# Patient Record
Sex: MALE | Race: OTHER | HISPANIC OR LATINO
[De-identification: names, ages, dates, MRNs, and addresses within clinical notes are randomized per-mention and may not be internally consistent; named-entity substitution may affect disease eponyms.]

---

## 2024-11-05 ENCOUNTER — LABORATORY RESULT (OUTPATIENT)
Age: 64
End: 2024-11-05

## 2024-11-05 ENCOUNTER — APPOINTMENT (OUTPATIENT)
Dept: FAMILY MEDICINE | Facility: HOSPITAL | Age: 64
End: 2024-11-05

## 2024-11-05 ENCOUNTER — OUTPATIENT (OUTPATIENT)
Dept: OUTPATIENT SERVICES | Facility: HOSPITAL | Age: 64
LOS: 1 days | End: 2024-11-05
Payer: MEDICARE

## 2024-11-05 VITALS
HEIGHT: 62 IN | OXYGEN SATURATION: 96 % | WEIGHT: 158 LBS | RESPIRATION RATE: 16 BRPM | DIASTOLIC BLOOD PRESSURE: 78 MMHG | SYSTOLIC BLOOD PRESSURE: 128 MMHG | HEART RATE: 95 BPM | TEMPERATURE: 97.5 F | BODY MASS INDEX: 29.08 KG/M2

## 2024-11-05 DIAGNOSIS — Z00.00 ENCOUNTER FOR GENERAL ADULT MEDICAL EXAMINATION W/OUT ABNORMAL FINDINGS: ICD-10-CM

## 2024-11-05 DIAGNOSIS — H93.19 TINNITUS, UNSPECIFIED EAR: ICD-10-CM

## 2024-11-05 DIAGNOSIS — F10.90 ALCOHOL USE, UNSPECIFIED, UNCOMPLICATED: ICD-10-CM

## 2024-11-05 DIAGNOSIS — H91.90 UNSPECIFIED HEARING LOSS, UNSPECIFIED EAR: ICD-10-CM

## 2024-11-05 DIAGNOSIS — Z00.00 ENCOUNTER FOR GENERAL ADULT MEDICAL EXAMINATION WITHOUT ABNORMAL FINDINGS: ICD-10-CM

## 2024-11-05 DIAGNOSIS — Z12.11 ENCOUNTER FOR SCREENING FOR MALIGNANT NEOPLASM OF COLON: ICD-10-CM

## 2024-11-10 PROBLEM — E78.5 HYPERLIPIDEMIA: Status: ACTIVE | Noted: 2024-11-10

## 2024-11-10 PROBLEM — R73.03 PREDIABETES: Status: ACTIVE | Noted: 2024-11-10

## 2024-11-10 LAB
ALBUMIN SERPL ELPH-MCNC: 3.3 G/DL
ALP BLD-CCNC: 200 U/L
ALT SERPL-CCNC: 14 U/L
ANION GAP SERPL CALC-SCNC: 13 MMOL/L
AST SERPL-CCNC: 39 U/L
BASOPHILS # BLD AUTO: 0.09 K/UL
BASOPHILS NFR BLD AUTO: 1.1 %
BILIRUB SERPL-MCNC: 1 MG/DL
BUN SERPL-MCNC: 6 MG/DL
CALCIUM SERPL-MCNC: 9.3 MG/DL
CHLORIDE SERPL-SCNC: 101 MMOL/L
CHOLEST SERPL-MCNC: 180 MG/DL
CO2 SERPL-SCNC: 24 MMOL/L
CREAT SERPL-MCNC: 0.84 MG/DL
EGFR: 97 ML/MIN/1.73M2
EOSINOPHIL # BLD AUTO: 0.16 K/UL
EOSINOPHIL NFR BLD AUTO: 1.9 %
ESTIMATED AVERAGE GLUCOSE: 117 MG/DL
GLUCOSE SERPL-MCNC: 90 MG/DL
HBA1C MFR BLD HPLC: 5.7 %
HCT VFR BLD CALC: 43.4 %
HCV AB SER QL: NONREACTIVE
HCV S/CO RATIO: 0.38 S/CO
HDLC SERPL-MCNC: 25 MG/DL
HGB BLD-MCNC: 14.5 G/DL
HIV1+2 AB SPEC QL IA.RAPID: NONREACTIVE
IMM GRANULOCYTES NFR BLD AUTO: 0.2 %
LDLC SERPL CALC-MCNC: 131 MG/DL
LYMPHOCYTES # BLD AUTO: 2.74 K/UL
LYMPHOCYTES NFR BLD AUTO: 32 %
MAN DIFF?: NORMAL
MCHC RBC-ENTMCNC: 31.5 PG
MCHC RBC-ENTMCNC: 33.4 G/DL
MCV RBC AUTO: 94.3 FL
MONOCYTES # BLD AUTO: 0.88 K/UL
MONOCYTES NFR BLD AUTO: 10.3 %
NEUTROPHILS # BLD AUTO: 4.68 K/UL
NEUTROPHILS NFR BLD AUTO: 54.5 %
NONHDLC SERPL-MCNC: 154 MG/DL
PLATELET # BLD AUTO: 163 K/UL
POTASSIUM SERPL-SCNC: 4.1 MMOL/L
PROT SERPL-MCNC: 8.6 G/DL
RBC # BLD: 4.6 M/UL
RBC # FLD: 13.3 %
SODIUM SERPL-SCNC: 138 MMOL/L
TRIGL SERPL-MCNC: 127 MG/DL
TSH SERPL-ACNC: 6.28 UIU/ML
WBC # FLD AUTO: 8.57 K/UL

## 2024-11-16 ENCOUNTER — APPOINTMENT (OUTPATIENT)
Dept: FAMILY MEDICINE | Facility: HOSPITAL | Age: 64
End: 2024-11-16

## 2024-11-16 ENCOUNTER — OUTPATIENT (OUTPATIENT)
Dept: OUTPATIENT SERVICES | Facility: HOSPITAL | Age: 64
LOS: 1 days | End: 2024-11-16
Payer: MEDICARE

## 2024-11-16 VITALS
DIASTOLIC BLOOD PRESSURE: 84 MMHG | HEART RATE: 86 BPM | WEIGHT: 158 LBS | TEMPERATURE: 98 F | OXYGEN SATURATION: 99 % | HEIGHT: 62 IN | SYSTOLIC BLOOD PRESSURE: 128 MMHG | RESPIRATION RATE: 16 BRPM | BODY MASS INDEX: 29.08 KG/M2

## 2024-11-16 DIAGNOSIS — F32.A ANXIETY DISORDER, UNSPECIFIED: ICD-10-CM

## 2024-11-16 DIAGNOSIS — R11.2 NAUSEA WITH VOMITING, UNSPECIFIED: ICD-10-CM

## 2024-11-16 DIAGNOSIS — Z00.00 ENCOUNTER FOR GENERAL ADULT MEDICAL EXAMINATION WITHOUT ABNORMAL FINDINGS: ICD-10-CM

## 2024-11-16 DIAGNOSIS — R10.9 UNSPECIFIED ABDOMINAL PAIN: ICD-10-CM

## 2024-11-16 DIAGNOSIS — R19.5 OTHER FECAL ABNORMALITIES: ICD-10-CM

## 2024-11-16 DIAGNOSIS — F41.9 ANXIETY DISORDER, UNSPECIFIED: ICD-10-CM

## 2024-11-16 DIAGNOSIS — E03.9 HYPOTHYROIDISM, UNSPECIFIED: ICD-10-CM

## 2024-11-16 DIAGNOSIS — K21.9 GASTRO-ESOPHAGEAL REFLUX DISEASE W/OUT ESOPHAGITIS: ICD-10-CM

## 2024-11-16 LAB — HEMOCCULT STL QL IA: POSITIVE

## 2024-11-16 PROCEDURE — G0463: CPT

## 2024-11-16 PROCEDURE — 87338 HPYLORI STOOL AG IA: CPT

## 2024-11-16 RX ORDER — FAMOTIDINE 20 MG/1
20 TABLET, FILM COATED ORAL
Qty: 30 | Refills: 2 | Status: ACTIVE | COMMUNITY
Start: 2024-11-16 | End: 1900-01-01

## 2024-11-16 RX ORDER — LEVOTHYROXINE SODIUM 0.07 MG/1
75 TABLET ORAL DAILY
Qty: 90 | Refills: 0 | Status: ACTIVE | COMMUNITY
Start: 2024-11-16 | End: 1900-01-01

## 2024-11-16 RX ORDER — ONDANSETRON 4 MG/1
4 TABLET, ORALLY DISINTEGRATING ORAL TWICE DAILY
Qty: 10 | Refills: 0 | Status: ACTIVE | COMMUNITY
Start: 2024-11-16 | End: 1900-01-01

## 2024-11-19 ENCOUNTER — APPOINTMENT (OUTPATIENT)
Dept: OTOLARYNGOLOGY | Facility: CLINIC | Age: 64
End: 2024-11-19
Payer: MEDICARE

## 2024-11-19 VITALS
RESPIRATION RATE: 16 BRPM | SYSTOLIC BLOOD PRESSURE: 124 MMHG | HEART RATE: 85 BPM | OXYGEN SATURATION: 96 % | HEIGHT: 62 IN | WEIGHT: 158 LBS | BODY MASS INDEX: 29.08 KG/M2 | TEMPERATURE: 97.3 F | DIASTOLIC BLOOD PRESSURE: 78 MMHG

## 2024-11-19 DIAGNOSIS — H91.90 UNSPECIFIED HEARING LOSS, UNSPECIFIED EAR: ICD-10-CM

## 2024-11-19 DIAGNOSIS — H93.19 TINNITUS, UNSPECIFIED EAR: ICD-10-CM

## 2024-11-19 PROCEDURE — 99202 OFFICE O/P NEW SF 15 MIN: CPT

## 2024-11-20 PROCEDURE — 82274 ASSAY TEST FOR BLOOD FECAL: CPT

## 2024-11-20 PROCEDURE — 80061 LIPID PANEL: CPT

## 2024-11-20 PROCEDURE — 36415 COLL VENOUS BLD VENIPUNCTURE: CPT

## 2024-11-20 PROCEDURE — 84439 ASSAY OF FREE THYROXINE: CPT

## 2024-11-20 PROCEDURE — 80053 COMPREHEN METABOLIC PANEL: CPT

## 2024-11-20 PROCEDURE — 86803 HEPATITIS C AB TEST: CPT

## 2024-11-20 PROCEDURE — G0439: CPT

## 2024-11-20 PROCEDURE — 90686 IIV4 VACC NO PRSV 0.5 ML IM: CPT

## 2024-11-20 PROCEDURE — 90471 IMMUNIZATION ADMIN: CPT

## 2024-11-20 PROCEDURE — 85025 COMPLETE CBC W/AUTO DIFF WBC: CPT

## 2024-11-20 PROCEDURE — 84443 ASSAY THYROID STIM HORMONE: CPT

## 2024-11-20 PROCEDURE — 87389 HIV-1 AG W/HIV-1&-2 AB AG IA: CPT

## 2024-11-20 PROCEDURE — 83036 HEMOGLOBIN GLYCOSYLATED A1C: CPT

## 2024-11-21 ENCOUNTER — NON-APPOINTMENT (OUTPATIENT)
Age: 64
End: 2024-11-21

## 2024-11-21 DIAGNOSIS — A04.8 OTHER SPECIFIED BACTERIAL INTESTINAL INFECTIONS: ICD-10-CM

## 2024-11-21 LAB — H PYLORI AG STL QL: POSITIVE

## 2024-11-21 RX ORDER — METRONIDAZOLE 500 MG/1
500 TABLET ORAL 3 TIMES DAILY
Qty: 42 | Refills: 0 | Status: ACTIVE | COMMUNITY
Start: 2024-11-21 | End: 1900-01-01

## 2024-11-21 RX ORDER — TETRACYCLINE HYDROCHLORIDE 500 MG/1
500 TABLET, FILM COATED ORAL EVERY 6 HOURS
Qty: 56 | Refills: 0 | Status: ACTIVE | COMMUNITY
Start: 2024-11-21 | End: 1900-01-01

## 2024-11-21 RX ORDER — BISMUTH SUBSALICYLATE 262 MG/1
262 TABLET, CHEWABLE ORAL 4 TIMES DAILY
Qty: 112 | Refills: 0 | Status: ACTIVE | COMMUNITY
Start: 2024-11-21 | End: 1900-01-01

## 2024-11-21 RX ORDER — PANTOPRAZOLE 40 MG/1
40 TABLET, DELAYED RELEASE ORAL
Qty: 28 | Refills: 0 | Status: ACTIVE | COMMUNITY
Start: 2024-11-21 | End: 1900-01-01

## 2024-11-22 ENCOUNTER — NON-APPOINTMENT (OUTPATIENT)
Age: 64
End: 2024-11-22

## 2024-11-25 ENCOUNTER — NON-APPOINTMENT (OUTPATIENT)
Age: 64
End: 2024-11-25

## 2024-12-03 ENCOUNTER — DOCTOR'S OFFICE (OUTPATIENT)
Facility: LOCATION | Age: 64
Setting detail: OPHTHALMOLOGY
End: 2024-12-03
Payer: MEDICARE

## 2024-12-03 ENCOUNTER — OUTPATIENT (OUTPATIENT)
Dept: OUTPATIENT SERVICES | Facility: HOSPITAL | Age: 64
LOS: 1 days | End: 2024-12-03
Payer: MEDICARE

## 2024-12-03 DIAGNOSIS — H11.153: ICD-10-CM

## 2024-12-03 DIAGNOSIS — H17.9: ICD-10-CM

## 2024-12-03 DIAGNOSIS — H25.12: ICD-10-CM

## 2024-12-03 DIAGNOSIS — H00.022: ICD-10-CM

## 2024-12-03 DIAGNOSIS — Z00.00 ENCOUNTER FOR GENERAL ADULT MEDICAL EXAMINATION WITHOUT ABNORMAL FINDINGS: ICD-10-CM

## 2024-12-03 DIAGNOSIS — H00.025: ICD-10-CM

## 2024-12-03 LAB
ALBUMIN SERPL ELPH-MCNC: 3.3 G/DL
ALP BLD-CCNC: 195 U/L
ALT SERPL-CCNC: 16 U/L
ANION GAP SERPL CALC-SCNC: 11 MMOL/L
AST SERPL-CCNC: 35 U/L
BILIRUB SERPL-MCNC: 1 MG/DL
BUN SERPL-MCNC: 10 MG/DL
CALCIUM SERPL-MCNC: 9.3 MG/DL
CHLORIDE SERPL-SCNC: 104 MMOL/L
CO2 SERPL-SCNC: 26 MMOL/L
CREAT SERPL-MCNC: 0.97 MG/DL
EGFR: 87 ML/MIN/1.73M2
GLUCOSE SERPL-MCNC: 91 MG/DL
POTASSIUM SERPL-SCNC: 4.1 MMOL/L
PROT SERPL-MCNC: 8.6 G/DL
SODIUM SERPL-SCNC: 141 MMOL/L

## 2024-12-03 PROCEDURE — 74177 CT ABD & PELVIS W/CONTRAST: CPT | Mod: 26,MH

## 2024-12-03 PROCEDURE — 99204 OFFICE O/P NEW MOD 45 MIN: CPT | Performed by: OPHTHALMOLOGY

## 2024-12-03 PROCEDURE — 74177 CT ABD & PELVIS W/CONTRAST: CPT

## 2024-12-03 ASSESSMENT — TONOMETRY
OS_IOP_MMHG: 12
OD_IOP_MMHG: 16

## 2024-12-03 ASSESSMENT — KERATOMETRY
OS_AXISANGLE_DEGREES: 023
OS_K2POWER_DIOPTERS: 44.75
OD_AXISANGLE_DEGREES: 006
OD_K1POWER_DIOPTERS: 44.00
OS_K1POWER_DIOPTERS: 43.25
OD_K2POWER_DIOPTERS: 44.75

## 2024-12-03 ASSESSMENT — REFRACTION_AUTOREFRACTION
OD_CYLINDER: +0.25
OD_AXIS: 007
OS_SPHERE: -19.25
OS_CYLINDER: +0.25
OS_AXIS: 007

## 2024-12-03 ASSESSMENT — CONFRONTATIONAL VISUAL FIELD TEST (CVF)
OS_FINDINGS: FULL
OD_FINDINGS: FULL

## 2024-12-03 ASSESSMENT — VISUAL ACUITY
OS_BCVA: 20/LP
OD_BCVA: CF@2FT

## 2024-12-03 ASSESSMENT — LID EXAM ASSESSMENTS
OD_MEIBOMITIS: RLL
OS_MEIBOMITIS: LLL

## 2024-12-10 ENCOUNTER — RESULT REVIEW (OUTPATIENT)
Age: 64
End: 2024-12-10

## 2024-12-10 ENCOUNTER — NON-APPOINTMENT (OUTPATIENT)
Age: 64
End: 2024-12-10

## 2024-12-10 DIAGNOSIS — R59.9 ENLARGED LYMPH NODES, UNSPECIFIED: ICD-10-CM

## 2024-12-10 DIAGNOSIS — K76.89 OTHER SPECIFIED DISEASES OF LIVER: ICD-10-CM

## 2024-12-10 DIAGNOSIS — K70.30 ALCOHOLIC CIRRHOSIS OF LIVER W/OUT ASCITES: ICD-10-CM

## 2024-12-11 ENCOUNTER — APPOINTMENT (OUTPATIENT)
Dept: GASTROENTEROLOGY | Facility: CLINIC | Age: 64
End: 2024-12-11
Payer: MEDICARE

## 2024-12-11 ENCOUNTER — TRANSCRIPTION ENCOUNTER (OUTPATIENT)
Age: 64
End: 2024-12-11

## 2024-12-11 VITALS
HEIGHT: 62 IN | BODY MASS INDEX: 28.71 KG/M2 | OXYGEN SATURATION: 97 % | SYSTOLIC BLOOD PRESSURE: 136 MMHG | HEART RATE: 112 BPM | DIASTOLIC BLOOD PRESSURE: 87 MMHG | TEMPERATURE: 97.5 F | RESPIRATION RATE: 16 BRPM | WEIGHT: 156 LBS

## 2024-12-11 DIAGNOSIS — R74.8 ABNORMAL LEVELS OF OTHER SERUM ENZYMES: ICD-10-CM

## 2024-12-11 DIAGNOSIS — K74.60 UNSPECIFIED CIRRHOSIS OF LIVER: ICD-10-CM

## 2024-12-11 PROBLEM — I71.40 ABDOMINAL AORTIC ANEURYSM: Status: ACTIVE | Noted: 2024-12-11

## 2024-12-11 PROCEDURE — 99204 OFFICE O/P NEW MOD 45 MIN: CPT

## 2024-12-11 RX ORDER — SODIUM SULFATE, POTASSIUM SULFATE AND MAGNESIUM SULFATE 1.6; 3.13; 17.5 G/177ML; G/177ML; G/177ML
17.5-3.13-1.6 SOLUTION ORAL
Qty: 1 | Refills: 0 | Status: ACTIVE | COMMUNITY
Start: 2024-12-11 | End: 1900-01-01

## 2024-12-18 ENCOUNTER — DOCTOR'S OFFICE (OUTPATIENT)
Facility: LOCATION | Age: 64
Setting detail: OPHTHALMOLOGY
End: 2024-12-18
Payer: MEDICARE

## 2024-12-18 DIAGNOSIS — H00.022: ICD-10-CM

## 2024-12-18 DIAGNOSIS — H17.9: ICD-10-CM

## 2024-12-18 DIAGNOSIS — H11.153: ICD-10-CM

## 2024-12-18 DIAGNOSIS — H25.13: ICD-10-CM

## 2024-12-18 DIAGNOSIS — H00.025: ICD-10-CM

## 2024-12-18 DIAGNOSIS — H25.11: ICD-10-CM

## 2024-12-18 PROBLEM — H25.12 CATARACT SENILE NUCLEAR SCLEROSIS; RIGHT EYE, LEFT EYE, BOTH EYES: Status: ACTIVE | Noted: 2024-12-18

## 2024-12-18 PROCEDURE — 99213 OFFICE O/P EST LOW 20 MIN: CPT | Performed by: OPHTHALMOLOGY

## 2024-12-18 PROCEDURE — 76519 ECHO EXAM OF EYE: CPT | Mod: 26,RT | Performed by: OPHTHALMOLOGY

## 2024-12-18 PROCEDURE — 76519 ECHO EXAM OF EYE: CPT | Mod: TC | Performed by: OPHTHALMOLOGY

## 2024-12-18 ASSESSMENT — KERATOMETRY
OS_K1POWER_DIOPTERS: 43.00
OD_K1POWER_DIOPTERS: 44.50
OS_K2POWER_DIOPTERS: 44.75
OD_K2POWER_DIOPTERS: 45.25
OD_AXISANGLE_DEGREES: 156
OS_AXISANGLE_DEGREES: 016

## 2024-12-18 ASSESSMENT — VISUAL ACUITY
OD_BCVA: CF@2FT
OS_BCVA: 20/LP

## 2024-12-18 ASSESSMENT — REFRACTION_AUTOREFRACTION
OS_SPHERE: -19.25
OD_AXIS: 005
OD_CYLINDER: +0.25
OS_AXIS: 006
OS_CYLINDER: +0.25
OD_SPHERE: -19.25

## 2024-12-18 ASSESSMENT — CONFRONTATIONAL VISUAL FIELD TEST (CVF)
OD_FINDINGS: FULL
OS_FINDINGS: FULL

## 2024-12-19 ENCOUNTER — OUTPATIENT (OUTPATIENT)
Dept: OUTPATIENT SERVICES | Facility: HOSPITAL | Age: 64
LOS: 1 days | End: 2024-12-19
Payer: MEDICARE

## 2024-12-19 ENCOUNTER — APPOINTMENT (OUTPATIENT)
Dept: NUCLEAR MEDICINE | Facility: IMAGING CENTER | Age: 64
End: 2024-12-19

## 2024-12-19 DIAGNOSIS — K76.89 OTHER SPECIFIED DISEASES OF LIVER: ICD-10-CM

## 2024-12-19 DIAGNOSIS — R59.9 ENLARGED LYMPH NODES, UNSPECIFIED: ICD-10-CM

## 2024-12-19 DIAGNOSIS — K74.60 UNSPECIFIED CIRRHOSIS OF LIVER: ICD-10-CM

## 2024-12-19 PROCEDURE — 78815 PET IMAGE W/CT SKULL-THIGH: CPT | Mod: 26,PI

## 2024-12-19 PROCEDURE — A9552: CPT

## 2024-12-19 PROCEDURE — 78815 PET IMAGE W/CT SKULL-THIGH: CPT

## 2025-01-04 ENCOUNTER — NON-APPOINTMENT (OUTPATIENT)
Age: 65
End: 2025-01-04

## 2025-01-04 DIAGNOSIS — R94.8 ABNORMAL RESULTS OF FUNCTION STUDIES OF OTHER ORGANS AND SYSTEMS: ICD-10-CM

## 2025-01-25 ENCOUNTER — NON-APPOINTMENT (OUTPATIENT)
Age: 65
End: 2025-01-25

## 2025-02-12 ENCOUNTER — NON-APPOINTMENT (OUTPATIENT)
Age: 65
End: 2025-02-12

## 2025-02-20 ENCOUNTER — APPOINTMENT (OUTPATIENT)
Dept: GASTROENTEROLOGY | Facility: HOSPITAL | Age: 65
End: 2025-02-20

## 2025-06-02 ENCOUNTER — APPOINTMENT (OUTPATIENT)
Age: 65
End: 2025-06-02

## 2025-06-02 ENCOUNTER — INPATIENT (INPATIENT)
Facility: HOSPITAL | Age: 65
LOS: 9 days | Discharge: ANOTHER HOSPITAL | DRG: 641 | End: 2025-06-12
Attending: FAMILY MEDICINE | Admitting: INTERNAL MEDICINE
Payer: MEDICARE

## 2025-06-02 ENCOUNTER — OUTPATIENT (OUTPATIENT)
Dept: OUTPATIENT SERVICES | Facility: HOSPITAL | Age: 65
LOS: 1 days | End: 2025-06-02

## 2025-06-02 VITALS
HEART RATE: 110 BPM | DIASTOLIC BLOOD PRESSURE: 69 MMHG | WEIGHT: 137 LBS | TEMPERATURE: 98 F | RESPIRATION RATE: 14 BRPM | SYSTOLIC BLOOD PRESSURE: 112 MMHG | OXYGEN SATURATION: 97 % | BODY MASS INDEX: 25.06 KG/M2

## 2025-06-02 VITALS
SYSTOLIC BLOOD PRESSURE: 117 MMHG | RESPIRATION RATE: 18 BRPM | OXYGEN SATURATION: 91 % | TEMPERATURE: 98 F | HEART RATE: 103 BPM | DIASTOLIC BLOOD PRESSURE: 73 MMHG | HEIGHT: 66 IN | WEIGHT: 136.91 LBS

## 2025-06-02 DIAGNOSIS — E87.70 FLUID OVERLOAD, UNSPECIFIED: ICD-10-CM

## 2025-06-02 DIAGNOSIS — Z00.00 ENCOUNTER FOR GENERAL ADULT MEDICAL EXAMINATION WITHOUT ABNORMAL FINDINGS: ICD-10-CM

## 2025-06-02 LAB
ALBUMIN SERPL ELPH-MCNC: 1.3 G/DL — LOW (ref 3.3–5)
ALP SERPL-CCNC: 218 U/L — HIGH (ref 40–120)
ALT FLD-CCNC: <6 U/L — LOW (ref 10–45)
AMMONIA BLD-MCNC: <10 UMOL/L — LOW (ref 11–55)
ANION GAP SERPL CALC-SCNC: 8 MMOL/L — SIGNIFICANT CHANGE UP (ref 5–17)
APTT BLD: 38.1 SEC — HIGH (ref 26.1–36.8)
AST SERPL-CCNC: 33 U/L — SIGNIFICANT CHANGE UP (ref 10–40)
BASOPHILS # BLD AUTO: 0.07 K/UL — SIGNIFICANT CHANGE UP (ref 0–0.2)
BASOPHILS NFR BLD AUTO: 0.8 % — SIGNIFICANT CHANGE UP (ref 0–2)
BILIRUB SERPL-MCNC: 1 MG/DL — SIGNIFICANT CHANGE UP (ref 0.2–1.2)
BUN SERPL-MCNC: 8 MG/DL — SIGNIFICANT CHANGE UP (ref 7–23)
CALCIUM SERPL-MCNC: 8.1 MG/DL — LOW (ref 8.4–10.5)
CHLORIDE SERPL-SCNC: 101 MMOL/L — SIGNIFICANT CHANGE UP (ref 96–108)
CO2 SERPL-SCNC: 25 MMOL/L — SIGNIFICANT CHANGE UP (ref 22–31)
CREAT SERPL-MCNC: 0.8 MG/DL — SIGNIFICANT CHANGE UP (ref 0.5–1.3)
EGFR: 98 ML/MIN/1.73M2 — SIGNIFICANT CHANGE UP
EGFR: 98 ML/MIN/1.73M2 — SIGNIFICANT CHANGE UP
EOSINOPHIL # BLD AUTO: 0.06 K/UL — SIGNIFICANT CHANGE UP (ref 0–0.5)
EOSINOPHIL NFR BLD AUTO: 0.7 % — SIGNIFICANT CHANGE UP (ref 0–6)
GLUCOSE SERPL-MCNC: 92 MG/DL — SIGNIFICANT CHANGE UP (ref 70–99)
HCT VFR BLD CALC: 31.2 % — LOW (ref 39–50)
HGB BLD-MCNC: 10.8 G/DL — LOW (ref 13–17)
IMM GRANULOCYTES NFR BLD AUTO: 0.3 % — SIGNIFICANT CHANGE UP (ref 0–0.9)
INR BLD: 1.56 RATIO — HIGH (ref 0.85–1.16)
LIDOCAIN IGE QN: 57 U/L — SIGNIFICANT CHANGE UP (ref 16–77)
LYMPHOCYTES # BLD AUTO: 1.42 K/UL — SIGNIFICANT CHANGE UP (ref 1–3.3)
LYMPHOCYTES # BLD AUTO: 15.6 % — SIGNIFICANT CHANGE UP (ref 13–44)
MAGNESIUM SERPL-MCNC: 1.7 MG/DL — SIGNIFICANT CHANGE UP (ref 1.6–2.6)
MCHC RBC-ENTMCNC: 31.6 PG — SIGNIFICANT CHANGE UP (ref 27–34)
MCHC RBC-ENTMCNC: 34.6 G/DL — SIGNIFICANT CHANGE UP (ref 32–36)
MCV RBC AUTO: 91.2 FL — SIGNIFICANT CHANGE UP (ref 80–100)
MONOCYTES # BLD AUTO: 1.03 K/UL — HIGH (ref 0–0.9)
MONOCYTES NFR BLD AUTO: 11.3 % — SIGNIFICANT CHANGE UP (ref 2–14)
NEUTROPHILS # BLD AUTO: 6.51 K/UL — SIGNIFICANT CHANGE UP (ref 1.8–7.4)
NEUTROPHILS NFR BLD AUTO: 71.3 % — SIGNIFICANT CHANGE UP (ref 43–77)
NRBC BLD AUTO-RTO: 0 /100 WBCS — SIGNIFICANT CHANGE UP (ref 0–0)
NT-PROBNP SERPL-SCNC: 372 PG/ML — HIGH (ref 0–300)
PLATELET # BLD AUTO: 380 K/UL — SIGNIFICANT CHANGE UP (ref 150–400)
POTASSIUM SERPL-MCNC: 3.7 MMOL/L — SIGNIFICANT CHANGE UP (ref 3.5–5.3)
POTASSIUM SERPL-SCNC: 3.7 MMOL/L — SIGNIFICANT CHANGE UP (ref 3.5–5.3)
PROT SERPL-MCNC: 9.1 G/DL — HIGH (ref 6–8.3)
PROTHROM AB SERPL-ACNC: 18.3 SEC — HIGH (ref 9.9–13.4)
RBC # BLD: 3.42 M/UL — LOW (ref 4.2–5.8)
RBC # FLD: 14.7 % — HIGH (ref 10.3–14.5)
SODIUM SERPL-SCNC: 134 MMOL/L — LOW (ref 135–145)
TROPONIN I, HIGH SENSITIVITY RESULT: 6.7 NG/L — SIGNIFICANT CHANGE UP
WBC # BLD: 9.12 K/UL — SIGNIFICANT CHANGE UP (ref 3.8–10.5)
WBC # FLD AUTO: 9.12 K/UL — SIGNIFICANT CHANGE UP (ref 3.8–10.5)

## 2025-06-02 PROCEDURE — 71045 X-RAY EXAM CHEST 1 VIEW: CPT | Mod: 26

## 2025-06-02 PROCEDURE — 99285 EMERGENCY DEPT VISIT HI MDM: CPT

## 2025-06-02 PROCEDURE — 93010 ELECTROCARDIOGRAM REPORT: CPT

## 2025-06-02 PROCEDURE — 99223 1ST HOSP IP/OBS HIGH 75: CPT | Mod: GC

## 2025-06-02 PROCEDURE — 93970 EXTREMITY STUDY: CPT | Mod: 26

## 2025-06-02 RX ORDER — MELATONIN 5 MG
3 TABLET ORAL AT BEDTIME
Refills: 0 | Status: DISCONTINUED | OUTPATIENT
Start: 2025-06-02 | End: 2025-06-12

## 2025-06-02 RX ORDER — FUROSEMIDE 10 MG/ML
40 INJECTION INTRAMUSCULAR; INTRAVENOUS ONCE
Refills: 0 | Status: COMPLETED | OUTPATIENT
Start: 2025-06-02 | End: 2025-06-02

## 2025-06-02 RX ORDER — MAGNESIUM, ALUMINUM HYDROXIDE 200-200 MG
30 TABLET,CHEWABLE ORAL EVERY 4 HOURS
Refills: 0 | Status: DISCONTINUED | OUTPATIENT
Start: 2025-06-02 | End: 2025-06-12

## 2025-06-02 RX ORDER — B1/B2/B3/B5/B6/B12/VIT C/FOLIC 500-0.5 MG
1 TABLET ORAL DAILY
Refills: 0 | Status: DISCONTINUED | OUTPATIENT
Start: 2025-06-02 | End: 2025-06-12

## 2025-06-02 RX ORDER — ONDANSETRON HCL/PF 4 MG/2 ML
4 VIAL (ML) INJECTION EVERY 8 HOURS
Refills: 0 | Status: DISCONTINUED | OUTPATIENT
Start: 2025-06-02 | End: 2025-06-12

## 2025-06-02 RX ADMIN — Medication 500 MILLILITER(S): at 18:58

## 2025-06-02 RX ADMIN — Medication 500 MILLILITER(S): at 20:00

## 2025-06-02 RX ADMIN — FUROSEMIDE 40 MILLIGRAM(S): 10 INJECTION INTRAMUSCULAR; INTRAVENOUS at 21:20

## 2025-06-02 NOTE — ED ADULT NURSE NOTE - NSFALLHARMRISKINTERV_ED_ALL_ED

## 2025-06-02 NOTE — ED PROVIDER NOTE - PHYSICAL EXAMINATION
VITAL SIGNS: I have reviewed nursing notes and confirm.   GEN: Well-developed; well-nourished; in no acute distress. Speaking full sentences.  SKIN: Warm, pink, no rash, no diaphoresis, no cyanosis, well perfused.   HEAD: Normocephalic; atraumatic.    NECK: Supple; non tender. Full range of motion. (+) nonmeningitic.  EYES: Pupils 3mm equal, round, reactive to light and accomodation, conjunctiva and sclera clear. Extra-ocular movements intact bilaterally.  ENT: No nasal discharge; airway clear. Trachea is midline.    CV: (+) tachycardia, S1, S2 normal; no murmurs, gallops, or rubs. Capillary refill < 2 seconds throughout. Distal pulses intact 2+ throughout. (+) BL LE pitting edema up to knees.  RESP: CTA bilaterally. No wheezes, rales, or rhonchi.   ABD: Normal bowel sounds, soft, (+) tense, (+) moderately distended, no rebound, no guarding, no rigidity   MSK: Normal range of motion and movement of all 4 extremities. No apparent joint or muscular pain throughout.    BACK: No thoracolumbar midline or paravertebral tenderness.    NEURO: Alert & oriented x 3, Grossly unremarkable. Sensory and motor intact throughout. No focal deficits.  Normal speech and coordination.

## 2025-06-02 NOTE — ED ADULT NURSE REASSESSMENT NOTE - NS ED NURSE REASSESS COMMENT FT1
Patient received from previous RN. Offering no complaints at this time. US in progress. Pending CT scans. Vitals as per flow sheet. Safety maintained. Comfort provided.

## 2025-06-02 NOTE — ED PROVIDER NOTE - PROGRESS NOTE DETAILS
pt wont lie flat for ct. gets very anxious and says he cant breath. doubt PE - h and p and CXR consistent with pleural effusions likely from etoh cirrhosis. admitted to med for thearaputic tap and reassessment for need ct ct chest is hypoxia doesn't improve after that. pt well appearing. no resp distress. currently on NC at 2LPM spo2 94%. pt looks cmfortable at this time.

## 2025-06-02 NOTE — ED PROVIDER NOTE - EKG/XRAY ADDITIONAL INFORMATION
sinus tachycardia at 101 bpm, WI interval at 132 ms, QRS at 78 ms, QTc at 466 ms, there are no ST elevations or ST depressions.

## 2025-06-02 NOTE — H&P ADULT - HISTORY OF PRESENT ILLNESS
Medication Name: LYRICA   Last office visit: 11-2-21  Next office visit: 1-10-22  Last refill: 11-23-21  Is patient due for refill: Yes   63 y/o male with PMHx of HLD, liver cirrhosis 2/2 ETOH use disorder, positive FIT test (cancelled colonoscopy 1/2025), Hpylori + 11/16/24 (treatment???***), GERD, hyperlipidemia, hypothyroidism sent from family medicine practice for shortness of breath today.  Pt had lost to follow up at clinic [place]. Per daughter at the bedside been reporting progressively worsening shortness of breath associated with abdominal distention, bilateral lower extremity worsening pitting edema  Denies  chest pain, syncope, lightheadedness, headaches, falls or nausea/vomiting/diarrhea, confusion, visual complaints, neck pain/stiffness, subjective neurological deficits, numbness/tingling.    ED course: VS T 97.5*F, , /73, RR 18, SpO2 91%  s/p NaCl 500mL  Labs: hgb 10.8, Hct 31.2, Na 134, Ca 8.1, albumin 1.3, Alk phosph 218, proBNP 372  EKG: accelerated junctional rhythm, rate 101 bpm, QTc 464ms    b/l duplex: No evidence of deep venous thrombosis in either lower extremity.  CXR showing right moderate pleural effusion and small left pleural effusion. 65 y/o male with PMHx of HLD, liver cirrhosis 2/2 ETOH use disorder (stopped drinking 1-2 yeas ago), positive FIT test (cancelled colonoscopy 1/2025), Hpylori + 11/16/24 (did not complete treatment), GERD, hyperlipidemia, hypothyroidism sent from family medicine practice for shortness of breath today.  Pt had lost to follow up at clinic as he left to [facility] in 12/2024. CT abd/pelvis ordered 12/10/2024 for abdominal pain showed cirrhotic changes of liver, abdominal aortic aneurysm, portacaval and peripancreatic lymph nodes, and  small nodule adjacent to R liver for which PET CT was recommended.  Per daughter Ada, pt returned from [facility] 6/1/2025 where he has seeing a natural medicine doctor and was taking various meds {epadex (B12, folate,oemga3), sadur, colitisan, vivitar, rodim (domperidone/rabeprazole)} along with torsemide 10mg BID. Daughter says he was receiving some injections but does not know what they were. Patient is poor historian but notes worsening shortness of breath, abdominal distention, bilateral lower extremity pitting edema for the past 1 wk. Denies chest pain, syncope, lightheadedness, headaches, falls or nausea/vomiting/diarrhea, confusion, visual complaints, neck pain/stiffness, subjective neurological deficits, numbness/tingling.    ED course: VS T 97.5*F, , /73, RR 18, SpO2 91% >97% on 4 L NC  s/p NaCl 500mL, IV lasix 40mg    Labs: hgb 10.8, Hct 31.2, Na 134, Ca 8.1, albumin 1.3, Alk phosph 218, proBNP 372  EKG: accelerated junctional rhythm, rate 101 bpm, QTc 464ms    b/l duplex: No evidence of deep venous thrombosis in either lower extremity.    CXR showing right moderate pleural effusion and small left pleural effusion.    Preliminary CT chest/abd/pelvisresult: No obvious PE to the level of the segmental pulmonary arteries. Difficult to assess subsegmental pulmonary arteries at the left lung base. Large right and moderate to large left pleural effusion with atelectasis. Nonspecific interlobar septal thickening. Cirrhosis and portal hypertension. Moderate to large ascites. Cholelithiasis. Nonspecific small and large bowel wall thickening in the setting of ascites/cirrhosis. Official report to follow.

## 2025-06-02 NOTE — ED PROVIDER NOTE - CLINICAL SUMMARY MEDICAL DECISION MAKING FREE TEXT BOX
64-year-old male with a past medical history of alcohol dependence complicated with liver cirrhosis, GERD, H. pylori infection, hyperlipidemia, hypothyroidism, sent from family medicine practice for shortness of breath today.  Per daughter at the bedside been reporting progressively worsening shortness of breath associated with abdominal distention.   The patient also has been noticing bilateral lower extremity worsening pitting edema bilaterally. Patient denies any chest pain or syncope or lightheadedness or headaches or falls or nausea/vomiting or diarrhea or confusion or visual complaints or neck pain/stiffness or subjective neurological deficits or numbness/tingling    The differential diagnosis includes but is not limited to: ascites from cirrhosis requiring therapeutic tap, rule out PE, pna, viral illness, atypical acs.    AP: 64-year-old male with a past medical history of alcohol dependence complicated with liver cirrhosis, GERD, H. pylori infection, hyperlipidemia, hypothyroidism, sent from family medicine practice for shortness of breath, worsening abdominal distention, worsening bl le pitting edema today. Vitals (+)tachycardia, 99% on RA, BP normotensive. No airway compromise and speaking full sentences. Mentating well, no confusion.  - cbc/cmp, coags, trop, bnp, IVF gentle hydration  - doubt sbp as he is afebrile and no abdominal ttp.  - CTA chest and CT AP for evaluation rule out pe and ascites.   - re-evaluation and disposition accoridngly, likely admit for therapeutic peritoneal tap. 64-year-old male with a past medical history of alcohol dependence complicated with liver cirrhosis, GERD, H. pylori infection, hyperlipidemia, hypothyroidism, sent from family medicine practice for shortness of breath today.  Per daughter at the bedside been reporting progressively worsening shortness of breath associated with abdominal distention.   The patient also has been noticing bilateral lower extremity worsening pitting edema bilaterally. Patient denies any chest pain or syncope or lightheadedness or headaches or falls or nausea/vomiting or diarrhea or confusion or visual complaints or neck pain/stiffness or subjective neurological deficits or numbness/tingling    The differential diagnosis includes but is not limited to: ascites from cirrhosis requiring therapeutic tap, rule out PE, pna, viral illness, atypical acs.    AP: 64-year-old male with a past medical history of alcohol dependence complicated with liver cirrhosis, GERD, H. pylori infection, hyperlipidemia, hypothyroidism, sent from family medicine practice for shortness of breath, worsening abdominal distention, worsening bl le pitting edema today. Vitals (+)tachycardia, 99% on RA, BP normotensive. No airway compromise and speaking full sentences. Mentating well, no confusion.  - cbc/cmp, coags, trop, bnp, IVF gentle hydration  - doubt sbp as he is afebrile and no abdominal ttp.  - CTA chest and CT AP for evaluation rule out pe and ascites.   - re-evaluation and disposition accoridngly, likely admit for therapeutic peritoneal tap.    715pm CXR showing right moderate pleural effusion and small left pleural effusion.

## 2025-06-02 NOTE — ED ADULT TRIAGE NOTE - CHIEF COMPLAINT QUOTE
pt BIB EMS from PCP office for c/o SOB x 3 days, pt also with c.o abd distention, pain and B/L LE swelling. Per PCP pt with suspected colon CA with mets to lung and suspecting volume overload at this time. O2 sat 91% on arrival to ED.

## 2025-06-02 NOTE — H&P ADULT - ASSESSMENT
65 y/o male with PMHx of HLD, liver cirrhosis 2/2 ETOH use disorder, positive FIT test (cancelled colonoscopy 1/2025), Hpylori + 11/16/24 (treatment???***), GERD, hyperlipidemia, hypothyroidism is admitted for      #decompensated alcoholic cirrhosis  #SOB  #b/l LE edema  - Consider RUQ US  - Ammonia level <10  - Monitor Is and Os, daily weight  - Supplementation with thiamine, MVI, and folate****  - CIWA protocol***  - follow up B12, folate levels  - MELD-Na __.   - spironolactone ***  - Monitor LFTs and coags daily   - GI consult ***    #hypothyroidism  - ordered TSH  - c/w levothyroxine 75mcg    #HLD    #DVT ppx  - SCDs for possible thoracentesis tomorrow    #GOC  - Full code ?    Case seen and discussed with Dr. Pascual 63 y/o male with PMHx of HLD, liver cirrhosis 2/2 ETOH use disorder, positive FIT test (cancelled colonoscopy 1/2025), Hpylori + 11/16/24 did not complete treatment, GERD, hyperlipidemia, hypothyroidism is admitted for      #decompensated alcoholic cirrhosis  #SOB  #b/l LE edema  - Ammonia level <10  - Na 134  - pro   - Monitor Is and Os, daily weight  - follow up B12, folate levels  - MELD-Na __  - consider spironolactone ****  - Monitor LFTs and coags daily   - b/l duplex: No evidence of deep venous thrombosis in either lower extremity.  - ordered abdominal US  - GI consult     #hypothyroidism  - ordered TSH  - c/w levothyroxine 75mcg    #HLD    #DVT ppx  - SCDs for possible thoracentesis tomorrow    #GOC  - Full code     Case seen and discussed with Dr. Pascual 65 y/o male with PMHx of HLD, liver cirrhosis 2/2 ETOH use disorder (stopped drinking 1-2 yeas ago), positive FIT test (cancelled colonoscopy 1/2025), Hpylori + 11/16/24 (did not complete treatment), GERD, hyperlipidemia, hypothyroidism is admitted for:      #Decompensated alcoholic cirrhosis versus malignant ascites (positive FIT,   #Acute Hypoxic respiratory failure secondary to b/l pleural effusions  #b/l LE edema  - s/p IV lasix 40mg in the ED with 1800cc output  - INR 1.56, albumin 1.3, Na 134,  pro , Ammonia level <10  - MELD-Na 11  - c/w oral daily lasix 40mg   - follow up AM labs, cbc, cmp, Mg, phosph  - consider spironolactone  - Monitor Is and Os, daily weight  - follow up B12, folate levels  - Monitor LFTs and coags daily   - b/l duplex: No evidence of deep venous thrombosis in either lower extremity.  - CXR showing right moderate pleural effusion and small left pleural effusion.  - CT abd/pelvis 12/10/2024 showed cirrhotic changes of liver, abdominal aortic aneurysm, portacaval and peripancreatic lymph nodes, and  small nodule adjacent to R liver for which PET CT was recommended. PET 12/19/2024 showed: Multiple hypermetabolic mediastinal and bilateral hilar lymph nodes, with the largest lesion adjacent to the descending thoracic aorta. Right level 2 neck lymph node with increased FDG uptake. Two periportal lymph nodes with increased FDG uptake, suspicious for malignant involvement. 3.8 cm infrarenal abdominal aortic aneurysm.  - ***Preliminary CT chest/abd/pelvis 6/2/25: No obvious PE to the level of the segmental pulmonary arteries. Difficult to assess subsegmental pulmonary arteries at the left lung base. Large right and moderate to large left pleural effusion with atelectasis. Nonspecific interlobar septal thickening. Cirrhosis and portal hypertension. Moderate to large ascites. Cholelithiasis. Nonspecific small and large bowel wall thickening in the setting of ascites/cirrhosis. Official report to follow.  - ordered abdominal US   - GI consult   - Pulm consult     #hypothyroidism  - TSH 6.28 11/2024  - pt was prescribed levothyroxine 75mcg but has not been taking med  - ordered TSH, follow up result and start dose accordingly    #HLD  - lipid panel 11/2024: 11/2024: total 180, HDL 25, [id number]  - was prescribed atorvastatin 20mg but has not been taking it  - ordered lipid panel     #DVT ppx  - SCDs for possible thoracentesis/paracentesis    #GOC  - Full code     Case seen and discussed with Dr. Pascual

## 2025-06-02 NOTE — H&P ADULT - ATTENDING COMMENTS
I have personally seen and examined patient on the above date.  I discussed the case with Dr Jay and I agree with findings and plan as detailed per note above, which I have amended where appropriate.    Superseding the above.  64 Vietnamese speaking M with hx of HLD, hypothyroid, AAA, hx of ETOH use d/o with liver cirrhosis (but no known prior ascites, hepatic encephalopathy, variceal bleed), hx of recent +H pylori which he did not take tmt for, hx of +FIT and hilar adenopathy but had not followed up as he went back to [facility]. He returned to [place] about 2 days ago reporting SOB, abd distention and LE edema for the past 2 weeks. Pt is extremely poor historian. Denied any fevers, chills, cough, CP, dysuria.   In ED afebrile P: 103 BP: 117/73 sat 91% on RA sat 100% on 2L/NC  NAD  CV;S1S2, tachy, no mgr  CL; decreased BS at R Base  Abd; tense ascites, +BS  Ext; bl LE edema to thighs  Labs;   WBC: 9.12 hgb: 10.8 plt; 380 71%N INR: 1.56 ammonia: <10 lip; 57 BUN/cr: 8/0,8 TB: 1 alb: 1.3 AP; 218 AST/ALT: 33/<6 M.7   trop: 6.7 BNP: 373     EKG: peronsally rev. sinus tachy at 101 bpm.   CXR: personally rev. Bl pleural effusions R>L  CT chest abd pelvis: PENDING     AP:   64 Vietnamese speaking M with hx of HLD, hypothyroid, AAA, hx of ETOH use d/o with liver cirrhosis with new onset ascites and bl pleural effusions with AHRF    #Ascites likely sec to liver cirrhosis sec to ETOH (elev INR, low alb) vs less likely malignant ascites with recent +FIT and hilar adenopathy  Check abd US for localization for paracentesis.  GI consult for diagnostic/therapeutic paracentesis.   #AHRF sec to bl pleural effusions.   s/p IV Lasix 40mg in ED with 1800 cc urine outpt  monitor lytes and replete.   pulm consult for possible thoracocentesis   #Hypothyroid  has not been taking levothyroxine  check TFTs.   #DVT/GI proph  #GOC  Pt is full code.     Kaden KAPLAN rev  D/W Dr Thomas, ED.

## 2025-06-02 NOTE — ED PROVIDER NOTE - OBJECTIVE STATEMENT
64-year-old male with a past medical history of alcohol dependence complicated with liver cirrhosis, GERD, H. pylori infection, hyperlipidemia, hypothyroidism, sent from family medicine practice for shortness of breath today.  Per daughter at the bedside been reporting progressively worsening shortness of breath associated with abdominal distention.   The patient also has been noticing bilateral lower extremity worsening pitting edema bilaterally. Patient denies any chest pain or syncope or lightheadedness or headaches or falls or nausea/vomiting or diarrhea or confusion or visual complaints or neck pain/stiffness or subjective neurological deficits or numbness/tingling.

## 2025-06-02 NOTE — H&P ADULT - NSHPPHYSICALEXAM_GEN_ALL_CORE
Vitals  T(F): 98.4 (06-03-25 @ 01:00), Max: 98.4 (06-03-25 @ 01:00)  HR: 114 (06-03-25 @ 03:00) (100 - 114)  BP: 109/80 (06-03-25 @ 03:00) (100/72 - 125/83)  RR: 20 (06-03-25 @ 03:00) (18 - 23)  SpO2: 94% (06-03-25 @ 03:00) (91% - 100%)    PHYSICAL EXAM   Gen: NAD, mouth breathing, on 5LNC  HEENT: head atraumatic and normocephalic, PEERLA, EOMI,  mucous membranes moist, no oral lesions, no JVD  CVS: +s1, s2, tachycardic, no murmurs  Pulmonary: decreased breath sounds on RLL  Abdomen: tense ascites, tender distended abdomen, +bowel sounds in all 4 quadrants  Back: no point tenderness, no CVA tenderness   Extremities: b/l pitting edema up to above knee, pedal pulses palpable, <2 sec. cap refill   Skin: warm and dry, no wounds   Neuro: AAOx3

## 2025-06-02 NOTE — H&P ADULT - NSICDXPASTMEDICALHX_GEN_ALL_CORE_FT
PAST MEDICAL HISTORY:  ALC (alcoholic liver cirrhosis)     GERD (gastroesophageal reflux disease)     Hyperlipidemia     Hypothyroidism     Positive FIT (fecal immunochemical test)

## 2025-06-03 DIAGNOSIS — I71.40 ABDOMINAL AORTIC ANEURYSM, WITHOUT RUPTURE, UNSPECIFIED: ICD-10-CM

## 2025-06-03 DIAGNOSIS — R18.8 OTHER ASCITES: ICD-10-CM

## 2025-06-03 DIAGNOSIS — K74.60 UNSPECIFIED CIRRHOSIS OF LIVER: ICD-10-CM

## 2025-06-03 DIAGNOSIS — R94.8 ABNORMAL RESULTS OF FUNCTION STUDIES OF OTHER ORGANS AND SYSTEMS: ICD-10-CM

## 2025-06-03 LAB
A1C WITH ESTIMATED AVERAGE GLUCOSE RESULT: 5.1 % — SIGNIFICANT CHANGE UP (ref 4–5.6)
ALBUMIN SERPL ELPH-MCNC: 1.1 G/DL — LOW (ref 3.3–5)
ALP SERPL-CCNC: 195 U/L — HIGH (ref 40–120)
ALT FLD-CCNC: 12 U/L — SIGNIFICANT CHANGE UP (ref 10–45)
ANION GAP SERPL CALC-SCNC: 10 MMOL/L — SIGNIFICANT CHANGE UP (ref 5–17)
APTT BLD: 39.5 SEC — HIGH (ref 26.1–36.8)
AST SERPL-CCNC: 34 U/L — SIGNIFICANT CHANGE UP (ref 10–40)
BASOPHILS # BLD AUTO: 0.05 K/UL — SIGNIFICANT CHANGE UP (ref 0–0.2)
BASOPHILS NFR BLD AUTO: 0.6 % — SIGNIFICANT CHANGE UP (ref 0–2)
BILIRUB SERPL-MCNC: 0.9 MG/DL — SIGNIFICANT CHANGE UP (ref 0.2–1.2)
BUN SERPL-MCNC: 10 MG/DL — SIGNIFICANT CHANGE UP (ref 7–23)
CALCIUM SERPL-MCNC: 8.1 MG/DL — LOW (ref 8.4–10.5)
CHLORIDE SERPL-SCNC: 101 MMOL/L — SIGNIFICANT CHANGE UP (ref 96–108)
CHOLEST SERPL-MCNC: 91 MG/DL — SIGNIFICANT CHANGE UP
CO2 SERPL-SCNC: 23 MMOL/L — SIGNIFICANT CHANGE UP (ref 22–31)
CREAT SERPL-MCNC: 0.89 MG/DL — SIGNIFICANT CHANGE UP (ref 0.5–1.3)
EGFR: 95 ML/MIN/1.73M2 — SIGNIFICANT CHANGE UP
EGFR: 95 ML/MIN/1.73M2 — SIGNIFICANT CHANGE UP
EOSINOPHIL # BLD AUTO: 0.04 K/UL — SIGNIFICANT CHANGE UP (ref 0–0.5)
EOSINOPHIL NFR BLD AUTO: 0.5 % — SIGNIFICANT CHANGE UP (ref 0–6)
ESTIMATED AVERAGE GLUCOSE: 100 MG/DL — SIGNIFICANT CHANGE UP (ref 68–114)
FOLATE SERPL-MCNC: 7.8 NG/ML — SIGNIFICANT CHANGE UP
GLUCOSE SERPL-MCNC: 69 MG/DL — LOW (ref 70–99)
HCT VFR BLD CALC: 31.8 % — LOW (ref 39–50)
HDLC SERPL-MCNC: 12 MG/DL — LOW
HGB BLD-MCNC: 10.6 G/DL — LOW (ref 13–17)
IMM GRANULOCYTES NFR BLD AUTO: 0.5 % — SIGNIFICANT CHANGE UP (ref 0–0.9)
INR BLD: 1.58 RATIO — HIGH (ref 0.85–1.16)
LDLC SERPL-MCNC: 64 MG/DL — SIGNIFICANT CHANGE UP
LIPID PNL WITH DIRECT LDL SERPL: 64 MG/DL — SIGNIFICANT CHANGE UP
LYMPHOCYTES # BLD AUTO: 1.25 K/UL — SIGNIFICANT CHANGE UP (ref 1–3.3)
LYMPHOCYTES # BLD AUTO: 14.9 % — SIGNIFICANT CHANGE UP (ref 13–44)
MAGNESIUM SERPL-MCNC: 1.6 MG/DL — SIGNIFICANT CHANGE UP (ref 1.6–2.6)
MCHC RBC-ENTMCNC: 30.6 PG — SIGNIFICANT CHANGE UP (ref 27–34)
MCHC RBC-ENTMCNC: 33.3 G/DL — SIGNIFICANT CHANGE UP (ref 32–36)
MCV RBC AUTO: 91.9 FL — SIGNIFICANT CHANGE UP (ref 80–100)
MELD SCORE WITH DIALYSIS: 26 POINTS — SIGNIFICANT CHANGE UP
MELD SCORE WITHOUT DIALYSIS: 15 POINTS — SIGNIFICANT CHANGE UP
MONOCYTES # BLD AUTO: 0.97 K/UL — HIGH (ref 0–0.9)
MONOCYTES NFR BLD AUTO: 11.6 % — SIGNIFICANT CHANGE UP (ref 2–14)
NEUTROPHILS # BLD AUTO: 6.04 K/UL — SIGNIFICANT CHANGE UP (ref 1.8–7.4)
NEUTROPHILS NFR BLD AUTO: 71.9 % — SIGNIFICANT CHANGE UP (ref 43–77)
NONHDLC SERPL-MCNC: 79 MG/DL — SIGNIFICANT CHANGE UP
NRBC BLD AUTO-RTO: 0 /100 WBCS — SIGNIFICANT CHANGE UP (ref 0–0)
PHOSPHATE SERPL-MCNC: 4.6 MG/DL — HIGH (ref 2.5–4.5)
PLATELET # BLD AUTO: 365 K/UL — SIGNIFICANT CHANGE UP (ref 150–400)
POTASSIUM SERPL-MCNC: 3.8 MMOL/L — SIGNIFICANT CHANGE UP (ref 3.5–5.3)
POTASSIUM SERPL-SCNC: 3.8 MMOL/L — SIGNIFICANT CHANGE UP (ref 3.5–5.3)
PROT SERPL-MCNC: 8.7 G/DL — HIGH (ref 6–8.3)
PROTHROM AB SERPL-ACNC: 18.6 SEC — HIGH (ref 9.9–13.4)
RBC # BLD: 3.46 M/UL — LOW (ref 4.2–5.8)
RBC # FLD: 14.9 % — HIGH (ref 10.3–14.5)
SODIUM SERPL-SCNC: 134 MMOL/L — LOW (ref 135–145)
TRIGL SERPL-MCNC: 68 MG/DL — SIGNIFICANT CHANGE UP
TSH SERPL-MCNC: 5.31 UIU/ML — HIGH (ref 0.36–3.74)
VIT B12 SERPL-MCNC: >2000 PG/ML — HIGH (ref 232–1245)
WBC # BLD: 8.39 K/UL — SIGNIFICANT CHANGE UP (ref 3.8–10.5)
WBC # FLD AUTO: 8.39 K/UL — SIGNIFICANT CHANGE UP (ref 3.8–10.5)

## 2025-06-03 PROCEDURE — 99223 1ST HOSP IP/OBS HIGH 75: CPT | Mod: FS

## 2025-06-03 PROCEDURE — 99233 SBSQ HOSP IP/OBS HIGH 50: CPT | Mod: GC

## 2025-06-03 PROCEDURE — 74177 CT ABD & PELVIS W/CONTRAST: CPT | Mod: 26

## 2025-06-03 PROCEDURE — 70450 CT HEAD/BRAIN W/O DYE: CPT | Mod: 26

## 2025-06-03 PROCEDURE — 71275 CT ANGIOGRAPHY CHEST: CPT | Mod: 26

## 2025-06-03 PROCEDURE — 76700 US EXAM ABDOM COMPLETE: CPT | Mod: 26

## 2025-06-03 RX ORDER — PIPERACILLIN-TAZO-DEXTROSE,ISO 3.375G/5
3.38 IV SOLUTION, PIGGYBACK PREMIX FROZEN(ML) INTRAVENOUS ONCE
Refills: 0 | Status: COMPLETED | OUTPATIENT
Start: 2025-06-03 | End: 2025-06-03

## 2025-06-03 RX ORDER — LEVOTHYROXINE SODIUM 300 MCG
75 TABLET ORAL DAILY
Refills: 0 | Status: DISCONTINUED | OUTPATIENT
Start: 2025-06-03 | End: 2025-06-12

## 2025-06-03 RX ORDER — CEFTRIAXONE 500 MG/1
2000 INJECTION, POWDER, FOR SOLUTION INTRAMUSCULAR; INTRAVENOUS EVERY 24 HOURS
Refills: 0 | Status: DISCONTINUED | OUTPATIENT
Start: 2025-06-03 | End: 2025-06-09

## 2025-06-03 RX ORDER — FUROSEMIDE 10 MG/ML
40 INJECTION INTRAMUSCULAR; INTRAVENOUS DAILY
Refills: 0 | Status: DISCONTINUED | OUTPATIENT
Start: 2025-06-03 | End: 2025-06-07

## 2025-06-03 RX ORDER — SPIRONOLACTONE 25 MG
100 TABLET ORAL DAILY
Refills: 0 | Status: DISCONTINUED | OUTPATIENT
Start: 2025-06-03 | End: 2025-06-04

## 2025-06-03 RX ORDER — PIPERACILLIN-TAZO-DEXTROSE,ISO 3.375G/5
3.38 IV SOLUTION, PIGGYBACK PREMIX FROZEN(ML) INTRAVENOUS EVERY 8 HOURS
Refills: 0 | Status: DISCONTINUED | OUTPATIENT
Start: 2025-06-03 | End: 2025-06-03

## 2025-06-03 RX ADMIN — Medication 200 GRAM(S): at 02:28

## 2025-06-03 RX ADMIN — FUROSEMIDE 40 MILLIGRAM(S): 10 INJECTION INTRAMUSCULAR; INTRAVENOUS at 05:27

## 2025-06-03 RX ADMIN — Medication 25 GRAM(S): at 05:26

## 2025-06-03 RX ADMIN — Medication 1 TABLET(S): at 13:36

## 2025-06-03 NOTE — CONSULT NOTE ADULT - SUBJECTIVE AND OBJECTIVE BOX
Time of visit:    CHIEF COMPLAINT: Patient is a 64y old  Male who presents with a chief complaint of ascites secondary to cirrhosis, sob, b/l LE edema (03 Jun 2025 08:23)      HPI:  63 y/o male with PMHx of HLD, liver cirrhosis 2/2 ETOH use disorder (stopped drinking 1-2 yeas ago), positive FIT test (cancelled colonoscopy 1/2025), Hpylori + 11/16/24 (did not complete treatment), GERD, hyperlipidemia, hypothyroidism sent from family medicine practice for shortness of breath today.  Pt had lost to follow up at clinic as he left to [facility] in 12/2024. CT abd/pelvis ordered 12/10/2024 for abdominal pain showed cirrhotic changes of liver, abdominal aortic aneurysm, portacaval and peripancreatic lymph nodes, and  small nodule adjacent to R liver for which PET CT was recommended.  Per daughter Ada, pt returned from [facility] 6/1/2025 where he has seeing a natural medicine doctor and was taking various meds epadex (B12, folate,oemga3), sadur, colitisan, vivitar, rodim (domperidone/rabeprazole) along with torsemide 10mg BID. Daughter says he was receiving some injections but does not know what they were. Patient is poor historian but notes worsening shortness of breath, abdominal distention, bilateral lower extremity pitting edema for the past 1 wk. Denies chest pain, syncope, lightheadedness, headaches, falls or nausea/vomiting/diarrhea, confusion, visual complaints, neck pain/stiffness, subjective neurological deficits, numbness/tingling.    ED course: VS T 97.5*F, , /73, RR 18, SpO2 91% >97% on 4 L NC  s/p NaCl 500mL, IV lasix 40mg    Labs: hgb 10.8, Hct 31.2, Na 134, Ca 8.1, albumin 1.3, Alk phosph 218, proBNP 372  EKG: accelerated junctional rhythm, rate 101 bpm, QTc 464ms    b/l duplex: No evidence of deep venous thrombosis in either lower extremity.    CXR showing right moderate pleural effusion and small left pleural effusion.    Preliminary CT chest/abd/pelvisresult: No obvious PE to the level of the segmental pulmonary arteries. Difficult to assess subsegmental pulmonary arteries at the left lung base. Large right and moderate to large left pleural effusion with atelectasis. Nonspecific interlobar septal thickening. Cirrhosis and portal hypertension. Moderate to large ascites. Cholelithiasis. Nonspecific small and large bowel wall thickening in the setting of ascites/cirrhosis. Official report to follow. (02 Jun 2025 21:35)   Patient seen and examined.     PAST MEDICAL & SURGICAL HISTORY:  ALC (alcoholic liver cirrhosis)      Hyperlipidemia      Positive FIT (fecal immunochemical test)      GERD (gastroesophageal reflux disease)      Hypothyroidism          Allergies    No Known Allergies    Intolerances        MEDICATIONS  (STANDING):  furosemide    Tablet 40 milliGRAM(s) Oral daily  multivitamin 1 Tablet(s) Oral daily  piperacillin/tazobactam IVPB.. 3.375 Gram(s) IV Intermittent every 8 hours      MEDICATIONS  (PRN):  aluminum hydroxide/magnesium hydroxide/simethicone Suspension 30 milliLiter(s) Oral every 4 hours PRN Dyspepsia  melatonin 3 milliGRAM(s) Oral at bedtime PRN Insomnia  ondansetron Injectable 4 milliGRAM(s) IV Push every 8 hours PRN Nausea and/or Vomiting   Medications up to date at time of exam.    Medications up to date at time of exam.    FAMILY HISTORY:      SOCIAL HISTORY  Smoking History: [   ] smoking/smoke exposure, [   ] former smoker  Living Condition: [   ] apartment, [   ] private house  Work History:   Travel History: denies recent travel  Illicit Substance Use: denies  Alcohol Use: denies    REVIEW OF SYSTEMS:    CONSTITUTIONAL:  denies fevers, chills, sweats, weight loss    HEENT:  denies diplopia or blurred vision, sore throat or runny nose.    CARDIOVASCULAR:  denies pressure, squeezing, tightness, or heaviness about the chest; no palpitations.    RESPIRATORY:  denies SOB, cough, SORIA, wheezing.    GASTROINTESTINAL:  denies abdominal pain, nausea, vomiting or diarrhea.    GENITOURINARY: denies dysuria, frequency or urgency.    NEUROLOGIC:  denies numbness, tingling, seizures or weakness.    PSYCHIATRIC:  denies disorder of thought or mood.    MSK: denies swelling, redness      PHYSICAL EXAMINATION:    GENERAL: The patient  in no apparent distress.     Vital Signs Last 24 Hrs  T(C): 36.9 (03 Jun 2025 01:00), Max: 36.9 (03 Jun 2025 01:00)  T(F): 98.4 (03 Jun 2025 01:00), Max: 98.4 (03 Jun 2025 01:00)  HR: 115 (03 Jun 2025 05:00) (100 - 115)  BP: 104/77 (03 Jun 2025 05:00) (100/72 - 125/83)  BP(mean): --  RR: 24 (03 Jun 2025 05:00) (18 - 24)  SpO2: 95% (03 Jun 2025 05:00) (91% - 100%)    Parameters below as of 03 Jun 2025 05:00  Patient On (Oxygen Delivery Method): nasal cannula  O2 Flow (L/min): 4     (if applicable)    Chest Tube (if applicable)    HEENT: Head is normocephalic and atraumatic. Extraocular muscles are intact. Mucous membranes are moist.     NECK: Supple, no palpable adenopathy.    LUNGS: Fair b/l breath sounds, no wheezing, rales, or rhonchi.    HEART: Regular rate and rhythm without murmur.    ABDOMEN: Soft, nontender, and nondistended.  No hepatosplenomegaly is noted.    RENAL: No difficulty voiding, no pelvic pain    EXTREMITIES: Without any cyanosis, clubbing, rash, lesions or edema.    NEUROLOGIC: Awake, alert, oriented, grossly intact    SKIN: Warm, dry, good turgor.      LABS:                        10.6   8.39  )-----------( 365      ( 03 Jun 2025 06:48 )             31.8     06-02    134[L]  |  101  |  8   ----------------------------<  92  3.7   |  25  |  0.80    Ca    8.1[L]      02 Jun 2025 18:07  Mg     1.7     06-02    TPro  9.1[H]  /  Alb  1.3[L]  /  TBili  1.0  /  DBili  x   /  AST  33  /  ALT  <6[L]  /  AlkPhos  218[H]  06-02    PT/INR - ( 03 Jun 2025 06:48 )   PT: 18.6 sec;   INR: 1.58 ratio         PTT - ( 03 Jun 2025 06:48 )  PTT:39.5 sec  Urinalysis Basic - ( 02 Jun 2025 18:07 )    Color: x / Appearance: x / SG: x / pH: x  Gluc: 92 mg/dL / Ketone: x  / Bili: x / Urobili: x   Blood: x / Protein: x / Nitrite: x   Leuk Esterase: x / RBC: x / WBC x   Sq Epi: x / Non Sq Epi: x / Bacteria: x        MICROBIOLOGY: (if applicable)    RADIOLOGY & ADDITIONAL STUDIES:  EKG:   CXR:< from: CT Angio Chest PE Protocol w/ IV Cont (06.03.25 @ 01:04) >  CT Angiography of the Chest was performed followed by portal venous phase   imaging of the Abdomen and Pelvis.  Sagittal and coronal reformats were performed as well as 3D (MIP)   reconstructions.    OVERNIGHT RADIOLOGIST PRELIMINARY INTERPRETATION: No obvious PE to the   level of the segmental pulmonary arteries. Difficult to assess   subsegmental pulmonary arteries at the left lung base. Large right and   moderate to large left pleural effusion with atelectasis. Nonspecific   interlobar septal thickening. Cirrhosisand portal hypertension. Moderate   to large ascites. Cholelithiasis. Nonspecific small and large bowel wall   thickening in the setting of ascites/cirrhosis. Official report to follow.    FINDINGS: Motion artifacts degrade some of the imaging.    CHEST:  LUNGS AND LARGE AIRWAYS: Small volume of retained secretions versus   aspirated material within the upper trachea. Respiratory motion artifact.   Calcified granulomata. Mild biapical scarring.  Complete right middle and lower lobe atelectasis.  Compressive atelectasis of remaining lungs adjacent to the pleural   effusions  PLEURA: Large right and moderate left pleural effusions  VESSELS: Aortic calcifications. Coronary artery calcifications.  HEART: Heart size is normal. No pericardial effusion. No acute pulmonary   embolism  MEDIASTINUM AND ADOLFO: Calcified subcarinal and left infrahilar lymph nodes  CHEST WALL AND LOWER NECK: Small approximately 4 x 1.5 cm intramuscular   lipoma superficial to the right scapular body    ABDOMEN AND PELVIS:  LIVER: Cirrhosis. Patent vasculature  BILE DUCTS: Normal caliber.  GALLBLADDER: Nondistended. Nonspecific mild diffuse thickening.   Cholelithiasis  SPLEEN: Diminutive and lobular with calcification, likely sequela of   prior infarct  PANCREAS: Within normal limits.  ADRENALS: Within normal limits.  KIDNEYS/URETERS: Diminutive left kidney with extensive cortical scarring.   No hydronephrosis.  Small right lower pole simple cortical cyst. Partial duplication of the   right collecting system    BLADDER: Minimally distended.  REPRODUCTIVE ORGANS: Prostate within normal size limits.    BOWEL: No bowel obstruction. Colonic diverticulosis  Edematous wall thickening of distal and terminal ileum, cecum, ascending   through mid transverse colon  PERITONEUM/RETROPERITONEUM: Moderate volume abdominal ascites. No free air  Extensive omental infiltration probably due to ascites and small varices.  Areas of smooth peritoneal thickening and enhancement suggests sequela of   recent instrumentation versus peritonitis  VESSELS: Atherosclerotic changes. Infrarenal bilobed configuration   fusiform tendon aneurysms approximately measuring 4 cm and 3 cm in   maximal AP dimensions on sagittal series  LYMPH NODES: No lymphadenopathy.  ABDOMINAL WALL: No significant acute abnormality.  BONES: Multilevel degenerative changes throughout the spine.    IMPRESSION: Motion degraded exam    No acute pulmonary embolism.    Large right and moderate left pleural effusions. Complete right middle   and lower lobeatelectasis with compressive atelectasis of remaining lungs    Cirrhosis with sequela of portal hypertension as described notably   moderate volume abdominal and pelvic ascites    Edematous wall thickening of distal and terminal ileum, cecum, ascending   through mid transverse colon may represent sequela of portal hypertensive   change versus ileitis and colitis in the correct clinical scenario    Areas of smooth peritoneal thickening and enhancement suggests sequela of   recent instrumentation versus peritonitis          < end of copied text >    ECHO:    IMPRESSION: 64y Male PAST MEDICAL & SURGICAL HISTORY:  ALC (alcoholic liver cirrhosis)      Hyperlipidemia      Positive FIT (fecal immunochemical test)      GERD (gastroesophageal reflux disease)      Hypothyroidism       p/w

## 2025-06-03 NOTE — CONSULT NOTE ADULT - SUBJECTIVE AND OBJECTIVE BOX
Time of visit:    CHIEF COMPLAINT: Patient is a 64y old  Male who presents with a chief complaint of ascites secondary to cirrhosis, sob, b/l LE edema (03 Jun 2025 15:35)      HPI: This is a 63 y/o man  with  HLD, liver cirrhosis 2/2 ETOH use disorder (stopped drinking 1-2 yeas ago), positive FIT test (cancelled colonoscopy 1/2025), Hpylori + 11/16/24 (did not complete treatment), GERD, hyperlipidemia, hypothyroidism sent from family medicine practice for shortness of breath today.  Pt had lost to follow up at clinic as he left to [facility] in 12/2024. CT abd/pelvis ordered 12/10/2024 for abdominal pain showed cirrhotic changes of liver, abdominal aortic aneurysm, portacaval and peripancreatic lymph nodes, and  small nodule adjacent to R liver for which PET CT was recommended.  Per daughter Ada, pt returned from [facility] 6/1/2025 where he has seeing a natural medicine doctor and was taking various meds epadex (B12, folate,oemga3), sadur, colitisan, vivitar, rodim (domperidone/rabeprazole) along with torsemide 10mg BID. Daughter says he was receiving some injections but does not know what they were. Patient is poor historian but notes worsening shortness of breath, abdominal distention, bilateral lower extremity pitting edema for the past 1 wk. Denies chest pain, syncope, lightheadedness, headaches, falls or nausea/vomiting/diarrhea, confusion, visual complaints, neck pain/stiffness, subjective neurological deficits, numbness/tingling.    ED course: VS T 97.5*F, , /73, RR 18, SpO2 91% >97% on 4 L NC  s/p NaCl 500mL, IV lasix 40mg    Labs: hgb 10.8, Hct 31.2, Na 134, Ca 8.1, albumin 1.3, Alk phosph 218, proBNP 372  EKG: accelerated junctional rhythm, rate 101 bpm, QTc 464ms    b/l duplex: No evidence of deep venous thrombosis in either lower extremity.    CXR showing right moderate pleural effusion and small left pleural effusion.    Preliminary CT chest/abd/pelvisresult: No obvious PE to the level of the segmental pulmonary arteries. Difficult to assess subsegmental pulmonary arteries at the left lung base. Large right and moderate to large left pleural effusion with atelectasis. Nonspecific interlobar septal thickening. Cirrhosis and portal hypertension. Moderate to large ascites. Cholelithiasis. Nonspecific small and large bowel wall thickening in the setting of ascites/cirrhosis. Official report to follow. (02 Jun 2025 21:35)   Patient seen and examined.     PAST MEDICAL & SURGICAL HISTORY:  ALC (alcoholic liver cirrhosis)      Hyperlipidemia      Positive FIT (fecal immunochemical test)      GERD (gastroesophageal reflux disease)      Hypothyroidism          Allergies    No Known Allergies    Intolerances        MEDICATIONS  (STANDING):  cefTRIAXone   IVPB 2000 milliGRAM(s) IV Intermittent every 24 hours  furosemide    Tablet 40 milliGRAM(s) Oral daily  levothyroxine 75 MICROGram(s) Oral daily  multivitamin 1 Tablet(s) Oral daily  propranolol 10 milliGRAM(s) Oral two times a day  spironolactone 100 milliGRAM(s) Oral daily      MEDICATIONS  (PRN):  aluminum hydroxide/magnesium hydroxide/simethicone Suspension 30 milliLiter(s) Oral every 4 hours PRN Dyspepsia  melatonin 3 milliGRAM(s) Oral at bedtime PRN Insomnia  ondansetron Injectable 4 milliGRAM(s) IV Push every 8 hours PRN Nausea and/or Vomiting   Medications up to date at time of exam.    Medications up to date at time of exam.    FAMILY HISTORY:      SOCIAL HISTORY  Smoking History: [ x  ] none  smoking/smoke exposure, [   ] former smoker  Living Condition: [   ] apartment, [   ] private house  Work History: lawn sprinkler tech  Travel History: denies recent travel  Illicit Substance Use: denies  Alcohol Use: denies    REVIEW OF SYSTEMS:    CONSTITUTIONAL:  denies fevers, chills, sweats, weight loss    HEENT:  denies diplopia or blurred vision, sore throat or runny nose.    CARDIOVASCULAR:  denies pressure, squeezing, tightness, or heaviness about the chest; no palpitations.    RESPIRATORY:  denies SOB, cough, SORIA, wheezing.    GASTROINTESTINAL:  denies abdominal pain, nausea, vomiting or diarrhea.    GENITOURINARY: denies dysuria, frequency or urgency.    NEUROLOGIC:  denies numbness, tingling, seizures or weakness.    PSYCHIATRIC:  denies disorder of thought or mood.    MSK: denies swelling, redness      PHYSICAL EXAMINATION:    GENERAL: The patient  in no apparent distress.     Vital Signs Last 24 Hrs  T(C): 36.5 (03 Jun 2025 17:17), Max: 36.9 (03 Jun 2025 01:00)  T(F): 97.7 (03 Jun 2025 17:17), Max: 98.4 (03 Jun 2025 01:00)  HR: 108 (03 Jun 2025 17:17) (100 - 115)  BP: 117/82 (03 Jun 2025 17:17) (100/72 - 125/83)  BP(mean): 87 (03 Jun 2025 13:40) (87 - 87)  RR: 24 (03 Jun 2025 17:17) (20 - 24)  SpO2: 96% (03 Jun 2025 17:17) (94% - 100%)    Parameters below as of 03 Jun 2025 17:17  Patient On (Oxygen Delivery Method): nasal cannula  O2 Flow (L/min): 4     (if applicable)    Chest Tube (if applicable)    HEENT: Head is normocephalic and atraumatic. Extraocular muscles are intact. Mucous membranes are moist.     NECK: Supple, no palpable adenopathy.    LUNGS: Fair b/l breath sounds, no wheezing, rales, or rhonchi.    HEART: Regular rate and rhythm without murmur.    ABDOMEN: Distended and tense     RENAL: No difficulty voiding, no pelvic pain    EXTREMITIES: Without any cyanosis, clubbing, rash, lesions or edema.    NEUROLOGIC: Awake, alert, oriented, grossly intact    SKIN: Warm, dry, good turgor.      LABS:                        10.6   8.39  )-----------( 365      ( 03 Jun 2025 06:48 )             31.8     06-03    134[L]  |  101  |  10  ----------------------------<  69[L]  3.8   |  23  |  0.89    Ca    8.1[L]      03 Jun 2025 06:48  Phos  4.6     06-03  Mg     1.6     06-03    TPro  8.7[H]  /  Alb  1.1[L]  /  TBili  0.9  /  DBili  x   /  AST  34  /  ALT  12  /  AlkPhos  195[H]  06-03    PT/INR - ( 03 Jun 2025 06:48 )   PT: 18.6 sec;   INR: 1.58 ratio         PTT - ( 03 Jun 2025 06:48 )  PTT:39.5 sec  Urinalysis Basic - ( 03 Jun 2025 06:48 )    Color: x / Appearance: x / SG: x / pH: x  Gluc: 69 mg/dL / Ketone: x  / Bili: x / Urobili: x   Blood: x / Protein: x / Nitrite: x   Leuk Esterase: x / RBC: x / WBC x   Sq Epi: x / Non Sq Epi: x / Bacteria: x      MICROBIOLOGY: (if applicable)    RADIOLOGY & ADDITIONAL STUDIES:  EKG:   CXR: < from: CT Abdomen and Pelvis w/ IV Cont (06.03.25 @ 01:04) >  IMPRESSION: Motion degraded exam    No acute pulmonary embolism.    Large right and moderate left pleural effusions. Complete right middle   and lower lobeatelectasis with compressive atelectasis of remaining lungs    Cirrhosis with sequela of portal hypertension as described notably   moderate volume abdominal and pelvic ascites    Edematous wall thickening of distal and terminal ileum, cecum, ascending   through mid transverse colon may represent sequela of portal hypertensive   change versus ileitis and colitis in the correct clinical scenario    Areas of smooth peritoneal thickening and enhancement suggests sequela of   recent instrumentation versus peritonitis    < end of copied text >    ECHO:    IMPRESSION: 64y Male PAST MEDICAL & SURGICAL HISTORY:  ALC (alcoholic liver cirrhosis)      Hyperlipidemia      Positive FIT (fecal immunochemical test)      GERD (gastroesophageal reflux disease)      Hypothyroidism       p/w           IMP:  This is a 63 y/o man  with  HLD, liver cirrhosis 2/2 ETOH use disorder (stopped drinking 1-2 yeas ago), positive FIT test (cancelled colonoscopy 1/2025), Hpylori + 11/16/24 (did not complete treatment), GERD, hyperlipidemia, hypothyroidism sent from family medicine practice for shortness of breath today due to acute hypoxic resp failure secondary to hepatohydrothorax       ASSESSMENT     - Acute hypoxic resp failure   - Hepatohydrothorax   - Liver cirrhosis   - Hx of Alcohol use   - HLD       Sugg    - Continue o2 supp as needed to maintain sat >90%  - Rocephin 1 gm prophy   - F/U cultures   - Paracentesis   - Diresis : lasix and aldatone   - DVT GI prophy

## 2025-06-03 NOTE — CONSULT NOTE ADULT - SUBJECTIVE AND OBJECTIVE BOX
Gi Consult:  HPI:  63 y/o male with PMHx of HLD, liver cirrhosis 2/2 ETOH use disorder (stopped drinking 1-2 yeas ago), positive FIT test (cancelled colonoscopy 1/2025), Hpylori + 11/16/24 (did not complete treatment), GERD, hyperlipidemia, hypothyroidism sent from family medicine practice for shortness of breath today.  Pt had lost to follow up at clinic as he left to [facility] in 12/2024. CT abd/pelvis ordered 12/10/2024 for abdominal pain showed cirrhotic changes of liver, abdominal aortic aneurysm, portacaval and peripancreatic lymph nodes, and  small nodule adjacent to R liver for which PET CT was recommended.  Per daughter Ada, pt returned from [facility] 6/1/2025 where he has seeing a natural medicine doctor and was taking various meds epadex (B12, folate,oemga3), sadur, colitisan, vivitar, rodim (domperidone/rabeprazole) along with torsemide 10mg BID. Daughter says he was receiving some injections but does not know what they were. Patient is poor historian but notes worsening shortness of breath, abdominal distention, bilateral lower extremity pitting edema for the past 1 wk. Denies chest pain, syncope, lightheadedness, headaches, falls or nausea/vomiting/diarrhea, confusion, visual complaints, neck pain/stiffness, subjective neurological deficits, numbness/tingling.        Preliminary CT chest/abd/pelvisresult: No obvious PE to the level of the segmental pulmonary arteries. Difficult to assess subsegmental pulmonary arteries at the left lung base. Large right and moderate to large left pleural effusion with atelectasis. Nonspecific interlobar septal thickening. Cirrhosis and portal hypertension. Moderate to large ascites. Cholelithiasis. Nonspecific small and large bowel wall thickening in the setting of ascites/cirrhosis. Official report to follow. (02 Jun 2025 21:35)    MEDICATIONS  (STANDING):  cefTRIAXone   IVPB 2000 milliGRAM(s) IV Intermittent every 24 hours  furosemide    Tablet 40 milliGRAM(s) Oral daily  levothyroxine 75 MICROGram(s) Oral daily  multivitamin 1 Tablet(s) Oral daily  propranolol 10 milliGRAM(s) Oral two times a day  spironolactone 100 milliGRAM(s) Oral daily    MEDICATIONS  (PRN):  aluminum hydroxide/magnesium hydroxide/simethicone Suspension 30 milliLiter(s) Oral every 4 hours PRN Dyspepsia  melatonin 3 milliGRAM(s) Oral at bedtime PRN Insomnia  ondansetron Injectable 4 milliGRAM(s) IV Push every 8 hours PRN Nausea and/or Vomiting      Allergies    No Known Allergies    Intolerances        PAST MEDICAL & SURGICAL HISTORY:  ALC (alcoholic liver cirrhosis)      Hyperlipidemia      Positive FIT (fecal immunochemical test)      GERD (gastroesophageal reflux disease)      Hypothyroidism          Social History:  lives with daughter  doesn't work  stopped drinking alcohol about 1-2 years ago  doesn't smoke (02 Jun 2025 21:35)      FAMILY HISTORY:      REVIEW OF SYSTEMS      General:	    Skin/Breast:  	  Ophthalmologic:  	  ENMT:	    Respiratory and Thorax:  	  Cardiovascular:	    Gastrointestinal:	    Genitourinary:	    Musculoskeletal:	    Neurological:	    Psychiatric:	    Hematology/Lymphatics:	    Endocrine:	    Allergic/Immunologic:	       PHYSICAL EXAM:   Vital Signs:  Vital Signs Last 24 Hrs  T(C): 36.8 (03 Jun 2025 07:30), Max: 36.9 (03 Jun 2025 01:00)  T(F): 98.2 (03 Jun 2025 07:30), Max: 98.4 (03 Jun 2025 01:00)  HR: 113 (03 Jun 2025 13:40) (100 - 115)  BP: 105/80 (03 Jun 2025 13:40) (100/72 - 125/83)  BP(mean): 87 (03 Jun 2025 13:40) (87 - 87)  RR: 23 (03 Jun 2025 13:40) (18 - 24)  SpO2: 96% (03 Jun 2025 13:40) (91% - 100%)    Parameters below as of 03 Jun 2025 13:40  Patient On (Oxygen Delivery Method): nasal cannula  O2 Flow (L/min): 4    Daily Height in cm: 167.64 (02 Jun 2025 17:41)    Daily I&O's Summary      GENERAL:  Appears stated age, well-groomed, well-nourished, no distress  HEENT:  Moist and clear sclera and conjuctivae  CHEST:  Full & symmetric excursion, SOB, breath sounds clear  HEART:  Regular rhythm, S1, S2  ABDOMEN:  Soft, -tender, -distended, normoactive bowel sounds,    EXTREMITIES:  no edema  SKIN:  No rash  NEURO:  Alert, oriented, no tremor, no encephalopathy, Afghan speaking      LABS:                          10.6   8.39  )-----------( 365      ( 03 Jun 2025 06:48 )             31.8       06-03    134[L]  |  101  |  10  ----------------------------<  69[L]  3.8   |  23  |  0.89    Ca    8.1[L]      03 Jun 2025 06:48  Phos  4.6     06-03  Mg     1.6     06-03    TPro  8.7[H]  /  Alb  1.1[L]  /  TBili  0.9  /  DBili  x   /  AST  34  /  ALT  12  /  AlkPhos  195[H]  06-03      PT/INR - ( 03 Jun 2025 06:48 )   PT: 18.6 sec;   INR: 1.58 ratio         PTT - ( 03 Jun 2025 06:48 )  PTT:39.5 sec    amylase     lipaseLipase: 57 U/L (06-02 @ 19:31)      RADIOLOGY & ADDITIONAL TESTS:  US 6/03/2024  IMPRESSION:  1. Cirrhotic morphology of the liver. No focal intrahepatic abnormality   is identified on this examination.  2. There is moderate volume ascites fluid in the right upper quadrant and   right lower quadrant which is complex and partially loculated with   numerous septations and low-level echoes. Advise clinical correlation and   correlation with needle sampling as clinically warranted.  3. There are multiple gallstones present within the gallbladder. There is   mild nonspecific thickening of the gallbladder wall which may be reactive   in the setting of liver disease and ascites. A sonographic Hernandez sign   was not elicited.  4. The common bile duct is mildly dilated measuring 9 mm. No significant   intrahepatic biliary dilatation is seen. Suggest clinical and laboratory   correlation and further evaluation as clinically warranted.  5. Atrophic left kidney, stable finding. GI Consult:  HPI:  65 y/o male with PMHx of HLD, liver cirrhosis 2/2 ETOH use disorder (stopped drinking 1-2 yeas ago), positive FIT test (cancelled colonoscopy 1/2025), Hpylori + 11/16/24 (did not complete treatment), GERD, hyperlipidemia, hypothyroidism sent from family medicine practice for shortness of breath today.  Pt had lost to follow up at clinic as he left to [facility] in 12/2024. CT abd/pelvis ordered 12/10/2024 for abdominal pain showed cirrhotic changes of liver, abdominal aortic aneurysm, portacaval and peripancreatic lymph nodes, and  small nodule adjacent to R liver for which PET CT was recommended.  Per daughter Ada, pt returned from [facility] 6/1/2025 where he has seeing a natural medicine doctor and was taking various meds epadex (B12, folate,oemga3), sadur, colitisan, vivitar, rodim (domperidone/rabeprazole) along with torsemide 10mg BID. Daughter says he was receiving some injections but does not know what they were. Patient is poor historian but notes worsening shortness of breath, abdominal distention, bilateral lower extremity pitting edema for the past 1 wk. Denies chest pain, syncope, lightheadedness, headaches, falls or nausea/vomiting/diarrhea, confusion, visual complaints, neck pain/stiffness, subjective neurological deficits, numbness/tingling.        Preliminary CT chest / abd / pelvis result: No obvious PE to the level of the segmental pulmonary arteries. Difficult to assess subsegmental pulmonary arteries at the left lung base. Large right and moderate to large left pleural effusion with atelectasis. Nonspecific interlobar septal thickening. Cirrhosis and portal hypertension. Moderate to large ascites. Cholelithiasis. Nonspecific small and large bowel wall thickening in the setting of ascites/cirrhosis. Official report to follow. (02 Jun 2025 21:35)    MEDICATIONS  (STANDING):  cefTRIAXone   IVPB 2000 milliGRAM(s) IV Intermittent every 24 hours  furosemide    Tablet 40 milliGRAM(s) Oral daily  levothyroxine 75 MICROGram(s) Oral daily  multivitamin 1 Tablet(s) Oral daily  propranolol 10 milliGRAM(s) Oral two times a day  spironolactone 100 milliGRAM(s) Oral daily    MEDICATIONS  (PRN):  aluminum hydroxide/magnesium hydroxide/simethicone Suspension 30 milliLiter(s) Oral every 4 hours PRN Dyspepsia  melatonin 3 milliGRAM(s) Oral at bedtime PRN Insomnia  ondansetron Injectable 4 milliGRAM(s) IV Push every 8 hours PRN Nausea and/or Vomiting      Allergies    No Known Allergies    Intolerances        PAST MEDICAL & SURGICAL HISTORY:  ALC (alcoholic liver cirrhosis)      Hyperlipidemia      Positive FIT (fecal immunochemical test)      GERD (gastroesophageal reflux disease)      Hypothyroidism          Social History:  lives with daughter  doesn't work  stopped drinking alcohol about 1-2 years ago  doesn't smoke (02 Jun 2025 21:35)      FAMILY HISTORY:      REVIEW OF SYSTEMS-  Respiratory: Positive for shortness of breath. GI: Positive for abdominal distention.       PHYSICAL EXAM:   Vital Signs:  Vital Signs Last 24 Hrs  T(C): 36.8 (03 Jun 2025 07:30), Max: 36.9 (03 Jun 2025 01:00)  T(F): 98.2 (03 Jun 2025 07:30), Max: 98.4 (03 Jun 2025 01:00)  HR: 113 (03 Jun 2025 13:40) (100 - 115)  BP: 105/80 (03 Jun 2025 13:40) (100/72 - 125/83)  BP(mean): 87 (03 Jun 2025 13:40) (87 - 87)  RR: 23 (03 Jun 2025 13:40) (18 - 24)  SpO2: 96% (03 Jun 2025 13:40) (91% - 100%)    Parameters below as of 03 Jun 2025 13:40  Patient On (Oxygen Delivery Method): nasal cannula  O2 Flow (L/min): 4    Daily Height in cm: 167.64 (02 Jun 2025 17:41)    Daily I&O's Summary      GENERAL:  Appears stated age, well-groomed, well-nourished, no distress  HEENT:  Moist and clear sclera and conjuctivae  CHEST:  Full & symmetric excursion, SOB, breath sounds clear  HEART:  Regular rhythm, S1, S2  ABDOMEN:  Soft, -tender, -distended, normoactive bowel sounds,    EXTREMITIES:  no edema  SKIN:  No rash  NEURO:  Alert, oriented, no tremor, no encephalopathy, Icelandic speaking      LABS:                          10.6   8.39  )-----------( 365      ( 03 Jun 2025 06:48 )             31.8       06-03    134[L]  |  101  |  10  ----------------------------<  69[L]  3.8   |  23  |  0.89    Ca    8.1[L]      03 Jun 2025 06:48  Phos  4.6     06-03  Mg     1.6     06-03    TPro  8.7[H]  /  Alb  1.1[L]  /  TBili  0.9  /  DBili  x   /  AST  34  /  ALT  12  /  AlkPhos  195[H]  06-03      PT/INR - ( 03 Jun 2025 06:48 )   PT: 18.6 sec;   INR: 1.58 ratio         PTT - ( 03 Jun 2025 06:48 )  PTT:39.5 sec    amylase     lipaseLipase: 57 U/L (06-02 @ 19:31)      RADIOLOGY & ADDITIONAL TESTS:  US 6/03/2024  IMPRESSION:  1. Cirrhotic morphology of the liver. No focal intrahepatic abnormality   is identified on this examination.  2. There is moderate volume ascites fluid in the right upper quadrant and   right lower quadrant which is complex and partially loculated with   numerous septations and low-level echoes. Advise clinical correlation and   correlation with needle sampling as clinically warranted.  3. There are multiple gallstones present within the gallbladder. There is   mild nonspecific thickening of the gallbladder wall which may be reactive   in the setting of liver disease and ascites. A sonographic Hernandez sign   was not elicited.  4. The common bile duct is mildly dilated measuring 9 mm. No significant   intrahepatic biliary dilatation is seen. Suggest clinical and laboratory   correlation and further evaluation as clinically warranted.  5. Atrophic left kidney, stable finding. GI Consult:    63 y/o male patient with PMHx of HLD, liver cirrhosis presumed secondary to EtOH use disorder (reports he stopped drinking 1-2 years ago), positive FIT test (cancelled colonoscopy 1/2025), H pylori+ 11/16/24 (did not complete treatment), GERD, hyperlipidemia, hypothyroidism sent from family medicine practice for shortness of breath today.  Pt had lost to follow up at clinic as he left to [facility] in 12/2024. CT abd/pelvis ordered 12/10/2024 for abdominal pain showed cirrhotic changes of liver, abdominal aortic aneurysm, portacaval and peripancreatic lymph nodes, and  small nodule adjacent to R liver for which PET CT was recommended.    Per daughter Ada, pt returned from [facility] 6/1/2025 where he has seeing a natural medicine doctor and was taking various meds epadex (B12, folate,oemga3), sadur, colitisan, vivitar, rodim (domperidone/rabeprazole) along with torsemide 10mg BID. Daughter says he was receiving some injections but does not know what they were. Patient is poor historian but notes worsening shortness of breath, abdominal distention, bilateral lower extremity pitting edema for the past 1 wk. Denies chest pain, syncope, lightheadedness, headaches, falls or nausea/vomiting/diarrhea, confusion, visual complaints, neck pain/stiffness, subjective neurological deficits, numbness/tingling.      Preliminary CT chest / abd / pelvis result: No obvious PE to the level of the segmental pulmonary arteries. Difficult to assess subsegmental pulmonary arteries at the left lung base. Large right and moderate to large left pleural effusion with atelectasis. Nonspecific interlobar septal thickening. Cirrhosis and portal hypertension. Moderate to large ascites. Cholelithiasis. Nonspecific small and large bowel wall thickening in the setting of ascites/cirrhosis. Official report to follow. (02 Jun 2025 21:35)    MEDICATIONS  (STANDING):  cefTRIAXone   IVPB 2000 milliGRAM(s) IV Intermittent every 24 hours  furosemide    Tablet 40 milliGRAM(s) Oral daily  levothyroxine 75 MICROGram(s) Oral daily  multivitamin 1 Tablet(s) Oral daily  propranolol 10 milliGRAM(s) Oral two times a day  spironolactone 100 milliGRAM(s) Oral daily    MEDICATIONS  (PRN):  aluminum hydroxide/magnesium hydroxide/simethicone Suspension 30 milliLiter(s) Oral every 4 hours PRN Dyspepsia  melatonin 3 milliGRAM(s) Oral at bedtime PRN Insomnia  ondansetron Injectable 4 milliGRAM(s) IV Push every 8 hours PRN Nausea and/or Vomiting      Allergies    No Known Allergies    Intolerances        PAST MEDICAL & SURGICAL HISTORY:  ALC (alcoholic liver cirrhosis)      Hyperlipidemia      Positive FIT (fecal immunochemical test)      GERD (gastroesophageal reflux disease)      Hypothyroidism          Social History:  lives with daughter  doesn't work  stopped drinking alcohol about 1-2 years ago  doesn't smoke (02 Jun 2025 21:35)      FAMILY HISTORY:  NC    REVIEW OF SYSTEMS-  Respiratory: Positive for shortness of breath. GI: Positive for abdominal distention.       PHYSICAL EXAM:   Vital Signs:  Vital Signs Last 24 Hrs  T(C): 36.8 (03 Jun 2025 07:30), Max: 36.9 (03 Jun 2025 01:00)  T(F): 98.2 (03 Jun 2025 07:30), Max: 98.4 (03 Jun 2025 01:00)  HR: 113 (03 Jun 2025 13:40) (100 - 115)  BP: 105/80 (03 Jun 2025 13:40) (100/72 - 125/83)  BP(mean): 87 (03 Jun 2025 13:40) (87 - 87)  RR: 23 (03 Jun 2025 13:40) (18 - 24)  SpO2: 96% (03 Jun 2025 13:40) (91% - 100%)    Parameters below as of 03 Jun 2025 13:40  Patient On (Oxygen Delivery Method): nasal cannula  O2 Flow (L/min): 4    Daily Height in cm: 167.64 (02 Jun 2025 17:41)    Daily I&O's Summary      GENERAL:  Appears stated age, well-groomed, well-nourished, no distress  HEENT:  Moist and clear sclera and conjuctivae  CHEST:  Full & symmetric excursion, SOB, breath sounds clear  HEART:  Regular rhythm, S1, S2  ABDOMEN:  Soft, -tender, -distended, normoactive bowel sounds,    EXTREMITIES:  no edema  SKIN:  No rash  NEURO:  Alert, oriented, no tremor, no encephalopathy, Belarusian speaking      LABS:                          10.6   8.39  )-----------( 365      ( 03 Jun 2025 06:48 )             31.8       06-03    134[L]  |  101  |  10  ----------------------------<  69[L]  3.8   |  23  |  0.89    Ca    8.1[L]      03 Jun 2025 06:48  Phos  4.6     06-03  Mg     1.6     06-03    TPro  8.7[H]  /  Alb  1.1[L]  /  TBili  0.9  /  DBili  x   /  AST  34  /  ALT  12  /  AlkPhos  195[H]  06-03      PT/INR - ( 03 Jun 2025 06:48 )   PT: 18.6 sec;   INR: 1.58 ratio    PTT - ( 03 Jun 2025 06:48 )  PTT:39.5 sec      Lipase: 57 U/L (06-02 @ 19:31)      RADIOLOGY & ADDITIONAL TESTS:  US 6/03/2024  IMPRESSION:  1. Cirrhotic morphology of the liver. No focal intrahepatic abnormality   is identified on this examination.  2. There is moderate volume ascites fluid in the right upper quadrant and   right lower quadrant which is complex and partially loculated with   numerous septations and low-level echoes. Advise clinical correlation and   correlation with needle sampling as clinically warranted.  3. There are multiple gallstones present within the gallbladder. There is   mild nonspecific thickening of the gallbladder wall which may be reactive   in the setting of liver disease and ascites. A sonographic Hernandez sign   was not elicited.  4. The common bile duct is mildly dilated measuring 9 mm. No significant   intrahepatic biliary dilatation is seen. Suggest clinical and laboratory   correlation and further evaluation as clinically warranted.  5. Atrophic left kidney, stable finding.          XXXXXXXXXXXX  XXXXXXXXXXXX        ACC: [id number] EXAM:  CT ABDOMEN AND PELVIS IC   ORDERED BY: EMILI CAMERON     ACC: [id number] EXAM:  CT ANGIO CHEST PULM ART [facility]   ORDERED BY: EMILI CAMERON     PROCEDURE DATE:  06/03/2025          INTERPRETATION:  CLINICAL INFORMATION: Shortness of breath. Cirrhosis   with increased abdominal distention    COMPARISON: CT abdomen pelvis 12/3/2024    CONTRAST/COMPLICATIONS:  IV Contrast: Omnipaque 350  90 cc administered   10 cc discarded  Oral Contrast: NONE  .    PROCEDURE:  CT Angiography of the Chest was performed followed by portal venous phase   imaging of the Abdomen and Pelvis.  Sagittal and coronal reformats were performed as well as 3D (MIP)   reconstructions.    OVERNIGHT RADIOLOGIST PRELIMINARY INTERPRETATION: No obvious PE to the   level of the segmental pulmonary arteries. Difficult to assess   subsegmental pulmonary arteries at the left lung base. Large right and   moderate to large left pleural effusion with atelectasis. Nonspecific   interlobar septal thickening. Cirrhosisand portal hypertension. Moderate   to large ascites. Cholelithiasis. Nonspecific small and large bowel wall   thickening in the setting of ascites/cirrhosis. Official report to follow.    FINDINGS: Motion artifacts degrade some of the imaging.    CHEST:  LUNGS AND LARGE AIRWAYS: Small volume of retained secretions versus   aspirated material within the upper trachea. Respiratory motion artifact.   Calcified granulomata. Mild biapical scarring.  Complete right middle and lower lobe atelectasis.  Compressive atelectasis of remaining lungs adjacent to the pleural   effusions  PLEURA: Large right and moderate left pleural effusions  VESSELS: Aortic calcifications. Coronary artery calcifications.  HEART: Heart size is normal. No pericardial effusion. No acute pulmonary   embolism  MEDIASTINUM AND ADOLFO: Calcified subcarinal and left infrahilar lymph nodes  CHEST WALL AND LOWER NECK: Small approximately 4 x 1.5 cm intramuscular   lipoma superficial to the right scapular body    ABDOMEN AND PELVIS:  LIVER: Cirrhosis. Patent vasculature  BILE DUCTS: Normal caliber.  GALLBLADDER: Nondistended. Nonspecific mild diffuse thickening.   Cholelithiasis  SPLEEN: Diminutive and lobular with calcification, likely sequela of   prior infarct  PANCREAS: Within normal limits.  ADRENALS: Within normal limits.  KIDNEYS/URETERS: Diminutive left kidney with extensive cortical scarring.   No hydronephrosis.  Small right lower pole simple cortical cyst. Partial duplication of the   right collecting system    BLADDER: Minimally distended.  REPRODUCTIVE ORGANS: Prostate within normal size limits.    BOWEL: No bowel obstruction. Colonic diverticulosis  Edematous wall thickening of distal and terminal ileum, cecum, ascending   through mid transverse colon  PERITONEUM/RETROPERITONEUM: Moderate volume abdominal ascites. No free air  Extensive omental infiltration probably due to ascites and small varices.  Areas of smooth peritoneal thickening and enhancement suggests sequela of   recent instrumentation versus peritonitis  VESSELS: Atherosclerotic changes. Infrarenal bilobed configuration   fusiform tendon aneurysms approximately measuring 4 cm and 3 cm in   maximal AP dimensions on sagittal series  LYMPH NODES: No lymphadenopathy.  ABDOMINAL WALL: No significant acute abnormality.  BONES: Multilevel degenerative changes throughout the spine.    IMPRESSION: Motion degraded exam    No acute pulmonary embolism.    Large right and moderate left pleural effusions. Complete right middle   and lower lobeatelectasis with compressive atelectasis of remaining lungs    Cirrhosis with sequela of portal hypertension as described notably   moderate volume abdominal and pelvic ascites    Edematous wall thickening of distal and terminal ileum, cecum, ascending   through mid transverse colon may represent sequela of portal hypertensive   change versus ileitis and colitis in the correct clinical scenario    Areas of smooth peritoneal thickening and enhancement suggests sequela of   recent instrumentation versus peritonitis            --- End of Report ---            ANTONIA AUGUSTE MD; Attending Radiologist  This document has been electronically signed. Sheldon  3 2025  8:53AM            [id number]  [id number]          EXAM: [id number] - PETCT SKUL-THI ONC FDG INIT  - ORDERED BY: MASON GLEZ      PROCEDURE DATE:  12/19/2024           INTERPRETATION:  CLINICAL INFORMATION: Nodule adjacent to the RIGHT lobe   of the liver.    TREATMENT STRATEGY EVALUATION: Initial  AREA IMAGED: Skull base-to-thigh  RADIOPHARMACEUTICAL: 10.89 mCi F-18 FDG, I.V.  I.V. SITE: antecubital left  UPTAKE PERIOD: 53 min  SCANNER: Crus[employer] Vapor Gen2  ORAL CONTRAST: Omnipaque 300    TECHNIQUE: Following intravenous injection of radiopharmaceutical and   above uptake period, PET/CT was obtained. CT protocol was optimized for   PET attenuation correction and anatomic localization and was not designed   to produce and cannot replace state-of-the-art diagnostic CT images with   specific imaging protocols for different body parts and indications.   Images were reconstructed and reviewed in axial, coronal and sagittal   views and three-dimensional MIP.    Standardized uptake values ("SUV") are normalized to patient body weight   and indicate the highest activity concentration (SUVmax) in a given   disease site. All image numbers refer to axial image numbers.    COMPARISON: No prior FDG PET/CT available for review.    OTHER STUDIES USED FOR CORRELATION:    FINDINGS:    HEAD/NECK: Increased uptake at RIGHT level 2 node (1.3 x 1.0 cm, SUV   14.3, image 44). Otherwise physiologic FDG activity in remaining   visualized brain, head, and neck.    CHEST: Multiple hypermetabolic nodes seen at the mediastinum and   BILATERAL perihilar regions. The most FDG-avid and largest lesion is a   curvilinear area of likely no abnormal soft tissue adjacent to the   DEscending thoracic aorta (3.0 x 0.9 cm, SUV 19.3, image 122). Additional   nodes are seen in the RIGHT internal mammary (0.7 x 0.6cm, SUV 3.1,   image 97), BILATERAL axillary, high RIGHT paratracheal, precarinal (1.4 x   1.2 cm, SUV 8.0, image 96), subcarinal and BILATERAL hilar stations, with   similar degrees of activity. There is also a hypermetabolic cluster of   small RIGHT costochondral nodes.    LUNGS: No abnormal activity. Findings of emphysema. No pulmonary nodules.    PLEURA/PERICARDIUM: No abnormal FDG activity. No effusions.    ESOPHAGUS/STOMACH: No abnormal FDG activity.    HEPATOBILIARY/PANCREAS: Physiologic hepatobiliary FDG activity. For   reference, liver SUV mean is 2.3.    SPLEEN: Physiologic FDG activity. Normal size.    ADRENAL GLANDS: No abnormal FDG activity. No nodule(s).    KIDNEYS/URINARY BLADDER: Physiologic excreted FDG activity.    REPRODUCTIVE ORGANS: No abnormal FDG activity.    ABDOMINOPELVIC NODES: Periportal node with increased uptake (1.1 cm, SUV   5.8, image 161). Small focus anterior to the LEFT lobe of the liver at a   thin-appearing node (1.2 x 0.8 cm, SUV 3.4, image 146) is a small focus   seen adjacent to the celiac axis (SUV 4.5, image 168), without clear   low-dose CT correlate. These findings were not enlarged on prior CT.    BOWEL/PERITONEUM/MESENTERY: Previously seen nodule lateral to the   inferior RIGHT lobe of theliver is no longer appreciated. Alimentary   tract, peritoneum, and mesentery otherwise without abnormal uptake.    ABDOMINAL WALL: No abnormal FDG activity.    BONES/SOFT TISSUES: No abnormal FDG activity.    VESSELS: Aortoiliac atherosclerosis. 3.8cm infrarenal abdominal aortic   aneurysm.    IMPRESSION:  1.  Multiple hypermetabolic mediastinal and bilateral hilar lymph nodes,   with the largest lesion adjacent to the descending thoracic aorta.   Consider tissue characterization of the largestmediastinal lesion.  2.  Right level 2 neck lymph node with increased FDG uptake. Consider   sonography if this will change patient management.  3.  Two periportal lymph nodes with increased FDG uptake, suspicious for   malignant involvement.  4.  Previously noted nodule lateral to the inferior right hepatic lobe no   longer appreciated.  5.  3.8 cm infrarenal abdominal aortic aneurysm.    --- End of Report ---                FELECIA TEJADA MD; Attending Radiologist   This document has been electronically signed. Dec 20 2024  9:19AM   GI Consult:    63 y/o male patient with PMHx of HLD, liver cirrhosis presumed secondary to EtOH use disorder (reports he stopped drinking 1-2 years ago), positive FIT test (cancelled colonoscopy 1/2025), H pylori+ 11/16/24 (did not complete treatment), GERD, hyperlipidemia, hypothyroidism sent from family medicine practice for shortness of breath today.  Pt had lost to follow up at clinic as he left to [facility] in 12/2024. CT abd/pelvis ordered 12/10/2024 for abdominal pain showed cirrhotic changes of liver, abdominal aortic aneurysm, portacaval and peripancreatic lymph nodes, and  small nodule adjacent to R liver for which PET CT was recommended.    Per daughter Ada, pt returned from [facility] 6/1/2025 where he has seeing a natural medicine doctor and was taking various meds epadex (B12, folate,oemga3), sadur, colitisan, vivitar, rodim (domperidone/rabeprazole) along with torsemide 10mg BID. Daughter says he was receiving some injections but does not know what they were. Patient is poor historian but notes worsening shortness of breath, abdominal distention, bilateral lower extremity pitting edema for the past 1 wk. Denies chest pain, syncope, lightheadedness, headaches, falls or nausea/vomiting/diarrhea, confusion, visual complaints, neck pain/stiffness, subjective neurological deficits, numbness/tingling.      Preliminary CT chest / abd / pelvis result: No obvious PE to the level of the segmental pulmonary arteries. Difficult to assess subsegmental pulmonary arteries at the left lung base. Large right and moderate to large left pleural effusion with atelectasis. Nonspecific interlobar septal thickening. Cirrhosis and portal hypertension. Moderate to large ascites. Cholelithiasis. Nonspecific small and large bowel wall thickening in the setting of ascites/cirrhosis. Official report to follow. (02 Jun 2025 21:35)    MEDICATIONS  (STANDING):  cefTRIAXone   IVPB 2000 milliGRAM(s) IV Intermittent every 24 hours  furosemide    Tablet 40 milliGRAM(s) Oral daily  levothyroxine 75 MICROGram(s) Oral daily  multivitamin 1 Tablet(s) Oral daily  propranolol 10 milliGRAM(s) Oral two times a day  spironolactone 100 milliGRAM(s) Oral daily    MEDICATIONS  (PRN):  aluminum hydroxide/magnesium hydroxide/simethicone Suspension 30 milliLiter(s) Oral every 4 hours PRN Dyspepsia  melatonin 3 milliGRAM(s) Oral at bedtime PRN Insomnia  ondansetron Injectable 4 milliGRAM(s) IV Push every 8 hours PRN Nausea and/or Vomiting      Allergies    No Known Allergies    Intolerances        PAST MEDICAL & SURGICAL HISTORY:  ALC (alcoholic liver cirrhosis)      Hyperlipidemia      Positive FIT (fecal immunochemical test)      GERD (gastroesophageal reflux disease)      Hypothyroidism          Social History:  lives with daughter  doesn't work  stopped drinking alcohol about 1-2 years ago  doesn't smoke (02 Jun 2025 21:35)      FAMILY HISTORY:  [place]    REVIEW OF SYSTEMS-  Respiratory: Positive for shortness of breath. GI: Positive for abdominal distention.       PHYSICAL EXAM:   Vital Signs:  Vital Signs Last 24 Hrs  T(C): 36.8 (03 Jun 2025 07:30), Max: 36.9 (03 Jun 2025 01:00)  T(F): 98.2 (03 Jun 2025 07:30), Max: 98.4 (03 Jun 2025 01:00)  HR: 113 (03 Jun 2025 13:40) (100 - 115)  BP: 105/80 (03 Jun 2025 13:40) (100/72 - 125/83)  BP(mean): 87 (03 Jun 2025 13:40) (87 - 87)  RR: 23 (03 Jun 2025 13:40) (18 - 24)  SpO2: 96% (03 Jun 2025 13:40) (91% - 100%)    Parameters below as of 03 Jun 2025 13:40  Patient On (Oxygen Delivery Method): nasal cannula  O2 Flow (L/min): 4    Daily Height in cm: 167.64 (02 Jun 2025 17:41)    Daily I&O's Summary      GENERAL:  appears older than reported age. NAD  HEENT:  MMM. EOMI, anicteric sclerae.  CHEST:  clear  HEART:  Regular rhythm, S1, S2  ABDOMEN:  Soft, -tender, _+softly distended, +normoactive bowel sounds    EXTREMITIES:  no edema  SKIN:  No rash or jaundice  NEURO:  Alert, oriented, no tremor, no overt encephalopathy, Irish speaking      LABS:                          10.6   8.39  )-----------( 365      ( 03 Jun 2025 06:48 )             31.8       06-03    134[L]  |  101  |  10  ----------------------------<  69[L]  3.8   |  23  |  0.89    Ca    8.1[L]      03 Jun 2025 06:48  Phos  4.6     06-03  Mg     1.6     06-03    TPro  8.7[H]  /  Alb  1.1[L]  /  TBili  0.9  /  DBili  x   /  AST  34  /  ALT  12  /  AlkPhos  195[H]  06-03      PT/INR - ( 03 Jun 2025 06:48 )   PT: 18.6 sec;   INR: 1.58 ratio    PTT - ( 03 Jun 2025 06:48 )  PTT:39.5 sec      Lipase: 57 U/L (06-02 @ 19:31)      RADIOLOGY & ADDITIONAL TESTS:  US 6/03/2024  IMPRESSION:  1. Cirrhotic morphology of the liver. No focal intrahepatic abnormality   is identified on this examination.  2. There is moderate volume ascites fluid in the right upper quadrant and   right lower quadrant which is complex and partially loculated with   numerous septations and low-level echoes. Advise clinical correlation and   correlation with needle sampling as clinically warranted.  3. There are multiple gallstones present within the gallbladder. There is   mild nonspecific thickening of the gallbladder wall which may be reactive   in the setting of liver disease and ascites. A sonographic Hernandez sign   was not elicited.  4. The common bile duct is mildly dilated measuring 9 mm. No significant   intrahepatic biliary dilatation is seen. Suggest clinical and laboratory   correlation and further evaluation as clinically warranted.  5. Atrophic left kidney, stable finding.          XXXXXXXXXXXX  XXXXXXXXXXXX        ACC: [id number] EXAM:  CT ABDOMEN AND PELVIS IC   ORDERED BY: EMILI CAMERON     ACC: [id number] EXAM:  [facility]ST [facility]   ORDERED BY: EMILI CAMERON     PROCEDURE DATE:  06/03/2025          INTERPRETATION:  CLINICAL INFORMATION: Shortness of breath. Cirrhosis   with increased abdominal distention    COMPARISON: CT abdomen pelvis 12/3/2024    CONTRAST/COMPLICATIONS:  IV Contrast: Omnipaque 350  90 cc administered   10 cc discarded  Oral Contrast: NONE  .    PROCEDURE:  CT Angiography of the Chest was performed followed by portal venous phase   imaging of the Abdomen and Pelvis.  Sagittal and coronal reformats were performed as well as 3D (MIP)   reconstructions.    OVERNIGHT RADIOLOGIST PRELIMINARY INTERPRETATION: No obvious PE to the   level of the segmental pulmonary arteries. Difficult to assess   subsegmental pulmonary arteries at the left lung base. Large right and   moderate to large left pleural effusion with atelectasis. Nonspecific   interlobar septal thickening. Cirrhosisand portal hypertension. Moderate   to large ascites. Cholelithiasis. Nonspecific small and large bowel wall   thickening in the setting of ascites/cirrhosis. Official report to follow.    FINDINGS: Motion artifacts degrade some of the imaging.    CHEST:  LUNGS AND LARGE AIRWAYS: Small volume of retained secretions versus   aspirated material within the upper trachea. Respiratory motion artifact.   Calcified granulomata. Mild biapical scarring.  Complete right middle and lower lobe atelectasis.  Compressive atelectasis of remaining lungs adjacent to the pleural   effusions  PLEURA: Large right and moderate left pleural effusions  VESSELS: Aortic calcifications. Coronary artery calcifications.  HEART: Heart size is normal. No pericardial effusion. No acute pulmonary   embolism  MEDIASTINUM AND ADOLFO: Calcified subcarinal and left infrahilar lymph nodes  CHEST WALL AND LOWER NECK: Small approximately 4 x 1.5 cm intramuscular   lipoma superficial to the right scapular body    ABDOMEN AND PELVIS:  LIVER: Cirrhosis. Patent vasculature  BILE DUCTS: Normal caliber.  GALLBLADDER: Nondistended. Nonspecific mild diffuse thickening.   Cholelithiasis  SPLEEN: Diminutive and lobular with calcification, likely sequela of   prior infarct  PANCREAS: Within normal limits.  ADRENALS: Within normal limits.  KIDNEYS/URETERS: Diminutive left kidney with extensive cortical scarring.   No hydronephrosis.  Small right lower pole simple cortical cyst. Partial duplication of the   right collecting system    BLADDER: Minimally distended.  REPRODUCTIVE ORGANS: Prostate within normal size limits.    BOWEL: No bowel obstruction. Colonic diverticulosis  Edematous wall thickening of distal and terminal ileum, cecum, ascending   through mid transverse colon  PERITONEUM/RETROPERITONEUM: Moderate volume abdominal ascites. No free air  Extensive omental infiltration probably due to ascites and small varices.  Areas of smooth peritoneal thickening and enhancement suggests sequela of   recent instrumentation versus peritonitis  VESSELS: Atherosclerotic changes. Infrarenal bilobed configuration   fusiform tendon aneurysms approximately measuring 4 cm and 3 cm in   maximal AP dimensions on sagittal series  LYMPH NODES: No lymphadenopathy.  ABDOMINAL WALL: No significant acute abnormality.  BONES: Multilevel degenerative changes throughout the spine.    IMPRESSION: Motion degraded exam    No acute pulmonary embolism.    Large right and moderate left pleural effusions. Complete right middle   and lower lobeatelectasis with compressive atelectasis of remaining lungs    Cirrhosis with sequela of portal hypertension as described notably   moderate volume abdominal and pelvic ascites    Edematous wall thickening of distal and terminal ileum, cecum, ascending   through mid transverse colon may represent sequela of portal hypertensive   change versus ileitis and colitis in the correct clinical scenario    Areas of smooth peritoneal thickening and enhancement suggests sequela of   recent instrumentation versus peritonitis            --- End of Report ---            ANTONIA AUGUSTE MD; Attending Radiologist  This document has been electronically signed. Sheldon  3 2025  8:53AM            [id number]  [id number]          EXAM: [id number] - PETCT SKUL-THI ONC FDG INIT  - ORDERED BY: MASON GLEZ      PROCEDURE DATE:  12/19/2024           INTERPRETATION:  CLINICAL INFORMATION: Nodule adjacent to the RIGHT lobe   of the liver.    TREATMENT STRATEGY EVALUATION: Initial  AREA IMAGED: Skull base-to-thigh  RADIOPHARMACEUTICAL: 10.89 mCi F-18 FDG, I.V.  I.V. SITE: antecubital left  UPTAKE PERIOD: 53 min  SCANNER: Metaresolver Gen2  ORAL CONTRAST: Omnipaque 300    TECHNIQUE: Following intravenous injection of radiopharmaceutical and   above uptake period, PET/CT was obtained. CT protocol was optimized for   PET attenuation correction and anatomic localization and was not designed   to produce and cannot replace state-of-the-art diagnostic CT images with   specific imaging protocols for different body parts and indications.   Images were reconstructed and reviewed in axial, coronal and sagittal   views and three-dimensional MIP.    Standardized uptake values ("SUV") are normalized to patient body weight   and indicate the highest activity concentration (SUVmax) in a given   disease site. All image numbers refer to axial image numbers.    COMPARISON: No prior FDG PET/CT available for review.    OTHER STUDIES USED FOR CORRELATION:    FINDINGS:    HEAD/NECK: Increased uptake at RIGHT level 2 node (1.3 x 1.0 cm, SUV   14.3, image 44). Otherwise physiologic FDG activity in remaining   visualized brain, head, and neck.    CHEST: Multiple hypermetabolic nodes seen at the mediastinum and   BILATERAL perihilar regions. The most FDG-avid and largest lesion is a   curvilinear area of likely no abnormal soft tissue adjacent to the   DEscending thoracic aorta (3.0 x 0.9 cm, SUV 19.3, image 122). Additional   nodes are seen in the RIGHT internal mammary (0.7 x 0.6cm, SUV 3.1,   image 97), BILATERAL axillary, high RIGHT paratracheal, precarinal (1.4 x   1.2 cm, SUV 8.0, image 96), subcarinal and BILATERAL hilar stations, with   similar degrees of activity. There is also a hypermetabolic cluster of   small RIGHT costochondral nodes.    LUNGS: No abnormal activity. Findings of emphysema. No pulmonary nodules.    PLEURA/PERICARDIUM: No abnormal FDG activity. No effusions.    ESOPHAGUS/STOMACH: No abnormal FDG activity.    HEPATOBILIARY/PANCREAS: Physiologic hepatobiliary FDG activity. For   reference, liver SUV mean is 2.3.    SPLEEN: Physiologic FDG activity. Normal size.    ADRENAL GLANDS: No abnormal FDG activity. No nodule(s).    KIDNEYS/URINARY BLADDER: Physiologic excreted FDG activity.    REPRODUCTIVE ORGANS: No abnormal FDG activity.    ABDOMINOPELVIC NODES: Periportal node with increased uptake (1.1 cm, SUV   5.8, image 161). Small focus anterior to the LEFT lobe of the liver at a   thin-appearing node (1.2 x 0.8 cm, SUV 3.4, image 146) is a small focus   seen adjacent to the celiac axis (SUV 4.5, image 168), without clear   low-dose CT correlate. These findings were not enlarged on prior CT.    BOWEL/PERITONEUM/MESENTERY: Previously seen nodule lateral to the   inferior RIGHT lobe of theliver is no longer appreciated. Alimentary   tract, peritoneum, and mesentery otherwise without abnormal uptake.    ABDOMINAL WALL: No abnormal FDG activity.    BONES/SOFT TISSUES: No abnormal FDG activity.    VESSELS: Aortoiliac atherosclerosis. 3.8cm infrarenal abdominal aortic   aneurysm.    IMPRESSION:  1.  Multiple hypermetabolic mediastinal and bilateral hilar lymph nodes,   with the largest lesion adjacent to the descending thoracic aorta.   Consider tissue characterization of the largestmediastinal lesion.  2.  Right level 2 neck lymph node with increased FDG uptake. Consider   sonography if this will change patient management.  3.  Two periportal lymph nodes with increased FDG uptake, suspicious for   malignant involvement.  4.  Previously noted nodule lateral to the inferior right hepatic lobe no   longer appreciated.  5.  3.8 cm infrarenal abdominal aortic aneurysm.    --- End of Report ---                FELECIA TEJADA MD; Attending Radiologist   This document has been electronically signed. Dec 20 2024  9:19AM

## 2025-06-03 NOTE — CONSULT NOTE ADULT - NS ATTEND AMEND GEN_ALL_CORE FT
Patient seen and examined at the bedside. Agree with the assessment and plan of NP Giovanni.  Ascites and imaging findings suggesting cirrhosis with portal HTN.  Multiple medical problems exacerbated by poor compliance to treatment.  Agree with plan to initiate diuretic treatment. Paracentesis (diagnostic and therapeutic) pending.  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 No obvious colon or gastric malignant changes on CT imaging. Patient seen and examined at the bedside. Agree with the assessment and plan of NP Giovanni.  Ascites and imaging findings suggesting cirrhosis with portal HTN.  Multiple medical problems exacerbated by poor compliance to treatment.  Agree with plan to initiate diuretic treatment. Paracentesis (diagnostic and therapeutic) pending.  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 No obvious colon or gastric malignant changes on CT imaging.      I certify that the total time spent on this consultation encounter includes reviewing medical records, obtaining history, performing examination, ordering tests, counseling, and documentation. Time spent = 75 minutes.

## 2025-06-03 NOTE — PROGRESS NOTE ADULT - SUBJECTIVE AND OBJECTIVE BOX
Subjective and Objective:     Overnight Events: None   Interval History: Patient was seen and examined this morning at bedside.     Vital Signs Last 24 Hrs  T(C): 36.5 (03 Jun 2025 17:17), Max: 36.9 (03 Jun 2025 01:00)  T(F): 97.7 (03 Jun 2025 17:17), Max: 98.4 (03 Jun 2025 01:00)  HR: 108 (03 Jun 2025 17:17) (100 - 115)  BP: 117/82 (03 Jun 2025 17:17) (100/72 - 125/83)  BP(mean): 87 (03 Jun 2025 13:40) (87 - 87)  RR: 24 (03 Jun 2025 17:17) (18 - 24)  SpO2: 96% (03 Jun 2025 17:17) (91% - 100%)    Parameters below as of 03 Jun 2025 17:17  Patient On (Oxygen Delivery Method): nasal cannula  O2 Flow (L/min): 4    I&O's Summary      PHYSICAL EXAM:  Constitutional: Pt lying in bed, awake and alert, NAD  Respiratory: decreased breath sounds at bilateral bases   Cardiovascular: S1S2+, RRR, no M/G/R  Gastrointestinal: abdomen distended, positive fluid wave   Extremities: no lower extremity swelling   Vascular: Peripheral pulses present  Neurological: AAOx2 to person and place , no focal deficits    ALLERGIES:  No Known Allergies      MEDICATIONS:  MEDICATIONS  (STANDING):  cefTRIAXone   IVPB 2000 milliGRAM(s) IV Intermittent every 24 hours  furosemide    Tablet 40 milliGRAM(s) Oral daily  levothyroxine 75 MICROGram(s) Oral daily  multivitamin 1 Tablet(s) Oral daily  propranolol 10 milliGRAM(s) Oral two times a day  spironolactone 100 milliGRAM(s) Oral daily    MEDICATIONS  (PRN):  aluminum hydroxide/magnesium hydroxide/simethicone Suspension 30 milliLiter(s) Oral every 4 hours PRN Dyspepsia  melatonin 3 milliGRAM(s) Oral at bedtime PRN Insomnia  ondansetron Injectable 4 milliGRAM(s) IV Push every 8 hours PRN Nausea and/or Vomiting      LABS: (Personally Reviewed):                        10.6   8.39  )-----------( 365      ( 03 Jun 2025 06:48 )             31.8     06-03    134[L]  |  101  |  10  ----------------------------<  69[L]  3.8   |  23  |  0.89    Ca    8.1[L]      03 Jun 2025 06:48  Phos  4.6     06-03  Mg     1.6     06-03    TPro  8.7[H]  /  Alb  1.1[L]  /  TBili  0.9  /  DBili  x   /  AST  34  /  ALT  12  /  AlkPhos  195[H]  06-03    PT/INR - ( 03 Jun 2025 06:48 )   PT: 18.6 sec;   INR: 1.58 ratio         PTT - ( 03 Jun 2025 06:48 )  PTT:39.5 sec

## 2025-06-03 NOTE — CONSULT NOTE ADULT - REASON FOR ADMISSION
ascites secondary to cirrhosis, sob, b/l LE edema

## 2025-06-03 NOTE — CONSULT NOTE ADULT - ASSESSMENT
64-year-old male with a history of hyperlipidemia, liver cirrhosis secondary to alcohol use disorder (abstinent for 1–2 years), hypothyroidism, gastroesophageal reflux disease, and prior Helicobacter pylori infection (untreated), presents for evaluation of shortness of breath due to abdominal distention related to large amount  64-year-old male with a history of hyperlipidemia, liver cirrhosis secondary to alcohol use disorder (abstinent for 1–2 years), hypothyroidism, gastroesophageal reflux disease, and prior Helicobacter pylori infection (untreated), presents for evaluation of shortness of breath due to abdominal distention related to large amount of ascites    GI: Ascites secondary to cirrhosis    Abdomen US: There is moderate volume ascites fluid in the right upper quadrant and right lower quadrant which is complex and partially loculated with numerous septations and low-level echoes. Advise clinical correlation and correlation with needle sampling as clinically warranted. 64 year-old man with a history of hyperlipidemia, liver cirrhosis presumed secondary to alcohol use disorder (abstinent for 1–2 years), hypothyroidism, gastroesophageal reflux disease, and prior Helicobacter pylori infection (untreated), presents for evaluation of shortness of breath due to abdominal distention and large ascites

## 2025-06-03 NOTE — CONSULT NOTE ADULT - PROBLEM SELECTOR RECOMMENDATION 9
Liver Cirrhosis most likely due to ETOH  - 50 mg Spirolactone (if kidney function permits)  - 20mg Lasix (If kidney function permits)  - Paracentesis tomorrow 6/4 by ICU attending   - NPO after midnight Liver Cirrhosis most likely due to ETOH  - 50 mg Spirolactone  - 20mg Lasix  - Paracentesis tomorrow 6/4 by ICU attending   - NPO after midnight

## 2025-06-03 NOTE — PROGRESS NOTE ADULT - ASSESSMENT
65 y/o male with PMHx of HLD, liver cirrhosis 2/2 ETOH use disorder (stopped drinking 1-2 yeas ago), positive FIT test (cancelled colonoscopy 1/2025), Hpylori + 11/16/24 (did not complete treatment), GERD, hyperlipidemia, hypothyroidism is admitted for:      #Decompensated alcoholic cirrhosis versus malignant ascites (positive FIT,   #Acute Hypoxic respiratory failure secondary to b/l pleural effusions  #b/l LE edema  - s/p IV lasix 40mg in the ED with 1800cc output  - INR 1.56, albumin 1.3, Na 134,  pro , Ammonia level <10  - MELD-Na 11  - c/w oral daily lasix 40mg   -Start Spironolactone for cirrhosis  -Start Propanolol for prophylaxis   -Monitor Is and Os, daily weight  -B12 >2000  -Folate 7.8   - Monitor LFTs and coags daily   - b/l duplex: No evidence of deep venous thrombosis in either lower extremity.  - CXR showing right moderate pleural effusion and small left pleural effusion.  - CT abd/pelvis 12/10/2024 showed cirrhotic changes of liver, abdominal aortic aneurysm, portacaval and peripancreatic lymph nodes, and  small nodule adjacent to R liver for which PET CT was recommended. PET 12/19/2024 showed: Multiple hypermetabolic mediastinal and bilateral hilar lymph nodes, with the largest lesion adjacent to the descending thoracic aorta. Right level 2 neck lymph node with increased FDG uptake. Two periportal lymph nodes with increased FDG uptake, suspicious for malignant involvement. 3.8 cm infrarenal abdominal aortic aneurysm.  - ***Preliminary CT chest/abd/pelvis 6/2/25: No obvious PE to the level of the segmental pulmonary arteries. Difficult to assess subsegmental pulmonary arteries at the left lung base. Large right and moderate to large left pleural effusion with atelectasis. Nonspecific interlobar septal thickening. Cirrhosis and portal hypertension. Moderate to large ascites. Cholelithiasis. Nonspecific small and large bowel wall thickening in the setting of ascites/cirrhosis. Official report to follow.  - ordered abdominal US   - GI consult   - Pulm consult     #hypothyroidism  - TSH 6.28 11/2024  - pt was prescribed levothyroxine 75mcg but has not been taking med  - TSH 5.3  - Restart Levothyroxine 75     #HLD  - lipid panel 11/2024: 11/2024: total 180, HDL 25, [id number]  - lipid panel 6/3/2025: total 91, HDL 12, LDL 64   - was prescribed atorvastatin 20mg but has not been taking it    #DVT ppx  - SCDs for possible thoracentesis/paracentesis    #GOC  - Full code     Discussed with Dr. Canales  65 y/o male with PMHx of HLD, liver cirrhosis 2/2 ETOH use disorder (stopped drinking 1-2 yeas ago), positive FIT test (cancelled colonoscopy 1/2025), Hpylori + 11/16/24 (did not complete treatment), GERD, hyperlipidemia, hypothyroidism is admitted for:    #Decompensated alcoholic cirrhosis versus malignant ascites (positive FIT outpatient)   #Acute Hypoxic respiratory failure secondary to b/l pleural effusions  #b/l LE edema  - s/p IV lasix 40mg in the ED with 1800cc output  - INR 1.56, albumin 1.3, Na 134,  pro , Ammonia level <10  - MELD-Na 11  - c/w oral daily lasix 40mg   -Start Spironolactone for cirrhosis  -Start Propanolol for prophylaxis   -Monitor Is and Os, daily weight  -B12 >2000  -Folate 7.8   - Monitor LFTs and coags daily   - b/l duplex: No evidence of deep venous thrombosis in either lower extremity.  - CXR showing right moderate pleural effusion and small left pleural effusion.  - CT abd/pelvis 12/10/2024 showed cirrhotic changes of liver, abdominal aortic aneurysm, portacaval and peripancreatic lymph nodes, and  small nodule adjacent to R liver for which PET CT was recommended. PET 12/19/2024 showed: Multiple hypermetabolic mediastinal and bilateral hilar lymph nodes, with the largest lesion adjacent to the descending thoracic aorta. Right level 2 neck lymph node with increased FDG uptake. Two periportal lymph nodes with increased FDG uptake, suspicious for malignant involvement. 3.8 cm infrarenal abdominal aortic aneurysm.  - ***Preliminary CT chest/abd/pelvis 6/2/25: No obvious PE to the level of the segmental pulmonary arteries. Difficult to assess subsegmental pulmonary arteries at the left lung base. Large right and moderate to large left pleural effusion with atelectasis. Nonspecific interlobar septal thickening. Cirrhosis and portal hypertension. Moderate to large ascites. Cholelithiasis. Nonspecific small and large bowel wall thickening in the setting of ascites/cirrhosis. Official report to follow.  - ordered abdominal US   - GI consult   - Pulm consult     #hypothyroidism  - TSH 6.28 11/2024  - pt was prescribed levothyroxine 75mcg but has not been taking med  - TSH 5.3  - Restart Levothyroxine 75     #HLD  - lipid panel 11/2024: 11/2024: total 180, HDL 25, [id number]  - lipid panel 6/3/2025: total 91, HDL 12, LDL 64   - was prescribed atorvastatin 20mg but has not been taking it    #DVT ppx  - SCDs for possible thoracentesis/paracentesis    #GOC  - Full code     Discussed with Dr. Canales

## 2025-06-03 NOTE — PATIENT PROFILE ADULT - FALL HARM RISK - HARM RISK INTERVENTIONS

## 2025-06-04 LAB
ALBUMIN FLD-MCNC: 1.5 G/DL — SIGNIFICANT CHANGE UP
ALBUMIN SERPL ELPH-MCNC: 1.2 G/DL — LOW (ref 3.3–5)
ALP SERPL-CCNC: 189 U/L — HIGH (ref 40–120)
ALT FLD-CCNC: 9 U/L — LOW (ref 10–45)
ANION GAP SERPL CALC-SCNC: 5 MMOL/L — SIGNIFICANT CHANGE UP (ref 5–17)
APTT BLD: 38.3 SEC — HIGH (ref 26.1–36.8)
AST SERPL-CCNC: 24 U/L — SIGNIFICANT CHANGE UP (ref 10–40)
BASOPHILS # BLD AUTO: 0.04 K/UL — SIGNIFICANT CHANGE UP (ref 0–0.2)
BASOPHILS NFR BLD AUTO: 0.6 % — SIGNIFICANT CHANGE UP (ref 0–2)
BILIRUB SERPL-MCNC: 0.7 MG/DL — SIGNIFICANT CHANGE UP (ref 0.2–1.2)
BUN SERPL-MCNC: 9 MG/DL — SIGNIFICANT CHANGE UP (ref 7–23)
CALCIUM SERPL-MCNC: 8.2 MG/DL — LOW (ref 8.4–10.5)
CHLORIDE SERPL-SCNC: 99 MMOL/L — SIGNIFICANT CHANGE UP (ref 96–108)
CO2 SERPL-SCNC: 30 MMOL/L — SIGNIFICANT CHANGE UP (ref 22–31)
CREAT SERPL-MCNC: 0.77 MG/DL — SIGNIFICANT CHANGE UP (ref 0.5–1.3)
EGFR: 99 ML/MIN/1.73M2 — SIGNIFICANT CHANGE UP
EGFR: 99 ML/MIN/1.73M2 — SIGNIFICANT CHANGE UP
EOSINOPHIL # BLD AUTO: 0.08 K/UL — SIGNIFICANT CHANGE UP (ref 0–0.5)
EOSINOPHIL NFR BLD AUTO: 1.2 % — SIGNIFICANT CHANGE UP (ref 0–6)
GLUCOSE FLD-MCNC: 100 MG/DL — SIGNIFICANT CHANGE UP
GLUCOSE SERPL-MCNC: 88 MG/DL — SIGNIFICANT CHANGE UP (ref 70–99)
GRAM STN FLD: SIGNIFICANT CHANGE UP
HAV IGM SER-ACNC: SIGNIFICANT CHANGE UP
HBV CORE IGM SER-ACNC: SIGNIFICANT CHANGE UP
HBV SURFACE AG SER-ACNC: SIGNIFICANT CHANGE UP
HCT VFR BLD CALC: 33.4 % — LOW (ref 39–50)
HCV AB S/CO SERPL IA: 0.32 S/CO — SIGNIFICANT CHANGE UP (ref 0–0.79)
HCV AB SERPL-IMP: SIGNIFICANT CHANGE UP
HGB BLD-MCNC: 11.3 G/DL — LOW (ref 13–17)
IMM GRANULOCYTES NFR BLD AUTO: 0.5 % — SIGNIFICANT CHANGE UP (ref 0–0.9)
INR BLD: 1.44 RATIO — HIGH (ref 0.85–1.16)
LDH SERPL L TO P-CCNC: 136 U/L — SIGNIFICANT CHANGE UP
LYMPHOCYTES # BLD AUTO: 1.08 K/UL — SIGNIFICANT CHANGE UP (ref 1–3.3)
LYMPHOCYTES # BLD AUTO: 16.3 % — SIGNIFICANT CHANGE UP (ref 13–44)
MAGNESIUM SERPL-MCNC: 1.5 MG/DL — LOW (ref 1.6–2.6)
MCHC RBC-ENTMCNC: 30.4 PG — SIGNIFICANT CHANGE UP (ref 27–34)
MCHC RBC-ENTMCNC: 33.8 G/DL — SIGNIFICANT CHANGE UP (ref 32–36)
MCV RBC AUTO: 89.8 FL — SIGNIFICANT CHANGE UP (ref 80–100)
MONOCYTES # BLD AUTO: 0.94 K/UL — HIGH (ref 0–0.9)
MONOCYTES NFR BLD AUTO: 14.2 % — HIGH (ref 2–14)
NEUTROPHILS # BLD AUTO: 4.47 K/UL — SIGNIFICANT CHANGE UP (ref 1.8–7.4)
NEUTROPHILS NFR BLD AUTO: 67.2 % — SIGNIFICANT CHANGE UP (ref 43–77)
NRBC BLD AUTO-RTO: 0 /100 WBCS — SIGNIFICANT CHANGE UP (ref 0–0)
PH FLD: 7.8 — SIGNIFICANT CHANGE UP
PHOSPHATE SERPL-MCNC: 4.3 MG/DL — SIGNIFICANT CHANGE UP (ref 2.5–4.5)
PLATELET # BLD AUTO: 379 K/UL — SIGNIFICANT CHANGE UP (ref 150–400)
POTASSIUM SERPL-MCNC: 3.2 MMOL/L — LOW (ref 3.5–5.3)
POTASSIUM SERPL-SCNC: 3.2 MMOL/L — LOW (ref 3.5–5.3)
PROT FLD-MCNC: 6.3 G/DL — SIGNIFICANT CHANGE UP
PROT SERPL-MCNC: 8.8 G/DL — HIGH (ref 6–8.3)
PROTHROM AB SERPL-ACNC: 16.9 SEC — HIGH (ref 9.9–13.4)
RBC # BLD: 3.72 M/UL — LOW (ref 4.2–5.8)
RBC # FLD: 14.7 % — HIGH (ref 10.3–14.5)
SODIUM SERPL-SCNC: 134 MMOL/L — LOW (ref 135–145)
SPECIMEN SOURCE: SIGNIFICANT CHANGE UP
WBC # BLD: 6.64 K/UL — SIGNIFICANT CHANGE UP (ref 3.8–10.5)
WBC # FLD AUTO: 6.64 K/UL — SIGNIFICANT CHANGE UP (ref 3.8–10.5)

## 2025-06-04 PROCEDURE — 71045 X-RAY EXAM CHEST 1 VIEW: CPT | Mod: 26

## 2025-06-04 PROCEDURE — 99233 SBSQ HOSP IP/OBS HIGH 50: CPT | Mod: FS

## 2025-06-04 PROCEDURE — 88112 CYTOPATH CELL ENHANCE TECH: CPT | Mod: 26

## 2025-06-04 PROCEDURE — 88305 TISSUE EXAM BY PATHOLOGIST: CPT | Mod: 26

## 2025-06-04 PROCEDURE — 99233 SBSQ HOSP IP/OBS HIGH 50: CPT | Mod: GC

## 2025-06-04 RX ORDER — ALBUMIN (HUMAN) 12.5 G/50ML
100 INJECTION, SOLUTION INTRAVENOUS
Refills: 0 | Status: COMPLETED | OUTPATIENT
Start: 2025-06-04 | End: 2025-06-04

## 2025-06-04 RX ORDER — SPIRONOLACTONE 25 MG
25 TABLET ORAL DAILY
Refills: 0 | Status: DISCONTINUED | OUTPATIENT
Start: 2025-06-04 | End: 2025-06-05

## 2025-06-04 RX ORDER — MAGNESIUM SULFATE 500 MG/ML
1 SYRINGE (ML) INJECTION ONCE
Refills: 0 | Status: COMPLETED | OUTPATIENT
Start: 2025-06-04 | End: 2025-06-04

## 2025-06-04 RX ADMIN — Medication 1 TABLET(S): at 12:19

## 2025-06-04 RX ADMIN — CEFTRIAXONE 100 MILLIGRAM(S): 500 INJECTION, POWDER, FOR SOLUTION INTRAMUSCULAR; INTRAVENOUS at 01:29

## 2025-06-04 RX ADMIN — ALBUMIN (HUMAN) 100 MILLILITER(S): 12.5 INJECTION, SOLUTION INTRAVENOUS at 19:10

## 2025-06-04 RX ADMIN — ALBUMIN (HUMAN) 100 MILLILITER(S): 12.5 INJECTION, SOLUTION INTRAVENOUS at 18:08

## 2025-06-04 RX ADMIN — ALBUMIN (HUMAN) 100 MILLILITER(S): 12.5 INJECTION, SOLUTION INTRAVENOUS at 17:05

## 2025-06-04 RX ADMIN — Medication 100 MILLIEQUIVALENT(S): at 09:28

## 2025-06-04 RX ADMIN — Medication 100 GRAM(S): at 12:19

## 2025-06-04 RX ADMIN — Medication 40 MILLIEQUIVALENT(S): at 12:19

## 2025-06-04 RX ADMIN — Medication 75 MICROGRAM(S): at 06:06

## 2025-06-04 RX ADMIN — ALBUMIN (HUMAN) 100 MILLILITER(S): 12.5 INJECTION, SOLUTION INTRAVENOUS at 16:00

## 2025-06-04 RX ADMIN — FUROSEMIDE 40 MILLIGRAM(S): 10 INJECTION INTRAMUSCULAR; INTRAVENOUS at 06:05

## 2025-06-04 NOTE — PROGRESS NOTE ADULT - ASSESSMENT
63 y/o male with PMHx of HLD, liver cirrhosis 2/2 ETOH use disorder (stopped drinking 1-2 yeas ago), positive FIT test (cancelled colonoscopy 1/2025), Hpylori + 11/16/24 (did not complete treatment), GERD, hyperlipidemia, hypothyroidism is admitted for:    #Decompensated alcoholic cirrhosis versus malignant ascites (positive FIT outpatient)   #Acute Hypoxic respiratory failure secondary to b/l pleural effusions  #b/l LE edema  - s/p IV lasix 40mg in the ED with 1800cc output  - INR 1.56, albumin 1.3, Na 134,  pro , Ammonia level <10  - MELD-Na 11  - c/w oral daily lasix 40mg   -Start Spironolactone for cirrhosis   -Start Propanolol for prophylaxis  -Rocephin ppx    -Monitor Is and Os, daily weight  -B12 >2000  -Folate 7.8   - Monitor LFTs and coags daily   - b/l duplex: No evidence of deep venous thrombosis in either lower extremity.  - CXR showing right moderate pleural effusion and small left pleural effusion.  - CT abd/pelvis 12/10/2024 showed cirrhotic changes of liver, abdominal aortic aneurysm, portacaval and peripancreatic lymph nodes, and  small nodule adjacent to R liver for which PET CT was recommended. PET 12/19/2024 showed: Multiple hypermetabolic mediastinal and bilateral hilar lymph nodes, with the largest lesion adjacent to the descending thoracic aorta. Right level 2 neck lymph node with increased FDG uptake. Two periportal lymph nodes with increased FDG uptake, suspicious for malignant involvement. 3.8 cm infrarenal abdominal aortic aneurysm.  - CT chest/abd/pelvis 6/2/25: No acute pulmonary embolism. Large right and moderate left pleural effusions. Complete right middle and lower lobeatelectasis with compressive atelectasis of remaining lungs. Cirrhosis with sequela of portal hypertension as described notably moderate volume abdominal and pelvic ascites. Edematous wall thickening of distal and terminal ileum, cecum, ascending   through mid transverse colon may represent sequela of portal hypertensive   change versus ileitis and colitis in the correct clinical scenario. Areas of smooth peritoneal thickening and enhancement suggests sequela of   recent instrumentation versus peritonitis  - GI consult   - Pulm consult     #hypothyroidism  - TSH 6.28 11/2024  - pt was prescribed levothyroxine 75mcg but has not been taking med  - TSH 5.3  - Continue Levothyroxine 75     #HLD  - lipid panel 11/2024: 11/2024: total 180, HDL 25, [id number]  - lipid panel 6/3/2025: total 91, HDL 12, LDL 64   - was prescribed atorvastatin 20mg but has not been taking it    #DVT ppx  - SCDs for possible thoracentesis/paracentesis    #GOC  - Full code     Discussed with Dr. Canales

## 2025-06-04 NOTE — PROVIDER CONTACT NOTE (MEDICATION) - ACTION/TREATMENT ORDERED:
Provider made aware with order to move medications to 0800.
Provider made aware with order to hold propranolol PO.

## 2025-06-04 NOTE — PROGRESS NOTE ADULT - PROBLEM SELECTOR PLAN 1
Liver Cirrhosis most likely due to ETOH  - 50 mg Spirolactone  - 20mg Lasix  - Tentatively Paracentesis on 6/4 or 6/5 after re evaluation by Dr Espana.  - Clear diet for paracentesis  - Monitor INR, Platelets Liver Cirrhosis most likely due to ETOH  - 50 mg Spirolactone  - 20mg Lasix  - Tentatively Paracentesis on 6/4 or 6/5 after re evaluation by Dr Espana.  - Clear diet for possible paracentesis  - Monitor INR, Platelets Liver Cirrhosis most likely due to ETOH  - Daily weight  - 50 mg Spirolactone, double if tolerated  - 20mg Lasix, double if tolerated  - Tentatively Paracentesis 6/5 after re evaluation by Dr Espana.  - Clear diet post midnight for possible paracentesis  - Monitor INR, Platelets

## 2025-06-04 NOTE — PROGRESS NOTE ADULT - ATTENDING COMMENTS
Please see resident note for full details regarding the service.    PE: A+Ox2 (person, place), no murmurs, decreased breath sounds bilateral bases, abd distended, positive fluid wave, no lower extremity swelling    Assessment:  - Dyspnea secondary to moderate to large right pleural effusion (possibly malignant effusion vs. cirrhosis?) s/p thoracentesis 6/4/25   - Decompensated cirrhosis and portal hypertension   - Moderate to large ascites   - Cholelithiasis with common bile duct is mildly dilated measuring 9 mm  - Tachycardia   - Normocytic anemia   - Elevated alkaline phosphatase   - Elevated BNP   - Hypoalbuminemia   - 3.8 cm infrarenal abdominal aortic aneurysm   - History of HLD, liver cirrhosis 2/2 ETOH use disorder (stopped drinking 1-2 yeas ago), positive FIT test (cancelled colonoscopy 1/2025), Hpylori + 11/16/24 (did not complete treatment), GERD, hyperlipidemia, hypothyroidism     Plan:  - Of note - outpatient imaging showed: multiple mediastinal and bilateral hilar lymph nodes, largest lesion adjacent to descending thoracic aorta, rght level 2 neck lymph node, 2 periportal lymph nodes, 3.8 cm infrarenal abdominal aortic aneurysm   - CT head: Mild chronic microvascular changes without evidence of an acute transcortical infarction or hemorrhage.  - CT chest/abd/pelvis: No acute pulmonary embolism. Large right and moderate left pleural effusions. Complete right middle and lower lobe atelectasis with compressive atelectasis of remaining lungs. Cirrhosis with sequela of portal hypertension as described notably moderate volume abdominal and pelvic ascites. Edematous wall thickening of distal and terminal ileum, cecum, ascending through mid transverse colon may represent sequela of portal hypertensive change versus ileitis and colitis in the correct clinical scenario. Areas of smooth peritoneal thickening and enhancement suggests sequela of recent instrumentation versus peritonitis  - Abdominal ultrasound:  1. Cirrhotic morphology of the liver. No focal intrahepatic abnormality is identified on this examination. 2. There is moderate volume ascites fluid in the right upper quadrant and right lower quadrant which is complex and partially loculated with numerous septations and low-level echoes. Advise clinical correlation and correlation with needle sampling as clinically warranted. 3. There are multiple gallstones present within the gallbladder. There is mild nonspecific thickening of the gallbladder wall which may be reactive in the setting of liver disease and ascites. A sonographic Hernandez sign was not elicited. 4. The common bile duct is mildly dilated measuring 9 mm. No significant intrahepatic biliary dilatation is seen. Suggest clinical and laboratory correlation and further evaluation as clinically warranted. 5. Atrophic left kidney, stable finding.  - B12, folate, lipid panel within normal limits   - TSH elevated -> pt was not taking thyroid medications outpatient -> restarted here follow up repeat TFTs in 6-8 weeks   - -116 -> monitor after thoracentesis and starting Propanolol   - SpO2 93-96% on 4L nasal cannula -> wean as tolerated   - Thoracentesis completed today, 1200 ml removed   - Switched to 2g of Ceftriaxone yesterday   - Continue Lasix 40 mg PO daily   - Pt did not receive Spironolactone given holding parameters -> start at 25 mg daily with lower holding parameters -> titrate up as tolerated   - Continue Propanolol   - Strict I+Os, daily weights  - Order hepatitis panel   - Spoke to GI (PA) - possible paracentesis tomorrow with IR   - Spoke to Pulmonology (Dr. Nguyen) - thoracentesis  - DVT ppx: SCDs   - Code status: Full code   - Dispo: Active     I spent a total of 50 minutes on the date of this encounter coordinating the patient's care, excluding resident teaching time. This includes reviewing results/imaging and discussions with specialists, nursing, case management/social work. Further tests, medications, and procedures have been ordered as indicated. Results and the plan of care were communicated to the patient and/or their family member. Supporting documentation was completed and added to the patient's chart.

## 2025-06-04 NOTE — PROCEDURE NOTE - NSINFORMCONSENT_GEN_A_CORE
yesterday
Benefits, risks, and possible complications of procedure explained to patient/caregiver who verbalized understanding and gave verbal consent.

## 2025-06-04 NOTE — PROGRESS NOTE ADULT - ASSESSMENT
64 year-old man with a history of hyperlipidemia, liver cirrhosis presumed secondary to alcohol use disorder (abstinent for 1–2 years), hypothyroidism, gastroesophageal reflux disease, and prior Helicobacter pylori infection (untreated), presents for evaluation of shortness of breath due to abdominal distention and large ascites    GI consult: Ascites  Thoracentesis by ICU - 1200 ml removed  Paracentesis tentatively 6/4 or 6/05 pending revaluation by Dr Espana

## 2025-06-04 NOTE — PROGRESS NOTE ADULT - SUBJECTIVE AND OBJECTIVE BOX
Follow-up Pulmonary Progress Note  Chief Complaint : Hypervolemia    pt seen and examined  cofmortable, in upright position  difficulty laying flat as will have sob  denies cough, fever chills  bedside US of chest and abd done   noted large right, mod left pl effusion and small ascities  no good area for paracentesis  discussed patient role in drainage as may help sob  agreed  consent obtained.        Allergies :No Known Allergies      PAST MEDICAL & SURGICAL HISTORY:  ALC (alcoholic liver cirrhosis)    Hyperlipidemia    Positive FIT (fecal immunochemical test)    GERD (gastroesophageal reflux disease)    Hypothyroidism        Medications:  MEDICATIONS  (STANDING):  albumin human 25% IVPB 100 milliLiter(s) IV Intermittent every 1 hour  cefTRIAXone   IVPB 2000 milliGRAM(s) IV Intermittent every 24 hours  furosemide    Tablet 40 milliGRAM(s) Oral daily  levothyroxine 75 MICROGram(s) Oral daily  magnesium sulfate  IVPB 1 Gram(s) IV Intermittent once  multivitamin 1 Tablet(s) Oral daily  potassium chloride    Tablet ER 40 milliEquivalent(s) Oral once  propranolol 10 milliGRAM(s) Oral two times a day  spironolactone 25 milliGRAM(s) Oral daily    MEDICATIONS  (PRN):  aluminum hydroxide/magnesium hydroxide/simethicone Suspension 30 milliLiter(s) Oral every 4 hours PRN Dyspepsia  melatonin 3 milliGRAM(s) Oral at bedtime PRN Insomnia  ondansetron Injectable 4 milliGRAM(s) IV Push every 8 hours PRN Nausea and/or Vomiting      Antibiotics History  cefTRIAXone   IVPB 2000 milliGRAM(s) IV Intermittent every 24 hours, 06-03-25 @ 13:32, Stop order after: 7 Days  piperacillin/tazobactam IVPB. 3.375 Gram(s) IV Intermittent once, 06-03-25 @ 01:49, Stop order after: 1 Doses  piperacillin/tazobactam IVPB.- 3.375 Gram(s) IV Intermittent once, 06-03-25 @ 06:30  piperacillin/tazobactam IVPB.. 3.375 Gram(s) IV Intermittent every 8 hours, 06-03-25 @ 14:00, Stop order after: 7 Days      Heme Medications       GI Medications  aluminum hydroxide/magnesium hydroxide/simethicone Suspension 30 milliLiter(s) Oral every 4 hours, 06-02-25 @ 21:47, Routine PRN        LABS:                        11.3   6.64  )-----------( 379      ( 04 Jun 2025 06:50 )             33.4     06-04    134[L]  |  99  |  9   ----------------------------<  88  3.2[L]   |  30  |  0.77    Ca    8.2[L]      04 Jun 2025 06:50  Phos  4.3     06-04  Mg     1.5     06-04    TPro  8.8[H]  /  Alb  1.2[L]  /  TBili  0.7  /  DBili  x   /  AST  24  /  ALT  9[L]  /  AlkPhos  189[H]  06-04            PT/INR - ( 04 Jun 2025 06:50 )   PT: 16.9 sec;   INR: 1.44 ratio         PTT - ( 04 Jun 2025 06:50 )  PTT:38.3 sec  Urinalysis Basic - ( 04 Jun 2025 06:50 )    Color: x / Appearance: x / SG: x / pH: x  Gluc: 88 mg/dL / Ketone: x  / Bili: x / Urobili: x   Blood: x / Protein: x / Nitrite: x   Leuk Esterase: x / RBC: x / WBC x   Sq Epi: x / Non Sq Epi: x / Bacteria: x               VITALS:  T(C): 36.8 (06-04-25 @ 05:00), Max: 36.9 (06-03-25 @ 20:19)  T(F): 98.3 (06-04-25 @ 05:00), Max: 98.4 (06-03-25 @ 20:19)  HR: 111 (06-04-25 @ 05:00) (107 - 113)  BP: 106/67 (06-04-25 @ 05:00) (104/69 - 117/82)  BP(mean): 87 (06-03-25 @ 13:40) (87 - 87)  ABP: --  ABP(mean): --  RR: 17 (06-04-25 @ 05:00) (17 - 24)  SpO2: 93% (06-04-25 @ 05:00) (93% - 96%)  CVP(mm Hg): --  CVP(cm H2O): --    Ins and Outs     06-03-25 @ 07:01  -  06-04-25 @ 07:00  --------------------------------------------------------  IN: 0 mL / OUT: 1400 mL / NET: -1400 mL        Height (cm): 167.6 (06-02-25 @ 17:41)  Weight (kg): 62.1 (06-02-25 @ 17:41)  BMI (kg/m2): 22.1 (06-02-25 @ 17:41)        I&O's Detail    03 Jun 2025 07:01  -  04 Jun 2025 07:00  --------------------------------------------------------  IN:  Total IN: 0 mL    OUT:    Voided (mL): 1400 mL  Total OUT: 1400 mL    Total NET: -1400 mL          Physical Examination:  GENERAL:               Alert, Oriented, No acute distress.    HEENT:                    Pupils equal, reactive to light.  Symmetric. No JVD, Moist MM  PULM:                     Bilateral air entry, Clear to auscultation bilaterally, no significant sputum production, No Rales, No Rhonchi, No Wheezing  CVS:                         S1, S2,  No Murmur  ABD:                        Soft, nondistended, nontender, normoactive bowel sounds,   EXT:                         No edema, nontender, No Cyanosis or Clubbing   Vascular:                Warm Extremities, Normal Capillary refill, Normal Distal Pulses  SKIN:                       Warm and well perfused, no rashes noted.   NEURO:                  Alert, oriented, interactive, nonfocal, follows commands  PSYC:                      Calm, + Insight.

## 2025-06-04 NOTE — PROGRESS NOTE ADULT - SUBJECTIVE AND OBJECTIVE BOX
Subjective and Objective:     Overnight Events: None   Interval History: Patient was seen and examined this morning at bedside.     Vital Signs Last 24 Hrs  T(C): 36.8 (04 Jun 2025 05:00), Max: 36.9 (03 Jun 2025 20:19)  T(F): 98.3 (04 Jun 2025 05:00), Max: 98.4 (03 Jun 2025 20:19)  HR: 111 (04 Jun 2025 05:00) (107 - 113)  BP: 106/67 (04 Jun 2025 05:00) (104/69 - 117/82)  BP(mean): 87 (03 Jun 2025 13:40) (87 - 87)  RR: 17 (04 Jun 2025 05:00) (17 - 24)  SpO2: 93% (04 Jun 2025 05:00) (93% - 96%)    Parameters below as of 04 Jun 2025 05:00  Patient On (Oxygen Delivery Method): nasal cannula  O2 Flow (L/min): 4    I&O's Summary    03 Jun 2025 07:01  -  04 Jun 2025 07:00  --------------------------------------------------------  IN: 0 mL / OUT: 1400 mL / NET: -1400 mL      PHYSICAL EXAM:  Constitutional: Pt lying in bed, awake and alert, NAD  Respiratory: decreased breath sounds at bilateral bases   Cardiovascular: S1S2+, RRR, no M/G/R  Gastrointestinal: abdomen distended, positive fluid wave   Extremities: no lower extremity swelling   Vascular: Peripheral pulses present  Neurological: AAOx2 to person and place , no focal deficits    ALLERGIES:  No Known Allergies      MEDICATIONS:  MEDICATIONS  (STANDING):  cefTRIAXone   IVPB 2000 milliGRAM(s) IV Intermittent every 24 hours  furosemide    Tablet 40 milliGRAM(s) Oral daily  levothyroxine 75 MICROGram(s) Oral daily  magnesium sulfate  IVPB 1 Gram(s) IV Intermittent once  multivitamin 1 Tablet(s) Oral daily  potassium chloride  10 mEq/100 mL IVPB 10 milliEquivalent(s) IV Intermittent every 1 hour  propranolol 10 milliGRAM(s) Oral two times a day  spironolactone 25 milliGRAM(s) Oral daily    MEDICATIONS  (PRN):  aluminum hydroxide/magnesium hydroxide/simethicone Suspension 30 milliLiter(s) Oral every 4 hours PRN Dyspepsia  melatonin 3 milliGRAM(s) Oral at bedtime PRN Insomnia  ondansetron Injectable 4 milliGRAM(s) IV Push every 8 hours PRN Nausea and/or Vomiting      LABS: (Personally Reviewed):                        11.3   6.64  )-----------( 379      ( 04 Jun 2025 06:50 )             33.4     06-04    134[L]  |  99  |  9   ----------------------------<  88  3.2[L]   |  30  |  0.77    Ca    8.2[L]      04 Jun 2025 06:50  Phos  4.3     06-04  Mg     1.5     06-04    TPro  8.8[H]  /  Alb  1.2[L]  /  TBili  0.7  /  DBili  x   /  AST  24  /  ALT  x   /  AlkPhos  189[H]  06-04    PT/INR - ( 04 Jun 2025 06:50 )   PT: 16.9 sec;   INR: 1.44 ratio         PTT - ( 04 Jun 2025 06:50 )  PTT:38.3 sec

## 2025-06-04 NOTE — PROGRESS NOTE ADULT - SUBJECTIVE AND OBJECTIVE BOX
GI Follow up  Patient seen and examined at bedside. Denies abdominal pain, denies N/V/D, no hemetemesis and hemetochezia.   Last bm:  , tolerating diet            MEDICATIONS  (STANDING):  albumin human 25% IVPB 100 milliLiter(s) IV Intermittent every 1 hour  cefTRIAXone   IVPB 2000 milliGRAM(s) IV Intermittent every 24 hours  furosemide    Tablet 40 milliGRAM(s) Oral daily  levothyroxine 75 MICROGram(s) Oral daily  multivitamin 1 Tablet(s) Oral daily  propranolol 10 milliGRAM(s) Oral two times a day  spironolactone 25 milliGRAM(s) Oral daily    MEDICATIONS  (PRN):  aluminum hydroxide/magnesium hydroxide/simethicone Suspension 30 milliLiter(s) Oral every 4 hours PRN Dyspepsia  melatonin 3 milliGRAM(s) Oral at bedtime PRN Insomnia  ondansetron Injectable 4 milliGRAM(s) IV Push every 8 hours PRN Nausea and/or Vomiting      Allergies    No Known Allergies    Intolerances          Vital Signs Last 24 Hrs  T(C): 36.8 (04 Jun 2025 11:58), Max: 36.9 (03 Jun 2025 20:19)  T(F): 98.2 (04 Jun 2025 11:58), Max: 98.4 (03 Jun 2025 20:19)  HR: 116 (04 Jun 2025 11:58) (107 - 116)  BP: 103/64 (04 Jun 2025 11:58) (103/64 - 117/82)  BP(mean): 87 (03 Jun 2025 13:40) (87 - 87)  RR: 18 (04 Jun 2025 11:58) (17 - 24)  SpO2: 93% (04 Jun 2025 11:58) (93% - 96%)    Parameters below as of 04 Jun 2025 11:58  Patient On (Oxygen Delivery Method): nasal cannula  O2 Flow (L/min): 4      PHYSICAL EXAM:    Constitutional: Well-developed  ENTT: clear conjunctivae and sclera  Respiratory: clear to auscultation  Cardiovascular: S1 and S2  Gastrointestinal: +Bowel Sounds all quadrants, soft, Non tender, non distended  Extremities: No peripheral edema, neg clubbing, cyanosis  Neurological: A/O x 3  Skin: warm and dry      LABS:                        11.3   6.64  )-----------( 379      ( 04 Jun 2025 06:50 )             33.4     06-04    134[L]  |  99  |  9   ----------------------------<  88  3.2[L]   |  30  |  0.77    Ca    8.2[L]      04 Jun 2025 06:50  Phos  4.3     06-04  Mg     1.5     06-04    TPro  8.8[H]  /  Alb  1.2[L]  /  TBili  0.7  /  DBili  x   /  AST  24  /  ALT  9[L]  /  AlkPhos  189[H]  06-04    PT/INR - ( 04 Jun 2025 06:50 )   PT: 16.9 sec;   INR: 1.44 ratio         PTT - ( 04 Jun 2025 06:50 )  PTT:38.3 sec  Urinalysis Basic - ( 04 Jun 2025 06:50 )    Color: x / Appearance: x / SG: x / pH: x  Gluc: 88 mg/dL / Ketone: x  / Bili: x / Urobili: x   Blood: x / Protein: x / Nitrite: x   Leuk Esterase: x / RBC: x / WBC x   Sq Epi: x / Non Sq Epi: x / Bacteria: x      LIVER FUNCTIONS - ( 04 Jun 2025 06:50 )  Alb: 1.2 g/dL / Pro: 8.8 g/dL / ALK PHOS: 189 U/L / ALT: 9 U/L / AST: 24 U/L / GGT: x             RADIOLOGY & ADDITIONAL TESTS:   GI Follow up  Patient seen and examined at bedside post Thoracentesis. Denies abdominal pain, denies N/V/D, denies SOB          MEDICATIONS  (STANDING):  albumin human 25% IVPB 100 milliLiter(s) IV Intermittent every 1 hour  cefTRIAXone   IVPB 2000 milliGRAM(s) IV Intermittent every 24 hours  furosemide    Tablet 40 milliGRAM(s) Oral daily  levothyroxine 75 MICROGram(s) Oral daily  multivitamin 1 Tablet(s) Oral daily  propranolol 10 milliGRAM(s) Oral two times a day  spironolactone 25 milliGRAM(s) Oral daily    MEDICATIONS  (PRN):  aluminum hydroxide/magnesium hydroxide/simethicone Suspension 30 milliLiter(s) Oral every 4 hours PRN Dyspepsia  melatonin 3 milliGRAM(s) Oral at bedtime PRN Insomnia  ondansetron Injectable 4 milliGRAM(s) IV Push every 8 hours PRN Nausea and/or Vomiting      Allergies    No Known Allergies    Intolerances          Vital Signs Last 24 Hrs  T(C): 36.8 (04 Jun 2025 11:58), Max: 36.9 (03 Jun 2025 20:19)  T(F): 98.2 (04 Jun 2025 11:58), Max: 98.4 (03 Jun 2025 20:19)  HR: 116 (04 Jun 2025 11:58) (107 - 116)  BP: 103/64 (04 Jun 2025 11:58) (103/64 - 117/82)  BP(mean): 87 (03 Jun 2025 13:40) (87 - 87)  RR: 18 (04 Jun 2025 11:58) (17 - 24)  SpO2: 93% (04 Jun 2025 11:58) (93% - 96%)    Parameters below as of 04 Jun 2025 11:58  Patient On (Oxygen Delivery Method): nasal cannula  O2 Flow (L/min): 4      PHYSICAL EXAM:    Constitutional: Well-developed  ENTT: clear conjunctivae and sclera  Respiratory: no distres  Cardiovascular: S1 and S2  Gastrointestinal: +Bowel Sounds all quadrants, soft, Non tender, slightly distended  Extremities: No peripheral edema, neg clubbing, cyanosis  Neurological: A/O x 3 with forgetfulness, Italian speaking  Skin: warm and dry      LABS:                        11.3   6.64  )-----------( 379      ( 04 Jun 2025 06:50 )             33.4     06-04    134[L]  |  99  |  9   ----------------------------<  88  3.2[L]   |  30  |  0.77    Ca    8.2[L]      04 Jun 2025 06:50  Phos  4.3     06-04  Mg     1.5     06-04    TPro  8.8[H]  /  Alb  1.2[L]  /  TBili  0.7  /  DBili  x   /  AST  24  /  ALT  9[L]  /  AlkPhos  189[H]  06-04    PT/INR - ( 04 Jun 2025 06:50 )   PT: 16.9 sec;   INR: 1.44 ratio         PTT - ( 04 Jun 2025 06:50 )  PTT:38.3 sec  Urinalysis Basic - ( 04 Jun 2025 06:50 )    Color: x / Appearance: x / SG: x / pH: x  Gluc: 88 mg/dL / Ketone: x  / Bili: x / Urobili: x   Blood: x / Protein: x / Nitrite: x   Leuk Esterase: x / RBC: x / WBC x   Sq Epi: x / Non Sq Epi: x / Bacteria: x      LIVER FUNCTIONS - ( 04 Jun 2025 06:50 )  Alb: 1.2 g/dL / Pro: 8.8 g/dL / ALK PHOS: 189 U/L / ALT: 9 U/L / AST: 24 U/L / GGT: x             RADIOLOGY & ADDITIONAL TESTS:   GI Follow up  Patient seen and examined at bedside post Thoracentesis. Denies abdominal pain, denies N/V/D, denies SOB          MEDICATIONS  (STANDING):  albumin human 25% IVPB 100 milliLiter(s) IV Intermittent every 1 hour  cefTRIAXone   IVPB 2000 milliGRAM(s) IV Intermittent every 24 hours  furosemide    Tablet 40 milliGRAM(s) Oral daily  levothyroxine 75 MICROGram(s) Oral daily  multivitamin 1 Tablet(s) Oral daily  propranolol 10 milliGRAM(s) Oral two times a day  spironolactone 25 milliGRAM(s) Oral daily    MEDICATIONS  (PRN):  aluminum hydroxide/magnesium hydroxide/simethicone Suspension 30 milliLiter(s) Oral every 4 hours PRN Dyspepsia  melatonin 3 milliGRAM(s) Oral at bedtime PRN Insomnia  ondansetron Injectable 4 milliGRAM(s) IV Push every 8 hours PRN Nausea and/or Vomiting      Allergies    No Known Allergies    Intolerances          Vital Signs Last 24 Hrs  T(C): 36.8 (04 Jun 2025 11:58), Max: 36.9 (03 Jun 2025 20:19)  T(F): 98.2 (04 Jun 2025 11:58), Max: 98.4 (03 Jun 2025 20:19)  HR: 116 (04 Jun 2025 11:58) (107 - 116)  BP: 103/64 (04 Jun 2025 11:58) (103/64 - 117/82)  BP(mean): 87 (03 Jun 2025 13:40) (87 - 87)  RR: 18 (04 Jun 2025 11:58) (17 - 24)  SpO2: 93% (04 Jun 2025 11:58) (93% - 96%)    Parameters below as of 04 Jun 2025 11:58  Patient On (Oxygen Delivery Method): nasal cannula  O2 Flow (L/min): 4      PHYSICAL EXAM:    Constitutional: Well-developed  ENTT: clear conjunctivae and sclera  Respiratory: no distres  Cardiovascular: S1 and S2  Gastrointestinal: +Bowel Sounds all quadrants, soft, Non tender, slightly distended  Extremities: No peripheral edema, neg clubbing, cyanosis  Neurological: A/O x 3 with forgetfulness, [place]an speaking  Skin: warm and dry      LABS:                        11.3   6.64  )-----------( 379      ( 04 Jun 2025 06:50 )             33.4     06-04    134[L]  |  99  |  9   ----------------------------<  88  3.2[L]   |  30  |  0.77    Ca    8.2[L]      04 Jun 2025 06:50  Phos  4.3     06-04  Mg     1.5     06-04    TPro  8.8[H]  /  Alb  1.2[L]  /  TBili  0.7  /  DBili  x   /  AST  24  /  ALT  9[L]  /  AlkPhos  189[H]  06-04    PT/INR - ( 04 Jun 2025 06:50 )   PT: 16.9 sec;   INR: 1.44 ratio         PTT - ( 04 Jun 2025 06:50 )  PTT:38.3 sec  Urinalysis Basic - ( 04 Jun 2025 06:50 )    Color: x / Appearance: x / SG: x / pH: x  Gluc: 88 mg/dL / Ketone: x  / Bili: x / Urobili: x   Blood: x / Protein: x / Nitrite: x   Leuk Esterase: x / RBC: x / WBC x   Sq Epi: x / Non Sq Epi: x / Bacteria: x      LIVER FUNCTIONS - ( 04 Jun 2025 06:50 )  Alb: 1.2 g/dL / Pro: 8.8 g/dL / ALK PHOS: 189 U/L / ALT: 9 U/L / AST: 24 U/L / GGT: x             RADIOLOGY & ADDITIONAL TESTS:      ACC: [id number] EXAM:  US ABDOMEN COMPLETE   ORDERED BY:  CRISTOPHER ANDUJAR     PROCEDURE DATE:  06/03/2025          INTERPRETATION:  CLINICAL INFORMATION: 64-year-old male with   decompensated alcoholic cirrhosis versus malignancy    COMPARISON:Abdominal pelvic CT 6/3/2025, 12/3/2024, PET/CT 12/19/2024    TECHNIQUE: Portable sonography of the abdomen.    FINDINGS:  Liver: Diffusely coarsened, heterogeneous echotexture and  irregular   liver surface contour, compatible with cirrhosis. No focal intrahepatic   abnormality is identified. The main portal vein is patent with   hepatopedal flow.  Bile ducts: Common bile duct measures 9 mm, mildly dilated. No   significant intrahepatic biliary dilatation is seen.  Gallbladder: Distended. There are multiple small gallstones. There is   mild diffuse gallbladder wall thickening measuring up to 5 mm. As per   sonographer report, a sonographic Hernandez sign was not elicited.  Pancreas: Poorly visualized due to overlying bowel.  Spleen: Not visualized on this examination. The spleen was demonstrated   to be small in size and lobular in contour on the prior CT scan.  Right kidney: 12.9 cm. No hydronephrosis. There is a cyst with thin   septation in the lower pole measuring 1.7 x 1.4 x 1.4 cm.  Left kidney: 5.7 cm. Atrophic, stable finding compared to prior CT scans.   No hydronephrosis.  Ascites: There is moderate volume ascites fluid in the right upper   quadrant and right lower quadrant which appears complex and partially   loculated with numerous internal septations and low-level echoes. There   is a small amount of ascites in the left upper quadrant and left lower   quadrant.  Aorta and IVC: Poorly visualized due to overlying bowel.    There are large bilateral pleural effusions.    IMPRESSION:    1. Cirrhotic morphology of the liver. No focal intrahepatic abnormality   is identified on this examination.  2. There is moderate volume ascites fluid in the right upper quadrant and   right lower quadrant which is complex and partially loculated with   numerous septations and low-level echoes. Advise clinical correlation and   correlation with needle sampling as clinically warranted.  3. There are multiple gallstones present within the gallbladder. There is   mild nonspecific thickening of the gallbladder wall which may be reactive   in the setting of liver disease and ascites. A sonographic Hernandez sign   was not elicited.  4. The common bile duct is mildly dilated measuring 9 mm. No significant   intrahepatic biliary dilatation is seen. Suggest clinical and laboratory   correlation and further evaluation as clinically warranted.  5. Atrophic left kidney, stable finding.

## 2025-06-04 NOTE — PROGRESS NOTE ADULT - ASSESSMENT
Assessment  63 y/o male with PMHx of HLD, liver cirrhosis 2/2 ETOH use disorder (stopped drinking 1-2 yeas ago), positive FIT test (cancelled colonoscopy 1/2025), Hpylori + 11/16/24 (did not complete treatment), GERD, hyperlipidemia, hypothyroidism sent from family medicine practice for shortness of breath today.     problem list  ascites with b/l pl effusions ? from cirrhosis    Plan  diagnostic / therapeutic thoracentesis  consent obtained  n/c o2 prn  gi f/u will need to optimize cirrhosis meds.

## 2025-06-05 LAB
ALBUMIN SERPL ELPH-MCNC: 2.1 G/DL — LOW (ref 3.3–5)
ALP SERPL-CCNC: 155 U/L — HIGH (ref 40–120)
ALT FLD-CCNC: 9 U/L — LOW (ref 10–45)
ANION GAP SERPL CALC-SCNC: 7 MMOL/L — SIGNIFICANT CHANGE UP (ref 5–17)
APTT BLD: 42.5 SEC — HIGH (ref 26.1–36.8)
AST SERPL-CCNC: 24 U/L — SIGNIFICANT CHANGE UP (ref 10–40)
B PERT IGG+IGM PNL SER: ABNORMAL
BASOPHILS # BLD AUTO: 0 K/UL — SIGNIFICANT CHANGE UP (ref 0–0.2)
BASOPHILS NFR BLD AUTO: 0 % — SIGNIFICANT CHANGE UP (ref 0–2)
BILIRUB SERPL-MCNC: 0.8 MG/DL — SIGNIFICANT CHANGE UP (ref 0.2–1.2)
BUN SERPL-MCNC: 6 MG/DL — LOW (ref 7–23)
CALCIUM SERPL-MCNC: 8 MG/DL — LOW (ref 8.4–10.5)
CHLORIDE SERPL-SCNC: 101 MMOL/L — SIGNIFICANT CHANGE UP (ref 96–108)
CO2 SERPL-SCNC: 28 MMOL/L — SIGNIFICANT CHANGE UP (ref 22–31)
COLOR FLD: SIGNIFICANT CHANGE UP
CREAT SERPL-MCNC: 0.73 MG/DL — SIGNIFICANT CHANGE UP (ref 0.5–1.3)
EGFR: 101 ML/MIN/1.73M2 — SIGNIFICANT CHANGE UP
EGFR: 101 ML/MIN/1.73M2 — SIGNIFICANT CHANGE UP
EOSINOPHIL # BLD AUTO: 0.14 K/UL — SIGNIFICANT CHANGE UP (ref 0–0.5)
EOSINOPHIL # FLD: 0 % — SIGNIFICANT CHANGE UP
EOSINOPHIL NFR BLD AUTO: 2 % — SIGNIFICANT CHANGE UP (ref 0–6)
FLUID INTAKE SUBSTANCE CLASS: SIGNIFICANT CHANGE UP
FOLATE+VIT B12 SERBLD-IMP: 0 % — SIGNIFICANT CHANGE UP
GLUCOSE SERPL-MCNC: 86 MG/DL — SIGNIFICANT CHANGE UP (ref 70–99)
HCT VFR BLD CALC: 29.7 % — LOW (ref 39–50)
HGB BLD-MCNC: 10.2 G/DL — LOW (ref 13–17)
HYPOCHROMIA BLD QL: SLIGHT — SIGNIFICANT CHANGE UP
INR BLD: 1.61 RATIO — HIGH (ref 0.85–1.16)
LDH SERPL L TO P-CCNC: 137 U/L — SIGNIFICANT CHANGE UP (ref 50–242)
LYMPHOCYTES # BLD AUTO: 1.01 K/UL — SIGNIFICANT CHANGE UP (ref 1–3.3)
LYMPHOCYTES # BLD AUTO: 14 % — SIGNIFICANT CHANGE UP (ref 13–44)
LYMPHOCYTES # FLD: 11 % — SIGNIFICANT CHANGE UP
MAGNESIUM SERPL-MCNC: 1.7 MG/DL — SIGNIFICANT CHANGE UP (ref 1.6–2.6)
MANUAL SMEAR VERIFICATION: SIGNIFICANT CHANGE UP
MCHC RBC-ENTMCNC: 31.3 PG — SIGNIFICANT CHANGE UP (ref 27–34)
MCHC RBC-ENTMCNC: 34.3 G/DL — SIGNIFICANT CHANGE UP (ref 32–36)
MCV RBC AUTO: 91.1 FL — SIGNIFICANT CHANGE UP (ref 80–100)
MESOTHL CELL # FLD: 0 % — SIGNIFICANT CHANGE UP
MONOCYTES # BLD AUTO: 0.93 K/UL — HIGH (ref 0–0.9)
MONOCYTES NFR BLD AUTO: 13 % — SIGNIFICANT CHANGE UP (ref 2–14)
MONOS+MACROS # FLD: 75 % — SIGNIFICANT CHANGE UP
NEUTROPHILS # BLD AUTO: 5.1 K/UL — SIGNIFICANT CHANGE UP (ref 1.8–7.4)
NEUTROPHILS NFR BLD AUTO: 69 % — SIGNIFICANT CHANGE UP (ref 43–77)
NEUTROPHILS-BODY FLUID: 14 % — SIGNIFICANT CHANGE UP
NEUTS BAND # BLD: 2 % — SIGNIFICANT CHANGE UP (ref 0–8)
NEUTS BAND NFR BLD: 2 % — SIGNIFICANT CHANGE UP (ref 0–8)
NRBC # BLD: 0 /100 WBCS — SIGNIFICANT CHANGE UP (ref 0–0)
NRBC # FLD: 0 % — SIGNIFICANT CHANGE UP
NRBC BLD-RTO: 0 /100 WBCS — SIGNIFICANT CHANGE UP (ref 0–0)
OTHER CELLS FLD MANUAL: 0 % — SIGNIFICANT CHANGE UP
PHOSPHATE SERPL-MCNC: 2.3 MG/DL — LOW (ref 2.5–4.5)
PLAT MORPH BLD: NORMAL — SIGNIFICANT CHANGE UP
PLATELET # BLD AUTO: 334 K/UL — SIGNIFICANT CHANGE UP (ref 150–400)
POTASSIUM SERPL-MCNC: 3.4 MMOL/L — LOW (ref 3.5–5.3)
POTASSIUM SERPL-SCNC: 3.4 MMOL/L — LOW (ref 3.5–5.3)
PROT SERPL-MCNC: 8.1 G/DL — SIGNIFICANT CHANGE UP (ref 6–8.3)
PROTHROM AB SERPL-ACNC: 18.9 SEC — HIGH (ref 9.9–13.4)
RBC # BLD: 3.26 M/UL — LOW (ref 4.2–5.8)
RBC # FLD: 14.6 % — HIGH (ref 10.3–14.5)
RBC BLD AUTO: ABNORMAL
RCV VOL RI: SIGNIFICANT CHANGE UP CELLS/UL
ROULEAUX BLD QL SMEAR: PRESENT
SCHISTOCYTES BLD QL AUTO: SLIGHT — SIGNIFICANT CHANGE UP
SODIUM SERPL-SCNC: 136 MMOL/L — SIGNIFICANT CHANGE UP (ref 135–145)
TOTAL NUCLEATED CELL COUNT, BODY FLUID: 1388 CELLS/UL — SIGNIFICANT CHANGE UP
TUBE TYPE: SIGNIFICANT CHANGE UP
WBC # BLD: 7.19 K/UL — SIGNIFICANT CHANGE UP (ref 3.8–10.5)
WBC # FLD AUTO: 7.19 K/UL — SIGNIFICANT CHANGE UP (ref 3.8–10.5)
WBC COUNT.: 1353 CELLS/UL — SIGNIFICANT CHANGE UP

## 2025-06-05 PROCEDURE — 71045 X-RAY EXAM CHEST 1 VIEW: CPT | Mod: 26

## 2025-06-05 PROCEDURE — 99233 SBSQ HOSP IP/OBS HIGH 50: CPT | Mod: FS

## 2025-06-05 PROCEDURE — 99233 SBSQ HOSP IP/OBS HIGH 50: CPT | Mod: GC

## 2025-06-05 RX ORDER — SPIRONOLACTONE 25 MG
25 TABLET ORAL
Refills: 0 | Status: DISCONTINUED | OUTPATIENT
Start: 2025-06-05 | End: 2025-06-12

## 2025-06-05 RX ORDER — SOD PHOS DI, MONO/K PHOS MONO 250 MG
1 TABLET ORAL ONCE
Refills: 0 | Status: COMPLETED | OUTPATIENT
Start: 2025-06-05 | End: 2025-06-05

## 2025-06-05 RX ADMIN — Medication 1 PACKET(S): at 11:56

## 2025-06-05 RX ADMIN — Medication 10 MILLIEQUIVALENT(S): at 11:58

## 2025-06-05 RX ADMIN — Medication 25 MILLIGRAM(S): at 11:56

## 2025-06-05 RX ADMIN — CEFTRIAXONE 100 MILLIGRAM(S): 500 INJECTION, POWDER, FOR SOLUTION INTRAMUSCULAR; INTRAVENOUS at 01:12

## 2025-06-05 RX ADMIN — Medication 75 MICROGRAM(S): at 05:46

## 2025-06-05 RX ADMIN — FUROSEMIDE 40 MILLIGRAM(S): 10 INJECTION INTRAMUSCULAR; INTRAVENOUS at 05:46

## 2025-06-05 RX ADMIN — Medication 1 TABLET(S): at 11:56

## 2025-06-05 NOTE — PROGRESS NOTE ADULT - SUBJECTIVE AND OBJECTIVE BOX
GI Follow up  Patient seen and examined at bedside. Denies abdominal pain, denies N/V/D, no hemetemesis and hemetochezia.   Last bm:  , tolerating diet            MEDICATIONS  (STANDING):  cefTRIAXone   IVPB 2000 milliGRAM(s) IV Intermittent every 24 hours  furosemide    Tablet 40 milliGRAM(s) Oral daily  levothyroxine 75 MICROGram(s) Oral daily  multivitamin 1 Tablet(s) Oral daily  propranolol 10 milliGRAM(s) Oral two times a day  spironolactone 25 milliGRAM(s) Oral <User Schedule>    MEDICATIONS  (PRN):  aluminum hydroxide/magnesium hydroxide/simethicone Suspension 30 milliLiter(s) Oral every 4 hours PRN Dyspepsia  melatonin 3 milliGRAM(s) Oral at bedtime PRN Insomnia  ondansetron Injectable 4 milliGRAM(s) IV Push every 8 hours PRN Nausea and/or Vomiting      Allergies    No Known Allergies    Intolerances          Vital Signs Last 24 Hrs  T(C): 36.7 (05 Jun 2025 11:54), Max: 36.7 (04 Jun 2025 20:33)  T(F): 98 (05 Jun 2025 11:54), Max: 98.1 (05 Jun 2025 05:03)  HR: 106 (05 Jun 2025 11:54) (106 - 110)  BP: 104/67 (05 Jun 2025 11:54) (100/61 - 112/55)  BP(mean): --  RR: 17 (05 Jun 2025 11:54) (16 - 18)  SpO2: 94% (05 Jun 2025 11:54) (93% - 94%)    Parameters below as of 05 Jun 2025 11:54  Patient On (Oxygen Delivery Method): nasal cannula  O2 Flow (L/min): 4      PHYSICAL EXAM:    Constitutional: Well-developed  ENTT: clear conjunctivae and sclera  Respiratory: clear to auscultation  Cardiovascular: S1 and S2  Gastrointestinal: +Bowel Sounds all quadrants, soft, Non tender, non distended  Extremities: No peripheral edema, neg clubbing, cyanosis  Neurological: A/O x 3  Skin: warm and dry      LABS:                        10.2   7.19  )-----------( 334      ( 05 Jun 2025 07:00 )             29.7     06-05    136  |  101  |  6[L]  ----------------------------<  86  3.4[L]   |  28  |  0.73    Ca    8.0[L]      05 Jun 2025 07:00  Phos  2.3     06-05  Mg     1.7     06-05    TPro  8.1  /  Alb  2.1[L]  /  TBili  0.8  /  DBili  x   /  AST  24  /  ALT  9[L]  /  AlkPhos  155[H]  06-05    PT/INR - ( 05 Jun 2025 07:00 )   PT: 18.9 sec;   INR: 1.61 ratio         PTT - ( 05 Jun 2025 07:00 )  PTT:42.5 sec  Urinalysis Basic - ( 05 Jun 2025 07:00 )    Color: x / Appearance: x / SG: x / pH: x  Gluc: 86 mg/dL / Ketone: x  / Bili: x / Urobili: x   Blood: x / Protein: x / Nitrite: x   Leuk Esterase: x / RBC: x / WBC x   Sq Epi: x / Non Sq Epi: x / Bacteria: x      LIVER FUNCTIONS - ( 05 Jun 2025 07:00 )  Alb: 2.1 g/dL / Pro: 8.1 g/dL / ALK PHOS: 155 U/L / ALT: 9 U/L / AST: 24 U/L / GGT: x             RADIOLOGY & ADDITIONAL TESTS:   GI Follow up  Patient seen and examined at bedside. Denies abdominal pain, denies N/V. No hematemesis or hematochezia.       MEDICATIONS  (STANDING):  cefTRIAXone   IVPB 2000 milliGRAM(s) IV Intermittent every 24 hours  furosemide    Tablet 40 milliGRAM(s) Oral daily  levothyroxine 75 MICROGram(s) Oral daily  multivitamin 1 Tablet(s) Oral daily  propranolol 10 milliGRAM(s) Oral two times a day  spironolactone 25 milliGRAM(s) Oral <User Schedule>    MEDICATIONS  (PRN):  aluminum hydroxide/magnesium hydroxide/simethicone Suspension 30 milliLiter(s) Oral every 4 hours PRN Dyspepsia  melatonin 3 milliGRAM(s) Oral at bedtime PRN Insomnia  ondansetron Injectable 4 milliGRAM(s) IV Push every 8 hours PRN Nausea and/or Vomiting      Allergies    No Known Allergies    Intolerances          Vital Signs Last 24 Hrs  T(C): 36.7 (05 Jun 2025 11:54), Max: 36.7 (04 Jun 2025 20:33)  T(F): 98 (05 Jun 2025 11:54), Max: 98.1 (05 Jun 2025 05:03)  HR: 106 (05 Jun 2025 11:54) (106 - 110)  BP: 104/67 (05 Jun 2025 11:54) (100/61 - 112/55)  BP(mean): --  RR: 17 (05 Jun 2025 11:54) (16 - 18)  SpO2: 94% (05 Jun 2025 11:54) (93% - 94%)    Parameters below as of 05 Jun 2025 11:54  Patient On (Oxygen Delivery Method): nasal cannula  O2 Flow (L/min): 4      PHYSICAL EXAM:    Gen: appears older than stated age, NAD  HEENT: EOMI, MMM, anicteric sclera  Respiratory: clear  Cardiovascular: S1 and S2  Gastrointestinal: +Bowel Sounds, soft  Extremities: No edema  Neurological: Awake alert, no confusion or agitation  Skin: warm and dry      LABS:                        10.2   7.19  )-----------( 334      ( 05 Jun 2025 07:00 )             29.7     06-05    136  |  101  |  6[L]  ----------------------------<  86  3.4[L]   |  28  |  0.73    Ca    8.0[L]      05 Jun 2025 07:00  Phos  2.3     06-05  Mg     1.7     06-05    TPro  8.1  /  Alb  2.1[L]  /  TBili  0.8  /  DBili  x   /  AST  24  /  ALT  9[L]  /  AlkPhos  155[H]  06-05    PT/INR - ( 05 Jun 2025 07:00 )   PT: 18.9 sec;   INR: 1.61 ratio    PTT - ( 05 Jun 2025 07:00 )  PTT:42.5 sec      LIVER FUNCTIONS - ( 05 Jun 2025 07:00 )  Alb: 2.1 g/dL / Pro: 8.1 g/dL / ALK PHOS: 155 U/L / ALT: 9 U/L / AST: 24 U/L / GGT: x

## 2025-06-05 NOTE — PROGRESS NOTE ADULT - ATTENDING COMMENTS
Please see resident note for full details regarding the service.    PE: A+Ox2 (person, place), no murmurs, decreased breath sounds bilateral bases, abd distended, positive fluid wave, no lower extremity swelling    Assessment:  - Dyspnea secondary to moderate to large right pleural effusion (possibly malignant effusion vs. cirrhosis?) s/p thoracentesis 6/4/25   - Decompensated cirrhosis and portal hypertension   - Moderate to large ascites   - Cholelithiasis with common bile duct is mildly dilated measuring 9 mm  - Tachycardia   - Normocytic anemia   - Elevated alkaline phosphatase   - Elevated BNP   - Hypoalbuminemia   - 3.8 cm infrarenal abdominal aortic aneurysm   - History of HLD, liver cirrhosis 2/2 ETOH use disorder (stopped drinking 1-2 yeas ago), positive FIT test (cancelled colonoscopy 1/2025), Hpylori + 11/16/24 (did not complete treatment), GERD, hyperlipidemia, hypothyroidism     Plan:  - Of note - outpatient imaging showed: multiple mediastinal and bilateral hilar lymph nodes, largest lesion adjacent to descending thoracic aorta, rght level 2 neck lymph node, 2 periportal lymph nodes, 3.8 cm infrarenal abdominal aortic aneurysm   - TSH elevated -> pt was not taking thyroid medications outpatient -> restarted here follow up repeat TFTs in 6-8 weeks   - Hepatitis panel negative   - -116 -> monitor after thoracentesis and starting Propanolol   - SpO2 dropped to 88% on 3L -> increased back to 4L, will wean as tolerated   - s/p thoracentesis completed today, 1200 ml removed. CXR: decreased effusions   - Switched to 2g of Ceftriaxone (Day 2)   - Continue Lasix 40 mg PO daily   - Pt did not receive Spironolactone given holding parameters -> start at 25 mg daily with lower holding parameters -> titrate up as tolerated   - Continue Propanolol   - Strict I+Os, daily weights  - Will need to follow up pleural fluid studies, cytology, culture   - Will follow up with GI regarding hepatology evaluation and further management and if paracentesis indicated   - DVT ppx: SCDs   - Code status: Full code   - Dispo: Active     I spent a total of 50 minutes on the date of this encounter coordinating the patient's care, excluding resident teaching time. This includes reviewing results/imaging and discussions with specialists, nursing, case management/social work. Further tests, medications, and procedures have been ordered as indicated. Results and the plan of care were communicated to the patient and/or their family member. Supporting documentation was completed and added to the patient's chart.

## 2025-06-05 NOTE — PROGRESS NOTE ADULT - ASSESSMENT
64 year-old man with a history of hyperlipidemia, liver cirrhosis presumed secondary to alcohol use disorder (abstinent for 1–2 years), hypothyroidism, gastroesophageal reflux disease, and prior Helicobacter pylori infection (untreated), presents for evaluation of shortness of breath due to abdominal distention and large ascites    GI consult: Ascites  Thoracentesis by ICU - 1200 ml removed  Paracentesis 6/06 by Dr Jovi Dyer consult from the hepatology team - Outpatient f/u for liver transplant / tips

## 2025-06-05 NOTE — PROGRESS NOTE ADULT - SUBJECTIVE AND OBJECTIVE BOX
Subjective and Objective:     Overnight Events: None   Interval History: Patient was seen and examined this morning at bedside.     Vital Signs Last 24 Hrs  T(C): 36.7 (05 Jun 2025 05:03), Max: 36.8 (04 Jun 2025 11:58)  T(F): 98.1 (05 Jun 2025 05:03), Max: 98.2 (04 Jun 2025 11:58)  HR: 109 (05 Jun 2025 05:03) (109 - 116)  BP: 100/61 (05 Jun 2025 05:03) (100/61 - 112/55)  BP(mean): --  RR: 18 (05 Jun 2025 05:03) (16 - 18)  SpO2: 93% (05 Jun 2025 05:03) (93% - 93%)    Parameters below as of 05 Jun 2025 05:03  Patient On (Oxygen Delivery Method): nasal cannula  O2 Flow (L/min): 4    I&O's Summary    04 Jun 2025 07:01  -  05 Jun 2025 07:00  --------------------------------------------------------  IN: 0 mL / OUT: 900 mL / NET: -900 mL        PHYSICAL EXAM:  Constitutional: Pt lying in bed, awake and alert, NAD  Respiratory: decreased breath sounds at bilateral bases   Cardiovascular: S1S2+, RRR, no M/G/R  Gastrointestinal: abdomen distended, positive fluid wave   Extremities: no lower extremity swelling   Vascular: Peripheral pulses present  Neurological: AAOx2 to person and place , no focal deficits    ALLERGIES:  No Known Allergies      MEDICATIONS:  MEDICATIONS  (STANDING):  cefTRIAXone   IVPB 2000 milliGRAM(s) IV Intermittent every 24 hours  furosemide    Tablet 40 milliGRAM(s) Oral daily  levothyroxine 75 MICROGram(s) Oral daily  multivitamin 1 Tablet(s) Oral daily  potassium chloride    Tablet ER 10 milliEquivalent(s) Oral once  potassium phosphate / sodium phosphate Powder (PHOS-NaK) 1 Packet(s) Oral once  propranolol 10 milliGRAM(s) Oral two times a day  spironolactone 25 milliGRAM(s) Oral <User Schedule>    MEDICATIONS  (PRN):  aluminum hydroxide/magnesium hydroxide/simethicone Suspension 30 milliLiter(s) Oral every 4 hours PRN Dyspepsia  melatonin 3 milliGRAM(s) Oral at bedtime PRN Insomnia  ondansetron Injectable 4 milliGRAM(s) IV Push every 8 hours PRN Nausea and/or Vomiting      LABS: (Personally Reviewed):                        10.2   7.19  )-----------( 334      ( 05 Jun 2025 07:00 )             29.7     06-05    136  |  101  |  6[L]  ----------------------------<  86  3.4[L]   |  28  |  0.73    Ca    8.0[L]      05 Jun 2025 07:00  Phos  2.3     06-05  Mg     1.7     06-05    TPro  8.1  /  Alb  2.1[L]  /  TBili  0.8  /  DBili  x   /  AST  24  /  ALT  9[L]  /  AlkPhos  155[H]  06-05    PT/INR - ( 05 Jun 2025 07:00 )   PT: 18.9 sec;   INR: 1.61 ratio         PTT - ( 05 Jun 2025 07:00 )  PTT:42.5 sec      Blood Culture: 06-04 @ 11:00  Organism --  Gram Stain Blood -- Gram Stain   polymorphonuclear leukocytes seen  No organisms seen  by cytocentrifuge  Specimen Source Pleural Fl Pleural Fluid  Culture-Blood --

## 2025-06-05 NOTE — PROGRESS NOTE ADULT - ASSESSMENT
65 y/o male with PMHx of HLD, liver cirrhosis 2/2 ETOH use disorder (stopped drinking 1-2 yeas ago), positive FIT test (cancelled colonoscopy 1/2025), Hpylori + 11/16/24 (did not complete treatment), GERD, hyperlipidemia, hypothyroidism is admitted for:    #Decompensated alcoholic cirrhosis versus malignant ascites (positive FIT outpatient)   #Acute Hypoxic respiratory failure secondary to b/l pleural effusions  #b/l LE edema  - c/w oral daily lasix 40mg   - Continue Spironolactone for cirrhosis, increase as able due to blood pressure   - Continue Propanolol for prophylaxis   - Rocephin ppx    - Thoracocentesis 6/4   - Plan for Paracentesis today    - GI consult   - Pulm consult     #hypothyroidism  - TSH 6.28 11/2024  - pt was prescribed levothyroxine 75mcg but has not been taking med  - TSH 5.3  - Continue Levothyroxine 75     #HLD  - lipid panel 11/2024: 11/2024: total 180, HDL 25, [id number]  - lipid panel 6/3/2025: total 91, HDL 12, LDL 64   - was prescribed atorvastatin 20mg but has not been taking it    #DVT ppx  - SCDs for possible thoracentesis/paracentesis    #GOC  - Full code     Discussed with Dr. Canales

## 2025-06-05 NOTE — PROGRESS NOTE ADULT - SUBJECTIVE AND OBJECTIVE BOX
Follow-up Pulmonary Progress Note  Chief Complaint : Hypervolemia    using Dr. Espinoza as       d/w pt pending thoracentesis results    patient feels well, less sob   oxygen not in nares has sat 95% when seen      Allergies :No Known Allergies      PAST MEDICAL & SURGICAL HISTORY:  ALC (alcoholic liver cirrhosis)    Hyperlipidemia    Positive FIT (fecal immunochemical test)    GERD (gastroesophageal reflux disease)    Hypothyroidism        Medications:  MEDICATIONS  (STANDING):  cefTRIAXone   IVPB 2000 milliGRAM(s) IV Intermittent every 24 hours  furosemide    Tablet 40 milliGRAM(s) Oral daily  levothyroxine 75 MICROGram(s) Oral daily  multivitamin 1 Tablet(s) Oral daily  propranolol 10 milliGRAM(s) Oral two times a day  spironolactone 25 milliGRAM(s) Oral <User Schedule>    MEDICATIONS  (PRN):  aluminum hydroxide/magnesium hydroxide/simethicone Suspension 30 milliLiter(s) Oral every 4 hours PRN Dyspepsia  melatonin 3 milliGRAM(s) Oral at bedtime PRN Insomnia  ondansetron Injectable 4 milliGRAM(s) IV Push every 8 hours PRN Nausea and/or Vomiting      Antibiotics History  cefTRIAXone   IVPB 2000 milliGRAM(s) IV Intermittent every 24 hours, 06-03-25 @ 13:32, Stop order after: 7 Days  piperacillin/tazobactam IVPB. 3.375 Gram(s) IV Intermittent once, 06-03-25 @ 01:49, Stop order after: 1 Doses  piperacillin/tazobactam IVPB.- 3.375 Gram(s) IV Intermittent once, 06-03-25 @ 06:30  piperacillin/tazobactam IVPB.. 3.375 Gram(s) IV Intermittent every 8 hours, 06-03-25 @ 14:00, Stop order after: 7 Days      Heme Medications       GI Medications  aluminum hydroxide/magnesium hydroxide/simethicone Suspension 30 milliLiter(s) Oral every 4 hours, 06-02-25 @ 21:47, Routine PRN        LABS:                        10.2   7.19  )-----------( 334      ( 05 Jun 2025 07:00 )             29.7     06-05    136  |  101  |  6[L]  ----------------------------<  86  3.4[L]   |  28  |  0.73    Ca    8.0[L]      05 Jun 2025 07:00  Phos  2.3     06-05  Mg     1.7     06-05    TPro  8.1  /  Alb  2.1[L]  /  TBili  0.8  /  DBili  x   /  AST  24  /  ALT  9[L]  /  AlkPhos  155[H]  06-05    Fluid characteristics  -- 06-04 @ 11:00  pH 7.8    tprot 6.3    Cell count  Appearance Cloudy  Fluid type --  BF lymph 11  color Red  eosinophil 0  PMN --  Mesothelial 0  Monocyte 75  Other body cells 0        Lights Criteria :    Trend LDH- Fluid  06-04-25 @ 11:00  136 -- --      Trend LDH - Serum  06-05-25 @ 07:00  137 [50 - 242]    _____________________  Trend Protein - Fluid   06-04-25 @ 11:00  6.3  --  --      Trend Protein - Serum  06-05-25 @ 07:00  8.1 [6.0 - 8.3]  06-04-25 @ 06:50  8.8[H] [6.0 - 8.3]  06-03-25 @ 06:48  8.7[H] [6.0 - 8.3]  06-02-25 @ 18:07  9.1[H] [6.0 - 8.3]      _____________________ Exudate  The effusion is exudative if one of the Light’s criteria has been met:   1)  Pleural fluid protein/serum protein ratio > 0.5.   2)  Pleural fluid LDH/serum LDH ratio greater than > 0.6.   3) Pleural fluid LDH level greater than 2/3 of upper limit of normal LDH level in serum.  _____________________        CULTURES: (if applicable)    Culture - Fungal, Body Fluid (collected 06-04-25 @ 11:00)  Source: Pleural Fl Pleural Fluid  Preliminary Report (06-05-25 @ 10:00):    Testing in progress    Culture - Body Fluid with Gram Stain (collected 06-04-25 @ 11:00)  Source: Pleural Fl Pleural Fluid  Gram Stain (06-04-25 @ 20:50):    polymorphonuclear leukocytes seen    No organisms seen    by cytocentrifuge  Preliminary Report (06-05-25 @ 10:49):    No growth to date         RADIOLOGY  CXR:       < from: Xray Chest 1 View- PORTABLE-Urgent (Xray Chest 1 View- PORTABLE-Urgent .) (06.05.25 @ 09:24) >  ACC: [id number] EXAM:  XR CHEST PORTABLE URGENT 1V   ORDERED BY:  DANGELO GAYTAN     ACC: [id number] EXAM:  XR CHEST PORTABLE ROUTINE 1V   ORDERED BY: OSIEL PINTO     PROCEDURE DATE:  06/04/2025          INTERPRETATION:  Chest one view 6/4/2025 10:53 AM    HISTORY: Post thoracentesis    COMPARISON STUDY: 6/2/2025    Frontal expiratory view of the chest shows the heart to be similar in   size. The lungs show less pulmonary congestion with smaller right   effusion. Small left effusion is similar andthere is no evidence of   pneumothorax.    Chest one view 6/5/2025 8:15 AM  Compared to the prior study, there is further reduction of pleural   effusions. No pneumothorax.    IMPRESSION:  Decreased effusions. No pneumothorax.        Thank you for the courtesy of this referral.    --- End of Report ---    < end of copied text >      VITALS:  T(C): 36.7 (06-05-25 @ 11:54), Max: 36.7 (06-04-25 @ 20:33)  T(F): 98 (06-05-25 @ 11:54), Max: 98.1 (06-05-25 @ 05:03)  HR: 106 (06-05-25 @ 11:54) (106 - 110)  BP: 104/67 (06-05-25 @ 11:54) (100/61 - 112/55)  BP(mean): --  ABP: --  ABP(mean): --  RR: 17 (06-05-25 @ 11:54) (16 - 18)  SpO2: 94% (06-05-25 @ 11:54) (93% - 94%)  CVP(mm Hg): --  CVP(cm H2O): --    Ins and Outs     06-04-25 @ 07:01  -  06-05-25 @ 07:00  --------------------------------------------------------  IN: 0 mL / OUT: 900 mL / NET: -900 mL        Height (cm): 167.6 (06-02-25 @ 17:41)  Weight (kg): 62.1 (06-02-25 @ 17:41)  BMI (kg/m2): 22.1 (06-02-25 @ 17:41)        I&O's Detail    04 Jun 2025 07:01  -  05 Jun 2025 07:00  --------------------------------------------------------  IN:  Total IN: 0 mL    OUT:    Voided (mL): 900 mL  Total OUT: 900 mL    Total NET: -900 mL

## 2025-06-05 NOTE — PROGRESS NOTE ADULT - PROBLEM SELECTOR PLAN 1
Liver Cirrhosis most likely due to ETOH  - Daily weights  - Paracentesis 6/5 after re evaluation by Dr Espana.  - Clear diet post midnight for possible paracentesis  - Monitor INR, Platelets  - Agree with hepatology recommendations  Hepatology recs:   - Restart Spirolactone (titrate trite as tolerated)  - 40mg Lasix  - Outpatient f/u for liver transplant / Tips Liver Cirrhosis most likely due to ETOH  - Daily weights  - Paracentesis after re evaluation by Dr Espana.  - Clear liquid diet post midnight for possible paracentesis 6/6  - Monitor INR, Platelets  - Agree with hepatology recommendations  Hepatology recs:   - Restart Spirolactone (titrate trite as tolerated)  - 40mg Lasix  - Outpatient f/u for liver transplant / TIPS

## 2025-06-05 NOTE — CHART NOTE - NSCHARTNOTEFT_GEN_A_CORE
Called and spoke to Dr. Andrew Dyer (Hepatology) who recommends outpatient liver transplant/TIPS evaluation. Will need to discuss with SW/CM regarding insurance/Medicaid. He recommends paracentesis 1x a week or every other week. Continue Lasix 40 mg daily, start Spironolactone and titrate based on response and K+. Cirrhotics tend to have SBP in the 90's, can lower parameters. Consider albumin when completing paracentesis. Order Type and Screen for AM. Called and spoke to Dr. Andrew Dyer (Hepatology) who recommends outpatient liver transplant/TIPS evaluation. Will need to discuss with SW/CM regarding insurance/Medicaid. He recommends paracentesis 1x a week or every other week. Continue Lasix 40 mg daily, start Spironolactone and titrate based on response and K+. Cirrhotics tend to have SBP in the 90's, can lower parameters. Consider albumin when completing paracentesis. Tachycardia could be in the setting of infection. Follow up ascitic fluid culture and pleural fluid cultures. Order Type and Screen for AM.

## 2025-06-05 NOTE — PROGRESS NOTE ADULT - ASSESSMENT
Physical Examination:  GENERAL:               Alert, Oriented, No acute distress.    HEENT:                    Pupils equal, reactive to light.  Symmetric. No JVD, Moist MM  PULM:                     Bilateral air entry, Clear to auscultation bilaterally, no significant sputum production, No Rales, No Rhonchi, No Wheezing  CVS:                         S1, S2,  No Murmur  ABD:                        Soft, nondistended, nontender, normoactive bowel sounds,    NEURO:                  Alert, oriented, interactive, nonfocal, follows commands  PSYC:                      Calm, + Insight.       Assessment  65 y/o male with PMHx of HLD, liver cirrhosis 2/2 ETOH use disorder (stopped drinking 1-2 yeas ago), positive FIT test (cancelled colonoscopy 1/2025), Hpylori + 11/16/24 (did not complete treatment), GERD, hyperlipidemia, hypothyroidism sent from family medicine practice for shortness of breath today.     problem list  ascites with b/l pl effusions ? from cirrhosis s/p thoracentesis 6/4/2025 - Exudate    Plan  f/u cytology  GI/hepatology eval  f/u cultures and cytology from fluid sample   n/c o2 prn, taper to off, check o2 on exertion.  rest of care as per primary team

## 2025-06-06 ENCOUNTER — TRANSCRIPTION ENCOUNTER (OUTPATIENT)
Age: 65
End: 2025-06-06

## 2025-06-06 LAB
ALBUMIN FLD-MCNC: 1.6 G/DL — SIGNIFICANT CHANGE UP
ALBUMIN SERPL ELPH-MCNC: 1.9 G/DL — LOW (ref 3.3–5)
ALP SERPL-CCNC: 195 U/L — HIGH (ref 40–120)
ALT FLD-CCNC: 7 U/L — LOW (ref 10–45)
AMYLASE FLD-CCNC: 48 U/L — SIGNIFICANT CHANGE UP
ANION GAP SERPL CALC-SCNC: 2 MMOL/L — LOW (ref 5–17)
AST SERPL-CCNC: 30 U/L — SIGNIFICANT CHANGE UP (ref 10–40)
B PERT IGG+IGM PNL SER: CLEAR — SIGNIFICANT CHANGE UP
BILIRUB SERPL-MCNC: 0.7 MG/DL — SIGNIFICANT CHANGE UP (ref 0.2–1.2)
BLD GP AB SCN SERPL QL: SIGNIFICANT CHANGE UP
BUN SERPL-MCNC: 9 MG/DL — SIGNIFICANT CHANGE UP (ref 7–23)
CALCIUM SERPL-MCNC: 8 MG/DL — LOW (ref 8.4–10.5)
CHLORIDE SERPL-SCNC: 99 MMOL/L — SIGNIFICANT CHANGE UP (ref 96–108)
CO2 SERPL-SCNC: 32 MMOL/L — HIGH (ref 22–31)
COLOR FLD: YELLOW — SIGNIFICANT CHANGE UP
CREAT SERPL-MCNC: 0.68 MG/DL — SIGNIFICANT CHANGE UP (ref 0.5–1.3)
EGFR: 103 ML/MIN/1.73M2 — SIGNIFICANT CHANGE UP
EGFR: 103 ML/MIN/1.73M2 — SIGNIFICANT CHANGE UP
FLUID INTAKE SUBSTANCE CLASS: SIGNIFICANT CHANGE UP
GLUCOSE FLD-MCNC: 60 MG/DL — SIGNIFICANT CHANGE UP
GLUCOSE SERPL-MCNC: 80 MG/DL — SIGNIFICANT CHANGE UP (ref 70–99)
HCT VFR BLD CALC: 31.4 % — LOW (ref 39–50)
HGB BLD-MCNC: 10.8 G/DL — LOW (ref 13–17)
LDH SERPL L TO P-CCNC: 129 U/L — SIGNIFICANT CHANGE UP
LYMPHOCYTES # FLD: 75 % — SIGNIFICANT CHANGE UP
MAGNESIUM SERPL-MCNC: 1.6 MG/DL — SIGNIFICANT CHANGE UP (ref 1.6–2.6)
MCHC RBC-ENTMCNC: 30.9 PG — SIGNIFICANT CHANGE UP (ref 27–34)
MCHC RBC-ENTMCNC: 34.4 G/DL — SIGNIFICANT CHANGE UP (ref 32–36)
MCV RBC AUTO: 90 FL — SIGNIFICANT CHANGE UP (ref 80–100)
MONOS+MACROS # FLD: 21 % — SIGNIFICANT CHANGE UP
NEUTROPHILS-BODY FLUID: 4 % — SIGNIFICANT CHANGE UP
NON-GYNECOLOGICAL CYTOLOGY STUDY: SIGNIFICANT CHANGE UP
NRBC BLD AUTO-RTO: 0 /100 WBCS — SIGNIFICANT CHANGE UP (ref 0–0)
PH FLD: 7.6 — SIGNIFICANT CHANGE UP
PHOSPHATE SERPL-MCNC: 2.3 MG/DL — LOW (ref 2.5–4.5)
PLATELET # BLD AUTO: 382 K/UL — SIGNIFICANT CHANGE UP (ref 150–400)
POTASSIUM SERPL-MCNC: 4.2 MMOL/L — SIGNIFICANT CHANGE UP (ref 3.5–5.3)
POTASSIUM SERPL-SCNC: 4.2 MMOL/L — SIGNIFICANT CHANGE UP (ref 3.5–5.3)
PROT FLD-MCNC: 5.1 G/DL — SIGNIFICANT CHANGE UP
PROT SERPL-MCNC: 8.6 G/DL — HIGH (ref 6–8.3)
RBC # BLD: 3.49 M/UL — LOW (ref 4.2–5.8)
RBC # FLD: 14.6 % — HIGH (ref 10.3–14.5)
RCV VOL RI: 2000 CELLS/UL — SIGNIFICANT CHANGE UP
SODIUM SERPL-SCNC: 133 MMOL/L — LOW (ref 135–145)
SPECIMEN SOURCE FLD: SIGNIFICANT CHANGE UP
SPECIMEN SOURCE FLD: SIGNIFICANT CHANGE UP
TOTAL NUCLEATED CELL COUNT, BODY FLUID: 1563 CELLS/UL — SIGNIFICANT CHANGE UP
TUBE TYPE: SIGNIFICANT CHANGE UP
WBC # BLD: 8.05 K/UL — SIGNIFICANT CHANGE UP (ref 3.8–10.5)
WBC # FLD AUTO: 8.05 K/UL — SIGNIFICANT CHANGE UP (ref 3.8–10.5)
WBC COUNT.: 1531 CELLS/UL — SIGNIFICANT CHANGE UP

## 2025-06-06 PROCEDURE — 49083 ABD PARACENTESIS W/IMAGING: CPT

## 2025-06-06 PROCEDURE — 88112 CYTOPATH CELL ENHANCE TECH: CPT | Mod: 26

## 2025-06-06 PROCEDURE — 99232 SBSQ HOSP IP/OBS MODERATE 35: CPT | Mod: FS

## 2025-06-06 PROCEDURE — 88305 TISSUE EXAM BY PATHOLOGIST: CPT | Mod: 26

## 2025-06-06 PROCEDURE — 99233 SBSQ HOSP IP/OBS HIGH 50: CPT | Mod: FS

## 2025-06-06 RX ORDER — TORSEMIDE 10 MG
1 TABLET ORAL
Refills: 0 | DISCHARGE

## 2025-06-06 RX ORDER — ALBUMIN (HUMAN) 12.5 G/50ML
100 INJECTION, SOLUTION INTRAVENOUS
Refills: 0 | Status: COMPLETED | OUTPATIENT
Start: 2025-06-06 | End: 2025-06-07

## 2025-06-06 RX ORDER — ALBUMIN (HUMAN) 12.5 G/50ML
100 INJECTION, SOLUTION INTRAVENOUS
Refills: 0 | Status: DISCONTINUED | OUTPATIENT
Start: 2025-06-06 | End: 2025-06-06

## 2025-06-06 RX ORDER — PROPRANOLOL HCL 60 MG
1 TABLET ORAL
Qty: 0 | Refills: 0 | DISCHARGE
Start: 2025-06-06

## 2025-06-06 RX ORDER — LEVOTHYROXINE SODIUM 300 MCG
1 TABLET ORAL
Qty: 0 | Refills: 0 | DISCHARGE
Start: 2025-06-06

## 2025-06-06 RX ORDER — FUROSEMIDE 10 MG/ML
1 INJECTION INTRAMUSCULAR; INTRAVENOUS
Qty: 0 | Refills: 0 | DISCHARGE
Start: 2025-06-06

## 2025-06-06 RX ORDER — SOD PHOS DI, MONO/K PHOS MONO 250 MG
1 TABLET ORAL
Refills: 0 | Status: COMPLETED | OUTPATIENT
Start: 2025-06-06 | End: 2025-06-07

## 2025-06-06 RX ORDER — B1/B2/B3/B5/B6/B12/VIT C/FOLIC 500-0.5 MG
1 TABLET ORAL
Qty: 0 | Refills: 0 | DISCHARGE
Start: 2025-06-06

## 2025-06-06 RX ADMIN — CEFTRIAXONE 100 MILLIGRAM(S): 500 INJECTION, POWDER, FOR SOLUTION INTRAMUSCULAR; INTRAVENOUS at 01:26

## 2025-06-06 RX ADMIN — Medication 1 TABLET(S): at 12:24

## 2025-06-06 RX ADMIN — Medication 75 MICROGRAM(S): at 05:35

## 2025-06-06 RX ADMIN — ALBUMIN (HUMAN) 100 MILLILITER(S): 12.5 INJECTION, SOLUTION INTRAVENOUS at 11:14

## 2025-06-06 RX ADMIN — FUROSEMIDE 40 MILLIGRAM(S): 10 INJECTION INTRAMUSCULAR; INTRAVENOUS at 05:34

## 2025-06-06 RX ADMIN — Medication 25 MILLIGRAM(S): at 12:23

## 2025-06-06 RX ADMIN — Medication 1 PACKET(S): at 18:21

## 2025-06-06 RX ADMIN — ALBUMIN (HUMAN) 100 MILLILITER(S): 12.5 INJECTION, SOLUTION INTRAVENOUS at 18:25

## 2025-06-06 NOTE — DISCHARGE NOTE PROVIDER - NSFOLLOWUPCLINICS_GEN_ALL_ED_FT
[facility]  Family Medicine  [address] [place] [id number]  Phone: [phone]  Fax:

## 2025-06-06 NOTE — PROGRESS NOTE ADULT - SUBJECTIVE AND OBJECTIVE BOX
GI Follow up  Patient seen and examined at bedside. Admits to abdominal discomfort, denies N/V/D, no hemetemesis and hemetochezia, admits to SOB            MEDICATIONS  (STANDING):  albumin human 25% IVPB 100 milliLiter(s) IV Intermittent every 1 hour  cefTRIAXone   IVPB 2000 milliGRAM(s) IV Intermittent every 24 hours  furosemide    Tablet 40 milliGRAM(s) Oral daily  levothyroxine 75 MICROGram(s) Oral daily  multivitamin 1 Tablet(s) Oral daily  potassium phosphate / sodium phosphate Powder (PHOS-NaK) 1 Packet(s) Oral two times a day  propranolol 10 milliGRAM(s) Oral two times a day  spironolactone 25 milliGRAM(s) Oral <User Schedule>    MEDICATIONS  (PRN):  aluminum hydroxide/magnesium hydroxide/simethicone Suspension 30 milliLiter(s) Oral every 4 hours PRN Dyspepsia  melatonin 3 milliGRAM(s) Oral at bedtime PRN Insomnia  ondansetron Injectable 4 milliGRAM(s) IV Push every 8 hours PRN Nausea and/or Vomiting      Allergies    No Known Allergies    Intolerances          Vital Signs Last 24 Hrs  T(C): 36.6 (06 Jun 2025 12:20), Max: 36.9 (05 Jun 2025 21:18)  T(F): 97.8 (06 Jun 2025 12:20), Max: 98.5 (05 Jun 2025 21:18)  HR: 93 (06 Jun 2025 12:20) (93 - 96)  BP: 94/44 (06 Jun 2025 12:20) (91/69 - 101/63)  BP(mean): 75 (06 Jun 2025 05:17) (75 - 75)  RR: 17 (06 Jun 2025 12:20) (17 - 18)  SpO2: 94% (06 Jun 2025 12:20) (94% - 96%)    Parameters below as of 06 Jun 2025 12:20  Patient On (Oxygen Delivery Method): nasal cannula  O2 Flow (L/min): 4      PHYSICAL EXAM:    Constitutional: Well-developed  ENTT: clear conjunctivae and sclera  Respiratory: sob, 4 LNC  Cardiovascular: S1 and S2  Gastrointestinal: +Bowel Sounds all quadrants, soft, tender, distended  Extremities: No peripheral edema, neg clubbing, cyanosis  Neurological: A/O with confusion, Macedonian speaking  Skin: warm and dry      LABS:                        10.8   8.05  )-----------( 382      ( 06 Jun 2025 06:15 )             31.4     06-06    133[L]  |  99  |  9   ----------------------------<  80  4.2   |  32[H]  |  0.68    Ca    8.0[L]      06 Jun 2025 06:15  Phos  2.3     06-06  Mg     1.6     06-06    TPro  8.6[H]  /  Alb  1.9[L]  /  TBili  0.7  /  DBili  x   /  AST  30  /  ALT  7[L]  /  AlkPhos  195[H]  06-06    PT/INR - ( 05 Jun 2025 07:00 )   PT: 18.9 sec;   INR: 1.61 ratio         PTT - ( 05 Jun 2025 07:00 )  PTT:42.5 sec  Urinalysis Basic - ( 06 Jun 2025 06:15 )    Color: x / Appearance: x / SG: x / pH: x  Gluc: 80 mg/dL / Ketone: x  / Bili: x / Urobili: x   Blood: x / Protein: x / Nitrite: x   Leuk Esterase: x / RBC: x / WBC x   Sq Epi: x / Non Sq Epi: x / Bacteria: x      LIVER FUNCTIONS - ( 06 Jun 2025 06:15 )  Alb: 1.9 g/dL / Pro: 8.6 g/dL / ALK PHOS: 195 U/L / ALT: 7 U/L / AST: 30 U/L / GGT: x             RADIOLOGY & ADDITIONAL TESTS:

## 2025-06-06 NOTE — DISCHARGE NOTE PROVIDER - CARE PROVIDER_API CALL
Angelia Garcia  Pulmonary Disease  [address] [place] [address], [address] [address]  Phone: [phone]  Fax: [phone]  Follow Up Time:

## 2025-06-06 NOTE — DISCHARGE NOTE PROVIDER - NSDCMRMEDTOKEN_GEN_ALL_CORE_FT
furosemide 40 mg oral tablet: 1 tab(s) orally once a day  levothyroxine 75 mcg (0.075 mg) oral tablet: 1 tab(s) orally once a day  Multiple Vitamins oral tablet: 1 tab(s) orally once a day  propranolol 10 mg oral tablet: 1 tab(s) orally 2 times a day   furosemide 40 mg oral tablet: 1 tab(s) orally once a day  lactulose 10 g/15 mL oral syrup: 22.5 milliliter(s) orally 3 times a day  levothyroxine 75 mcg (0.075 mg) oral tablet: 1 tab(s) orally once a day  midodrine 5 mg oral tablet: 1 tab(s) orally 3 times a day  Multiple Vitamins oral tablet: 1 tab(s) orally once a day  propranolol 10 mg oral tablet: 1 tab(s) orally 2 times a day  spironolactone 25 mg oral tablet: 1 tab(s) orally once a day

## 2025-06-06 NOTE — DISCHARGE NOTE PROVIDER - NSDCCPTREATMENT_GEN_ALL_CORE_FT
PRINCIPAL PROCEDURE  Procedure: Thoracentesis  Findings and Treatment: x2      SECONDARY PROCEDURE  Procedure: Paracentesis, with US guidance  Findings and Treatment:

## 2025-06-06 NOTE — PROGRESS NOTE ADULT - ATTENDING COMMENTS
Plan of care as above    # Cirrhosis  # Pleural effusion  - SP thora, exudate  - For paracentesis  - Continue lasix and aldactone  - DC home tomorrow pending above    Continue care as above

## 2025-06-06 NOTE — PROGRESS NOTE ADULT - PROBLEM SELECTOR PLAN 1
Liver Cirrhosis most likely due to ETOH  - Daily weights  - Paracentesis after re evaluation by Dr Espana.  - Clear liquid diet post midnight for possible paracentesis 6/6  - Monitor INR, Platelets  - Agree with hepatology recommendations  Hepatology recs:   - Restart Spirolactone (titrate trite as tolerated)  - 40mg Lasix  - Outpatient f/u for liver transplant / TIPS

## 2025-06-06 NOTE — DISCHARGE NOTE PROVIDER - NSDCCPCAREPLAN_GEN_ALL_CORE_FT
PRINCIPAL DISCHARGE DIAGNOSIS  Diagnosis: Fluid overload  Assessment and Plan of Treatment: You came to the hospital due to:   You were diagnosed with:   You were treated with:   You were prescribed the following new medications:  You will need to follow up with your primary care physician for further management.  Discharging Provider:  Elizabeth Fraser MD   Contact Info: [phone] - Please call with any questions or concerns.        SECONDARY DISCHARGE DIAGNOSES  Diagnosis: Pleural effusion  Assessment and Plan of Treatment:      PRINCIPAL DISCHARGE DIAGNOSIS  Diagnosis: Fluid overload  Assessment and Plan of Treatment: You came to the hospital due to: shortness of breath  You were diagnosed with: Cirrhosis   You were treated with: Lasix, Albumin, Spironolactone, Propranolol   You were prescribed the following new medications:  You will need to follow up with your primary care physician for further management.  Discharging Provider:  Elizabeth Fraser MD   Contact Info: [phone] - Please call with any questions or concerns.        SECONDARY DISCHARGE DIAGNOSES  Diagnosis: Pleural effusion  Assessment and Plan of Treatment:      PRINCIPAL DISCHARGE DIAGNOSIS  Diagnosis: Decompensated cirrhosis  Assessment and Plan of Treatment: You came to the hospital due to: shortness of breath  You were diagnosed with: Cirrhosis   You were treated with: Lasix, Albumin, Spironolactone, Propranolol   You were prescribed the following new medications: spironolactone, propanolol, lasix  You will need to follow up with your primary care physician for further management.  Discharging Provider:  Elizabeth Fraser MD   Contact Info: [phone] - Please call with any questions or concerns.      SECONDARY DISCHARGE DIAGNOSES  Diagnosis: Pleural effusion  Assessment and Plan of Treatment: You required two thoracentesis with 1.2L removed each time. Please follow up with hepatologist.    Diagnosis: HLD (hyperlipidemia)  Assessment and Plan of Treatment:     Diagnosis: GERD (gastroesophageal reflux disease)  Assessment and Plan of Treatment:     Diagnosis: Hypothyroidism  Assessment and Plan of Treatment:      PRINCIPAL DISCHARGE DIAGNOSIS  Diagnosis: Decompensated cirrhosis  Assessment and Plan of Treatment: You came to the hospital due to: shortness of breath  You were diagnosed with: Cirrhosis   You were treated with: Lasix, Albumin, Spironolactone, Propranolol   You were prescribed the following new medications: spironolactone, propanolol, lasix  You may benefit from pleural biopsy.   You will need to follow up with your primary care physician for further management.  Discharging Provider:  Elizabeth Fraser MD   Contact Info: [phone] - Please call with any questions or concerns.      SECONDARY DISCHARGE DIAGNOSES  Diagnosis: Pleural effusion  Assessment and Plan of Treatment: You required two thoracentesis with 1.2L removed each time. Please follow up with hepatologist.    Diagnosis: HLD (hyperlipidemia)  Assessment and Plan of Treatment:     Diagnosis: GERD (gastroesophageal reflux disease)  Assessment and Plan of Treatment:     Diagnosis: Hypothyroidism  Assessment and Plan of Treatment:      PRINCIPAL DISCHARGE DIAGNOSIS  Diagnosis: Decompensated cirrhosis  Assessment and Plan of Treatment: You came to the hospital due to: shortness of breath  You were diagnosed with: Cirrhosis   You were treated with: Lasix, Albumin, Spironolactone, Propranolol   You were prescribed the following new medications: spironolactone, propanolol, lasix, midodrine   You had elevated ammonia levels and were started on Lactulose.   You may benefit from pleural biopsy.   You will need to follow up with your primary care physician for further management.  Discharging Provider:  Elizabeth Fraser MD   Contact Info: [phone] - Please call with any questions or concerns.      SECONDARY DISCHARGE DIAGNOSES  Diagnosis: Pleural effusion  Assessment and Plan of Treatment: You required two thoracentesis with 1.2L removed each time. Please follow up with hepatologist.    Diagnosis: HLD (hyperlipidemia)  Assessment and Plan of Treatment:     Diagnosis: GERD (gastroesophageal reflux disease)  Assessment and Plan of Treatment:     Diagnosis: Hypothyroidism  Assessment and Plan of Treatment:      PRINCIPAL DISCHARGE DIAGNOSIS  Diagnosis: Decompensated cirrhosis  Assessment and Plan of Treatment: You came to the hospital due to: shortness of breath  You were diagnosed with: Cirrhosis   You were treated with: Lasix, Albumin, Spironolactone, Propranolol   You were prescribed the following new medications: spironolactone, propanolol, lasix, midodrine   You had elevated ammonia levels and were started on Lactulose.   You may benefit from pleural biopsy.   You will need to follow up with your primary care physician for further management.  Discharging Provider:  Elizabeth Fraser MD   Contact Info: [phone] - Please call with any questions or concerns.      SECONDARY DISCHARGE DIAGNOSES  Diagnosis: Pleural effusion  Assessment and Plan of Treatment: You required two thoracentesis with 1.2L removed each time. Please follow up with hepatologist.    Diagnosis: HLD (hyperlipidemia)  Assessment and Plan of Treatment:     Diagnosis: GERD (gastroesophageal reflux disease)  Assessment and Plan of Treatment:     Diagnosis: Hypothyroidism  Assessment and Plan of Treatment:     Diagnosis: Hyperammonemia  Assessment and Plan of Treatment:

## 2025-06-06 NOTE — DISCHARGE NOTE PROVIDER - NSDCFUADDAPPT_GEN_ALL_CORE_FT
APPTS ARE READY TO BE MADE: [ ] YES    Best Family or Patient Contact (if needed):    Additional Information about above appointments (if needed):    1: PCP ([facility] [place] [facility], Dr. Devries)  2: hepatologist  3: pulm    Other comments or requests:    APPTS ARE READY TO BE MADE: [ ] YES    Best Family or Patient Contact (if needed):    Additional Information about above appointments (if needed):    1: PCP    2: hepatologist  3: pulm    Other comments or requests:    APPTS ARE READY TO BE MADE: [X] YES    Best Family or Patient Contact (if needed):    Additional Information about above appointments (if needed):    1: PCP    2: hepatologist  3: pulm    Other comments or requests:    APPTS ARE READY TO BE MADE: [X] YES    Best Family or Patient Contact (if needed):    Additional Information about above appointments (if needed):    1: PCP    2: hepatologist  3: pulm    Other comments or requests:     Patient is being transferred. Caregiver will arrange follow up.

## 2025-06-06 NOTE — DISCHARGE NOTE PROVIDER - NSDCCAREPROVSEEN_GEN_ALL_CORE_FT
Christofer, Willy Leavitt, Curtis Webber, Sherrell Pascual, Ria Reyes, Kaur Fraser, Elizabeth Davila, Stella Canales, Kimberly Rivera, Destiny De Los Santos, Johanna Nguyen, Russell Devries, Angelia Porras

## 2025-06-06 NOTE — PROGRESS NOTE ADULT - ASSESSMENT
65 y/o male with PMHx of HLD, liver cirrhosis 2/2 ETOH use disorder (stopped drinking 1-2 yeas ago), positive FIT test (cancelled colonoscopy 1/2025), Hpylori + 11/16/24 (did not complete treatment), GERD, hyperlipidemia, hypothyroidism is admitted for:    #Decompensated alcoholic cirrhosis versus malignant ascites (positive FIT outpatient)   #Acute Hypoxic respiratory failure secondary to b/l pleural effusions  #b/l LE edema  - c/w oral daily lasix 40mg   - Continue Spironolactone for cirrhosis, increase as able due to blood pressure   - Continue Propanolol for prophylaxis   - Rocephin ppx    - Thoracocentesis 6/4   - Plan for Paracentesis today    - GI consult, recs appreciated  - Pulm consult, recs appreciated    - Hepatology - outpatient liver transplant/TIPS evaluation recommended     #Hypothyroidism  - Continue Levothyroxine 75   - recheck TSH in 6 weeks     #HLD  - lipid panel 11/2024: 11/2024: total 180, HDL 25, [id number]  - lipid panel 6/3/2025: total 91, HDL 12, LDL 64     #DVT ppx  - SCDs for possible thoracentesis/paracentesis    #GOC  - Full code     Discussed with Dr. Fraser

## 2025-06-06 NOTE — DISCHARGE NOTE PROVIDER - DETAILS OF MALNUTRITION DIAGNOSIS/DIAGNOSES
This patient has been assessed with a concern for Malnutrition and was treated during this hospitalization for the following Nutrition diagnosis/diagnoses:     -  06/07/2025: Severe protein-calorie malnutrition

## 2025-06-06 NOTE — PROGRESS NOTE ADULT - SUBJECTIVE AND OBJECTIVE BOX
Subjective and Objective:     Overnight Events: None   Interval History: Patient was seen and examined this morning at bedside.     Vital Signs Last 24 Hrs  T(C): 36.7 (06 Jun 2025 05:17), Max: 36.9 (05 Jun 2025 21:18)  T(F): 98 (06 Jun 2025 05:17), Max: 98.5 (05 Jun 2025 21:18)  HR: 94 (06 Jun 2025 05:17) (94 - 106)  BP: 101/63 (06 Jun 2025 05:17) (95/61 - 104/67)  BP(mean): 75 (06 Jun 2025 05:17) (75 - 75)  RR: 18 (06 Jun 2025 05:17) (17 - 18)  SpO2: 94% (06 Jun 2025 05:17) (94% - 95%)    Parameters below as of 06 Jun 2025 05:17  Patient On (Oxygen Delivery Method): nasal cannula  O2 Flow (L/min): 4    I&O's Summary    05 Jun 2025 07:01  -  06 Jun 2025 07:00  --------------------------------------------------------  IN: 0 mL / OUT: 300 mL / NET: -300 mL        PHYSICAL EXAM:  Constitutional: Pt lying in bed, awake and alert, NAD  Respiratory: decreased breath sounds at bilateral bases   Cardiovascular: S1S2+, RRR, no M/G/R  Gastrointestinal: abdomen distended, positive fluid wave   Extremities: no lower extremity swelling   Vascular: Peripheral pulses present  Neurological: AAOx2 to person and place , no focal deficits    ALLERGIES:  No Known Allergies      MEDICATIONS:  MEDICATIONS  (STANDING):  albumin human 25% IVPB 100 milliLiter(s) IV Intermittent every 1 hour  cefTRIAXone   IVPB 2000 milliGRAM(s) IV Intermittent every 24 hours  furosemide    Tablet 40 milliGRAM(s) Oral daily  levothyroxine 75 MICROGram(s) Oral daily  multivitamin 1 Tablet(s) Oral daily  potassium phosphate / sodium phosphate Powder (PHOS-NaK) 1 Packet(s) Oral two times a day  propranolol 10 milliGRAM(s) Oral two times a day  spironolactone 25 milliGRAM(s) Oral <User Schedule>    MEDICATIONS  (PRN):  aluminum hydroxide/magnesium hydroxide/simethicone Suspension 30 milliLiter(s) Oral every 4 hours PRN Dyspepsia  melatonin 3 milliGRAM(s) Oral at bedtime PRN Insomnia  ondansetron Injectable 4 milliGRAM(s) IV Push every 8 hours PRN Nausea and/or Vomiting      LABS: (Personally Reviewed):                        10.8   8.05  )-----------( 382      ( 06 Jun 2025 06:15 )             31.4     06-06    133[L]  |  99  |  9   ----------------------------<  80  4.2   |  32[H]  |  0.68    Ca    8.0[L]      06 Jun 2025 06:15  Phos  2.3     06-06  Mg     1.6     06-06    TPro  8.6[H]  /  Alb  1.9[L]  /  TBili  0.7  /  DBili  x   /  AST  30  /  ALT  7[L]  /  AlkPhos  195[H]  06-06    PT/INR - ( 05 Jun 2025 07:00 )   PT: 18.9 sec;   INR: 1.61 ratio         PTT - ( 05 Jun 2025 07:00 )  PTT:42.5 sec      Blood Culture: 06-04 @ 11:00  Organism --  Gram Stain Blood -- Gram Stain   polymorphonuclear leukocytes seen  No organisms seen  by cytocentrifuge  Specimen Source Pleural Fl Pleural Fluid  Culture-Blood --

## 2025-06-06 NOTE — DISCHARGE NOTE PROVIDER - HOSPITAL COURSE
HPI:  63 y/o male with PMHx of HLD, liver cirrhosis 2/2 ETOH use disorder (stopped drinking 1-2 yeas ago), positive FIT test (cancelled colonoscopy 1/2025), Hpylori + 11/16/24 (did not complete treatment), GERD, hyperlipidemia, hypothyroidism sent from family medicine practice for shortness of breath today.  Pt had lost to follow up at clinic as he left to [facility] in 12/2024. CT abd/pelvis ordered 12/10/2024 for abdominal pain showed cirrhotic changes of liver, abdominal aortic aneurysm, portacaval and peripancreatic lymph nodes, and  small nodule adjacent to R liver for which PET CT was recommended.  Per daughter Ada, pt returned from [facility] 6/1/2025 where he has seeing a natural medicine doctor and was taking various meds epadex (B12, folate,oemga3), sadur, colitisan, vivitar, rodim (domperidone/rabeprazole) along with torsemide 10mg BID. Daughter says he was receiving some injections but does not know what they were. Patient is poor historian but notes worsening shortness of breath, abdominal distention, bilateral lower extremity pitting edema for the past 1 wk. Denies chest pain, syncope, lightheadedness, headaches, falls or nausea/vomiting/diarrhea, confusion, visual complaints, neck pain/stiffness, subjective neurological deficits, numbness/tingling.    ED course: VS T 97.5*F, , /73, RR 18, SpO2 91% >97% on 4 L NC  s/p NaCl 500mL, IV lasix 40mg    Labs: hgb 10.8, Hct 31.2, Na 134, Ca 8.1, albumin 1.3, Alk phosph 218, proBNP 372  EKG: accelerated junctional rhythm, rate 101 bpm, QTc 464ms    b/l duplex: No evidence of deep venous thrombosis in either lower extremity.    CXR showing right moderate pleural effusion and small left pleural effusion.    Preliminary CT chest/abd/pelvisresult: No obvious PE to the level of the segmental pulmonary arteries. Difficult to assess subsegmental pulmonary arteries at the left lung base. Large right and moderate to large left pleural effusion with atelectasis. Nonspecific interlobar septal thickening. Cirrhosis and portal hypertension. Moderate to large ascites. Cholelithiasis. Nonspecific small and large bowel wall thickening in the setting of ascites/cirrhosis. Official report to follow. (02 Jun 2025 21:35)      Hospital Course Summary: Patient admitted with dyspnea found with moderate to large right pleural effusion s/p thoracentesis 6/4. Patient has decompnsated cirrhosis and portal hypertension. Patient has moderate to large ascites. Patient had a paracentesis on 6/6. Patient was started on Lasix, Spironolactone, and Propranolol. TSH was elevated - patient was restarted on Levothyroxine 75; patient will require TSH testing in 6 weeks. Patient had cholelithiasis with common bile duct mildly dilated measuring 9 mm. 3.8 cm infrarenal abdominal aortic aneurysm. Hepatologist was spoken to who recommends outpatient liver transplant/TIPS evaluation.     ---  VITALS:     ---  PHYSICAL EXAM:     ---    Indicate ongoing risks or concerns: Decompensated Cirhossis     30 Day Supply through Med[employer] to Beds: Completed or not. If not, please provide reason why.     GOC:   •Full Code     Source of Infection:  Antibiotic / Last Day:    Discharging Provider:   Elizabeth Fraser MD    Contact Info: [phone]    Outpatient Provider: Sign out given?  SNF Provider: Sign-out given?   HPI:  65 y/o male with PMHx of HLD, liver cirrhosis 2/2 ETOH use disorder (stopped drinking 1-2 yeas ago), positive FIT test (cancelled colonoscopy 1/2025), Hpylori + 11/16/24 (did not complete treatment), GERD, hyperlipidemia, hypothyroidism sent from family medicine practice for shortness of breath today.  Pt had lost to follow up at clinic as he left to [facility] in 12/2024. CT abd/pelvis ordered 12/10/2024 for abdominal pain showed cirrhotic changes of liver, abdominal aortic aneurysm, portacaval and peripancreatic lymph nodes, and  small nodule adjacent to R liver for which PET CT was recommended.  Per daughter Ada, pt returned from [facility] 6/1/2025 where he has seeing a natural medicine doctor and was taking various meds epadex (B12, folate,oemga3), sadur, colitisan, vivitar, rodim (domperidone/rabeprazole) along with torsemide 10mg BID. Daughter says he was receiving some injections but does not know what they were. Patient is poor historian but notes worsening shortness of breath, abdominal distention, bilateral lower extremity pitting edema for the past 1 wk. Denies chest pain, syncope, lightheadedness, headaches, falls or nausea/vomiting/diarrhea, confusion, visual complaints, neck pain/stiffness, subjective neurological deficits, numbness/tingling.    ED course: VS T 97.5*F, , /73, RR 18, SpO2 91% >97% on 4 L NC  s/p NaCl 500mL, IV lasix 40mg    Labs: hgb 10.8, Hct 31.2, Na 134, Ca 8.1, albumin 1.3, Alk phosph 218, proBNP 372  EKG: accelerated junctional rhythm, rate 101 bpm, QTc 464ms    b/l duplex: No evidence of deep venous thrombosis in either lower extremity.    CXR showing right moderate pleural effusion and small left pleural effusion.    Preliminary CT chest/abd/pelvisresult: No obvious PE to the level of the segmental pulmonary arteries. Difficult to assess subsegmental pulmonary arteries at the left lung base. Large right and moderate to large left pleural effusion with atelectasis. Nonspecific interlobar septal thickening. Cirrhosis and portal hypertension. Moderate to large ascites. Cholelithiasis. Nonspecific small and large bowel wall thickening in the setting of ascites/cirrhosis. Official report to follow. (02 Jun 2025 21:35)    Hospital Course Summary: Patient admitted with dyspnea found with moderate to large right pleural effusion s/p thoracentesis 6/4. Patient has decompnsated cirrhosis and portal hypertension. Patient has moderate to large ascites. Patient had a paracentesis on 6/6. Patient was started on Lasix, Spironolactone, and Propranolol. TSH was elevated - patient was restarted on Levothyroxine 75; patient will require TSH testing in 6 weeks. Patient had cholelithiasis with common bile duct mildly dilated measuring 9 mm. 3.8 cm infrarenal abdominal aortic aneurysm. Hepatologist was spoken to who recommends outpatient liver transplant/TIPS evaluation. Patient had a repeat thoracentesis on 6/9. Patient's blood pressure was too low and patient was started on midodrine 5mg TID. Patient was given Albumin and IV Lasix.     ---  VITALS:     ---  PHYSICAL EXAM:     ---    Indicate ongoing risks or concerns: Decompensated Cirhossis     30 Day Supply through [employer] to Beds: Completed or not. If not, please provide reason why.     GOC:   •Full Code     Source of Infection:  Antibiotic / Last Day:    Discharging Provider:   Elizabeth Fraser MD    Contact Info: [phone]    Outpatient Provider: Dr. Damon    HPI:  65 y/o male with PMHx of HLD, liver cirrhosis 2/2 ETOH use disorder (stopped drinking 1-2 yeas ago), positive FIT test (cancelled colonoscopy 1/2025), Hpylori + 11/16/24 (did not complete treatment), GERD, hyperlipidemia, hypothyroidism sent from family medicine practice for shortness of breath today.  Pt had lost to follow up at clinic as he left to [facility] in 12/2024. CT abd/pelvis ordered 12/10/2024 for abdominal pain showed cirrhotic changes of liver, abdominal aortic aneurysm, portacaval and peripancreatic lymph nodes, and  small nodule adjacent to R liver for which PET CT was recommended.  Per daughter Ada, pt returned from [facility] 6/1/2025 where he has seeing a natural medicine doctor and was taking various meds epadex (B12, folate,oemga3), sadur, colitisan, vivitar, rodim (domperidone/rabeprazole) along with torsemide 10mg BID. Daughter says he was receiving some injections but does not know what they were. Patient is poor historian but notes worsening shortness of breath, abdominal distention, bilateral lower extremity pitting edema for the past 1 wk. Denies chest pain, syncope, lightheadedness, headaches, falls or nausea/vomiting/diarrhea, confusion, visual complaints, neck pain/stiffness, subjective neurological deficits, numbness/tingling.    ED course: VS T 97.5*F, , /73, RR 18, SpO2 91% >97% on 4 L NC  s/p NaCl 500mL, IV lasix 40mg    Labs: hgb 10.8, Hct 31.2, Na 134, Ca 8.1, albumin 1.3, Alk phosph 218, proBNP 372  EKG: accelerated junctional rhythm, rate 101 bpm, QTc 464ms    b/l duplex: No evidence of deep venous thrombosis in either lower extremity.    CXR showing right moderate pleural effusion and small left pleural effusion.    Preliminary CT chest/abd/pelvisresult: No obvious PE to the level of the segmental pulmonary arteries. Difficult to assess subsegmental pulmonary arteries at the left lung base. Large right and moderate to large left pleural effusion with atelectasis. Nonspecific interlobar septal thickening. Cirrhosis and portal hypertension. Moderate to large ascites. Cholelithiasis. Nonspecific small and large bowel wall thickening in the setting of ascites/cirrhosis. Official report to follow. (02 Jun 2025 21:35)    Hospital Course Summary: Patient admitted with dyspnea found with moderate to large right pleural effusion s/p thoracentesis 6/4. Patient has decompnsated cirrhosis and portal hypertension. Patient has moderate to large ascites. Patient had a paracentesis on 6/6. Patient was started on Lasix, Spironolactone, and Propranolol. TSH was elevated - patient was restarted on Levothyroxine 75; patient will require TSH testing in 6 weeks. Patient had cholelithiasis with common bile duct mildly dilated measuring 9 mm. 3.8 cm infrarenal abdominal aortic aneurysm. Hepatologist was spoken to who recommends outpatient liver transplant/TIPS evaluation. Patient had a repeat thoracentesis on 6/9. Patient's blood pressure was too low and patient was started on midodrine 5mg TID. Patient was given Albumin and IV Lasix. Transfer center was called for patient to be evaluated by Hepatology. Patient accepted for transfer to [facility].     ---  VITALS:     ---  PHYSICAL EXAM:     ---    Indicate ongoing risks or concerns: Decompensated Cirhossis     30 Day Supply through [employer] ? to Beds: Completed or not. If not, please provide reason why.     GOC:   •Full Code     Source of Infection:  Antibiotic / Last Day:    Discharging Provider:   Elizabeth Fraser MD    Contact Info: [phone]    Outpatient Provider: Dr. Damon    HPI:  63 y/o male with PMHx of HLD, liver cirrhosis 2/2 ETOH use disorder (stopped drinking 1-2 yeas ago), positive FIT test (cancelled colonoscopy 1/2025), Hpylori + 11/16/24 (did not complete treatment), GERD, hyperlipidemia, hypothyroidism sent from family medicine practice for shortness of breath today.  Pt had lost to follow up at clinic as he left to [facility] in 12/2024. CT abd/pelvis ordered 12/10/2024 for abdominal pain showed cirrhotic changes of liver, abdominal aortic aneurysm, portacaval and peripancreatic lymph nodes, and  small nodule adjacent to R liver for which PET CT was recommended.  Per daughter Ada, pt returned from [facility] 6/1/2025 where he has seeing a natural medicine doctor and was taking various meds epadex (B12, folate,oemga3), sadur, colitisan, vivitar, rodim (domperidone/rabeprazole) along with torsemide 10mg BID. Daughter says he was receiving some injections but does not know what they were. Patient is poor historian but notes worsening shortness of breath, abdominal distention, bilateral lower extremity pitting edema for the past 1 wk. Denies chest pain, syncope, lightheadedness, headaches, falls or nausea/vomiting/diarrhea, confusion, visual complaints, neck pain/stiffness, subjective neurological deficits, numbness/tingling.    ED course: VS T 97.5*F, , /73, RR 18, SpO2 91% >97% on 4 L NC  s/p NaCl 500mL, IV lasix 40mg    Labs: hgb 10.8, Hct 31.2, Na 134, Ca 8.1, albumin 1.3, Alk phosph 218, proBNP 372  EKG: accelerated junctional rhythm, rate 101 bpm, QTc 464ms    b/l duplex: No evidence of deep venous thrombosis in either lower extremity.    CXR showing right moderate pleural effusion and small left pleural effusion.    Preliminary CT chest/abd/pelvis result: No obvious PE to the level of the segmental pulmonary arteries. Difficult to assess subsegmental pulmonary arteries at the left lung base. Large right and moderate to large left pleural effusion with atelectasis. Nonspecific interlobar septal thickening. Cirrhosis and portal hypertension. Moderate to large ascites. Cholelithiasis. Nonspecific small and large bowel wall thickening in the setting of ascites/cirrhosis. Official report to follow. (02 Jun 2025 21:35)    Hospital Course Summary: Patient admitted for decompensated cirrhosis and portal hypertension with dyspnea found with moderate to large right pleural effusion. Pt had Right thoracocentesis on 6/4 with 1.2L removed, cultures neg.  Patient has moderate to large ascites and had a paracentesis on 6/6 with 1.5L removed, cultures neg. Patient was started on Lasix, Spironolactone, and Propranolol; however unable to optimize meds due to hypotension. TSH was elevated - patient was restarted on Levothyroxine 75; patient will require TSH testing in 6 weeks. Patient had cholelithiasis with common bile duct mildly dilated measuring 9 mm. 3.8 cm infrarenal abdominal aortic aneurysm. Hepatologist was spoken to who recommends outpatient liver transplant/TIPS evaluation. However, Pt would not be a safe discharge home due to fluid status and low BP. Patient had a repeat thoracentesis on 6/9 with another 1.2L removed. Patient's blood pressure was too low and patient was started on midodrine 5mg TID. Patient was given Albumin and IV Lasix but still unable to optimize medications. Transfer center was called for patient to be evaluated by Hepatology. Patient accepted for transfer to [facility] by Dr. Charly Monk for further management.         ---  VITALS:   Vital Signs Last 24 Hrs  T(C): 36.9 (10 Sheldon 2025 12:07), Max: 37.1 (10 Sheldon 2025 04:57)  T(F): 98.5 (10 Sheldon 2025 12:07), Max: 98.8 (10 Sheldon 2025 04:57)  HR: 102 (10 Sheldon 2025 12:07) (92 - 102)  BP: 93/49 (10 Sheldon 2025 12:07) (91/50 - 101/57)  BP(mean): 66 (09 Jun 2025 18:04) (66 - 66)  RR: 17 (10 Sheldon 2025 12:07) (17 - 18)  SpO2: 93% (10 Sheldon 2025 12:07) (92% - 94%)    Parameters below as of 10 Sheldon 2025 12:07  Patient On (Oxygen Delivery Method): room air    ---  PHYSICAL EXAM:   Constitutional: Pt lying in bed, awake and alert, NAD  Respiratory: decreased breath sounds at bilateral bases, CTA x2  Cardiovascular: S1S2+, RRR, no M/G/R  Gastrointestinal: abdomen distended, positive fluid wave , +BS, nontender  Extremities: no lower extremity swelling   Vascular: Peripheral pulses present  Neurological: AAOx2 to person and place , no focal deficits  ---    Indicate ongoing risks or concerns: Decompensated Cirrhosis     30 Day Supply through Meds to Beds: Completed or not. If not, please provide reason why. transfer to [facility]:   •Full Code     Source of Infection: none  Antibiotic / Last Day: n/a    Discharging Provider:   Elizabeth Fraser MD    Contact Info: [phone]    Outpatient Provider: Dr. Salazar ( clinic)    HPI:  65 y/o male with PMHx of HLD, liver cirrhosis 2/2 ETOH use disorder (stopped drinking 1-2 yeas ago), positive FIT test (cancelled colonoscopy 1/2025), Hpylori + 11/16/24 (did not complete treatment), GERD, hyperlipidemia, hypothyroidism sent from family medicine practice for shortness of breath today.  Pt had lost to follow up at clinic as he left to [facility] in 12/2024. CT abd/pelvis ordered 12/10/2024 for abdominal pain showed cirrhotic changes of liver, abdominal aortic aneurysm, portacaval and peripancreatic lymph nodes, and  small nodule adjacent to R liver for which PET CT was recommended.  Per daughter Ada, pt returned from [facility] 6/1/2025 where he has seeing a natural medicine doctor and was taking various meds epadex (B12, folate,oemga3), sadur, colitisan, vivitar, rodim (domperidone/rabeprazole) along with torsemide 10mg BID. Daughter says he was receiving some injections but does not know what they were. Patient is poor historian but notes worsening shortness of breath, abdominal distention, bilateral lower extremity pitting edema for the past 1 wk. Denies chest pain, syncope, lightheadedness, headaches, falls or nausea/vomiting/diarrhea, confusion, visual complaints, neck pain/stiffness, subjective neurological deficits, numbness/tingling.    ED course: VS T 97.5*F, , /73, RR 18, SpO2 91% >97% on 4 L NC  s/p NaCl 500mL, IV lasix 40mg    Labs: hgb 10.8, Hct 31.2, Na 134, Ca 8.1, albumin 1.3, Alk phosph 218, proBNP 372  EKG: accelerated junctional rhythm, rate 101 bpm, QTc 464ms    b/l duplex: No evidence of deep venous thrombosis in either lower extremity.    CXR showing right moderate pleural effusion and small left pleural effusion.    Preliminary CT chest/abd/pelvis result: No obvious PE to the level of the segmental pulmonary arteries. Difficult to assess subsegmental pulmonary arteries at the left lung base. Large right and moderate to large left pleural effusion with atelectasis. Nonspecific interlobar septal thickening. Cirrhosis and portal hypertension. Moderate to large ascites. Cholelithiasis. Nonspecific small and large bowel wall thickening in the setting of ascites/cirrhosis. Official report to follow. (02 Jun 2025 21:35)    Hospital Course Summary: Patient admitted for decompensated cirrhosis and portal hypertension with dyspnea found with moderate to large right pleural effusion. Pt had Right thoracocentesis on 6/4 with 1.2L removed, cultures neg.  Patient has moderate to large ascites and had a paracentesis on 6/6 with 1.5L removed, cultures neg. Patient was started on Lasix, Spironolactone, and Propranolol; however unable to optimize meds due to hypotension. TSH was elevated - patient was restarted on Levothyroxine 75; patient will require TSH testing in 6 weeks. Patient had cholelithiasis with common bile duct mildly dilated measuring 9 mm. 3.8 cm infrarenal abdominal aortic aneurysm. Hepatologist was spoken to who recommends outpatient liver transplant/TIPS evaluation. However, Pt would not be a safe discharge home due to fluid status and low BP. Patient had a repeat thoracentesis on 6/9 with another 1.2L removed. Patient's blood pressure was too low and patient was started on midodrine 5mg TID. Patient was given Albumin and IV Lasix but still unable to optimize medications. With exudative etiology result of thoracentesis, patient may benefit from pleural biopsy. Transfer center was called for patient to be evaluated by Hepatology. Patient accepted for transfer to [facility] by Dr. Charly Monk for further management.         ---  VITALS:   Vital Signs Last 24 Hrs  T(C): 36.9 (10 Sheldon 2025 12:07), Max: 37.1 (10 Sheldon 2025 04:57)  T(F): 98.5 (10 Sheldon 2025 12:07), Max: 98.8 (10 Sheldon 2025 04:57)  HR: 102 (10 Sheldon 2025 12:07) (92 - 102)  BP: 93/49 (10 Sheldon 2025 12:07) (91/50 - 101/57)  BP(mean): 66 (09 Jun 2025 18:04) (66 - 66)  RR: 17 (10 Sheldon 2025 12:07) (17 - 18)  SpO2: 93% (10 Sheldon 2025 12:07) (92% - 94%)    Parameters below as of 10 Sheldon 2025 12:07  Patient On (Oxygen Delivery Method): room air    ---  PHYSICAL EXAM:   Constitutional: Pt lying in bed, awake and alert, NAD  Respiratory: decreased breath sounds at bilateral bases, CTA x2  Cardiovascular: S1S2+, RRR, no M/G/R  Gastrointestinal: abdomen distended, positive fluid wave , +BS, nontender  Extremities: no lower extremity swelling   Vascular: Peripheral pulses present  Neurological: AAOx2 to person and place , no focal deficits  ---    Indicate ongoing risks or concerns: Decompensated Cirrhosis     30 Day Supply through Meds to Beds: Completed or not. If not, please provide reason why. transfer to [facility]:   •Full Code     Source of Infection: none  Antibiotic / Last Day: n/a    Discharging Provider:   Elizabeth Fraser MD    Contact Info: [phone]    Outpatient Provider: Dr. Salazar ( clinic)    HPI:  65 y/o male with PMHx of HLD, liver cirrhosis 2/2 ETOH use disorder (stopped drinking 1-2 yeas ago), positive FIT test (cancelled colonoscopy 1/2025), Hpylori + 11/16/24 (did not complete treatment), GERD, hyperlipidemia, hypothyroidism sent from family medicine practice for shortness of breath today.  Pt had lost to follow up at clinic as he left to [facility] in 12/2024. CT abd/pelvis ordered 12/10/2024 for abdominal pain showed cirrhotic changes of liver, abdominal aortic aneurysm, portacaval and peripancreatic lymph nodes, and  small nodule adjacent to R liver for which PET CT was recommended.  Per daughter Ada, pt returned from [facility] 6/1/2025 where he has seeing a natural medicine doctor and was taking various meds epadex (B12, folate,oemga3), sadur, colitisan, vivitar, rodim (domperidone/rabeprazole) along with torsemide 10mg BID. Daughter says he was receiving some injections but does not know what they were. Patient is poor historian but notes worsening shortness of breath, abdominal distention, bilateral lower extremity pitting edema for the past 1 wk. Denies chest pain, syncope, lightheadedness, headaches, falls or nausea/vomiting/diarrhea, confusion, visual complaints, neck pain/stiffness, subjective neurological deficits, numbness/tingling.    ED course: VS T 97.5*F, , /73, RR 18, SpO2 91% >97% on 4 L NC  s/p NaCl 500mL, IV lasix 40mg    Labs: hgb 10.8, Hct 31.2, Na 134, Ca 8.1, albumin 1.3, Alk phosph 218, proBNP 372  EKG: accelerated junctional rhythm, rate 101 bpm, QTc 464ms    b/l duplex: No evidence of deep venous thrombosis in either lower extremity.    CXR showing right moderate pleural effusion and small left pleural effusion.    Preliminary CT chest/abd/pelvis result: No obvious PE to the level of the segmental pulmonary arteries. Difficult to assess subsegmental pulmonary arteries at the left lung base. Large right and moderate to large left pleural effusion with atelectasis. Nonspecific interlobar septal thickening. Cirrhosis and portal hypertension. Moderate to large ascites. Cholelithiasis. Nonspecific small and large bowel wall thickening in the setting of ascites/cirrhosis. Official report to follow. (02 Jun 2025 21:35)    Hospital Course Summary: Patient admitted for decompensated cirrhosis and portal hypertension with dyspnea found with moderate to large right pleural effusion. Pt had Right thoracocentesis on 6/4 with 1.2L removed, cultures neg.  Patient has moderate to large ascites and had a paracentesis on 6/6 with 1.5L removed, cultures neg. Patient was started on Lasix, Spironolactone, and Propranolol; however unable to optimize meds due to hypotension. TSH was elevated - patient was restarted on Levothyroxine 75; patient will require TSH testing in 6 weeks. Patient had cholelithiasis with common bile duct mildly dilated measuring 9 mm. 3.8 cm infrarenal abdominal aortic aneurysm. Hepatologist was spoken to who recommends outpatient liver transplant/TIPS evaluation. However, Pt would not be a safe discharge home due to fluid status and low BP. Patient had a repeat thoracentesis on 6/9 with another 1.2L removed. Patient's blood pressure was too low and patient was started on midodrine 5mg TID. Patient was given Albumin and IV Lasix but still unable to optimize medications. With exudative etiology result of thoracentesis, patient may benefit from pleural biopsy. Patient had elevated ammonia levels 64 and 80 patient was started on lactulose 15mg TID. Transfer center was called for patient to be evaluated by Hepatology. Patient accepted for transfer to [facility] by Dr. Charly Monk for further management.         ---  VITALS:   Vital Signs Last 24 Hrs  T(C): 37.2 (12 Jun 2025 11:29), Max: 37.3 (12 Jun 2025 05:37)  T(F): 98.9 (12 Jun 2025 11:29), Max: 99.1 (12 Jun 2025 05:37)  HR: 101 (12 Jun 2025 11:29) (96 - 101)  BP: 103/62 (12 Jun 2025 11:29) (92/46 - 103/62)  BP(mean): 74 (12 Jun 2025 11:29) (74 - 74)  RR: 17 (12 Jun 2025 11:29) (17 - 18)  SpO2: 93% (12 Jun 2025 11:29) (92% - 93%)    Parameters below as of 12 Jun 2025 11:29  Patient On (Oxygen Delivery Method): room air        ---  PHYSICAL EXAM:   Constitutional: Pt lying in bed, awake and alert, NAD  Respiratory: decreased breath sounds at bilateral bases, CTA x2  Cardiovascular: S1S2+, RRR, no M/G/R  Gastrointestinal: abdomen distended, positive fluid wave , +BS, nontender  Extremities: no lower extremity swelling   Vascular: Peripheral pulses present  Neurological: AAOx2 to person and place , no focal deficits  ---    Indicate ongoing risks or concerns: Decompensated Cirrhosis     30 Day Supply through Meds to Beds: Completed or not. If not, please provide reason why. transfer to [facility]:   •Full Code     Source of Infection: none  Antibiotic / Last Day: n/a    Discharging Provider:   Elizaebth Fraser MD    Contact Info: [phone]    Outpatient Provider: Dr. Salazar ( clinic)

## 2025-06-07 LAB
ALBUMIN SERPL ELPH-MCNC: 2 G/DL — LOW (ref 3.3–5)
ALP SERPL-CCNC: 174 U/L — HIGH (ref 40–120)
ALT FLD-CCNC: 8 U/L — LOW (ref 10–45)
ANION GAP SERPL CALC-SCNC: 9 MMOL/L — SIGNIFICANT CHANGE UP (ref 5–17)
AST SERPL-CCNC: 28 U/L — SIGNIFICANT CHANGE UP (ref 10–40)
BASOPHILS # BLD AUTO: 0.06 K/UL — SIGNIFICANT CHANGE UP (ref 0–0.2)
BASOPHILS NFR BLD AUTO: 0.9 % — SIGNIFICANT CHANGE UP (ref 0–2)
BILIRUB SERPL-MCNC: 0.8 MG/DL — SIGNIFICANT CHANGE UP (ref 0.2–1.2)
BUN SERPL-MCNC: 8 MG/DL — SIGNIFICANT CHANGE UP (ref 7–23)
CALCIUM SERPL-MCNC: 8.3 MG/DL — LOW (ref 8.4–10.5)
CHLORIDE SERPL-SCNC: 96 MMOL/L — SIGNIFICANT CHANGE UP (ref 96–108)
CO2 SERPL-SCNC: 25 MMOL/L — SIGNIFICANT CHANGE UP (ref 22–31)
CREAT SERPL-MCNC: 0.77 MG/DL — SIGNIFICANT CHANGE UP (ref 0.5–1.3)
EGFR: 100 ML/MIN/1.73M2 — SIGNIFICANT CHANGE UP
EGFR: 100 ML/MIN/1.73M2 — SIGNIFICANT CHANGE UP
EOSINOPHIL # BLD AUTO: 0.13 K/UL — SIGNIFICANT CHANGE UP (ref 0–0.5)
EOSINOPHIL NFR BLD AUTO: 1.8 % — SIGNIFICANT CHANGE UP (ref 0–6)
GLUCOSE SERPL-MCNC: 96 MG/DL — SIGNIFICANT CHANGE UP (ref 70–99)
GRAM STN FLD: ABNORMAL
HCT VFR BLD CALC: 33.3 % — LOW (ref 39–50)
HGB BLD-MCNC: 11.3 G/DL — LOW (ref 13–17)
IMM GRANULOCYTES NFR BLD AUTO: 0.3 % — SIGNIFICANT CHANGE UP (ref 0–0.9)
LYMPHOCYTES # BLD AUTO: 1.52 K/UL — SIGNIFICANT CHANGE UP (ref 1–3.3)
LYMPHOCYTES # BLD AUTO: 21.6 % — SIGNIFICANT CHANGE UP (ref 13–44)
MAGNESIUM SERPL-MCNC: 1.5 MG/DL — LOW (ref 1.6–2.6)
MCHC RBC-ENTMCNC: 31.2 PG — SIGNIFICANT CHANGE UP (ref 27–34)
MCHC RBC-ENTMCNC: 33.9 G/DL — SIGNIFICANT CHANGE UP (ref 32–36)
MCV RBC AUTO: 92 FL — SIGNIFICANT CHANGE UP (ref 80–100)
MONOCYTES # BLD AUTO: 1.14 K/UL — HIGH (ref 0–0.9)
MONOCYTES NFR BLD AUTO: 16.2 % — HIGH (ref 2–14)
NEUTROPHILS # BLD AUTO: 4.17 K/UL — SIGNIFICANT CHANGE UP (ref 1.8–7.4)
NEUTROPHILS NFR BLD AUTO: 59.2 % — SIGNIFICANT CHANGE UP (ref 43–77)
NRBC BLD AUTO-RTO: 0 /100 WBCS — SIGNIFICANT CHANGE UP (ref 0–0)
PHOSPHATE SERPL-MCNC: 3.1 MG/DL — SIGNIFICANT CHANGE UP (ref 2.5–4.5)
PLATELET # BLD AUTO: 254 K/UL — SIGNIFICANT CHANGE UP (ref 150–400)
POTASSIUM SERPL-MCNC: 4.3 MMOL/L — SIGNIFICANT CHANGE UP (ref 3.5–5.3)
POTASSIUM SERPL-SCNC: 4.3 MMOL/L — SIGNIFICANT CHANGE UP (ref 3.5–5.3)
PROT SERPL-MCNC: 8.7 G/DL — HIGH (ref 6–8.3)
RBC # BLD: 3.62 M/UL — LOW (ref 4.2–5.8)
RBC # FLD: 14.6 % — HIGH (ref 10.3–14.5)
SODIUM SERPL-SCNC: 130 MMOL/L — LOW (ref 135–145)
SPECIMEN SOURCE: SIGNIFICANT CHANGE UP
WBC # BLD: 7.04 K/UL — SIGNIFICANT CHANGE UP (ref 3.8–10.5)
WBC # FLD AUTO: 7.04 K/UL — SIGNIFICANT CHANGE UP (ref 3.8–10.5)

## 2025-06-07 PROCEDURE — 99233 SBSQ HOSP IP/OBS HIGH 50: CPT | Mod: FS

## 2025-06-07 RX ORDER — MAGNESIUM OXIDE 400 MG
400 TABLET ORAL ONCE
Refills: 0 | Status: COMPLETED | OUTPATIENT
Start: 2025-06-07 | End: 2025-06-07

## 2025-06-07 RX ORDER — HEPARIN SODIUM 1000 [USP'U]/ML
5000 INJECTION INTRAVENOUS; SUBCUTANEOUS EVERY 8 HOURS
Refills: 0 | Status: DISCONTINUED | OUTPATIENT
Start: 2025-06-07 | End: 2025-06-09

## 2025-06-07 RX ORDER — FUROSEMIDE 10 MG/ML
40 INJECTION INTRAMUSCULAR; INTRAVENOUS
Refills: 0 | Status: DISCONTINUED | OUTPATIENT
Start: 2025-06-07 | End: 2025-06-09

## 2025-06-07 RX ADMIN — Medication 1 TABLET(S): at 11:46

## 2025-06-07 RX ADMIN — FUROSEMIDE 40 MILLIGRAM(S): 10 INJECTION INTRAMUSCULAR; INTRAVENOUS at 14:04

## 2025-06-07 RX ADMIN — Medication 75 MICROGRAM(S): at 05:35

## 2025-06-07 RX ADMIN — Medication 1 PACKET(S): at 05:34

## 2025-06-07 RX ADMIN — Medication 25 MILLIGRAM(S): at 11:46

## 2025-06-07 RX ADMIN — CEFTRIAXONE 100 MILLIGRAM(S): 500 INJECTION, POWDER, FOR SOLUTION INTRAMUSCULAR; INTRAVENOUS at 00:40

## 2025-06-07 RX ADMIN — HEPARIN SODIUM 5000 UNIT(S): 1000 INJECTION INTRAVENOUS; SUBCUTANEOUS at 21:29

## 2025-06-07 RX ADMIN — Medication 400 MILLIGRAM(S): at 11:45

## 2025-06-07 RX ADMIN — ALBUMIN (HUMAN) 100 MILLILITER(S): 12.5 INJECTION, SOLUTION INTRAVENOUS at 10:48

## 2025-06-07 NOTE — PHYSICAL THERAPY INITIAL EVALUATION ADULT - GAIT TRAINING, PT EVAL
In one to three treatments, patient will ambulate with RW x 100 ft limited household distances supervision/ cg assist

## 2025-06-07 NOTE — DIETITIAN INITIAL EVALUATION ADULT - PERTINENT LABORATORY DATA
06-07    130[L]  |  96  |  8   ----------------------------<  96  4.3   |  25  |  0.77    Ca    8.3[L]      07 Jun 2025 07:22  Phos  3.1     06-07  Mg     1.5     06-07    TPro  8.7[H]  /  Alb  2.0[L]  /  TBili  0.8  /  DBili  x   /  AST  28  /  ALT  8[L]  /  AlkPhos  174[H]  06-07  A1C with Estimated Average Glucose Result: 5.1 % (06-03-25 @ 06:48)

## 2025-06-07 NOTE — DIETITIAN INITIAL EVALUATION ADULT - OTHER INFO
63 y/o male with PMHx of HLD, liver cirrhosis 2/2 ETOH use disorder (stopped drinking 1-2 yeas ago), positive FIT test (cancelled colonoscopy 1/2025), Hpylori + 11/16/24 (did not complete treatment), GERD, hyperlipidemia, hypothyroidism is admitted for decompensated alcoholic cirrhosis versus malignant ascites (positive FIT outpatient).    Pt tolerating diet with report of good appetite now. Consuming >75% of all meals. s/p thoracentesis 6/4, paracentesis 6/6. .9lbs. Pt reports highest weight 164lbs. NFPE findings consistent with severe malnutrition. No pressure ulcers or edema. Pt denies N/V/D, constipation. Last BM 6/5. Pt receptive to Ensure HP BID. Continue with MVI po daily, suggest Zinc Sulfate 220mg x 14 days in setting of cirrhosis. Recommend Mg repletion.

## 2025-06-07 NOTE — DIETITIAN INITIAL EVALUATION ADULT - ADD RECOMMEND
1. Low Na + Ensure HP BID  2. Continue MVI po daily. Add Zinc Sulfate 220mg x 14 days  3. Replete Mg

## 2025-06-07 NOTE — CHART NOTE - NSCHARTNOTEFT_GEN_A_CORE
Updated daughter Ada via the phone. Pt will need to stay for SBP treatment and desat requiring O2. Daughter requested PT eval for MACY placement. All questions and concerns addressed

## 2025-06-07 NOTE — DIETITIAN INITIAL EVALUATION ADULT - ORAL INTAKE PTA/DIET HISTORY
[employer] used for [place] [employer], ID #[id number]. Pt reported hx of decreased appetite/po intake over the last year resulting in unintended weight loss. NKFA. No chewing/swallowing issues. Reports good acceptance of Ensure supplements in the past.

## 2025-06-07 NOTE — PROGRESS NOTE ADULT - SUBJECTIVE AND OBJECTIVE BOX
Subjective and Objective:     Overnight Events: None   Interval History: Patient was seen and examined this morning at bedside.     Vital Signs Last 24 Hrs  T(C): 36.7 (07 Jun 2025 05:00), Max: 36.8 (06 Jun 2025 19:45)  T(F): 98.1 (07 Jun 2025 05:00), Max: 98.2 (06 Jun 2025 19:45)  HR: 92 (07 Jun 2025 05:00) (92 - 102)  BP: 91/44 (07 Jun 2025 05:00) (91/44 - 100/62)  BP(mean): --  RR: 17 (07 Jun 2025 05:00) (17 - 18)  SpO2: 93% (07 Jun 2025 05:00) (93% - 96%)    Parameters below as of 07 Jun 2025 05:00  Patient On (Oxygen Delivery Method): room air      I&O's Summary      PHYSICAL EXAM:  Constitutional: Pt lying in bed, awake and alert, NAD  Respiratory: decreased breath sounds at bilateral bases   Cardiovascular: S1S2+, RRR, no M/G/R  Gastrointestinal: abdomen distended, positive fluid wave   Extremities: no lower extremity swelling   Vascular: Peripheral pulses present  Neurological: AAOx2 to person and place , no focal deficits    ALLERGIES:  No Known Allergies      MEDICATIONS:  MEDICATIONS  (STANDING):  cefTRIAXone   IVPB 2000 milliGRAM(s) IV Intermittent every 24 hours  furosemide    Tablet 40 milliGRAM(s) Oral two times a day  levothyroxine 75 MICROGram(s) Oral daily  multivitamin 1 Tablet(s) Oral daily  propranolol 10 milliGRAM(s) Oral two times a day  spironolactone 25 milliGRAM(s) Oral <User Schedule>    MEDICATIONS  (PRN):  aluminum hydroxide/magnesium hydroxide/simethicone Suspension 30 milliLiter(s) Oral every 4 hours PRN Dyspepsia  melatonin 3 milliGRAM(s) Oral at bedtime PRN Insomnia  ondansetron Injectable 4 milliGRAM(s) IV Push every 8 hours PRN Nausea and/or Vomiting      LABS: (Personally Reviewed):                        11.3   7.04  )-----------( 254      ( 07 Jun 2025 07:22 )             33.3     06-07    130[L]  |  96  |  8   ----------------------------<  96  4.3   |  25  |  0.77    Ca    8.3[L]      07 Jun 2025 07:22  Phos  3.1     06-07  Mg     1.5     06-07    TPro  8.7[H]  /  Alb  2.0[L]  /  TBili  0.8  /  DBili  x   /  AST  28  /  ALT  8[L]  /  AlkPhos  174[H]  06-07          Blood Culture: 06-06 @ 12:20  Organism --  Gram Stain Blood -- Gram Stain --  Specimen Source Peritoneal Peritoneal Fluid  Culture-Blood --    06-06 @ 11:30  Organism --  Gram Stain Blood -- Gram Stain   polymorphonuclear leukocytes seen  Gram Negative Rods  by cytocentrifuge  Specimen Source Body Fluid Culture Ascites fluid  Culture-Blood --    06-04 @ 11:00  Organism --  Gram Stain Blood -- Gram Stain   polymorphonuclear leukocytes seen  No organisms seen  by cytocentrifuge  Specimen Source Pleural Fl Pleural Fluid  Culture-Blood --

## 2025-06-07 NOTE — PROGRESS NOTE ADULT - ASSESSMENT
65 y/o male with PMHx of HLD, liver cirrhosis 2/2 ETOH use disorder (stopped drinking 1-2 yeas ago), positive FIT test (cancelled colonoscopy 1/2025), Hpylori + 11/16/24 (did not complete treatment), GERD, hyperlipidemia, hypothyroidism is admitted for:    #Decompensated alcoholic cirrhosis versus malignant ascites (positive FIT outpatient)   #Acute Hypoxic respiratory failure secondary to b/l pleural effusions  #b/l LE edema  - Change4 Lasix 40mg BID    - Continue Spironolactone for cirrhosis, increase as able due to blood pressure   - Continue Propanolol for prophylaxis   - Rocephin ppx    - Thoracocentesis 6/4   - Paracenteses 6/6    - GI consult, recs appreciated  - Pulm consult, recs appreciated    - Hepatology - outpatient liver transplant/TIPS evaluation recommended   - PT Evaluation   - Pulse ox on ambulation attemmt - satting 88-90% on RA    #Hypothyroidism  - Continue Levothyroxine 75   - recheck TSH in 6 weeks     #HLD  - lipid panel 11/2024: 11/2024: total 180, HDL 25, [id number]  - lipid panel 6/3/2025: total 91, HDL 12, LDL 64     #DVT ppx  - SCDs for possible thoracentesis/paracentesis    #GOC  - Full code     Discussed with Dr. Fraser

## 2025-06-07 NOTE — PROGRESS NOTE ADULT - ASSESSMENT
Physical Examination:  GENERAL:               Alert, Oriented, No acute distress.    HEENT:                    Pupils equal, reactive to light.  Symmetric. No JVD, Moist MM  PULM:                     Bilateral air entry, Clear to auscultation bilaterally, no significant sputum production, No Rales, No Rhonchi, No Wheezing  CVS:                         S1, S2,  No Murmur  ABD:                        Soft, nondistended, nontender, normoactive bowel sounds,    NEURO:                  Alert, oriented, interactive, nonfocal, follows commands  PSYC:                      Calm, + Insight.       Assessment  63 y/o male with PMHx of HLD, liver cirrhosis 2/2 ETOH use disorder (stopped drinking 1-2 yeas ago), positive FIT test (cancelled colonoscopy 1/2025), Hpylori + 11/16/24 (did not complete treatment), GERD, hyperlipidemia, hypothyroidism sent from family medicine practice for shortness of breath today.     problem list  ascites with b/l pl effusions  s/p thoracentesis 6/4/2025 - Exudate  abd fluid showing  SBP with GNR    Plan  cytology neg  continue antibiotics  continue diuretics  repeat cxr on monday  if pl effusion worsening, will re drain pl effusion based on repeat imaging and clinical status    rest of care as per primary team

## 2025-06-07 NOTE — DIETITIAN INITIAL EVALUATION ADULT - PERTINENT MEDS FT
MEDICATIONS  (STANDING):  cefTRIAXone   IVPB 2000 milliGRAM(s) IV Intermittent every 24 hours  furosemide    Tablet 40 milliGRAM(s) Oral two times a day  levothyroxine 75 MICROGram(s) Oral daily  multivitamin 1 Tablet(s) Oral daily  propranolol 10 milliGRAM(s) Oral two times a day  spironolactone 25 milliGRAM(s) Oral <User Schedule>    MEDICATIONS  (PRN):  aluminum hydroxide/magnesium hydroxide/simethicone Suspension 30 milliLiter(s) Oral every 4 hours PRN Dyspepsia  melatonin 3 milliGRAM(s) Oral at bedtime PRN Insomnia  ondansetron Injectable 4 milliGRAM(s) IV Push every 8 hours PRN Nausea and/or Vomiting

## 2025-06-07 NOTE — PROGRESS NOTE ADULT - SUBJECTIVE AND OBJECTIVE BOX
Follow-up Pulmonary Progress Note  Chief Complaint : Hypervolemia    pt oob to chair  sob on exertion  no cp, sob, palp, n/v,   + SORIA      Allergies :No Known Allergies      PAST MEDICAL & SURGICAL HISTORY:  ALC (alcoholic liver cirrhosis)    Hyperlipidemia    Positive FIT (fecal immunochemical test)    GERD (gastroesophageal reflux disease)    Hypothyroidism        Medications:  MEDICATIONS  (STANDING):  cefTRIAXone   IVPB 2000 milliGRAM(s) IV Intermittent every 24 hours  furosemide    Tablet 40 milliGRAM(s) Oral two times a day  levothyroxine 75 MICROGram(s) Oral daily  multivitamin 1 Tablet(s) Oral daily  propranolol 10 milliGRAM(s) Oral two times a day  spironolactone 25 milliGRAM(s) Oral <User Schedule>    MEDICATIONS  (PRN):  aluminum hydroxide/magnesium hydroxide/simethicone Suspension 30 milliLiter(s) Oral every 4 hours PRN Dyspepsia  melatonin 3 milliGRAM(s) Oral at bedtime PRN Insomnia  ondansetron Injectable 4 milliGRAM(s) IV Push every 8 hours PRN Nausea and/or Vomiting      Antibiotics History  cefTRIAXone   IVPB 2000 milliGRAM(s) IV Intermittent every 24 hours, 06-03-25 @ 13:32, Stop order after: 7 Days  piperacillin/tazobactam IVPB. 3.375 Gram(s) IV Intermittent once, 06-03-25 @ 01:49, Stop order after: 1 Doses  piperacillin/tazobactam IVPB.- 3.375 Gram(s) IV Intermittent once, 06-03-25 @ 06:30  piperacillin/tazobactam IVPB.. 3.375 Gram(s) IV Intermittent every 8 hours, 06-03-25 @ 14:00, Stop order after: 7 Days      Heme Medications       GI Medications  aluminum hydroxide/magnesium hydroxide/simethicone Suspension 30 milliLiter(s) Oral every 4 hours, 06-02-25 @ 21:47, Routine PRN        LABS:                        11.3   7.04  )-----------( 254      ( 07 Jun 2025 07:22 )             33.3     06-07    130[L]  |  96  |  8   ----------------------------<  96  4.3   |  25  |  0.77    Ca    8.3[L]      07 Jun 2025 07:22  Phos  3.1     06-07  Mg     1.5     06-07    TPro  8.7[H]  /  Alb  2.0[L]  /  TBili  0.8  /  DBili  x   /  AST  28  /  ALT  8[L]  /  AlkPhos  174[H]  06-07    Fluid characteristics  Ascites 06-06 @ 11:50  pH 7.6    tprot 5.1    Cell count  Appearance Clear  Fluid type --  BF lymph 75  color Yellow  eosinophil --  PMN --  Mesothelial --  Monocyte 21  Other body cells --  Fluid characteristics  -- 06-04 @ 11:00  pH 7.8    tprot 6.3    Cell count  Appearance Cloudy  Fluid type --  BF lymph 11  color Red  eosinophil 0  PMN --  Mesothelial 0  Monocyte 75  Other body cells 0             CULTURES: (if applicable)    Culture - Fungal, Body Fluid (collected 06-06-25 @ 12:20)  Source: Peritoneal Peritoneal Fluid  Preliminary Report (06-07-25 @ 08:01):    Testing in progress    Culture - Body Fluid with Gram Stain (collected 06-06-25 @ 11:30) from abd  Source: Body Fluid Culture Ascites fluid  Gram Stain (06-07-25 @ 00:34):    polymorphonuclear leukocytes seen    Gram Negative Rods    by cytocentrifuge    Culture - Fungal, Body Fluid (collected 06-04-25 @ 11:00)  Source: Pleural Fl Pleural Fluid  Preliminary Report (06-06-25 @ 08:27):    No growth    Culture - Body Fluid with Gram Stain (collected 06-04-25 @ 11:00)  Source: Pleural Fl Pleural Fluid  Gram Stain (06-04-25 @ 20:50):    polymorphonuclear leukocytes seen    No organisms seen    by cytocentrifuge  Preliminary Report (06-05-25 @ 10:49):    No growth to date           VITALS:  T(C): 37.4 (06-07-25 @ 12:00), Max: 37.4 (06-07-25 @ 12:00)  T(F): 99.4 (06-07-25 @ 12:00), Max: 99.4 (06-07-25 @ 12:00)  HR: 106 (06-07-25 @ 12:00) (92 - 106)  BP: 100/59 (06-07-25 @ 12:00) (91/44 - 100/62)  BP(mean): --  ABP: --  ABP(mean): --  RR: 17 (06-07-25 @ 12:00) (17 - 18)  SpO2: 92% (06-07-25 @ 12:00) (92% - 95%)  CVP(mm Hg): --  CVP(cm H2O): --    Ins and Outs

## 2025-06-07 NOTE — PROGRESS NOTE ADULT - ATTENDING COMMENTS
Plan of care as above    # Ascites/Cirrhosis  - SP Thora and Para  - Negative for malignant cells  - Hypoxia on room air from pleural effusion  - Increase lasix to BID  - Continue aldactone at 25 for soft BP  - Incentive spirometry    Continue care

## 2025-06-07 NOTE — PHYSICAL THERAPY INITIAL EVALUATION ADULT - BALANCE DISTURBANCE, SYSTEM IMPAIRMENT CONTRIBUTE, REHAB EVAL
- Patient is complaining of difficulty swallowing to both solids and liquids.  - SLP was consulted for evaluation.  - MBSS on 01/17 showed no abnormalities.  - Will f/u with ENT as an outpatient.     musculoskeletal

## 2025-06-07 NOTE — PHYSICAL THERAPY INITIAL EVALUATION ADULT - BED MOBILITY LIMITATIONS, REHAB EVAL
increased time/decreased ability to use arms for pushing/pulling/decreased ability to use legs for bridging/pushing

## 2025-06-07 NOTE — DIETITIAN INITIAL EVALUATION ADULT - NSFNSGIIOFT_GEN_A_CORE
06-07-25 @ 07:01  -  06-07-25 @ 15:05  --------------------------------------------------------  OUT:    Stool (mL): 1 mL  Total OUT: 1 mL    Total NET: -1 mL

## 2025-06-08 LAB
ALBUMIN SERPL ELPH-MCNC: 2.2 G/DL — LOW (ref 3.3–5)
ALP SERPL-CCNC: 191 U/L — HIGH (ref 40–120)
ALT FLD-CCNC: 9 U/L — LOW (ref 10–45)
ANION GAP SERPL CALC-SCNC: 6 MMOL/L — SIGNIFICANT CHANGE UP (ref 5–17)
AST SERPL-CCNC: 24 U/L — SIGNIFICANT CHANGE UP (ref 10–40)
BASOPHILS # BLD AUTO: 0.06 K/UL — SIGNIFICANT CHANGE UP (ref 0–0.2)
BASOPHILS NFR BLD AUTO: 0.8 % — SIGNIFICANT CHANGE UP (ref 0–2)
BILIRUB SERPL-MCNC: 0.8 MG/DL — SIGNIFICANT CHANGE UP (ref 0.2–1.2)
BUN SERPL-MCNC: 9 MG/DL — SIGNIFICANT CHANGE UP (ref 7–23)
CALCIUM SERPL-MCNC: 8.4 MG/DL — SIGNIFICANT CHANGE UP (ref 8.4–10.5)
CHLORIDE SERPL-SCNC: 98 MMOL/L — SIGNIFICANT CHANGE UP (ref 96–108)
CO2 SERPL-SCNC: 30 MMOL/L — SIGNIFICANT CHANGE UP (ref 22–31)
CREAT SERPL-MCNC: 0.88 MG/DL — SIGNIFICANT CHANGE UP (ref 0.5–1.3)
EGFR: 95 ML/MIN/1.73M2 — SIGNIFICANT CHANGE UP
EGFR: 95 ML/MIN/1.73M2 — SIGNIFICANT CHANGE UP
EOSINOPHIL # BLD AUTO: 0.16 K/UL — SIGNIFICANT CHANGE UP (ref 0–0.5)
EOSINOPHIL NFR BLD AUTO: 2.1 % — SIGNIFICANT CHANGE UP (ref 0–6)
GLUCOSE SERPL-MCNC: 121 MG/DL — HIGH (ref 70–99)
GRAM STN FLD: ABNORMAL
HCT VFR BLD CALC: 30.1 % — LOW (ref 39–50)
HGB BLD-MCNC: 10.6 G/DL — LOW (ref 13–17)
IMM GRANULOCYTES NFR BLD AUTO: 0.4 % — SIGNIFICANT CHANGE UP (ref 0–0.9)
LYMPHOCYTES # BLD AUTO: 1.96 K/UL — SIGNIFICANT CHANGE UP (ref 1–3.3)
LYMPHOCYTES # BLD AUTO: 25.2 % — SIGNIFICANT CHANGE UP (ref 13–44)
MCHC RBC-ENTMCNC: 31.3 PG — SIGNIFICANT CHANGE UP (ref 27–34)
MCHC RBC-ENTMCNC: 35.2 G/DL — SIGNIFICANT CHANGE UP (ref 32–36)
MCV RBC AUTO: 88.8 FL — SIGNIFICANT CHANGE UP (ref 80–100)
MONOCYTES # BLD AUTO: 1.47 K/UL — HIGH (ref 0–0.9)
MONOCYTES NFR BLD AUTO: 18.9 % — HIGH (ref 2–14)
NEUTROPHILS # BLD AUTO: 4.09 K/UL — SIGNIFICANT CHANGE UP (ref 1.8–7.4)
NEUTROPHILS NFR BLD AUTO: 52.6 % — SIGNIFICANT CHANGE UP (ref 43–77)
NRBC BLD AUTO-RTO: 0 /100 WBCS — SIGNIFICANT CHANGE UP (ref 0–0)
PLATELET # BLD AUTO: 375 K/UL — SIGNIFICANT CHANGE UP (ref 150–400)
POTASSIUM SERPL-MCNC: 3.6 MMOL/L — SIGNIFICANT CHANGE UP (ref 3.5–5.3)
POTASSIUM SERPL-SCNC: 3.6 MMOL/L — SIGNIFICANT CHANGE UP (ref 3.5–5.3)
PROT SERPL-MCNC: 8.5 G/DL — HIGH (ref 6–8.3)
RBC # BLD: 3.39 M/UL — LOW (ref 4.2–5.8)
RBC # FLD: 14.4 % — SIGNIFICANT CHANGE UP (ref 10.3–14.5)
SODIUM SERPL-SCNC: 134 MMOL/L — LOW (ref 135–145)
WBC # BLD: 7.77 K/UL — SIGNIFICANT CHANGE UP (ref 3.8–10.5)
WBC # FLD AUTO: 7.77 K/UL — SIGNIFICANT CHANGE UP (ref 3.8–10.5)

## 2025-06-08 PROCEDURE — 71045 X-RAY EXAM CHEST 1 VIEW: CPT | Mod: 26

## 2025-06-08 PROCEDURE — 99233 SBSQ HOSP IP/OBS HIGH 50: CPT | Mod: FS

## 2025-06-08 RX ORDER — ACETAMINOPHEN 500 MG/5ML
650 LIQUID (ML) ORAL ONCE
Refills: 0 | Status: COMPLETED | OUTPATIENT
Start: 2025-06-08 | End: 2025-06-08

## 2025-06-08 RX ADMIN — Medication 650 MILLIGRAM(S): at 22:14

## 2025-06-08 RX ADMIN — Medication 25 MILLIGRAM(S): at 13:01

## 2025-06-08 RX ADMIN — HEPARIN SODIUM 5000 UNIT(S): 1000 INJECTION INTRAVENOUS; SUBCUTANEOUS at 21:14

## 2025-06-08 RX ADMIN — Medication 1 TABLET(S): at 13:01

## 2025-06-08 RX ADMIN — Medication 650 MILLIGRAM(S): at 21:14

## 2025-06-08 RX ADMIN — HEPARIN SODIUM 5000 UNIT(S): 1000 INJECTION INTRAVENOUS; SUBCUTANEOUS at 06:29

## 2025-06-08 RX ADMIN — FUROSEMIDE 40 MILLIGRAM(S): 10 INJECTION INTRAMUSCULAR; INTRAVENOUS at 15:20

## 2025-06-08 RX ADMIN — CEFTRIAXONE 100 MILLIGRAM(S): 500 INJECTION, POWDER, FOR SOLUTION INTRAMUSCULAR; INTRAVENOUS at 00:59

## 2025-06-08 RX ADMIN — FUROSEMIDE 40 MILLIGRAM(S): 10 INJECTION INTRAMUSCULAR; INTRAVENOUS at 06:29

## 2025-06-08 RX ADMIN — HEPARIN SODIUM 5000 UNIT(S): 1000 INJECTION INTRAVENOUS; SUBCUTANEOUS at 15:22

## 2025-06-08 RX ADMIN — Medication 75 MICROGRAM(S): at 06:29

## 2025-06-08 NOTE — PROGRESS NOTE ADULT - ASSESSMENT
63 y/o male with PMHx of HLD, liver cirrhosis 2/2 ETOH use disorder (stopped drinking 1-2 yeas ago), positive FIT test (cancelled colonoscopy 1/2025), Hpylori + 11/16/24 (did not complete treatment), GERD, hyperlipidemia, hypothyroidism is admitted for:    #Decompensated alcoholic cirrhosis versus malignant ascites (positive FIT outpatient)   #Acute Hypoxic respiratory failure secondary to b/l pleural effusions  #b/l LE edema  - s/p Thoracocentesis 6/4, Paracenteses 6/6   - c/w Lasix 40mg BID    - c/w Spironolactone 25 for cirrhosis, increase as able due to blood pressure   - c/w Propanolol for prophylaxis   - Rocephin ppx   - GI recs appreciated  - Pulm recs appreciated    - Hepatology - outpatient liver transplant/TIPS evaluation recommended   - PT Evaluation   - Pulse ox on ambulation: 88-90% on RA    #Hypothyroidism  - c/w levothyroxine 75   - recheck TSH in 6 weeks     #HLD  - lipid panel 6/3/2025: total 91, HDL 12, LDL 64     #DVT ppx  - SCDs for possible thoracentesis tomorrow.    #GOC  - Full code     Discussed with Dr. Fraser  63 y/o male with PMHx of HLD, liver cirrhosis 2/2 ETOH use disorder (stopped drinking 1-2 yeas ago), positive FIT test (cancelled colonoscopy 1/2025), Hpylori + 11/16/24 (did not complete treatment), GERD, hyperlipidemia, hypothyroidism is admitted for:    #Decompensated alcoholic cirrhosis versus malignant ascites (positive FIT outpatient)   #Acute Hypoxic respiratory failure secondary to b/l pleural effusions  #b/l LE edema  - s/p Thoracocentesis 6/4, Paracenteses 6/6   - c/w Lasix 40mg BID    - c/w Spironolactone 25 for cirrhosis, increase as able due to blood pressure   - c/w Propanolol for prophylaxis   - Rocephin ppx   - GI recs appreciated. pending GI labs  - Pulm recs appreciated  - Hepatology - outpatient liver transplant/TIPS evaluation recommended   - PT Evaluation   - Pulse ox on ambulation: 88-90% on RA    #Hypothyroidism  - c/w levothyroxine 75   - recheck TSH in 6 weeks     #HLD  - lipid panel 6/3/2025: total 91, HDL 12, LDL 64     #DVT ppx  - SCDs for possible thoracentesis tomorrow.    #GOC  - Full code     Discussed with Dr. Fraser  63 y/o male with PMHx of HLD, liver cirrhosis 2/2 ETOH use disorder (stopped drinking 1-2 yeas ago), positive FIT test (cancelled colonoscopy 1/2025), Hpylori + 11/16/24 (did not complete treatment), GERD, hyperlipidemia, hypothyroidism is admitted for:    #Decompensated alcoholic cirrhosis versus malignant ascites (positive FIT outpatient)   #Acute Hypoxic respiratory failure secondary to b/l pleural effusions  #b/l LE edema  - s/p Thoracocentesis 6/4, Paracenteses 6/6   - c/w Lasix 40mg BID    - c/w Spironolactone 25 for cirrhosis, increase as able due to blood pressure   - c/w Propanolol for prophylaxis   - Rocephin ppx   - GI recs appreciated. pending GI labs  - Pulm recs appreciated  - Hepatology - outpatient liver transplant/TIPS evaluation recommended   - PT Evaluation   - Pulse ox on ambulation: 88-90% on RA    #Hypothyroidism  - c/w levothyroxine 75   - recheck TSH in 6 weeks     #HLD  - lipid panel 6/3/2025: total 91, HDL 12, LDL 64     #DVT ppx  - Heparin. Dc tonight and put on SCDs for possible thoracentesis tomorrow.    #GOC  - Full code     Discussed with Dr. Fraser

## 2025-06-08 NOTE — PROGRESS NOTE ADULT - ATTENDING COMMENTS
Plan of care as above    # Ascites/Cirrhosis  # Hypoxia  - SP Thora and Para  - Negative for malignant cells  - Hypoxia on room air from pleural effusion  - Increase lasix to BID  - Continue aldactone at 25 for soft BP  - Incentive spirometry    Continue care

## 2025-06-08 NOTE — PROGRESS NOTE ADULT - SUBJECTIVE AND OBJECTIVE BOX
Senior Purposeful Rounding     Position:Repositions Self     Physical Environment:No safety hazards noted     Pain Rating/ Nonverbal Pain Behaviors:0;      Pain interventions Attempted: n/a     Patient Toileted:Incontinent    Subjective and Objective:     Overnight Events: Desat to mid-80s, unsustained.   Interval History: Patient was seen and examined this morning at bedside. No acute complaints today.    Vital Signs Last 24 Hrs  T(C): 36.8 (08 Jun 2025 05:51), Max: 37.4 (07 Jun 2025 12:00)  T(F): 98.2 (08 Jun 2025 05:51), Max: 99.4 (07 Jun 2025 12:00)  HR: 99 (08 Jun 2025 06:05) (95 - 118)  BP: 98/63 (08 Jun 2025 06:05) (90/44 - 104/61)  BP(mean): 75 (08 Jun 2025 06:05) (75 - 75)  RR: 17 (08 Jun 2025 06:05) (17 - 18)  SpO2: 93% (08 Jun 2025 06:05) (92% - 95%)    Parameters below as of 08 Jun 2025 06:05  Patient On (Oxygen Delivery Method): room air      PHYSICAL EXAM:  Constitutional: Pt lying in bed, awake and alert, NAD  Respiratory: decreased breath sounds at bilateral bases   Cardiovascular: S1S2+, RRR, no M/G/R  Gastrointestinal: abdomen distended, positive fluid wave   Extremities: no lower extremity swelling   Vascular: Peripheral pulses present  Neurological: AAOx2 to person and place , no focal deficits    ALLERGIES:  No Known Allergies      MEDICATIONS:  MEDICATIONS  (STANDING):  cefTRIAXone   IVPB 2000 milliGRAM(s) IV Intermittent every 24 hours  furosemide    Tablet 40 milliGRAM(s) Oral two times a day  levothyroxine 75 MICROGram(s) Oral daily  multivitamin 1 Tablet(s) Oral daily  propranolol 10 milliGRAM(s) Oral two times a day  spironolactone 25 milliGRAM(s) Oral <User Schedule>    MEDICATIONS  (PRN):  aluminum hydroxide/magnesium hydroxide/simethicone Suspension 30 milliLiter(s) Oral every 4 hours PRN Dyspepsia  melatonin 3 milliGRAM(s) Oral at bedtime PRN Insomnia  ondansetron Injectable 4 milliGRAM(s) IV Push every 8 hours PRN Nausea and/or Vomiting      LABS: (Personally Reviewed):               [   [id number]  ]     Blood Culture: 06-06 @ 12:20  Organism --  Gram Stain Blood -- Gram Stain --  Specimen Source Peritoneal Peritoneal Fluid  Culture-Blood --    06-06 @ 11:30  Organism --  Gram Stain Blood -- Gram Stain   polymorphonuclear leukocytes seen  Gram Negative Rods  by cytocentrifuge  Specimen Source Body Fluid Culture Ascites fluid  Culture-Blood --    06-04 @ 11:00  Organism --  Gram Stain Blood -- Gram Stain   polymorphonuclear leukocytes seen  No organisms seen  by cytocentrifuge  Specimen Source Pleural Fl Pleural Fluid  Culture-Blood --     Subjective and Objective:     Overnight Events: Desat to mid-80s, unsustained.   Interval History: Patient was seen and examined this morning at bedside. No acute complaints today.    Vital Signs Last 24 Hrs  T(C): 36.8 (08 Jun 2025 05:51), Max: 37.4 (07 Jun 2025 12:00)  T(F): 98.2 (08 Jun 2025 05:51), Max: 99.4 (07 Jun 2025 12:00)  HR: 99 (08 Jun 2025 06:05) (95 - 118)  BP: 98/63 (08 Jun 2025 06:05) (90/44 - 104/61)  BP(mean): 75 (08 Jun 2025 06:05) (75 - 75)  RR: 17 (08 Jun 2025 06:05) (17 - 18)  SpO2: 93% (08 Jun 2025 06:05) (92% - 95%)    Parameters below as of 08 Jun 2025 06:05  Patient On (Oxygen Delivery Method): room air      PHYSICAL EXAM:  Constitutional: Pt lying in bed, awake and alert, NAD  Respiratory: decreased breath sounds at bilateral bases   Cardiovascular: S1S2+, RRR, no M/G/R  Gastrointestinal: abdomen distended, positive fluid wave   Extremities: no lower extremity swelling   Vascular: Peripheral pulses present  Neurological: AAOx2 to person and place , no focal deficits    ALLERGIES:  No Known Allergies      MEDICATIONS:  MEDICATIONS  (STANDING):  cefTRIAXone   IVPB 2000 milliGRAM(s) IV Intermittent every 24 hours  furosemide    Tablet 40 milliGRAM(s) Oral two times a day  levothyroxine 75 MICROGram(s) Oral daily  multivitamin 1 Tablet(s) Oral daily  propranolol 10 milliGRAM(s) Oral two times a day  spironolactone 25 milliGRAM(s) Oral <User Schedule>    MEDICATIONS  (PRN):  aluminum hydroxide/magnesium hydroxide/simethicone Suspension 30 milliLiter(s) Oral every 4 hours PRN Dyspepsia  melatonin 3 milliGRAM(s) Oral at bedtime PRN Insomnia  ondansetron Injectable 4 milliGRAM(s) IV Push every 8 hours PRN Nausea and/or Vomiting      LABS: (Personally Reviewed):                                    10.6   7.77  )-----------( 375      ( 08 Jun 2025 06:15 )             30.1     06-08    134[L]  |  98  |  9   ----------------------------<  121[H]  3.6   |  30  |  0.88    Ca    8.4      08 Jun 2025 06:15  Phos  3.1     06-07  Mg     1.5     06-07    TPro  8.5[H]  /  Alb  2.2[L]  /  TBili  0.8  /  DBili  x   /  AST  24  /  ALT  9[L]  /  AlkPhos  191[H]  06-08      Blood Culture: 06-06 @ 12:20  Organism --  Gram Stain Blood -- Gram Stain --  Specimen Source Peritoneal Peritoneal Fluid  Culture-Blood --    06-06 @ 11:30  Organism --  Gram Stain Blood -- Gram Stain   polymorphonuclear leukocytes seen  Gram Negative Rods  by cytocentrifuge  Specimen Source Body Fluid Culture Ascites fluid  Culture-Blood --    06-04 @ 11:00  Organism --  Gram Stain Blood -- Gram Stain   polymorphonuclear leukocytes seen  No organisms seen  by cytocentrifuge  Specimen Source Pleural Fl Pleural Fluid  Culture-Blood --

## 2025-06-09 LAB
ALBUMIN FLD-MCNC: 2 G/DL — SIGNIFICANT CHANGE UP
ALBUMIN SERPL ELPH-MCNC: 2.1 G/DL — LOW (ref 3.3–5)
ALP SERPL-CCNC: 169 U/L — HIGH (ref 40–120)
ALT FLD-CCNC: 10 U/L — SIGNIFICANT CHANGE UP (ref 10–45)
ANION GAP SERPL CALC-SCNC: 5 MMOL/L — SIGNIFICANT CHANGE UP (ref 5–17)
APTT BLD: 39.2 SEC — HIGH (ref 26.1–36.8)
AST SERPL-CCNC: 28 U/L — SIGNIFICANT CHANGE UP (ref 10–40)
B PERT IGG+IGM PNL SER: ABNORMAL
BILIRUB SERPL-MCNC: 0.9 MG/DL — SIGNIFICANT CHANGE UP (ref 0.2–1.2)
BUN SERPL-MCNC: 15 MG/DL — SIGNIFICANT CHANGE UP (ref 7–23)
CALCIUM SERPL-MCNC: 8.6 MG/DL — SIGNIFICANT CHANGE UP (ref 8.4–10.5)
CHLORIDE SERPL-SCNC: 97 MMOL/L — SIGNIFICANT CHANGE UP (ref 96–108)
CO2 SERPL-SCNC: 32 MMOL/L — HIGH (ref 22–31)
COLOR FLD: SIGNIFICANT CHANGE UP
CREAT SERPL-MCNC: 0.8 MG/DL — SIGNIFICANT CHANGE UP (ref 0.5–1.3)
CULTURE RESULTS: SIGNIFICANT CHANGE UP
EGFR: 98 ML/MIN/1.73M2 — SIGNIFICANT CHANGE UP
EGFR: 98 ML/MIN/1.73M2 — SIGNIFICANT CHANGE UP
EOSINOPHIL # FLD: 3 % — SIGNIFICANT CHANGE UP
FLUID INTAKE SUBSTANCE CLASS: SIGNIFICANT CHANGE UP
GLUCOSE FLD-MCNC: 108 MG/DL — SIGNIFICANT CHANGE UP
GLUCOSE SERPL-MCNC: 101 MG/DL — HIGH (ref 70–99)
HCT VFR BLD CALC: 30.6 % — LOW (ref 39–50)
HGB BLD-MCNC: 10.5 G/DL — LOW (ref 13–17)
INR BLD: 1.44 RATIO — HIGH (ref 0.85–1.16)
LDH SERPL L TO P-CCNC: 186 U/L — SIGNIFICANT CHANGE UP
LYMPHOCYTES # FLD: 76 % — SIGNIFICANT CHANGE UP
MCHC RBC-ENTMCNC: 30.9 PG — SIGNIFICANT CHANGE UP (ref 27–34)
MCHC RBC-ENTMCNC: 34.3 G/DL — SIGNIFICANT CHANGE UP (ref 32–36)
MCV RBC AUTO: 90 FL — SIGNIFICANT CHANGE UP (ref 80–100)
MONOS+MACROS # FLD: 17 % — SIGNIFICANT CHANGE UP
NEUTROPHILS-BODY FLUID: 4 % — SIGNIFICANT CHANGE UP
NRBC BLD AUTO-RTO: 0 /100 WBCS — SIGNIFICANT CHANGE UP (ref 0–0)
PH FLD: 7.7 — SIGNIFICANT CHANGE UP
PLATELET # BLD AUTO: 381 K/UL — SIGNIFICANT CHANGE UP (ref 150–400)
POTASSIUM SERPL-MCNC: 3.9 MMOL/L — SIGNIFICANT CHANGE UP (ref 3.5–5.3)
POTASSIUM SERPL-SCNC: 3.9 MMOL/L — SIGNIFICANT CHANGE UP (ref 3.5–5.3)
PROT FLD-MCNC: 6.8 G/DL — SIGNIFICANT CHANGE UP
PROT SERPL-MCNC: 8.9 G/DL — HIGH (ref 6–8.3)
PROTHROM AB SERPL-ACNC: 16.9 SEC — HIGH (ref 9.9–13.4)
RBC # BLD: 3.4 M/UL — LOW (ref 4.2–5.8)
RBC # FLD: 14.6 % — HIGH (ref 10.3–14.5)
RCV VOL RI: 9000 CELLS/UL — SIGNIFICANT CHANGE UP
SODIUM SERPL-SCNC: 134 MMOL/L — LOW (ref 135–145)
SPECIMEN SOURCE: SIGNIFICANT CHANGE UP
TOTAL NUCLEATED CELL COUNT, BODY FLUID: 1653 CELLS/UL — SIGNIFICANT CHANGE UP
TUBE TYPE: SIGNIFICANT CHANGE UP
WBC # BLD: 8.84 K/UL — SIGNIFICANT CHANGE UP (ref 3.8–10.5)
WBC # FLD AUTO: 8.84 K/UL — SIGNIFICANT CHANGE UP (ref 3.8–10.5)
WBC COUNT.: 1596 CELLS/UL — SIGNIFICANT CHANGE UP

## 2025-06-09 PROCEDURE — 99233 SBSQ HOSP IP/OBS HIGH 50: CPT | Mod: FS

## 2025-06-09 PROCEDURE — 88305 TISSUE EXAM BY PATHOLOGIST: CPT | Mod: 26

## 2025-06-09 PROCEDURE — 71045 X-RAY EXAM CHEST 1 VIEW: CPT | Mod: 26,77

## 2025-06-09 PROCEDURE — 88112 CYTOPATH CELL ENHANCE TECH: CPT | Mod: 26

## 2025-06-09 PROCEDURE — 71045 X-RAY EXAM CHEST 1 VIEW: CPT | Mod: 26

## 2025-06-09 RX ORDER — FUROSEMIDE 10 MG/ML
20 INJECTION INTRAMUSCULAR; INTRAVENOUS
Refills: 0 | Status: COMPLETED | OUTPATIENT
Start: 2025-06-09 | End: 2025-06-10

## 2025-06-09 RX ORDER — ALBUMIN (HUMAN) 12.5 G/50ML
100 INJECTION, SOLUTION INTRAVENOUS EVERY 12 HOURS
Refills: 0 | Status: COMPLETED | OUTPATIENT
Start: 2025-06-09 | End: 2025-06-10

## 2025-06-09 RX ORDER — FUROSEMIDE 10 MG/ML
40 INJECTION INTRAMUSCULAR; INTRAVENOUS
Refills: 0 | Status: DISCONTINUED | OUTPATIENT
Start: 2025-06-10 | End: 2025-06-10

## 2025-06-09 RX ADMIN — FUROSEMIDE 20 MILLIGRAM(S): 10 INJECTION INTRAMUSCULAR; INTRAVENOUS at 20:28

## 2025-06-09 RX ADMIN — CEFTRIAXONE 100 MILLIGRAM(S): 500 INJECTION, POWDER, FOR SOLUTION INTRAMUSCULAR; INTRAVENOUS at 04:36

## 2025-06-09 RX ADMIN — Medication 1 TABLET(S): at 12:30

## 2025-06-09 RX ADMIN — Medication 25 MILLIGRAM(S): at 12:30

## 2025-06-09 RX ADMIN — ALBUMIN (HUMAN) 50 MILLILITER(S): 12.5 INJECTION, SOLUTION INTRAVENOUS at 18:07

## 2025-06-09 RX ADMIN — FUROSEMIDE 40 MILLIGRAM(S): 10 INJECTION INTRAMUSCULAR; INTRAVENOUS at 05:36

## 2025-06-09 RX ADMIN — Medication 75 MICROGRAM(S): at 05:36

## 2025-06-09 NOTE — PROGRESS NOTE ADULT - ASSESSMENT
Physical Examination:  GENERAL:               Alert, Oriented, No acute distress.    HEENT:                    Pupils equal, reactive to light.  Symmetric. No JVD, Moist MM  PULM:                     Bilateral air entry, Clear to auscultation bilaterally, no significant sputum production, No Rales, No Rhonchi, No Wheezing  CVS:                         S1, S2,  No Murmur  ABD:                        Soft, nondistended, nontender, normoactive bowel sounds,    NEURO:                  Alert, oriented, interactive, nonfocal, follows commands  PSYC:                      Calm, + Insight.       Assessment  65 y/o male with PMHx of HLD, liver cirrhosis 2/2 ETOH use disorder (stopped drinking 1-2 yeas ago), positive FIT test (cancelled colonoscopy 1/2025), Hpylori + 11/16/24 (did not complete treatment), GERD, hyperlipidemia, hypothyroidism sent from family medicine practice for shortness of breath today.     problem list  ascites with b/l pl effusions  s/p thoracentesis 6/4/2025 - Exudate  abd fluid showing  SBP with GNR    Plan  cytology neg    continue diuretics  repeat cxr on shows increasing fluid, plan for thoracentesis  for symptomatic relief  unsure if will benefit from pleurex  repeat samples as was exudate     rest of care as per primary team

## 2025-06-09 NOTE — PROVIDER CONTACT NOTE (CRITICAL VALUE NOTIFICATION) - TEST AND RESULT REPORTED:
body fluid culture shows  gram stain polymorpho nuclear,gram negative rods
Ascites fluid culture result no growth

## 2025-06-09 NOTE — PROGRESS NOTE ADULT - SUBJECTIVE AND OBJECTIVE BOX
GI Follow up  Patient seen and examined at OOB to chair. Denies abdominal pain, denies N/V/D, no hemetemesis and hematochezia.      MEDICATIONS  (STANDING):  albumin human 25% IVPB 100 milliLiter(s) IV Intermittent every 12 hours  furosemide   Injectable 20 milliGRAM(s) IV Push two times a day  levothyroxine 75 MICROGram(s) Oral daily  multivitamin 1 Tablet(s) Oral daily  propranolol 10 milliGRAM(s) Oral two times a day  spironolactone 25 milliGRAM(s) Oral <User Schedule>    MEDICATIONS  (PRN):  aluminum hydroxide/magnesium hydroxide/simethicone Suspension 30 milliLiter(s) Oral every 4 hours PRN Dyspepsia  melatonin 3 milliGRAM(s) Oral at bedtime PRN Insomnia  ondansetron Injectable 4 milliGRAM(s) IV Push every 8 hours PRN Nausea and/or Vomiting      Allergies    No Known Allergies    Intolerances          Vital Signs Last 24 Hrs  T(C): 36.6 (09 Jun 2025 11:44), Max: 37.1 (08 Jun 2025 20:11)  T(F): 97.9 (09 Jun 2025 11:44), Max: 98.8 (08 Jun 2025 20:11)  HR: 104 (09 Jun 2025 11:44) (98 - 117)  BP: 99/67 (09 Jun 2025 11:44) (93/56 - 106/63)  BP(mean): 76 (08 Jun 2025 20:11) (69 - 77)  RR: 18 (09 Jun 2025 11:44) (17 - 18)  SpO2: 91% (09 Jun 2025 11:44) (91% - 92%)    Parameters below as of 09 Jun 2025 11:44  Patient On (Oxygen Delivery Method): room air        PHYSICAL EXAM:    Constitutional: Well-developed  ENTT: clear conjunctivae and sclera  Respiratory: clear to auscultation  Cardiovascular: S1 and S2  Gastrointestinal: +Bowel Sounds all quadrants, soft, tender, distended  Extremities: No peripheral edema, neg clubbing, cyanosis  Neurological: A/O with confusion, Wolof speaking  Skin: warm and dry      LABS:                        10.5   8.84  )-----------( 381      ( 09 Jun 2025 06:48 )             30.6     06-09    134[L]  |  97  |  15  ----------------------------<  101[H]  3.9   |  32[H]  |  0.80    Ca    8.6      09 Jun 2025 06:48    TPro  8.9[H]  /  Alb  2.1[L]  /  TBili  0.9  /  DBili  x   /  AST  28  /  ALT  10  /  AlkPhos  169[H]  06-09    PT/INR - ( 09 Jun 2025 06:48 )   PT: 16.9 sec;   INR: 1.44 ratio         PTT - ( 09 Jun 2025 06:48 )  PTT:39.2 sec  Urinalysis Basic - ( 09 Jun 2025 06:48 )    Color: x / Appearance: x / SG: x / pH: x  Gluc: 101 mg/dL / Ketone: x  / Bili: x / Urobili: x   Blood: x / Protein: x / Nitrite: x   Leuk Esterase: x / RBC: x / WBC x   Sq Epi: x / Non Sq Epi: x / Bacteria: x      LIVER FUNCTIONS - ( 09 Jun 2025 06:48 )  Alb: 2.1 g/dL / Pro: 8.9 g/dL / ALK PHOS: 169 U/L / ALT: 10 U/L / AST: 28 U/L / GGT: x             RADIOLOGY & ADDITIONAL TESTS:

## 2025-06-09 NOTE — PROCEDURE NOTE - NSPROCDETAILS_GEN_ALL_CORE
location identified, draped/prepped, sterile technique used, needle inserted/introduced
location identified, draped/prepped, sterile technique used, needle inserted/introduced/Seldinger technique/catheter inserted over needle

## 2025-06-09 NOTE — PROGRESS NOTE ADULT - SUBJECTIVE AND OBJECTIVE BOX
Subjective and Objective:     Overnight Events: Ascites fluid culture resulted no growth   Interval History: Patient was seen and examined this morning at bedside. No acute concerns.     Vital Signs Last 24 Hrs  T(C): 36.7 (09 Jun 2025 05:27), Max: 37.1 (08 Jun 2025 20:11)  T(F): 98 (09 Jun 2025 05:27), Max: 98.8 (08 Jun 2025 20:11)  HR: 105 (09 Jun 2025 05:27) (98 - 117)  BP: 102/65 (09 Jun 2025 05:27) (93/56 - 106/63)  BP(mean): 76 (08 Jun 2025 20:11) (69 - 77)  RR: 17 (09 Jun 2025 05:27) (17 - 18)  SpO2: 92% (09 Jun 2025 05:27) (91% - 94%)    Parameters below as of 09 Jun 2025 05:27  Patient On (Oxygen Delivery Method): room air      I&O's Summary    08 Jun 2025 07:01  -  09 Jun 2025 07:00  --------------------------------------------------------  IN: 0 mL / OUT: 101 mL / NET: -101 mL          PHYSICAL EXAM:  Constitutional: Pt lying in bed, awake and alert, NAD  Respiratory: decreased breath sounds at bilateral bases   Cardiovascular: S1S2+, RRR, no M/G/R  Gastrointestinal: abdomen distended, positive fluid wave   Extremities: no lower extremity swelling   Vascular: Peripheral pulses present  Neurological: AAOx2 to person and place , no focal deficits      ALLERGIES:  No Known Allergies      MEDICATIONS:  MEDICATIONS  (STANDING):  cefTRIAXone   IVPB 2000 milliGRAM(s) IV Intermittent every 24 hours  furosemide    Tablet 40 milliGRAM(s) Oral two times a day  levothyroxine 75 MICROGram(s) Oral daily  multivitamin 1 Tablet(s) Oral daily  propranolol 10 milliGRAM(s) Oral two times a day  spironolactone 25 milliGRAM(s) Oral <User Schedule>    MEDICATIONS  (PRN):  aluminum hydroxide/magnesium hydroxide/simethicone Suspension 30 milliLiter(s) Oral every 4 hours PRN Dyspepsia  melatonin 3 milliGRAM(s) Oral at bedtime PRN Insomnia  ondansetron Injectable 4 milliGRAM(s) IV Push every 8 hours PRN Nausea and/or Vomiting      LABS: (Personally Reviewed):                        10.5   8.84  )-----------( 381      ( 09 Jun 2025 06:48 )             30.6     06-09    134[L]  |  97  |  15  ----------------------------<  101[H]  3.9   |  32[H]  |  0.80    Ca    8.6      09 Jun 2025 06:48    TPro  8.9[H]  /  Alb  2.1[L]  /  TBili  0.9  /  DBili  x   /  AST  28  /  ALT  x   /  AlkPhos  169[H]  06-09    PT/INR - ( 09 Jun 2025 06:48 )   PT: 16.9 sec;   INR: 1.44 ratio         PTT - ( 09 Jun 2025 06:48 )  PTT:39.2 sec      Blood Culture: 06-06 @ 12:20  Organism --  Gram Stain Blood -- Gram Stain --  Specimen Source Peritoneal Peritoneal Fluid  Culture-Blood --    06-06 @ 11:30  Organism --  Gram Stain Blood -- Gram Stain   **Please Note**: This is a Corrected Report**  polymorphonuclear leukocytes seen  No organisms seen  by cytocentrifuge  Previously reported as:  polymorphonuclear leukocytes seen  Gram Negative Rods  by cytocentrifuge  Specimen Source Body Fluid Culture Ascites fluid  Culture-Blood --    06-04 @ 11:00  Organism --  Gram Stain Blood -- Gram Stain   polymorphonuclear leukocytes seen  No organisms seen  by cytocentrifuge  Specimen Source Pleural Fl Pleural Fluid  Culture-Blood --

## 2025-06-09 NOTE — PROVIDER CONTACT NOTE (CRITICAL VALUE NOTIFICATION) - SITUATION
Ascites fluid collected on 6/6/25 polymorphonuclear leukocytes seen no organisms seen by cytocentrifuge. Previously reported as polymorphonuclear leukocytes seen gram negative rods by cytocentrifuge. This is a corrected report.

## 2025-06-09 NOTE — PROGRESS NOTE ADULT - SUBJECTIVE AND OBJECTIVE BOX
Follow-up Pulmonary Progress Note  Chief Complaint : Hypervolemia      pateint seen and examined  cordova continues  cxr shows increasing pl effusions  consent obtained for repeat thoracentesis       Allergies :No Known Allergies      PAST MEDICAL & SURGICAL HISTORY:  ALC (alcoholic liver cirrhosis)    Hyperlipidemia    Positive FIT (fecal immunochemical test)    GERD (gastroesophageal reflux disease)    Hypothyroidism        Medications:  MEDICATIONS  (STANDING):  cefTRIAXone   IVPB 2000 milliGRAM(s) IV Intermittent every 24 hours  furosemide    Tablet 40 milliGRAM(s) Oral two times a day  levothyroxine 75 MICROGram(s) Oral daily  multivitamin 1 Tablet(s) Oral daily  propranolol 10 milliGRAM(s) Oral two times a day  spironolactone 25 milliGRAM(s) Oral <User Schedule>    MEDICATIONS  (PRN):  aluminum hydroxide/magnesium hydroxide/simethicone Suspension 30 milliLiter(s) Oral every 4 hours PRN Dyspepsia  melatonin 3 milliGRAM(s) Oral at bedtime PRN Insomnia  ondansetron Injectable 4 milliGRAM(s) IV Push every 8 hours PRN Nausea and/or Vomiting      Antibiotics History  cefTRIAXone   IVPB 2000 milliGRAM(s) IV Intermittent every 24 hours, 06-03-25 @ 13:32, Stop order after: 7 Days  piperacillin/tazobactam IVPB. 3.375 Gram(s) IV Intermittent once, 06-03-25 @ 01:49, Stop order after: 1 Doses  piperacillin/tazobactam IVPB.- 3.375 Gram(s) IV Intermittent once, 06-03-25 @ 06:30  piperacillin/tazobactam IVPB.. 3.375 Gram(s) IV Intermittent every 8 hours, 06-03-25 @ 14:00, Stop order after: 7 Days      Heme Medications       GI Medications  aluminum hydroxide/magnesium hydroxide/simethicone Suspension 30 milliLiter(s) Oral every 4 hours, 06-02-25 @ 21:47, Routine PRN        LABS:                        10.5   8.84  )-----------( 381      ( 09 Jun 2025 06:48 )             30.6     06-09    134[L]  |  97  |  15  ----------------------------<  101[H]  3.9   |  32[H]  |  0.80    Ca    8.6      09 Jun 2025 06:48    TPro  8.9[H]  /  Alb  2.1[L]  /  TBili  0.9  /  DBili  x   /  AST  28  /  ALT  10  /  AlkPhos  169[H]  06-09    Fluid characteristics  Ascites 06-06 @ 11:50  pH 7.6    tprot 5.1    Cell count  Appearance Clear  Fluid type --  BF lymph 75  color Yellow  eosinophil --  PMN --  Mesothelial --  Monocyte 21  Other body cells --  Fluid characteristics  -- 06-04 @ 11:00  pH 7.8    tprot 6.3    Cell count  Appearance Cloudy  Fluid type --  BF lymph 11  color Red  eosinophil 0  PMN --  Mesothelial 0  Monocyte 75  Other body cells 0       CULTURES: (if applicable)    Culture - Fungal, Body Fluid (collected 06-06-25 @ 12:20)  Source: Peritoneal Peritoneal Fluid  Preliminary Report (06-08-25 @ 08:33):    No growth    Culture - Body Fluid with Gram Stain (collected 06-06-25 @ 11:30)  Source: Body Fluid Culture Ascites fluid  Gram Stain (06-08-25 @ 23:19):    **Please Note**: This is a Corrected Report**    polymorphonuclear leukocytes seen    No organisms seen    by cytocentrifuge    Previously reported as:    polymorphonuclear leukocytes seen    Gram Negative Rods    by cytocentrifuge  Preliminary Report (06-07-25 @ 21:04):    No growth    Culture - Fungal, Body Fluid (collected 06-04-25 @ 11:00)  Source: Pleural Fl Pleural Fluid  Preliminary Report (06-08-25 @ 07:45):    No growth    Culture - Body Fluid with Gram Stain (collected 06-04-25 @ 11:00)  Source: Pleural Fl Pleural Fluid  Gram Stain (06-04-25 @ 20:50):    polymorphonuclear leukocytes seen    No organisms seen    by cytocentrifuge  Final Report (06-09-25 @ 07:38):    No growth at 5 days         VITALS:  T(C): 36.7 (06-09-25 @ 05:27), Max: 37.1 (06-08-25 @ 20:11)  T(F): 98 (06-09-25 @ 05:27), Max: 98.8 (06-08-25 @ 20:11)  HR: 105 (06-09-25 @ 05:27) (98 - 117)  BP: 102/65 (06-09-25 @ 05:27) (93/56 - 106/63)  BP(mean): 76 (06-08-25 @ 20:11) (69 - 77)  ABP: --  ABP(mean): --  RR: 17 (06-09-25 @ 05:27) (17 - 18)  SpO2: 92% (06-09-25 @ 05:27) (91% - 94%)  CVP(mm Hg): --  CVP(cm H2O): --    Ins and Outs     06-08-25 @ 07:01  -  06-09-25 @ 07:00  --------------------------------------------------------  IN: 0 mL / OUT: 101 mL / NET: -101 mL                I&O's Detail    08 Jun 2025 07:01  -  09 Jun 2025 07:00  --------------------------------------------------------  IN:  Total IN: 0 mL    OUT:    Stool (mL): 1 mL    Voided (mL): 100 mL  Total OUT: 101 mL    Total NET: -101 mL

## 2025-06-09 NOTE — PROGRESS NOTE ADULT - ASSESSMENT
64 year-old man with a history of hyperlipidemia, liver cirrhosis presumed secondary to alcohol use disorder (abstinent for 1–2 years), hypothyroidism, gastroesophageal reflux disease, and prior Helicobacter pylori infection (untreated), presents for evaluation of shortness of breath due to abdominal distention and large ascites    GI consult: Ascites  Thoracentesis by ICU - 1200 ml removed  Paracentesis 6/06 by Dr Espana   Paracentesis 1500 ml  Dr Andrew Dyer consult from the hepatology team - Outpatient f/u for liver transplant / TIPS procedure  Patient needs insurance

## 2025-06-09 NOTE — PROGRESS NOTE ADULT - NS ATTEND AMEND GEN_ALL_CORE FT
Agree with assessment and plan as above. I attest my time as attending was greater than 50% of the total time of 50 min on patient care (ACP <= 25 mins) on this DOS. Time spent includes review of hospital course, labs, vitals, medical records, patient evaluation, contact with family (when present), discussion of plan with the primary team, coordinating services and documenting encounter.     Patient presented with abdominal distension and shortness of breath. Found to have evidence of cirrhosis and protal hypertension as well as abdominal ascites and pleural effusions on CT. Likely has EtOH associated cirrhosis. Currently on lasix and aldactone but issues titrating up due to low BP. Also on propranolol. Will need eventual EGD for variceal screening given decompensated state. Would benefit from transplant evaluation but there are social barriers as he does not have health insurance.
Agree with the assessment and plan of ALTHEA Davila. Paracentesis. Eventual hepatology evaluation for possible TIPS, transplant.       I certify that the total time spent on this follow-up encounter includes reviewing medical records, obtaining history, performing examination, ordering tests, counseling, and documentation. Time spent = 50 minutes.
I attest my time as attending was greater than 50% of the total time of 35 min on (ACP <=17.5 min) patient care on this DOS. Time spent includes review of hospital course, labs, vitals, medical records, patient evaluation, contact with family (when present), discussion of plan with the primary team, coordinating services and documenting encounter.
I attest my time as attending was greater than 50% of the total time of 50 min on patient care (ACP <= 25 mins) on this DOS. Time spent includes review of hospital course, labs, vitals, medical records, patient evaluation, contact with family (when present), discussion of plan with the primary team, coordinating services and documenting encounter.

## 2025-06-09 NOTE — PROGRESS NOTE ADULT - ASSESSMENT
65 y/o male with PMHx of HLD, liver cirrhosis 2/2 ETOH use disorder (stopped drinking 1-2 yeas ago), positive FIT test (cancelled colonoscopy 1/2025), Hpylori + 11/16/24 (did not complete treatment), GERD, hyperlipidemia, hypothyroidism is admitted for:    #Decompensated alcoholic cirrhosis versus malignant ascites (positive FIT outpatient)   #Acute Hypoxic respiratory failure secondary to b/l pleural effusions  #b/l LE edema  - s/p Thoracocentesis 6/4, Paracenteses 6/6   - Ascites fluid resulted - no growth   - c/w Lasix 40mg BID    - c/w Spironolactone 25 for cirrhosis, increase as able due to blood pressure   - c/w Propanolol for prophylaxis   - Rocephin ppx   - GI recs appreciated. pending GI labs  - Pulm recs appreciated  - Hepatology - outpatient liver transplant/TIPS evaluation recommended   - PT Evaluation   - Pulse ox on ambulation: 88-90% on RA  - Repeat CXR today     #Hypothyroidism  - c/w levothyroxine 75   - recheck TSH in 6 weeks     #HLD  - lipid panel 6/3/2025: total 91, HDL 12, LDL 64     #DVT ppx  - SCD for possible thoracocentesis today     #GOC  - Full code     Discussed with Dr. Fraser

## 2025-06-09 NOTE — PROGRESS NOTE ADULT - ATTENDING COMMENTS
Plan of care as above    Plan of care as above    # Ascites/Cirrhosis  # Hypoxia  - SP Thora and Para  - Negative for malignant cells  - Hypoxia on room air from pleural effusion  - Increase lasix to BID  - Continue aldactone at 25 for soft BP  - Incentive spirometry  - Worsening Bilateral effusions, for thora 6/9    Continue care .

## 2025-06-10 LAB
ALBUMIN SERPL ELPH-MCNC: 2.7 G/DL — LOW (ref 3.3–5)
ALP SERPL-CCNC: 139 U/L — HIGH (ref 40–120)
ALT FLD-CCNC: 10 U/L — SIGNIFICANT CHANGE UP (ref 10–45)
ANION GAP SERPL CALC-SCNC: 4 MMOL/L — LOW (ref 5–17)
AST SERPL-CCNC: 22 U/L — SIGNIFICANT CHANGE UP (ref 10–40)
BILIRUB SERPL-MCNC: 0.8 MG/DL — SIGNIFICANT CHANGE UP (ref 0.2–1.2)
BUN SERPL-MCNC: 14 MG/DL — SIGNIFICANT CHANGE UP (ref 7–23)
CALCIUM SERPL-MCNC: 8.7 MG/DL — SIGNIFICANT CHANGE UP (ref 8.4–10.5)
CHLORIDE SERPL-SCNC: 99 MMOL/L — SIGNIFICANT CHANGE UP (ref 96–108)
CO2 SERPL-SCNC: 32 MMOL/L — HIGH (ref 22–31)
CREAT SERPL-MCNC: 0.84 MG/DL — SIGNIFICANT CHANGE UP (ref 0.5–1.3)
EGFR: 96 ML/MIN/1.73M2 — SIGNIFICANT CHANGE UP
EGFR: 96 ML/MIN/1.73M2 — SIGNIFICANT CHANGE UP
GLUCOSE SERPL-MCNC: 85 MG/DL — SIGNIFICANT CHANGE UP (ref 70–99)
GRAM STN FLD: SIGNIFICANT CHANGE UP
HCT VFR BLD CALC: 27.9 % — LOW (ref 39–50)
HGB BLD-MCNC: 9.8 G/DL — LOW (ref 13–17)
LDH SERPL L TO P-CCNC: 110 U/L — SIGNIFICANT CHANGE UP (ref 50–242)
MAGNESIUM SERPL-MCNC: 1.7 MG/DL — SIGNIFICANT CHANGE UP (ref 1.6–2.6)
MCHC RBC-ENTMCNC: 31.5 PG — SIGNIFICANT CHANGE UP (ref 27–34)
MCHC RBC-ENTMCNC: 35.1 G/DL — SIGNIFICANT CHANGE UP (ref 32–36)
MCV RBC AUTO: 89.7 FL — SIGNIFICANT CHANGE UP (ref 80–100)
NRBC BLD AUTO-RTO: 0 /100 WBCS — SIGNIFICANT CHANGE UP (ref 0–0)
PHOSPHATE SERPL-MCNC: 3.2 MG/DL — SIGNIFICANT CHANGE UP (ref 2.5–4.5)
PLATELET # BLD AUTO: 364 K/UL — SIGNIFICANT CHANGE UP (ref 150–400)
POTASSIUM SERPL-MCNC: 4.2 MMOL/L — SIGNIFICANT CHANGE UP (ref 3.5–5.3)
POTASSIUM SERPL-SCNC: 4.2 MMOL/L — SIGNIFICANT CHANGE UP (ref 3.5–5.3)
PROT SERPL-MCNC: 8.6 G/DL — HIGH (ref 6–8.3)
RBC # BLD: 3.11 M/UL — LOW (ref 4.2–5.8)
RBC # FLD: 14.6 % — HIGH (ref 10.3–14.5)
SODIUM SERPL-SCNC: 135 MMOL/L — SIGNIFICANT CHANGE UP (ref 135–145)
SPECIMEN SOURCE: SIGNIFICANT CHANGE UP
WBC # BLD: 8.24 K/UL — SIGNIFICANT CHANGE UP (ref 3.8–10.5)
WBC # FLD AUTO: 8.24 K/UL — SIGNIFICANT CHANGE UP (ref 3.8–10.5)

## 2025-06-10 PROCEDURE — 99233 SBSQ HOSP IP/OBS HIGH 50: CPT | Mod: FS

## 2025-06-10 RX ORDER — HEPARIN SODIUM 1000 [USP'U]/ML
5000 INJECTION INTRAVENOUS; SUBCUTANEOUS EVERY 8 HOURS
Refills: 0 | Status: DISCONTINUED | OUTPATIENT
Start: 2025-06-10 | End: 2025-06-12

## 2025-06-10 RX ORDER — MIDODRINE HYDROCHLORIDE 5 MG/1
5 TABLET ORAL THREE TIMES A DAY
Refills: 0 | Status: DISCONTINUED | OUTPATIENT
Start: 2025-06-10 | End: 2025-06-12

## 2025-06-10 RX ADMIN — ALBUMIN (HUMAN) 50 MILLILITER(S): 12.5 INJECTION, SOLUTION INTRAVENOUS at 05:21

## 2025-06-10 RX ADMIN — MIDODRINE HYDROCHLORIDE 5 MILLIGRAM(S): 5 TABLET ORAL at 17:17

## 2025-06-10 RX ADMIN — Medication 25 MILLIGRAM(S): at 15:06

## 2025-06-10 RX ADMIN — Medication 75 MICROGRAM(S): at 05:21

## 2025-06-10 RX ADMIN — HEPARIN SODIUM 5000 UNIT(S): 1000 INJECTION INTRAVENOUS; SUBCUTANEOUS at 21:13

## 2025-06-10 RX ADMIN — HEPARIN SODIUM 5000 UNIT(S): 1000 INJECTION INTRAVENOUS; SUBCUTANEOUS at 15:09

## 2025-06-10 RX ADMIN — Medication 1 TABLET(S): at 12:08

## 2025-06-10 RX ADMIN — FUROSEMIDE 20 MILLIGRAM(S): 10 INJECTION INTRAMUSCULAR; INTRAVENOUS at 08:10

## 2025-06-10 RX ADMIN — MIDODRINE HYDROCHLORIDE 5 MILLIGRAM(S): 5 TABLET ORAL at 12:08

## 2025-06-10 NOTE — PROGRESS NOTE ADULT - ATTENDING COMMENTS
Plan of care as above    # Cirrohsis/Ascites  # Pleural effusion  - SP Thoracentesis/Para  - Blood pressure soft  - Worsening ascites/Recurrent effusion  - Lasix and Albumin  - Midodrine for BP support  - Possible transfer for advanced Gastro    Continue care as above Plan of care as above    # Cirrohsis/Ascites  # Pleural effusion  - SP Thoracentesis/Para  - Blood pressure soft  - Worsening ascites/Recurrent effusion  - Lasix and Albumin  - Exudate for PE, Trans for Ascites  - May need pleural bx   - Midodrine for BP support  - Possible transfer for advanced Gastro    Continue care as above

## 2025-06-10 NOTE — CHART NOTE - NSCHARTNOTEFT_GEN_A_CORE
Transfer center contacted. Accepted for transfer to [facility], with hepatology f/u there    Thoracentesis x2 with exudate. Will need to f/u cytology. May need pleural bx.

## 2025-06-10 NOTE — PROGRESS NOTE ADULT - SUBJECTIVE AND OBJECTIVE BOX
Follow-up Pulmonary Progress Note  Chief Complaint : Hypervolemia          No new events overnight.  Denies SOB/CP.       Allergies :No Known Allergies      PAST MEDICAL & SURGICAL HISTORY:  ALC (alcoholic liver cirrhosis)    Hyperlipidemia    Positive FIT (fecal immunochemical test)    GERD (gastroesophageal reflux disease)    Hypothyroidism        Medications:  MEDICATIONS  (STANDING):  heparin   Injectable 5000 Unit(s) SubCutaneous every 8 hours  levothyroxine 75 MICROGram(s) Oral daily  midodrine. 5 milliGRAM(s) Oral three times a day  multivitamin 1 Tablet(s) Oral daily  propranolol 10 milliGRAM(s) Oral two times a day  spironolactone 25 milliGRAM(s) Oral <User Schedule>    MEDICATIONS  (PRN):  aluminum hydroxide/magnesium hydroxide/simethicone Suspension 30 milliLiter(s) Oral every 4 hours PRN Dyspepsia  melatonin 3 milliGRAM(s) Oral at bedtime PRN Insomnia  ondansetron Injectable 4 milliGRAM(s) IV Push every 8 hours PRN Nausea and/or Vomiting      Antibiotics History  cefTRIAXone   IVPB 2000 milliGRAM(s) IV Intermittent every 24 hours, 06-03-25 @ 13:32, Stop order after: 7 Days  piperacillin/tazobactam IVPB. 3.375 Gram(s) IV Intermittent once, 06-03-25 @ 01:49, Stop order after: 1 Doses  piperacillin/tazobactam IVPB.- 3.375 Gram(s) IV Intermittent once, 06-03-25 @ 06:30  piperacillin/tazobactam IVPB.. 3.375 Gram(s) IV Intermittent every 8 hours, 06-03-25 @ 14:00, Stop order after: 7 Days      Heme Medications   heparin   Injectable 5000 Unit(s) SubCutaneous every 8 hours, 06-10-25 @ 08:45      GI Medications  aluminum hydroxide/magnesium hydroxide/simethicone Suspension 30 milliLiter(s) Oral every 4 hours, 06-02-25 @ 21:47, Routine PRN        LABS:                        9.8    8.24  )-----------( 364      ( 10 Sheldon 2025 08:03 )             27.9     06-10    135  |  99  |  14  ----------------------------<  85  4.2   |  32[H]  |  0.84    Ca    8.7      10 Sheldon 2025 08:03  Phos  3.2     06-10  Mg     1.7     06-10    TPro  8.6[H]  /  Alb  2.7[L]  /  TBili  0.8  /  DBili  x   /  AST  22  /  ALT  10  /  AlkPhos  139[H]  06-10    Fluid characteristics  -- 06-09 @ 10:30  pH 7.7    tprot 6.8    Cell count  Appearance Bloody  Fluid type --  BF lymph 76  color Red  eosinophil 3  PMN --  Mesothelial --  Monocyte 17  Other body cells --  Fluid characteristics  Ascites 06-06 @ 11:50  pH 7.6    tprot 5.1    Cell count  Appearance Clear  Fluid type --  BF lymph 75  color Yellow  eosinophil --  PMN --  Mesothelial --  Monocyte 21  Other body cells --  Fluid characteristics  -- 06-04 @ 11:00  pH 7.8    tprot 6.3    Cell count  Appearance Cloudy  Fluid type --  BF lymph 11  color Red  eosinophil 0  PMN --  Mesothelial 0  Monocyte 75  Other body cells 0     Lights Criteria :    Trend LDH- Fluid  06-09-25 @ 10:30  186 -- --  06-06-25 @ 11:50  129 -- Ascites  06-04-25 @ 11:00  136 -- --      Trend LDH - Serum  06-10-25 @ 08:03  110 [50 - 242]  06-05-25 @ 07:00  137 [50 - 242]    _____________________  Trend Protein - Fluid   06-09-25 @ 10:30  6.8  --  --  06-06-25 @ 11:50  5.1  --  Ascites  06-04-25 @ 11:00  6.3  --  --      Trend Protein - Serum  06-10-25 @ 08:03  8.6[H] [6.0 - 8.3]  06-09-25 @ 06:48  8.9[H] [6.0 - 8.3]  06-08-25 @ 06:15  8.5[H] [6.0 - 8.3]  06-07-25 @ 07:22  8.7[H] [6.0 - 8.3]  06-06-25 @ 06:15  8.6[H] [6.0 - 8.3]  06-05-25 @ 07:00  8.1 [6.0 - 8.3]  06-04-25 @ 06:50  8.8[H] [6.0 - 8.3]  06-03-25 @ 06:48  8.7[H] [6.0 - 8.3]  06-02-25 @ 18:07  9.1[H] [6.0 - 8.3]      _____________________ Exudate x 2   The effusion is exudative if one of the Light’s criteria has been met:   1)  Pleural fluid protein/serum protein ratio > 0.5.   2)  Pleural fluid LDH/serum LDH ratio greater than > 0.6.   3) Pleural fluid LDH level greater than 2/3 of upper limit of normal LDH level in serum.  _____________________              CULTURES: (if applicable)    Culture - Fungal, Body Fluid (collected 06-09-25 @ 10:30)  Source: Pleural Fl Pleural Fluid  Preliminary Report (06-10-25 @ 08:24):    Testing in progress    Culture - Body Fluid with Gram Stain (collected 06-09-25 @ 10:30)  Source: Pleural Fl Pleural Fluid  Gram Stain (06-10-25 @ 00:32):    No polymorphonuclear cells seen    No organisms seen    by cytocentrifuge  Preliminary Report (06-10-25 @ 11:51):    No growth to date    Culture - Fungal, Body Fluid (collected 06-06-25 @ 12:20)  Source: Peritoneal Peritoneal Fluid  Preliminary Report (06-08-25 @ 08:33):    No growth    Culture - Body Fluid with Gram Stain (collected 06-06-25 @ 11:30)  Source: Body Fluid Culture Ascites fluid  Gram Stain (06-08-25 @ 23:19):    **Please Note**: This is a Corrected Report**    polymorphonuclear leukocytes seen    No organisms seen    by cytocentrifuge    Previously reported as:    polymorphonuclear leukocytes seen    Gram Negative Rods    by cytocentrifuge  Preliminary Report (06-07-25 @ 21:04):    No growth    Culture - Fungal, Body Fluid (collected 06-04-25 @ 11:00)  Source: Pleural Fl Pleural Fluid  Preliminary Report (06-08-25 @ 07:45):    No growth    Culture - Body Fluid with Gram Stain (collected 06-04-25 @ 11:00)  Source: Pleural Fl Pleural Fluid  Gram Stain (06-04-25 @ 20:50):    polymorphonuclear leukocytes seen    No organisms seen    by cytocentrifuge  Final Report (06-09-25 @ 07:38):    No growth at 5 days    < from: Xray Chest 1 View-PORTABLE IMMEDIATE (Xray Chest 1 View-PORTABLE IMMEDIATE .) (06.09.25 @ 11:19) >    INTERPRETATION:  AP chest on June 9, 2025 at 10:50 AM. Patient had   thoracentesis.    Heart magnified by technique.    There is a moderate right base effusion roughly similar to June 9 earlier   study.    Smaller left base effusion is also relatively stable. There are adjacent   atelectases. No pneumothorax.    IMPRESSION: Relatively stable bilateral effusions right greater than left.    --- End of Report ---    < end of copied text >           VITALS:  T(C): 36.9 (06-10-25 @ 12:07), Max: 37.1 (06-10-25 @ 04:57)  T(F): 98.5 (06-10-25 @ 12:07), Max: 98.8 (06-10-25 @ 04:57)  HR: 102 (06-10-25 @ 12:07) (92 - 102)  BP: 93/49 (06-10-25 @ 12:07) (91/50 - 101/57)  BP(mean): 66 (06-09-25 @ 18:04) (66 - 66)  ABP: --  ABP(mean): --  RR: 17 (06-10-25 @ 12:07) (17 - 18)  SpO2: 93% (06-10-25 @ 12:07) (92% - 94%)  CVP(mm Hg): --  CVP(cm H2O): --    Ins and Outs     06-09-25 @ 07:01  -  06-10-25 @ 07:00  --------------------------------------------------------  IN: 0 mL / OUT: 100 mL / NET: -100 mL    06-10-25 @ 07:01  -  06-10-25 @ 14:29  --------------------------------------------------------  IN: 0 mL / OUT: 550 mL / NET: -550 mL                I&O's Detail    09 Jun 2025 07:01  -  10 Sheldon 2025 07:00  --------------------------------------------------------  IN:  Total IN: 0 mL    OUT:    Voided (mL): 100 mL  Total OUT: 100 mL    Total NET: -100 mL      10 Sheldon 2025 07:01  -  10 Sheldon 2025 14:29  --------------------------------------------------------  IN:  Total IN: 0 mL    OUT:    Voided (mL): 550 mL  Total OUT: 550 mL    Total NET: -550 mL

## 2025-06-10 NOTE — PROGRESS NOTE ADULT - SUBJECTIVE AND OBJECTIVE BOX
Subjective and Objective:     Overnight Events: None   Interval History: Patient was seen and examined this morning at bedside.     Vital Signs Last 24 Hrs  T(C): 37.1 (10 Sheldon 2025 04:57), Max: 37.1 (10 Sheldon 2025 04:57)  T(F): 98.8 (10 Sheldon 2025 04:57), Max: 98.8 (10 Sheldon 2025 04:57)  HR: 102 (10 Sheldon 2025 04:57) (92 - 104)  BP: 91/50 (10 Sheldon 2025 04:57) (91/50 - 101/57)  BP(mean): 66 (09 Jun 2025 18:04) (66 - 66)  RR: 18 (10 Sheldon 2025 04:57) (18 - 18)  SpO2: 92% (10 Sheldon 2025 04:57) (91% - 94%)    Parameters below as of 10 Sheldon 2025 04:57  Patient On (Oxygen Delivery Method): room air      I&O's Summary    09 Jun 2025 07:01  -  10 Sheldon 2025 07:00  --------------------------------------------------------  IN: 0 mL / OUT: 100 mL / NET: -100 mL          PHYSICAL EXAM:  Constitutional: Pt lying in bed, awake and alert, NAD  Respiratory: decreased breath sounds at bilateral bases   Cardiovascular: S1S2+, RRR, no M/G/R  Gastrointestinal: abdomen distended, positive fluid wave   Extremities: no lower extremity swelling   Vascular: Peripheral pulses present  Neurological: AAOx2 to person and place , no focal deficits      ALLERGIES:  No Known Allergies      MEDICATIONS:  MEDICATIONS  (STANDING):  furosemide    Tablet 40 milliGRAM(s) Oral two times a day  furosemide   Injectable 20 milliGRAM(s) IV Push two times a day  levothyroxine 75 MICROGram(s) Oral daily  multivitamin 1 Tablet(s) Oral daily  propranolol 10 milliGRAM(s) Oral two times a day  spironolactone 25 milliGRAM(s) Oral <User Schedule>    MEDICATIONS  (PRN):  aluminum hydroxide/magnesium hydroxide/simethicone Suspension 30 milliLiter(s) Oral every 4 hours PRN Dyspepsia  melatonin 3 milliGRAM(s) Oral at bedtime PRN Insomnia  ondansetron Injectable 4 milliGRAM(s) IV Push every 8 hours PRN Nausea and/or Vomiting      LABS: (Personally Reviewed):                        9.8    8.24  )-----------( 364      ( 10 Sheldon 2025 08:03 )             27.9     06-09    134[L]  |  97  |  15  ----------------------------<  101[H]  3.9   |  32[H]  |  0.80    Ca    8.6      09 Jun 2025 06:48    TPro  8.9[H]  /  Alb  2.1[L]  /  TBili  0.9  /  DBili  x   /  AST  28  /  ALT  10  /  AlkPhos  169[H]  06-09    PT/INR - ( 09 Jun 2025 06:48 )   PT: 16.9 sec;   INR: 1.44 ratio         PTT - ( 09 Jun 2025 06:48 )  PTT:39.2 sec      Blood Culture: 06-09 @ 10:30  Organism --  Gram Stain Blood -- Gram Stain   No polymorphonuclear cells seen  No organisms seen  by cytocentrifuge  Specimen Source Pleural Fl Pleural Fluid  Culture-Blood --    06-06 @ 12:20  Organism --  Gram Stain Blood -- Gram Stain --  Specimen Source Peritoneal Peritoneal Fluid  Culture-Blood --    06-06 @ 11:30  Organism --  Gram Stain Blood -- Gram Stain   **Please Note**: This is a Corrected Report**  polymorphonuclear leukocytes seen  No organisms seen  by cytocentrifuge  Previously reported as:  polymorphonuclear leukocytes seen  Gram Negative Rods  by cytocentrifuge  Specimen Source Body Fluid Culture Ascites fluid  Culture-Blood --

## 2025-06-10 NOTE — PROGRESS NOTE ADULT - ASSESSMENT
Physical Examination:  GENERAL:               Alert, Oriented, No acute distress.    HEENT:                    Pupils equal, reactive to light.  Symmetric. No JVD, Moist MM  PULM:                     Bilateral air entry, Clear to auscultation bilaterally, no significant sputum production, No Rales, No Rhonchi, No Wheezing  CVS:                         S1, S2,  No Murmur  ABD:                        Soft, nondistended, nontender, normoactive bowel sounds,    NEURO:                  Alert, oriented, interactive, nonfocal, follows commands  PSYC:                      Calm, + Insight.       Assessment  65 y/o male with PMHx of HLD, liver cirrhosis 2/2 ETOH use disorder (stopped drinking 1-2 yeas ago), positive FIT test (cancelled colonoscopy 1/2025), Hpylori + 11/16/24 (did not complete treatment), GERD, hyperlipidemia, hypothyroidism sent from family medicine practice for shortness of breath today.     problem list  ascites with b/l pl effusions  s/p thoracentesis 6/4 nd 6/9/2025 - Exudate x 2  abd fluid showing  SBP with GNR    Plan  cytology neg on first thoro, awaiting second   as exudate may require pleural biopsy as fluid is exudate, unsure if will benefit with pleurex at this time.   Optimize cirrhosis meds as per GI  d/w bedside team  patient being transfered to [facility] for further care and managment.

## 2025-06-10 NOTE — PROGRESS NOTE ADULT - ASSESSMENT
63 y/o male with PMHx of HLD, liver cirrhosis 2/2 ETOH use disorder (stopped drinking 1-2 yeas ago), positive FIT test (cancelled colonoscopy 1/2025), Hpylori + 11/16/24 (did not complete treatment), GERD, hyperlipidemia, hypothyroidism is admitted for:    #Decompensated alcoholic cirrhosis versus malignant ascites (positive FIT outpatient)   #Acute Hypoxic respiratory failure secondary to b/l pleural effusions  #b/l LE edema  - s/p Thoracocentesis 6/4, Paracenteses 6/6, repeat Thoracocentesis 6/9   - Ascites fluid resulted - no growth   - c/w Lasix 40mg BID    - c/w Spironolactone 25 for cirrhosis, increase as able due to blood pressure   - c/w Propanolol for prophylaxis   - Empiric Antibiotic regimen completed   - GI recs appreciated  - Pulm recs appreciated  - Hepatology - outpatient liver transplant/TIPS evaluation recommended   - PT Evaluation   - Pulse ox on ambulation: 88-90% on RA    #Hypothyroidism  - c/w levothyroxine 75   - recheck TSH in 6 weeks     #HLD  - lipid panel 6/3/2025: total 91, HDL 12, LDL 64     #DVT ppx  - Heparin     #GOC  - Full code     Discussed with Dr. Fraser  65 y/o male with PMHx of HLD, liver cirrhosis 2/2 ETOH use disorder (stopped drinking 1-2 yeas ago), positive FIT test (cancelled colonoscopy 1/2025), Hpylori + 11/16/24 (did not complete treatment), GERD, hyperlipidemia, hypothyroidism is admitted for:    #Decompensated alcoholic cirrhosis versus malignant ascites (positive FIT outpatient)   #Acute Hypoxic respiratory failure secondary to b/l pleural effusions  #b/l LE edema  - s/p Thoracocentesis 6/4, Paracenteses 6/6, repeat Thoracocentesis 6/9   - Ascites fluid resulted - no growth   - c/w Lasix 40mg BID    - c/w Spironolactone 25 for cirrhosis, increase as able due to blood pressure   - c/w Propanolol for prophylaxis   - Antibiotic regimen completed   - GI recs appreciated  - Pulm recs appreciated  - Hepatology - outpatient liver transplant/TIPS evaluation recommended   - PT Evaluation   - Pulse ox on ambulation: 88-90% on RA    #Hypothyroidism  - c/w levothyroxine 75   - recheck TSH in 6 weeks     #HLD  - lipid panel 6/3/2025: total 91, HDL 12, LDL 64     #DVT ppx  - Heparin     #GOC  - Full code     Discussed with Dr. Fraser  65 y/o male with PMHx of HLD, liver cirrhosis 2/2 ETOH use disorder (stopped drinking 1-2 yeas ago), positive FIT test (cancelled colonoscopy 1/2025), Hpylori + 11/16/24 (did not complete treatment), GERD, hyperlipidemia, hypothyroidism is admitted for:    #Decompensated alcoholic cirrhosis versus malignant ascites (positive FIT outpatient)   #Acute Hypoxic respiratory failure secondary to b/l pleural effusions  #b/l LE edema  - s/p Thoracocentesis 6/4, Paracenteses 6/6, repeat Thoracocentesis 6/9   - Ascites fluid resulted - no growth   - c/w Lasix 40mg BID    - c/w Spironolactone 25 for cirrhosis, increase as able due to blood pressure   - c/w Propanolol for prophylaxis   - Midodrine 5mg TID for blood pressure support   - Antibiotic regimen completed   - GI recs appreciated  - Pulm recs appreciated  - Hepatology - outpatient liver transplant/TIPS evaluation recommended   - PT Evaluation   - Pulse ox on ambulation: 88-90% on RA    #Hypothyroidism  - c/w levothyroxine 75   - recheck TSH in 6 weeks     #HLD  - lipid panel 6/3/2025: total 91, HDL 12, LDL 64     #DVT ppx  - Heparin     #GOC  - Full code     Discussed with Dr. Fraser

## 2025-06-11 LAB
ALBUMIN SERPL ELPH-MCNC: 2.3 G/DL — LOW (ref 3.3–5)
ALP SERPL-CCNC: 180 U/L — HIGH (ref 40–120)
ALT FLD-CCNC: 9 U/L — LOW (ref 10–45)
AMMONIA BLD-MCNC: 64 UMOL/L — HIGH (ref 11–55)
ANION GAP SERPL CALC-SCNC: 6 MMOL/L — SIGNIFICANT CHANGE UP (ref 5–17)
AST SERPL-CCNC: 21 U/L — SIGNIFICANT CHANGE UP (ref 10–40)
BASOPHILS # BLD AUTO: 0.07 K/UL — SIGNIFICANT CHANGE UP (ref 0–0.2)
BASOPHILS NFR BLD AUTO: 0.8 % — SIGNIFICANT CHANGE UP (ref 0–2)
BILIRUB SERPL-MCNC: 0.7 MG/DL — SIGNIFICANT CHANGE UP (ref 0.2–1.2)
BUN SERPL-MCNC: 13 MG/DL — SIGNIFICANT CHANGE UP (ref 7–23)
CALCIUM SERPL-MCNC: 8.8 MG/DL — SIGNIFICANT CHANGE UP (ref 8.4–10.5)
CHLORIDE SERPL-SCNC: 100 MMOL/L — SIGNIFICANT CHANGE UP (ref 96–108)
CO2 SERPL-SCNC: 30 MMOL/L — SIGNIFICANT CHANGE UP (ref 22–31)
CREAT SERPL-MCNC: 0.92 MG/DL — SIGNIFICANT CHANGE UP (ref 0.5–1.3)
CULTURE RESULTS: ABNORMAL
EGFR: 91 ML/MIN/1.73M2 — SIGNIFICANT CHANGE UP
EGFR: 91 ML/MIN/1.73M2 — SIGNIFICANT CHANGE UP
EOSINOPHIL # BLD AUTO: 0.11 K/UL — SIGNIFICANT CHANGE UP (ref 0–0.5)
EOSINOPHIL NFR BLD AUTO: 1.3 % — SIGNIFICANT CHANGE UP (ref 0–6)
GLUCOSE SERPL-MCNC: 124 MG/DL — HIGH (ref 70–99)
HCT VFR BLD CALC: 29 % — LOW (ref 39–50)
HGB BLD-MCNC: 9.9 G/DL — LOW (ref 13–17)
IMM GRANULOCYTES NFR BLD AUTO: 0.5 % — SIGNIFICANT CHANGE UP (ref 0–0.9)
LYMPHOCYTES # BLD AUTO: 1.57 K/UL — SIGNIFICANT CHANGE UP (ref 1–3.3)
LYMPHOCYTES # BLD AUTO: 18.8 % — SIGNIFICANT CHANGE UP (ref 13–44)
MCHC RBC-ENTMCNC: 30.8 PG — SIGNIFICANT CHANGE UP (ref 27–34)
MCHC RBC-ENTMCNC: 34.1 G/DL — SIGNIFICANT CHANGE UP (ref 32–36)
MCV RBC AUTO: 90.3 FL — SIGNIFICANT CHANGE UP (ref 80–100)
MONOCYTES # BLD AUTO: 1.55 K/UL — HIGH (ref 0–0.9)
MONOCYTES NFR BLD AUTO: 18.6 % — HIGH (ref 2–14)
NEUTROPHILS # BLD AUTO: 5 K/UL — SIGNIFICANT CHANGE UP (ref 1.8–7.4)
NEUTROPHILS NFR BLD AUTO: 60 % — SIGNIFICANT CHANGE UP (ref 43–77)
NRBC BLD AUTO-RTO: 0 /100 WBCS — SIGNIFICANT CHANGE UP (ref 0–0)
PLATELET # BLD AUTO: 365 K/UL — SIGNIFICANT CHANGE UP (ref 150–400)
POTASSIUM SERPL-MCNC: 3.9 MMOL/L — SIGNIFICANT CHANGE UP (ref 3.5–5.3)
POTASSIUM SERPL-SCNC: 3.9 MMOL/L — SIGNIFICANT CHANGE UP (ref 3.5–5.3)
PROT SERPL-MCNC: 8.6 G/DL — HIGH (ref 6–8.3)
RBC # BLD: 3.21 M/UL — LOW (ref 4.2–5.8)
RBC # FLD: 14.8 % — HIGH (ref 10.3–14.5)
SODIUM SERPL-SCNC: 136 MMOL/L — SIGNIFICANT CHANGE UP (ref 135–145)
SPECIMEN SOURCE: SIGNIFICANT CHANGE UP
WBC # BLD: 8.34 K/UL — SIGNIFICANT CHANGE UP (ref 3.8–10.5)
WBC # FLD AUTO: 8.34 K/UL — SIGNIFICANT CHANGE UP (ref 3.8–10.5)

## 2025-06-11 PROCEDURE — 99233 SBSQ HOSP IP/OBS HIGH 50: CPT | Mod: FS

## 2025-06-11 RX ORDER — LACTULOSE 10 G/15ML
10 SOLUTION ORAL THREE TIMES A DAY
Refills: 0 | Status: DISCONTINUED | OUTPATIENT
Start: 2025-06-11 | End: 2025-06-12

## 2025-06-11 RX ORDER — ACETAMINOPHEN 500 MG/5ML
650 LIQUID (ML) ORAL EVERY 6 HOURS
Refills: 0 | Status: DISCONTINUED | OUTPATIENT
Start: 2025-06-11 | End: 2025-06-12

## 2025-06-11 RX ORDER — FUROSEMIDE 10 MG/ML
40 INJECTION INTRAMUSCULAR; INTRAVENOUS
Refills: 0 | Status: DISCONTINUED | OUTPATIENT
Start: 2025-06-11 | End: 2025-06-12

## 2025-06-11 RX ADMIN — MIDODRINE HYDROCHLORIDE 5 MILLIGRAM(S): 5 TABLET ORAL at 17:40

## 2025-06-11 RX ADMIN — LACTULOSE 10 GRAM(S): 10 SOLUTION ORAL at 15:12

## 2025-06-11 RX ADMIN — LACTULOSE 10 GRAM(S): 10 SOLUTION ORAL at 21:34

## 2025-06-11 RX ADMIN — FUROSEMIDE 40 MILLIGRAM(S): 10 INJECTION INTRAMUSCULAR; INTRAVENOUS at 16:02

## 2025-06-11 RX ADMIN — Medication 75 MICROGRAM(S): at 06:03

## 2025-06-11 RX ADMIN — HEPARIN SODIUM 5000 UNIT(S): 1000 INJECTION INTRAVENOUS; SUBCUTANEOUS at 21:34

## 2025-06-11 RX ADMIN — HEPARIN SODIUM 5000 UNIT(S): 1000 INJECTION INTRAVENOUS; SUBCUTANEOUS at 15:17

## 2025-06-11 RX ADMIN — Medication 25 MILLIGRAM(S): at 11:36

## 2025-06-11 RX ADMIN — MIDODRINE HYDROCHLORIDE 5 MILLIGRAM(S): 5 TABLET ORAL at 11:39

## 2025-06-11 RX ADMIN — Medication 1 TABLET(S): at 11:36

## 2025-06-11 RX ADMIN — HEPARIN SODIUM 5000 UNIT(S): 1000 INJECTION INTRAVENOUS; SUBCUTANEOUS at 06:04

## 2025-06-11 RX ADMIN — MIDODRINE HYDROCHLORIDE 5 MILLIGRAM(S): 5 TABLET ORAL at 06:03

## 2025-06-11 NOTE — PROGRESS NOTE ADULT - SUBJECTIVE AND OBJECTIVE BOX
Subjective and Objective:     Overnight Events: None   Interval History: Patient was seen and examined this morning at bedside. Patient was more sleepy today, wasn't responding to his name being called. Patient woke up and was speaking, however more lethargic.     Vital Signs Last 24 Hrs  T(C): 36.7 (11 Jun 2025 05:11), Max: 36.9 (10 Sheldon 2025 12:07)  T(F): 98.1 (11 Jun 2025 05:11), Max: 98.5 (10 Sheldon 2025 12:07)  HR: 104 (11 Jun 2025 05:11) (97 - 104)  BP: 95/51 (11 Jun 2025 05:11) (93/49 - 102/56)  BP(mean): --  RR: 18 (11 Jun 2025 05:11) (17 - 18)  SpO2: 92% (11 Jun 2025 05:11) (92% - 93%)    Parameters below as of 11 Jun 2025 05:11  Patient On (Oxygen Delivery Method): room air      I&O's Summary    10 Sheldon 2025 07:01  -  11 Jun 2025 07:00  --------------------------------------------------------  IN: 0 mL / OUT: 700 mL / NET: -700 mL          PHYSICAL EXAM:  Constitutional: Pt lying in bed, awake and alert, NAD  Respiratory: decreased breath sounds at bilateral bases   Cardiovascular: S1S2+, RRR, no M/G/R  Gastrointestinal: abdomen distended, positive fluid wave   Extremities: no lower extremity swelling   Vascular: Peripheral pulses present  Neurological: AAOx2 to person and place , no focal deficits      ALLERGIES:  No Known Allergies      MEDICATIONS:  MEDICATIONS  (STANDING):  heparin   Injectable 5000 Unit(s) SubCutaneous every 8 hours  levothyroxine 75 MICROGram(s) Oral daily  midodrine. 5 milliGRAM(s) Oral three times a day  multivitamin 1 Tablet(s) Oral daily  propranolol 10 milliGRAM(s) Oral two times a day  spironolactone 25 milliGRAM(s) Oral <User Schedule>    MEDICATIONS  (PRN):  acetaminophen     Tablet .. 650 milliGRAM(s) Oral every 6 hours PRN Temp greater or equal to 38C (100.4F), Mild Pain (1 - 3)  aluminum hydroxide/magnesium hydroxide/simethicone Suspension 30 milliLiter(s) Oral every 4 hours PRN Dyspepsia  melatonin 3 milliGRAM(s) Oral at bedtime PRN Insomnia  ondansetron Injectable 4 milliGRAM(s) IV Push every 8 hours PRN Nausea and/or Vomiting      LABS: (Personally Reviewed):                        9.9    8.34  )-----------( 365      ( 11 Jun 2025 06:08 )             29.0     06-11    136  |  100  |  13  ----------------------------<  124[H]  3.9   |  30  |  0.92    Ca    8.8      11 Jun 2025 06:08  Phos  3.2     06-10  Mg     1.7     06-10    TPro  8.6[H]  /  Alb  2.3[L]  /  TBili  0.7  /  DBili  x   /  AST  21  /  ALT  9[L]  /  AlkPhos  180[H]  06-11          Blood Culture: 06-09 @ 10:30  Organism --  Gram Stain Blood -- Gram Stain   No polymorphonuclear cells seen  No organisms seen  by cytocentrifuge  Specimen Source Pleural Fl Pleural Fluid  Culture-Blood --    06-06 @ 12:20  Organism --  Gram Stain Blood -- Gram Stain --  Specimen Source Peritoneal Peritoneal Fluid  Culture-Blood --    06-06 @ 11:30  Organism --  Gram Stain Blood -- Gram Stain   **Please Note**: This is a Corrected Report**  polymorphonuclear leukocytes seen  No organisms seen  by cytocentrifuge  Previously reported as:  polymorphonuclear leukocytes seen  Gram Negative Rods  by cytocentrifuge  Specimen Source Body Fluid Culture Ascites fluid  Culture-Blood --

## 2025-06-11 NOTE — PROGRESS NOTE ADULT - ASSESSMENT
65 y/o male with PMHx of HLD, liver cirrhosis 2/2 ETOH use disorder (stopped drinking 1-2 yeas ago), positive FIT test (cancelled colonoscopy 1/2025), Hpylori + 11/16/24 (did not complete treatment), GERD, hyperlipidemia, hypothyroidism is admitted for:    #Decompensated alcoholic cirrhosis versus malignant ascites (positive FIT outpatient)   #Acute Hypoxic respiratory failure secondary to b/l pleural effusions  #b/l LE edema  - s/p Thoracocentesis 6/4, Paracenteses 6/6, repeat Thoracocentesis 6/9   - Ascites fluid resulted - no growth   - c/w Lasix 40mg BID    - c/w Spironolactone 25 for cirrhosis, increase as able due to blood pressure   - c/w Propanolol for prophylaxis   - Midodrine 5mg TID for blood pressure support   - Antibiotic regimen completed   - Ordered ammonia level due to lethargy today   - GI recs appreciated  - Pulm recs appreciated  - Hepatology - outpatient liver transplant/TIPS evaluation recommended   - PT Evaluation   - Pulse ox on ambulation: 88-90% on RA    #Hypothyroidism  - c/w levothyroxine 75   - recheck TSH in 6 weeks     #HLD  - lipid panel 6/3/2025: total 91, HDL 12, LDL 64     #DVT ppx  - Heparin     #GOC  - Full code     Discussed with Dr. Fraser

## 2025-06-12 ENCOUNTER — TRANSCRIPTION ENCOUNTER (OUTPATIENT)
Age: 65
End: 2025-06-12

## 2025-06-12 ENCOUNTER — INPATIENT (INPATIENT)
Facility: HOSPITAL | Age: 65
LOS: 12 days | Discharge: HOME | DRG: 433 | End: 2025-06-25
Attending: STUDENT IN AN ORGANIZED HEALTH CARE EDUCATION/TRAINING PROGRAM | Admitting: STUDENT IN AN ORGANIZED HEALTH CARE EDUCATION/TRAINING PROGRAM
Payer: MEDICARE

## 2025-06-12 VITALS
OXYGEN SATURATION: 93 % | HEART RATE: 101 BPM | DIASTOLIC BLOOD PRESSURE: 62 MMHG | SYSTOLIC BLOOD PRESSURE: 103 MMHG | RESPIRATION RATE: 17 BRPM | TEMPERATURE: 99 F

## 2025-06-12 VITALS
DIASTOLIC BLOOD PRESSURE: 73 MMHG | HEART RATE: 100 BPM | RESPIRATION RATE: 18 BRPM | OXYGEN SATURATION: 93 % | TEMPERATURE: 98 F | SYSTOLIC BLOOD PRESSURE: 125 MMHG

## 2025-06-12 DIAGNOSIS — K21.9 GASTRO-ESOPHAGEAL REFLUX DISEASE WITHOUT ESOPHAGITIS: ICD-10-CM

## 2025-06-12 DIAGNOSIS — I10 ESSENTIAL (PRIMARY) HYPERTENSION: ICD-10-CM

## 2025-06-12 DIAGNOSIS — K74.60 UNSPECIFIED CIRRHOSIS OF LIVER: ICD-10-CM

## 2025-06-12 DIAGNOSIS — J90 PLEURAL EFFUSION, NOT ELSEWHERE CLASSIFIED: ICD-10-CM

## 2025-06-12 DIAGNOSIS — R18.8 OTHER ASCITES: ICD-10-CM

## 2025-06-12 DIAGNOSIS — E78.5 HYPERLIPIDEMIA, UNSPECIFIED: ICD-10-CM

## 2025-06-12 DIAGNOSIS — E03.9 HYPOTHYROIDISM, UNSPECIFIED: ICD-10-CM

## 2025-06-12 DIAGNOSIS — K76.82 HEPATIC ENCEPHALOPATHY: ICD-10-CM

## 2025-06-12 DIAGNOSIS — Z87.898 PERSONAL HISTORY OF OTHER SPECIFIED CONDITIONS: ICD-10-CM

## 2025-06-12 PROBLEM — K70.30 ALCOHOLIC CIRRHOSIS OF LIVER WITHOUT ASCITES: Chronic | Status: ACTIVE | Noted: 2025-06-02

## 2025-06-12 PROBLEM — R19.5 OTHER FECAL ABNORMALITIES: Chronic | Status: ACTIVE | Noted: 2025-06-02

## 2025-06-12 LAB
ALBUMIN SERPL ELPH-MCNC: 2.1 G/DL — LOW (ref 3.3–5)
ALP SERPL-CCNC: 182 U/L — HIGH (ref 40–120)
ALT FLD-CCNC: 13 U/L — SIGNIFICANT CHANGE UP (ref 10–45)
AMMONIA BLD-MCNC: 80 UMOL/L — HIGH (ref 11–55)
ANION GAP SERPL CALC-SCNC: 7 MMOL/L — SIGNIFICANT CHANGE UP (ref 5–17)
APPEARANCE UR: CLEAR — SIGNIFICANT CHANGE UP
AST SERPL-CCNC: 28 U/L — SIGNIFICANT CHANGE UP (ref 10–40)
BASOPHILS # BLD AUTO: 0.07 K/UL — SIGNIFICANT CHANGE UP (ref 0–0.2)
BASOPHILS NFR BLD AUTO: 0.7 % — SIGNIFICANT CHANGE UP (ref 0–2)
BILIRUB SERPL-MCNC: 0.8 MG/DL — SIGNIFICANT CHANGE UP (ref 0.2–1.2)
BILIRUB UR-MCNC: NEGATIVE — SIGNIFICANT CHANGE UP
BUN SERPL-MCNC: 14 MG/DL — SIGNIFICANT CHANGE UP (ref 7–23)
CALCIUM SERPL-MCNC: 8.7 MG/DL — SIGNIFICANT CHANGE UP (ref 8.4–10.5)
CHLORIDE SERPL-SCNC: 99 MMOL/L — SIGNIFICANT CHANGE UP (ref 96–108)
CO2 SERPL-SCNC: 27 MMOL/L — SIGNIFICANT CHANGE UP (ref 22–31)
COLOR SPEC: YELLOW — SIGNIFICANT CHANGE UP
CREAT ?TM UR-MCNC: 36 MG/DL — SIGNIFICANT CHANGE UP
CREAT SERPL-MCNC: 0.87 MG/DL — SIGNIFICANT CHANGE UP (ref 0.5–1.3)
DIFF PNL FLD: NEGATIVE — SIGNIFICANT CHANGE UP
EGFR: 95 ML/MIN/1.73M2 — SIGNIFICANT CHANGE UP
EGFR: 95 ML/MIN/1.73M2 — SIGNIFICANT CHANGE UP
EOSINOPHIL # BLD AUTO: 0.11 K/UL — SIGNIFICANT CHANGE UP (ref 0–0.5)
EOSINOPHIL NFR BLD AUTO: 1.1 % — SIGNIFICANT CHANGE UP (ref 0–6)
GLUCOSE SERPL-MCNC: 88 MG/DL — SIGNIFICANT CHANGE UP (ref 70–99)
GLUCOSE UR QL: NEGATIVE MG/DL — SIGNIFICANT CHANGE UP
HCT VFR BLD CALC: 29.4 % — LOW (ref 39–50)
HGB BLD-MCNC: 10.1 G/DL — LOW (ref 13–17)
IMM GRANULOCYTES NFR BLD AUTO: 0.4 % — SIGNIFICANT CHANGE UP (ref 0–0.9)
KETONES UR QL: NEGATIVE MG/DL — SIGNIFICANT CHANGE UP
LEUKOCYTE ESTERASE UR-ACNC: NEGATIVE — SIGNIFICANT CHANGE UP
LYMPHOCYTES # BLD AUTO: 1.81 K/UL — SIGNIFICANT CHANGE UP (ref 1–3.3)
LYMPHOCYTES # BLD AUTO: 17.5 % — SIGNIFICANT CHANGE UP (ref 13–44)
MAGNESIUM SERPL-MCNC: 1.7 MG/DL — SIGNIFICANT CHANGE UP (ref 1.6–2.6)
MCHC RBC-ENTMCNC: 30.6 PG — SIGNIFICANT CHANGE UP (ref 27–34)
MCHC RBC-ENTMCNC: 34.4 G/DL — SIGNIFICANT CHANGE UP (ref 32–36)
MCV RBC AUTO: 89.1 FL — SIGNIFICANT CHANGE UP (ref 80–100)
MONOCYTES # BLD AUTO: 1.8 K/UL — HIGH (ref 0–0.9)
MONOCYTES NFR BLD AUTO: 17.4 % — HIGH (ref 2–14)
NEUTROPHILS # BLD AUTO: 6.49 K/UL — SIGNIFICANT CHANGE UP (ref 1.8–7.4)
NEUTROPHILS NFR BLD AUTO: 62.9 % — SIGNIFICANT CHANGE UP (ref 43–77)
NITRITE UR-MCNC: NEGATIVE — SIGNIFICANT CHANGE UP
NON-GYNECOLOGICAL CYTOLOGY STUDY: SIGNIFICANT CHANGE UP
NON-GYNECOLOGICAL CYTOLOGY STUDY: SIGNIFICANT CHANGE UP
NRBC BLD AUTO-RTO: 0 /100 WBCS — SIGNIFICANT CHANGE UP (ref 0–0)
PH UR: 7 — SIGNIFICANT CHANGE UP (ref 5–8)
PHOSPHATE SERPL-MCNC: 3.6 MG/DL — SIGNIFICANT CHANGE UP (ref 2.5–4.5)
PLATELET # BLD AUTO: 377 K/UL — SIGNIFICANT CHANGE UP (ref 150–400)
POTASSIUM SERPL-MCNC: 3.9 MMOL/L — SIGNIFICANT CHANGE UP (ref 3.5–5.3)
POTASSIUM SERPL-SCNC: 3.9 MMOL/L — SIGNIFICANT CHANGE UP (ref 3.5–5.3)
PROT ?TM UR-MCNC: 7 MG/DL — SIGNIFICANT CHANGE UP (ref 0–12)
PROT SERPL-MCNC: 8.7 G/DL — HIGH (ref 6–8.3)
PROT UR-MCNC: NEGATIVE MG/DL — SIGNIFICANT CHANGE UP
PROT/CREAT UR-RTO: 0.2 RATIO — SIGNIFICANT CHANGE UP (ref 0–0.2)
RBC # BLD: 3.3 M/UL — LOW (ref 4.2–5.8)
RBC # FLD: 14.9 % — HIGH (ref 10.3–14.5)
SODIUM SERPL-SCNC: 133 MMOL/L — LOW (ref 135–145)
SODIUM UR-SCNC: 102 MMOL/L — SIGNIFICANT CHANGE UP
SP GR SPEC: 1.01 — SIGNIFICANT CHANGE UP (ref 1–1.03)
UROBILINOGEN FLD QL: 1 MG/DL — SIGNIFICANT CHANGE UP (ref 0.2–1)
WBC # BLD: 10.32 K/UL — SIGNIFICANT CHANGE UP (ref 3.8–10.5)
WBC # FLD AUTO: 10.32 K/UL — SIGNIFICANT CHANGE UP (ref 3.8–10.5)

## 2025-06-12 PROCEDURE — 76705 ECHO EXAM OF ABDOMEN: CPT | Mod: 26

## 2025-06-12 PROCEDURE — 76705 ECHO EXAM OF ABDOMEN: CPT

## 2025-06-12 PROCEDURE — 88305 TISSUE EXAM BY PATHOLOGIST: CPT

## 2025-06-12 PROCEDURE — 83735 ASSAY OF MAGNESIUM: CPT

## 2025-06-12 PROCEDURE — 86850 RBC ANTIBODY SCREEN: CPT

## 2025-06-12 PROCEDURE — 71275 CT ANGIOGRAPHY CHEST: CPT

## 2025-06-12 PROCEDURE — 99223 1ST HOSP IP/OBS HIGH 75: CPT

## 2025-06-12 PROCEDURE — 71045 X-RAY EXAM CHEST 1 VIEW: CPT

## 2025-06-12 PROCEDURE — 97162 PT EVAL MOD COMPLEX 30 MIN: CPT

## 2025-06-12 PROCEDURE — 93970 EXTREMITY STUDY: CPT

## 2025-06-12 PROCEDURE — 80053 COMPREHEN METABOLIC PANEL: CPT

## 2025-06-12 PROCEDURE — 76700 US EXAM ABDOM COMPLETE: CPT

## 2025-06-12 PROCEDURE — 84100 ASSAY OF PHOSPHORUS: CPT

## 2025-06-12 PROCEDURE — 88112 CYTOPATH CELL ENHANCE TECH: CPT

## 2025-06-12 PROCEDURE — 83986 ASSAY PH BODY FLUID NOS: CPT

## 2025-06-12 PROCEDURE — 49083 ABD PARACENTESIS W/IMAGING: CPT

## 2025-06-12 PROCEDURE — 86900 BLOOD TYPING SEROLOGIC ABO: CPT

## 2025-06-12 PROCEDURE — 99233 SBSQ HOSP IP/OBS HIGH 50: CPT | Mod: FS

## 2025-06-12 PROCEDURE — 87015 SPECIMEN INFECT AGNT CONCNTJ: CPT

## 2025-06-12 PROCEDURE — 86901 BLOOD TYPING SEROLOGIC RH(D): CPT

## 2025-06-12 PROCEDURE — 82945 GLUCOSE OTHER FLUID: CPT

## 2025-06-12 PROCEDURE — 84157 ASSAY OF PROTEIN OTHER: CPT

## 2025-06-12 PROCEDURE — 36415 COLL VENOUS BLD VENIPUNCTURE: CPT

## 2025-06-12 PROCEDURE — G0463: CPT

## 2025-06-12 PROCEDURE — 87102 FUNGUS ISOLATION CULTURE: CPT

## 2025-06-12 PROCEDURE — 82140 ASSAY OF AMMONIA: CPT

## 2025-06-12 PROCEDURE — 82042 OTHER SOURCE ALBUMIN QUAN EA: CPT

## 2025-06-12 PROCEDURE — 71045 X-RAY EXAM CHEST 1 VIEW: CPT | Mod: 26

## 2025-06-12 PROCEDURE — 83880 ASSAY OF NATRIURETIC PEPTIDE: CPT

## 2025-06-12 PROCEDURE — 74177 CT ABD & PELVIS W/CONTRAST: CPT

## 2025-06-12 PROCEDURE — 84484 ASSAY OF TROPONIN QUANT: CPT

## 2025-06-12 PROCEDURE — 89051 BODY FLUID CELL COUNT: CPT

## 2025-06-12 PROCEDURE — 83615 LACTATE (LD) (LDH) ENZYME: CPT

## 2025-06-12 PROCEDURE — 80074 ACUTE HEPATITIS PANEL: CPT

## 2025-06-12 PROCEDURE — 85025 COMPLETE CBC W/AUTO DIFF WBC: CPT

## 2025-06-12 PROCEDURE — 85610 PROTHROMBIN TIME: CPT

## 2025-06-12 PROCEDURE — 87205 SMEAR GRAM STAIN: CPT

## 2025-06-12 PROCEDURE — 82150 ASSAY OF AMYLASE: CPT

## 2025-06-12 PROCEDURE — 80061 LIPID PANEL: CPT

## 2025-06-12 PROCEDURE — 85730 THROMBOPLASTIN TIME PARTIAL: CPT

## 2025-06-12 PROCEDURE — 84443 ASSAY THYROID STIM HORMONE: CPT

## 2025-06-12 PROCEDURE — P9047: CPT

## 2025-06-12 PROCEDURE — 82607 VITAMIN B-12: CPT

## 2025-06-12 PROCEDURE — 83690 ASSAY OF LIPASE: CPT

## 2025-06-12 PROCEDURE — 87075 CULTR BACTERIA EXCEPT BLOOD: CPT

## 2025-06-12 PROCEDURE — 82746 ASSAY OF FOLIC ACID SERUM: CPT

## 2025-06-12 PROCEDURE — 99285 EMERGENCY DEPT VISIT HI MDM: CPT

## 2025-06-12 PROCEDURE — 83036 HEMOGLOBIN GLYCOSYLATED A1C: CPT

## 2025-06-12 PROCEDURE — 70450 CT HEAD/BRAIN W/O DYE: CPT

## 2025-06-12 PROCEDURE — 85027 COMPLETE CBC AUTOMATED: CPT

## 2025-06-12 PROCEDURE — 93005 ELECTROCARDIOGRAM TRACING: CPT

## 2025-06-12 PROCEDURE — 87070 CULTURE OTHR SPECIMN AEROBIC: CPT

## 2025-06-12 RX ORDER — LACTULOSE 10 G/15ML
22.5 SOLUTION ORAL
Qty: 0 | Refills: 0 | DISCHARGE
Start: 2025-06-12

## 2025-06-12 RX ORDER — B1/B2/B3/B5/B6/B12/VIT C/FOLIC 500-0.5 MG
1 TABLET ORAL DAILY
Refills: 0 | Status: DISCONTINUED | OUTPATIENT
Start: 2025-06-12 | End: 2025-06-25

## 2025-06-12 RX ORDER — ONDANSETRON HCL/PF 4 MG/2 ML
4 VIAL (ML) INJECTION EVERY 8 HOURS
Refills: 0 | Status: DISCONTINUED | OUTPATIENT
Start: 2025-06-12 | End: 2025-06-25

## 2025-06-12 RX ORDER — LACTULOSE 10 G/15ML
20 SOLUTION ORAL THREE TIMES A DAY
Refills: 0 | Status: DISCONTINUED | OUTPATIENT
Start: 2025-06-12 | End: 2025-06-12

## 2025-06-12 RX ORDER — LEVOTHYROXINE SODIUM 300 MCG
75 TABLET ORAL DAILY
Refills: 0 | Status: DISCONTINUED | OUTPATIENT
Start: 2025-06-12 | End: 2025-06-25

## 2025-06-12 RX ORDER — FUROSEMIDE 10 MG/ML
40 INJECTION INTRAMUSCULAR; INTRAVENOUS DAILY
Refills: 0 | Status: DISCONTINUED | OUTPATIENT
Start: 2025-06-12 | End: 2025-06-25

## 2025-06-12 RX ORDER — FOLIC ACID 1 MG/1
1 TABLET ORAL DAILY
Refills: 0 | Status: DISCONTINUED | OUTPATIENT
Start: 2025-06-12 | End: 2025-06-25

## 2025-06-12 RX ORDER — MAGNESIUM, ALUMINUM HYDROXIDE 200-200 MG
30 TABLET,CHEWABLE ORAL EVERY 4 HOURS
Refills: 0 | Status: DISCONTINUED | OUTPATIENT
Start: 2025-06-12 | End: 2025-06-25

## 2025-06-12 RX ORDER — ALBUMIN (HUMAN) 12.5 G/50ML
100 INJECTION, SOLUTION INTRAVENOUS EVERY 8 HOURS
Refills: 0 | Status: COMPLETED | OUTPATIENT
Start: 2025-06-12 | End: 2025-06-14

## 2025-06-12 RX ORDER — SENNA 187 MG
2 TABLET ORAL AT BEDTIME
Refills: 0 | Status: DISCONTINUED | OUTPATIENT
Start: 2025-06-12 | End: 2025-06-25

## 2025-06-12 RX ORDER — MELATONIN 5 MG
3 TABLET ORAL AT BEDTIME
Refills: 0 | Status: DISCONTINUED | OUTPATIENT
Start: 2025-06-12 | End: 2025-06-25

## 2025-06-12 RX ORDER — ACETAMINOPHEN 500 MG/5ML
650 LIQUID (ML) ORAL EVERY 6 HOURS
Refills: 0 | Status: DISCONTINUED | OUTPATIENT
Start: 2025-06-12 | End: 2025-06-25

## 2025-06-12 RX ORDER — ACETAMINOPHEN 500 MG/5ML
2 LIQUID (ML) ORAL
Qty: 0 | Refills: 0 | DISCHARGE
Start: 2025-06-12

## 2025-06-12 RX ORDER — MIDODRINE HYDROCHLORIDE 5 MG/1
5 TABLET ORAL EVERY 8 HOURS
Refills: 0 | Status: DISCONTINUED | OUTPATIENT
Start: 2025-06-12 | End: 2025-06-25

## 2025-06-12 RX ORDER — HEPARIN SODIUM 1000 [USP'U]/ML
5000 INJECTION INTRAVENOUS; SUBCUTANEOUS EVERY 8 HOURS
Refills: 0 | Status: DISCONTINUED | OUTPATIENT
Start: 2025-06-12 | End: 2025-06-25

## 2025-06-12 RX ORDER — LACTULOSE 10 G/15ML
15 SOLUTION ORAL DAILY
Refills: 0 | Status: DISCONTINUED | OUTPATIENT
Start: 2025-06-12 | End: 2025-06-12

## 2025-06-12 RX ORDER — LACTULOSE 10 G/15ML
15 SOLUTION ORAL THREE TIMES A DAY
Refills: 0 | Status: DISCONTINUED | OUTPATIENT
Start: 2025-06-12 | End: 2025-06-12

## 2025-06-12 RX ORDER — ACETAMINOPHEN 500 MG/5ML
650 LIQUID (ML) ORAL EVERY 6 HOURS
Refills: 0 | Status: DISCONTINUED | OUTPATIENT
Start: 2025-06-12 | End: 2025-06-12

## 2025-06-12 RX ORDER — POLYETHYLENE GLYCOL 3350 17 G/17G
17 POWDER, FOR SOLUTION ORAL DAILY
Refills: 0 | Status: DISCONTINUED | OUTPATIENT
Start: 2025-06-12 | End: 2025-06-25

## 2025-06-12 RX ORDER — LACTULOSE 10 G/15ML
15 SOLUTION ORAL THREE TIMES A DAY
Refills: 0 | Status: DISCONTINUED | OUTPATIENT
Start: 2025-06-12 | End: 2025-06-14

## 2025-06-12 RX ORDER — SPIRONOLACTONE 25 MG
1 TABLET ORAL
Qty: 0 | Refills: 0 | DISCHARGE
Start: 2025-06-12

## 2025-06-12 RX ORDER — HEPARIN SODIUM 1000 [USP'U]/ML
5000 INJECTION INTRAVENOUS; SUBCUTANEOUS
Qty: 0 | Refills: 0 | DISCHARGE
Start: 2025-06-12

## 2025-06-12 RX ORDER — MIDODRINE HYDROCHLORIDE 5 MG/1
1 TABLET ORAL
Qty: 0 | Refills: 0 | DISCHARGE
Start: 2025-06-12

## 2025-06-12 RX ORDER — SPIRONOLACTONE 25 MG
25 TABLET ORAL DAILY
Refills: 0 | Status: DISCONTINUED | OUTPATIENT
Start: 2025-06-12 | End: 2025-06-13

## 2025-06-12 RX ADMIN — HEPARIN SODIUM 5000 UNIT(S): 1000 INJECTION INTRAVENOUS; SUBCUTANEOUS at 05:35

## 2025-06-12 RX ADMIN — Medication 2 TABLET(S): at 22:22

## 2025-06-12 RX ADMIN — HEPARIN SODIUM 5000 UNIT(S): 1000 INJECTION INTRAVENOUS; SUBCUTANEOUS at 14:54

## 2025-06-12 RX ADMIN — Medication 25 MILLIGRAM(S): at 11:47

## 2025-06-12 RX ADMIN — LACTULOSE 15 GRAM(S): 10 SOLUTION ORAL at 14:54

## 2025-06-12 RX ADMIN — MIDODRINE HYDROCHLORIDE 5 MILLIGRAM(S): 5 TABLET ORAL at 21:02

## 2025-06-12 RX ADMIN — Medication 75 MICROGRAM(S): at 05:35

## 2025-06-12 RX ADMIN — FUROSEMIDE 40 MILLIGRAM(S): 10 INJECTION INTRAMUSCULAR; INTRAVENOUS at 14:54

## 2025-06-12 RX ADMIN — LACTULOSE 10 GRAM(S): 10 SOLUTION ORAL at 05:34

## 2025-06-12 RX ADMIN — LACTULOSE 15 GRAM(S): 10 SOLUTION ORAL at 21:01

## 2025-06-12 RX ADMIN — ALBUMIN (HUMAN) 50 MILLILITER(S): 12.5 INJECTION, SOLUTION INTRAVENOUS at 22:23

## 2025-06-12 RX ADMIN — MIDODRINE HYDROCHLORIDE 5 MILLIGRAM(S): 5 TABLET ORAL at 05:34

## 2025-06-12 RX ADMIN — MIDODRINE HYDROCHLORIDE 5 MILLIGRAM(S): 5 TABLET ORAL at 11:47

## 2025-06-12 RX ADMIN — FUROSEMIDE 40 MILLIGRAM(S): 10 INJECTION INTRAMUSCULAR; INTRAVENOUS at 05:35

## 2025-06-12 RX ADMIN — HEPARIN SODIUM 5000 UNIT(S): 1000 INJECTION INTRAVENOUS; SUBCUTANEOUS at 22:22

## 2025-06-12 RX ADMIN — Medication 1 TABLET(S): at 11:47

## 2025-06-12 NOTE — H&P ADULT - NSHPPHYSICALEXAM_GEN_ALL_CORE
T(C): 36.7 (06-12-25 @ 20:17), Max: 37.3 (06-12-25 @ 05:37)  HR: 100 (06-12-25 @ 22:30) (97 - 101)  BP: 106/73 (06-12-25 @ 22:30) (92/46 - 125/73)  RR: 18 (06-12-25 @ 20:17) (17 - 18)  SpO2: 95% (06-12-25 @ 20:17) (92% - 95%)  GENERAL: NAD, lying in bed    EYES: EOMI, PERRLA; conjunctiva and sclera clear  ENMT: Moist oral mucosa, no pharyngeal injection or exudates;    NECK: Supple, no palpable masses; no JVD  RESPIRATORY: Normal respiratory effort; lungs are clear to auscultation bilaterally  CARDIOVASCULAR: Regular rate and rhythm, normal S1 and S2, no murmur/rub/gallop; +lower extremity edema; Peripheral pulses are 2+ bilaterally  ABDOMEN: distended but soft, normoactive bowel sounds, no rebound/guarding   MUSCULOSKELETAL:  no joint swelling or tenderness to palpation  PSYCH: A+O  x2-3; affect appropriate  NEUROLOGY: CN 2-12 are intact and symmetric; no gross motor or sensory deficits   SKIN: No rashes; no palpable lesions

## 2025-06-12 NOTE — H&P ADULT - PROBLEM SELECTOR PLAN 9
h/o h.pylori gastritis (reportedly, not treated), gerd  start protonix fu tsh  synthroid fu lipid profile

## 2025-06-12 NOTE — H&P ADULT - PROBLEM SELECTOR PLAN 1
s/p thoracentesis x2 (1.2L on 6/4, 1.2L on 6/9; fluid studies consistent w exudative eff; cytology negative for malignant cells; pulm consulted, opine, "may require pleural biopsy")  unclear etiology of pleural fluid  most recent cxr w persistent bilateral pl eff  currently in no respiratory distress with adequate spo2 at room air while resting   fu urine studies, tte  Monitor SpO2, RR, for signs of respiratory distress; Goal SpO2 >88-92%   oxygen supplementation as needed  aggressive pulmonary toilet/hygiene   pulm consult in am

## 2025-06-12 NOTE — CHART NOTE - NSCHARTNOTEFT_GEN_A_CORE
Nutrition Follow Up Note  Hospital Course (Per Electronic Medical Record):   Source: Medical Record [X]  Nursing Staff [X]     Diet: Low Sodium , Ensure Plus BID     Patient tolerating diet no n/v noted , po intake reported good as per PCA patient consumed 75% of  breakfast meal. Dx of severe protein calorie malnutrition noted supplementing diet with Ensure Plus BID which is providing an additional 700kcals, 26gms protein. Regular BM's noted patient receiving Lactulose in the setting of Cirrhosis,  continue current diet , will follow clinical course     Current Weight: Admit weight (6/2) 136/ 62.1kg - weight change noted during hospitalization patient receiving Lasix 40mg BID    (6/11) 129.1/58.6kg    (6/9) 128.3/58.2kg   (6/6) 128.9/58.2kg     Pertinent Medications: MEDICATIONS  (STANDING):  furosemide    Tablet 40 milliGRAM(s) Oral two times a day  heparin   Injectable 5000 Unit(s) SubCutaneous every 8 hours  lactulose Syrup 15 Gram(s) Oral three times a day  levothyroxine 75 MICROGram(s) Oral daily  midodrine. 5 milliGRAM(s) Oral three times a day  multivitamin 1 Tablet(s) Oral daily  propranolol 10 milliGRAM(s) Oral two times a day  spironolactone 25 milliGRAM(s) Oral <User Schedule>    MEDICATIONS  (PRN):  acetaminophen     Tablet .. 650 milliGRAM(s) Oral every 6 hours PRN Temp greater or equal to 38C (100.4F), Mild Pain (1 - 3)  aluminum hydroxide/magnesium hydroxide/simethicone Suspension 30 milliLiter(s) Oral every 4 hours PRN Dyspepsia  melatonin 3 milliGRAM(s) Oral at bedtime PRN Insomnia  ondansetron Injectable 4 milliGRAM(s) IV Push every 8 hours PRN Nausea and/or Vomiting      Pertinent Labs:  06-12 Na133 mmol/L[L] Glu 88 mg/dL K+ 3.9 mmol/L Cr  0.87 mg/dL BUN 14 mg/dL 06-12 Phos 3.6 mg/dL 06-12 Alb 2.1 g/dL[L] 06-03 Chol 91 mg/dL LDL --    HDL 12 mg/dL[L] Trig 68 mg/dL        Skin: intact    Edema: none    Last BM: (6/12) receives Lactulose TID     Estimated Needs:   [X] No Change since Previous Assessment      Previous Nutrition Diagnosis: severe protein calorie malnutrition     Nutrition Diagnosis is [X] Ongoing & addressed         Interventions:   1. continue current nutrition regimen       Monitoring & Evaluation: will monitor:  [X] PO Intake   [X] Labs  [X] Follow Up (Per Protocol)  [X] Tolerance to Diet Prescription       RD to follow as per Nutrition protocol  Corrine Kimble RDN PT physical scheduled  Refill RX

## 2025-06-12 NOTE — DISCHARGE NOTE NURSING/CASE MANAGEMENT/SOCIAL WORK - NSDCPEFALRISK_GEN_ALL_CORE
For information on Fall & Injury Prevention, visit: [email or web address] OR  [email or web address] OR  https://www.cdc.gov/steadi/patient.html

## 2025-06-12 NOTE — H&P ADULT - PROBLEM SELECTOR PLAN 6
cont lasix + aldactone w hold parameters and albumin assistance  cont propranolol   cont midodrine  consider octreotide reportedly stopped drinking alcohol 1-2 years ago  christa miller   multivitamin, thiamine, folic acid supplementation outpatient records reviewed  outpatient pet-ct reviewed; "Multiple hypermetabolic mediastinal and bilateral hilar lymph nodes, with the largest lesion adjacent to the descending thoracic aorta. Consider tissue characterization of the largest mediastinal lesion....Right level 2 neck lymph node with increased FDG uptake. Consider sonography if this will change patient management. Two periportal lymph nodes with increased FDG uptake, suspicious for malignant involvement....Previously noted nodule lateral to the inferior right hepatic lobe no longer appreciated....3.8 cm infrarenal abdominal aortic aneurysm"  was scheduled to undergo colonoscopy, but left the country and was subsequently lost to follow up  gi consult in am outpatient records + labs reviewed; worsening anemia since 11/2024  outpatient pet-ct reviewed; "Multiple hypermetabolic mediastinal and bilateral hilar lymph nodes, with the largest lesion adjacent to the descending thoracic aorta. Consider tissue characterization of the largest mediastinal lesion....Right level 2 neck lymph node with increased FDG uptake. Consider sonography if this will change patient management. Two periportal lymph nodes with increased FDG uptake, suspicious for malignant involvement....Previously noted nodule lateral to the inferior right hepatic lobe no longer appreciated....3.8 cm infrarenal abdominal aortic aneurysm"  was scheduled to undergo colonoscopy, but left the country and was subsequently lost to follow up  gi consult in am

## 2025-06-12 NOTE — H&P ADULT - ASSESSMENT
64yo m w pmh htn, hld, aud, alcoholic liver cirrhosis c/b portal htn, hpylori gastritis, gerd, hypothyroidism, presented to osh w sob/cordova, bl le edema, abdominal distention    pl eff    ascites    he/pse    decompensated cirrhosis    aud    htn    hld    hypothyr    gerd 66yo m w pmh htn, hld, aud, alcoholic liver cirrhosis c/b portal htn, hpylori gastritis, gerd, hypothyroidism, presented to osh w sob/cordova, bl le swelling, abdominal distention; found to have bl pl eff and ascites; suspected 2/2 decompensated cirrhosis; admitted to osh for further mgmt; s/p thoracentesis x2 (1.2L on 6/4, 1.2L on 6/9; fluid studies consistent w exudative eff; pulm consulted, opine, "may require pleural biopsy") + paracentesis x1 (1.5L on 6/6; fluid studies consistent w ***); hospital couse complicated by lethargy, 2/2 he/pse, lactulose started; once stabilized, decision made to have patient transferred to [facility] for tips, transplant evaluation in lieu of socioeconomic barriers that would make outpatient evaluation difficult. 64yo m w pmh htn, hld, aud, alcoholic liver cirrhosis c/b portal htn, hpylori gastritis, gerd, hypothyroidism, presented to osh w sob/cordova, bl le swelling, abdominal distention; found to have bl pl eff and ascites; suspected 2/2 decompensated cirrhosis; admitted to osh for further mgmt; s/p thoracentesis x2 (1.2L on 6/4, 1.2L on 6/9; fluid studies consistent w exudative eff; pulm consulted, opine, "may require pleural biopsy") + paracentesis x1 (1.5L on 6/6); hospital course complicated by lethargy, 2/2 he/pse, lactulose started; once stabilized, decision made to have patient transferred to [facility] for tips, transplant evaluation in lieu of socioeconomic barriers that would make outpatient evaluation difficult.

## 2025-06-12 NOTE — H&P ADULT - PROBLEM SELECTOR PLAN 11
h/o h.pylori gastritis (reportedly, not treated), gerd  start protonix h/o h.pylori gastritis (reportedly, not treated), gerd  fu h pylori stool test  gi consult in am

## 2025-06-12 NOTE — PROGRESS NOTE ADULT - ASSESSMENT
65 y/o male with PMHx of HLD, liver cirrhosis 2/2 ETOH use disorder (stopped drinking 1-2 yeas ago), positive FIT test (cancelled colonoscopy 1/2025), Hpylori + 11/16/24 (did not complete treatment), GERD, hyperlipidemia, hypothyroidism is admitted for:    #Decompensated alcoholic cirrhosis versus malignant ascites (positive FIT outpatient)   #Acute Hypoxic respiratory failure secondary to b/l pleural effusions  #b/l LE edema  - s/p Thoracocentesis 6/4, Paracenteses 6/6, repeat Thoracocentesis 6/9   - Ascites fluid resulted - no growth   - c/w Lasix 40mg BID    - c/w Spironolactone 25 for cirrhosis, increase as able due to blood pressure   - c/w Propanolol for prophylaxis   - Midodrine 5mg TID for blood pressure support   - Antibiotic regimen completed   - Ammonia elevated - lactulose 15mg TID -goal of 3-4 BM daily   - GI recs appreciated  - Pulm recs appreciated  - Hepatology - outpatient liver transplant/TIPS evaluation recommended   - PT Evaluation     #Hypothyroidism  - c/w levothyroxine 75   - recheck TSH in 6 weeks     #HLD  - lipid panel 6/3/2025: total 91, HDL 12, LDL 64     #DVT ppx  - Heparin     #GOC  - Full code     Discussed with Dr. Fraser  65 y/o male with PMHx of HLD, liver cirrhosis 2/2 ETOH use disorder (stopped drinking 1-2 yeas ago), positive FIT test (cancelled colonoscopy 1/2025), Hpylori + 11/16/24 (did not complete treatment), GERD, hyperlipidemia, hypothyroidism is admitted for:    #Decompensated alcoholic cirrhosis versus malignant ascites (positive FIT outpatient)   #Acute Hypoxic respiratory failure secondary to b/l pleural effusions  #b/l LE edema  - s/p Thoracocentesis 6/4, Paracenteses 6/6, repeat Thoracocentesis 6/9   - Ascites fluid resulted - no growth   - c/w Lasix 40mg BID    - c/w Spironolactone 25 for cirrhosis, increase as able due to blood pressure   - c/w Propanolol for prophylaxis   - Midodrine 5mg TID for blood pressure support   - Antibiotic regimen completed   - Ammonia elevated - lactulose 15mg TID -goal of 3-4 BM daily   - US Abdomen   - GI recs appreciated  - Pulm recs appreciated  - Hepatology - outpatient liver transplant/TIPS evaluation recommended   - PT Evaluation     #Hypothyroidism  - c/w levothyroxine 75   - recheck TSH in 6 weeks     #HLD  - lipid panel 6/3/2025: total 91, HDL 12, LDL 64     #DVT ppx  - Heparin     #GOC  - Full code     Discussed with Dr. Fraser

## 2025-06-12 NOTE — PROGRESS NOTE ADULT - REASON FOR ADMISSION
ascites secondary to cirrhosis, sob, b/l LE edema

## 2025-06-12 NOTE — DISCHARGE NOTE NURSING/CASE MANAGEMENT/SOCIAL WORK - NSDCFUADDAPPT_GEN_ALL_CORE_FT
APPTS ARE READY TO BE MADE: [ ] YES    Best Family or Patient Contact (if needed):    Additional Information about above appointments (if needed):    1: PCP    2: hepatologist  3: pulm    Other comments or requests:

## 2025-06-12 NOTE — PROGRESS NOTE ADULT - ATTENDING COMMENTS
Plan of care as above    # Cirrohsis/Ascites  # Pleural effusion  - SP Thoracentesis/Para  - Blood pressure soft  - Worsening ascites/Recurrent effusion  - SP Lasix and Albumin  - Exudate for PE, Trans ? for Ascites  - May need pleural bx   - Cytology neg in ascites fluid  - Midodrine for BP support  - Start lactulose for ammonemia   - transfer for advanced Gastro    Continue care as above.

## 2025-06-12 NOTE — PROGRESS NOTE ADULT - SUBJECTIVE AND OBJECTIVE BOX
Subjective and Objective:     Overnight Events: None   Interval History: Patient was seen and examined this morning at bedside. Patient more alert this morning. Patient had one large soft BM this morning.     Vital Signs Last 24 Hrs  T(C): 37.3 (12 Jun 2025 05:37), Max: 37.3 (12 Jun 2025 05:37)  T(F): 99.1 (12 Jun 2025 05:37), Max: 99.1 (12 Jun 2025 05:37)  HR: 97 (12 Jun 2025 05:37) (96 - 101)  BP: 92/46 (12 Jun 2025 05:37) (92/46 - 101/66)  BP(mean): --  RR: 17 (12 Jun 2025 05:37) (17 - 18)  SpO2: 92% (12 Jun 2025 05:37) (92% - 93%)    Parameters below as of 12 Jun 2025 05:37  Patient On (Oxygen Delivery Method): room air      I&O's Summary        PHYSICAL EXAM:  Constitutional: Pt lying in bed, awake and alert, NAD  Respiratory:  Bilateral air entry, Clear to auscultation bilaterally, no significant sputum production, No Rales, No Rhonchi, No Wheezing  Cardiovascular: S1S2+, RRR, no M/G/R  Gastrointestinal: abdomen distended, positive fluid wave   Extremities: no lower extremity swelling   Vascular: Peripheral pulses present  Neurological: AAOx2 to person and place , no focal deficits      ALLERGIES:  No Known Allergies      MEDICATIONS:  MEDICATIONS  (STANDING):  furosemide    Tablet 40 milliGRAM(s) Oral two times a day  heparin   Injectable 5000 Unit(s) SubCutaneous every 8 hours  lactulose Syrup 15 Gram(s) Oral three times a day  levothyroxine 75 MICROGram(s) Oral daily  midodrine. 5 milliGRAM(s) Oral three times a day  multivitamin 1 Tablet(s) Oral daily  propranolol 10 milliGRAM(s) Oral two times a day  spironolactone 25 milliGRAM(s) Oral <User Schedule>    MEDICATIONS  (PRN):  acetaminophen     Tablet .. 650 milliGRAM(s) Oral every 6 hours PRN Temp greater or equal to 38C (100.4F), Mild Pain (1 - 3)  aluminum hydroxide/magnesium hydroxide/simethicone Suspension 30 milliLiter(s) Oral every 4 hours PRN Dyspepsia  melatonin 3 milliGRAM(s) Oral at bedtime PRN Insomnia  ondansetron Injectable 4 milliGRAM(s) IV Push every 8 hours PRN Nausea and/or Vomiting      LABS: (Personally Reviewed):                        10.1   10.32 )-----------( 377      ( 12 Jun 2025 07:36 )             29.4     06-12    133[L]  |  99  |  14  ----------------------------<  88  3.9   |  27  |  0.87    Ca    8.7      12 Jun 2025 07:36  Phos  3.6     06-12  Mg     1.7     06-12    TPro  8.7[H]  /  Alb  2.1[L]  /  TBili  0.8  /  DBili  x   /  AST  28  /  ALT  13  /  AlkPhos  182[H]  06-12          Blood Culture: 06-09 @ 10:30  Organism --  Gram Stain Blood -- Gram Stain   No polymorphonuclear cells seen  No organisms seen  by cytocentrifuge  Specimen Source Pleural Fl Pleural Fluid  Culture-Blood --

## 2025-06-12 NOTE — PROGRESS NOTE ADULT - NUTRITIONAL ASSESSMENT
This patient has been assessed with a concern for Malnutrition and has been determined to have a diagnosis/diagnoses of Severe protein-calorie malnutrition.    This patient is being managed with:   Diet Regular-  Low Sodium  Supplement Feeding Modality:  Oral  Ensure Plus High Protein Cans or Servings Per Day:  1       Frequency:  Two Times a day  Entered: Jun 7 2025  3:15PM  
This patient has been assessed with a concern for Malnutrition and has been determined to have a diagnosis/diagnoses of Severe protein-calorie malnutrition.    This patient is being managed with:   Diet NPO after Midnight-     NPO Start Date: 08-Jun-2025   NPO Start Time: 23:59  Entered: Jun 8 2025  7:15AM    Diet Regular-  Low Sodium  Supplement Feeding Modality:  Oral  Ensure Plus High Protein Cans or Servings Per Day:  1       Frequency:  Two Times a day  Entered: Jun 7 2025  3:15PM

## 2025-06-12 NOTE — H&P ADULT - PROBLEM SELECTOR PLAN 7
fu lipid profile cont lasix + aldactone w hold parameters and albumin assistance  cont propranolol   cont midodrine  consider octreotide reportedly stopped drinking alcohol 1-2 years ago  christa miller   multivitamin, thiamine, folic acid supplementation

## 2025-06-12 NOTE — H&P ADULT - HISTORY OF PRESENT ILLNESS
64yo m w pmh htn, hld, aud, alcoholic liver cirrhosis c/b portal htn, hpylori gastritis, gerd, hypothyroidism, presented to osh w sob/cordova, bl le swelling, abdominal distention; found to have bl pl eff and ascites; suspected 2/2 decompensated cirrhosis; admitted to osh for further mgmt; s/p thoracentesis x2 (1.2L on 6/4, 1.2L on 6/9; fluid studies consistent w exudative eff; pulm consulted, opine, "may require pleural biopsy") + paracentesis x1 (1.5L on 6/6; fluid studies consistent w ***); hospital couse complicated by lethargy, 2/2 he/pse, lactulose started; once stabilized, decision made to have patient transferred to [facility] for tips, transplant evaluation in lieu of socioeconomic barriers that would make outpatient evaluation difficult.   66yo m w pmh htn, hld, aud, alcoholic liver cirrhosis c/b portal htn, hpylori gastritis, gerd, hypothyroidism, presented to osh w sob/cordova, bl le swelling, abdominal distention; found to have bl pl eff and ascites; suspected 2/2 decompensated cirrhosis; admitted to osh for further mgmt; s/p thoracentesis x2 (1.2L on 6/4, 1.2L on 6/9; fluid studies consistent w exudative eff; pulm consulted, opine, "may require pleural biopsy") + paracentesis x1 (1.5L on 6/6); hospital course complicated by lethargy, 2/2 he/pse, lactulose started; once stabilized, decision made to have patient transferred to [facility] for tips, transplant evaluation in lieu of socioeconomic barriers that would make outpatient evaluation difficult.

## 2025-06-12 NOTE — H&P ADULT - PROBLEM SELECTOR PLAN 5
reportedly stopped drinking alcohol 1-2 years ago a/w elevated serum total protein (elevated gamma gap) + coagulopathy w elevated inr  suspect 2/2 cirrhosis and likely contributing to third spacing  fu spep, upep, serum and urine immunofixation, ig panel; hiv; hepatitis panel  nutrition consult in am a/w elevated serum total protein (elevated gamma gap) + coagulopathy w elevated inr, iso cirrhosis  pet-ct showed lymphadenopathy as described below;  fu spep, upep, serum and urine immunofixation, ig panel; hiv; hepatitis panel  nutrition consult in am a/w elevated serum total protein (elevated gamma gap) + coagulopathy w elevated inr + worsening anemia, iso cirrhosis  outpatient hiv and hepatitis panel negative  pet-ct showed lymphadenopathy as described below;  fu spep, upep, serum and urine immunofixation, ig panel;  nutrition consult in am

## 2025-06-12 NOTE — PATIENT PROFILE ADULT - FALL HARM RISK - HARM RISK INTERVENTIONS

## 2025-06-12 NOTE — PROGRESS NOTE ADULT - PROVIDER SPECIALTY LIST ADULT
Family Medicine
Pulmonology
Pulmonology
Family Medicine
Gastroenterology
Pulmonology
Gastroenterology
Pulmonology
Pulmonology
Family Medicine
Gastroenterology
Gastroenterology

## 2025-06-12 NOTE — H&P ADULT - PROBLEM SELECTOR PLAN 2
s/p paracentesis x1 (1.5L on 6/6)  fluid studies at that time showed white count ~1500, neutrophils ~4%, lymphocytes ~75%; saag ~0.3; fluid protein 5.1, glu 60, ldh 129, amylase 48; fluid gram stain and culture w ngtd  unclear etiology of ascitic fluid; ?tuberculous peritonitis   most recent limited abdominal us shows Mild/small volume diffuse abdominal ascites  serial abdominal exam/abdominal imaging as needed  cont lasix + aldactone w albumin assistance  cont midodrine; consider adding octreotide   ir consult in am for repeat tap w repeat fluid studies, including ada, afb stain, mycobacterial culture    gi/hepatology consult in am s/p paracentesis x1 (1.5L on 6/6)  fluid studies at that time showed white count ~1500, neutrophils ~4%, lymphocytes ~75%; saag ~0.3; fluid protein 5.1, glu 60, ldh 129, amylase 48; fluid gram stain and culture w ngtd  unclear etiology of ascitic fluid    most recent limited abdominal us shows Mild/small volume diffuse abdominal ascites  serial abdominal exam/abdominal imaging as needed  cont lasix + aldactone w albumin assistance  cont midodrine; consider adding octreotide   ir consult in am for repeat tap w repeat fluid studies   hepatology consult in am

## 2025-06-12 NOTE — H&P ADULT - PROBLEM SELECTOR PLAN 3
neuroimaging wnl  nh3 ~80  Monitor mental status with frequent neurochecks  maintain fall, delirium, seizure, aspiration precautions; keep head end of bed elevated  bowel regimen w lactulose 15g tid + rifaximin 550 bid + miralax/senna; adjust to maintain goal 2-3 bm/day  gi/hepatology consult in am neuroimaging wnl  nh3 ~80  Monitor mental status with frequent neurochecks  maintain fall, delirium, seizure, aspiration precautions; keep head end of bed elevated  bowel regimen w lactulose 15g tid + rifaximin 550 bid + miralax/senna; adjust to maintain goal 2-3 bm/day  hepatology consult in am

## 2025-06-12 NOTE — DISCHARGE NOTE NURSING/CASE MANAGEMENT/SOCIAL WORK - PATIENT PORTAL LINK FT
You can access the FollowMyHealth Patient Portal offered by [facility] by registering at the following website: [email or web address]. By joining [facility]’s FollowMyHealth portal, you will also be able to view your health information using other applications (apps) compatible with our system.

## 2025-06-12 NOTE — PHARMACOTHERAPY INTERVENTION NOTE - INTERVENTION CATEGORIES
When approved, pt's father would like approval form faxed to: [phone].  
Therapy
Patient Education
Therapy

## 2025-06-12 NOTE — PHARMACOTHERAPY INTERVENTION NOTE - COMMENTS
Recommend adjust time of spironolactone administration to 1200 to optimize BP fluctuations with multiple BP lowering medications, d/w family medicine team
Recommend increase lactulose dose to 15g TID for elevated ammonia level, pt only had one BM with 10g TID, discussed during IDRs

## 2025-06-12 NOTE — DISCHARGE NOTE NURSING/CASE MANAGEMENT/SOCIAL WORK - FINANCIAL ASSISTANCE
[facility] provides services at a reduced cost to those who are determined to be eligible through [facility]’s financial assistance program. Information regarding [facility]’s financial assistance program can be found by going to [email or web address] or by calling [phone].

## 2025-06-12 NOTE — H&P ADULT - PROBLEM SELECTOR PLAN 4
h/o alcoholic cirrhosis  anemia, coagulopathy, hypoalbuminemia iso cirrhosis  a/w portal htn and its complications;   he/pse; see above  varices; ct a/p showed small varices; cont propranolol, start protonix; gi consult in am  portal hypertensive colopathy; ct a/p showed Edematous wall thickening of distal and terminal ileum, cecum, ascending through mid transverse colon  ascites; see above  sbp; no prior history of sbp   hcc; recent imaging studies with no evidence of hcc; follow up afp  hepatology consult in am h/o alcoholic cirrhosis  anemia, coagulopathy, hypoalbuminemia iso cirrhosis  a/w portal htn and its complications;   he/pse; see above  varices; ct a/p showed small varices; cont propranolol; gi consult in am  portal hypertensive colopathy; ct a/p showed Edematous wall thickening of distal and terminal ileum, cecum, ascending through mid transverse colon  ascites; see above  sbp; no prior history of sbp   hcc; recent imaging studies with no evidence of hcc; follow up afp  hepatology consult in am

## 2025-06-12 NOTE — H&P ADULT - PROBLEM SELECTOR PLAN 8
fu tsh  synthroid fu lipid profile cont lasix + aldactone w hold parameters and albumin assistance  cont propranolol   cont midodrine  consider octreotide

## 2025-06-13 ENCOUNTER — RESULT REVIEW (OUTPATIENT)
Age: 65
End: 2025-06-13

## 2025-06-13 DIAGNOSIS — E88.09 OTHER DISORDERS OF PLASMA-PROTEIN METABOLISM, NOT ELSEWHERE CLASSIFIED: ICD-10-CM

## 2025-06-13 DIAGNOSIS — R19.5 OTHER FECAL ABNORMALITIES: ICD-10-CM

## 2025-06-13 LAB
A1C WITH ESTIMATED AVERAGE GLUCOSE RESULT: 5 % — SIGNIFICANT CHANGE UP (ref 4–5.6)
AFP-TM SERPL-MCNC: <1.8 NG/ML — SIGNIFICANT CHANGE UP
ALBUMIN FLD-MCNC: 2.3 G/DL — SIGNIFICANT CHANGE UP
ALBUMIN SERPL ELPH-MCNC: 3.5 G/DL — SIGNIFICANT CHANGE UP (ref 3.3–5)
ALP SERPL-CCNC: 168 U/L — HIGH (ref 40–120)
ALT FLD-CCNC: 8 U/L — LOW (ref 10–45)
ANION GAP SERPL CALC-SCNC: 15 MMOL/L — SIGNIFICANT CHANGE UP (ref 5–17)
APPEARANCE UR: CLEAR — SIGNIFICANT CHANGE UP
APTT BLD: 52.8 SEC — HIGH (ref 26.1–36.8)
AST SERPL-CCNC: 22 U/L — SIGNIFICANT CHANGE UP (ref 10–40)
B PERT IGG+IGM PNL SER: CLEAR — SIGNIFICANT CHANGE UP
BACTERIA # UR AUTO: NEGATIVE /HPF — SIGNIFICANT CHANGE UP
BASOPHILS # BLD AUTO: 0.07 K/UL — SIGNIFICANT CHANGE UP (ref 0–0.2)
BASOPHILS NFR BLD AUTO: 0.7 % — SIGNIFICANT CHANGE UP (ref 0–2)
BILIRUB SERPL-MCNC: 1.3 MG/DL — HIGH (ref 0.2–1.2)
BILIRUB UR-MCNC: ABNORMAL
BUN SERPL-MCNC: 15 MG/DL — SIGNIFICANT CHANGE UP (ref 7–23)
CALCIUM SERPL-MCNC: 9.3 MG/DL — SIGNIFICANT CHANGE UP (ref 8.4–10.5)
CAST: 1 /LPF — SIGNIFICANT CHANGE UP (ref 0–4)
CHLORIDE SERPL-SCNC: 95 MMOL/L — LOW (ref 96–108)
CHOLEST SERPL-MCNC: 77 MG/DL — SIGNIFICANT CHANGE UP
CO2 SERPL-SCNC: 21 MMOL/L — LOW (ref 22–31)
COLOR FLD: YELLOW
COLOR SPEC: SIGNIFICANT CHANGE UP
CREAT SERPL-MCNC: 0.54 MG/DL — SIGNIFICANT CHANGE UP (ref 0.5–1.3)
DIFF PNL FLD: NEGATIVE — SIGNIFICANT CHANGE UP
EGFR: 110 ML/MIN/1.73M2 — SIGNIFICANT CHANGE UP
EGFR: 110 ML/MIN/1.73M2 — SIGNIFICANT CHANGE UP
EOSINOPHIL # BLD AUTO: 0.21 K/UL — SIGNIFICANT CHANGE UP (ref 0–0.5)
EOSINOPHIL NFR BLD AUTO: 2 % — SIGNIFICANT CHANGE UP (ref 0–6)
ESTIMATED AVERAGE GLUCOSE: 97 MG/DL — SIGNIFICANT CHANGE UP (ref 68–114)
FERRITIN SERPL-MCNC: 749 NG/ML — HIGH (ref 30–400)
FLUID INTAKE SUBSTANCE CLASS: SIGNIFICANT CHANGE UP
FOLATE SERPL-MCNC: 13.4 NG/ML — SIGNIFICANT CHANGE UP
GAS PNL BLDV: SIGNIFICANT CHANGE UP
GLUCOSE FLD-MCNC: 98 MG/DL — SIGNIFICANT CHANGE UP
GLUCOSE SERPL-MCNC: 82 MG/DL — SIGNIFICANT CHANGE UP (ref 70–99)
GLUCOSE UR QL: NEGATIVE MG/DL — SIGNIFICANT CHANGE UP
HAPTOGLOB SERPL-MCNC: 130 MG/DL — SIGNIFICANT CHANGE UP (ref 34–200)
HAV IGM SER-ACNC: SIGNIFICANT CHANGE UP
HBV CORE IGM SER-ACNC: SIGNIFICANT CHANGE UP
HBV SURFACE AG SER-ACNC: SIGNIFICANT CHANGE UP
HCT VFR BLD CALC: 30.3 % — LOW (ref 39–50)
HCV AB S/CO SERPL IA: 0.21 S/CO — SIGNIFICANT CHANGE UP (ref 0–0.79)
HCV AB SERPL-IMP: SIGNIFICANT CHANGE UP
HDLC SERPL-MCNC: 18 MG/DL — LOW
HGB BLD-MCNC: 10.3 G/DL — LOW (ref 13–17)
IGA FLD-MCNC: 769 MG/DL — HIGH (ref 84–499)
IGG FLD-MCNC: 2632 MG/DL — HIGH (ref 610–1660)
IGM SERPL-MCNC: 121 MG/DL — SIGNIFICANT CHANGE UP (ref 35–242)
IMM GRANULOCYTES NFR BLD AUTO: 0.5 % — SIGNIFICANT CHANGE UP (ref 0–0.9)
INR BLD: 1.4 RATIO — HIGH (ref 0.85–1.16)
IRON SATN MFR SERPL: 38 % — SIGNIFICANT CHANGE UP (ref 16–55)
IRON SATN MFR SERPL: 38 UG/DL — LOW (ref 45–165)
KAPPA LC SER QL IFE: 17.59 MG/DL — HIGH (ref 0.33–1.94)
KAPPA/LAMBDA FREE LIGHT CHAIN RATIO, SERUM: 0.86 RATIO — SIGNIFICANT CHANGE UP (ref 0.26–1.65)
KETONES UR QL: NEGATIVE MG/DL — SIGNIFICANT CHANGE UP
LAMBDA LC SER QL IFE: 20.46 MG/DL — HIGH (ref 0.57–2.63)
LDH SERPL L TO P-CCNC: 120 U/L — SIGNIFICANT CHANGE UP (ref 50–242)
LDH SERPL L TO P-CCNC: 146 U/L — SIGNIFICANT CHANGE UP
LDLC SERPL-MCNC: 45 MG/DL — SIGNIFICANT CHANGE UP
LEUKOCYTE ESTERASE UR-ACNC: ABNORMAL
LIPID PNL WITH DIRECT LDL SERPL: 45 MG/DL — SIGNIFICANT CHANGE UP
LYMPHOCYTES # BLD AUTO: 1.52 K/UL — SIGNIFICANT CHANGE UP (ref 1–3.3)
LYMPHOCYTES # BLD AUTO: 14.3 % — SIGNIFICANT CHANGE UP (ref 13–44)
LYMPHOCYTES # FLD: 85 % — SIGNIFICANT CHANGE UP
MAGNESIUM SERPL-MCNC: 1.8 MG/DL — SIGNIFICANT CHANGE UP (ref 1.6–2.6)
MCHC RBC-ENTMCNC: 30.8 PG — SIGNIFICANT CHANGE UP (ref 27–34)
MCHC RBC-ENTMCNC: 34 G/DL — SIGNIFICANT CHANGE UP (ref 32–36)
MCV RBC AUTO: 90.7 FL — SIGNIFICANT CHANGE UP (ref 80–100)
MONOCYTES # BLD AUTO: 1.45 K/UL — HIGH (ref 0–0.9)
MONOCYTES NFR BLD AUTO: 13.6 % — SIGNIFICANT CHANGE UP (ref 2–14)
MONOS+MACROS # FLD: 13 % — SIGNIFICANT CHANGE UP
NEUTROPHILS # BLD AUTO: 7.34 K/UL — SIGNIFICANT CHANGE UP (ref 1.8–7.4)
NEUTROPHILS NFR BLD AUTO: 68.9 % — SIGNIFICANT CHANGE UP (ref 43–77)
NEUTROPHILS-BODY FLUID: 2 % — SIGNIFICANT CHANGE UP
NITRITE UR-MCNC: NEGATIVE — SIGNIFICANT CHANGE UP
NONHDLC SERPL-MCNC: 59 MG/DL — SIGNIFICANT CHANGE UP
NRBC BLD AUTO-RTO: 0 /100 WBCS — SIGNIFICANT CHANGE UP (ref 0–0)
PH UR: 6 — SIGNIFICANT CHANGE UP (ref 5–8)
PHOSPHATE SERPL-MCNC: 3.7 MG/DL — SIGNIFICANT CHANGE UP (ref 2.5–4.5)
PLATELET # BLD AUTO: 362 K/UL — SIGNIFICANT CHANGE UP (ref 150–400)
POTASSIUM SERPL-MCNC: 3.8 MMOL/L — SIGNIFICANT CHANGE UP (ref 3.5–5.3)
POTASSIUM SERPL-SCNC: 3.8 MMOL/L — SIGNIFICANT CHANGE UP (ref 3.5–5.3)
PROT FLD-MCNC: 6 G/DL — SIGNIFICANT CHANGE UP
PROT SERPL-MCNC: 8.9 G/DL — HIGH (ref 6–8.3)
PROT UR-MCNC: NEGATIVE MG/DL — SIGNIFICANT CHANGE UP
PROTHROM AB SERPL-ACNC: 15.9 SEC — HIGH (ref 9.9–13.4)
RBC # BLD: 3.34 M/UL — LOW (ref 4.2–5.8)
RBC # BLD: 3.34 M/UL — LOW (ref 4.2–5.8)
RBC # FLD: 15.1 % — HIGH (ref 10.3–14.5)
RBC CASTS # UR COMP ASSIST: 0 /HPF — SIGNIFICANT CHANGE UP (ref 0–4)
RCV VOL RI: 2000 CELLS/UL — HIGH
RETICS #: 63.8 K/UL — SIGNIFICANT CHANGE UP (ref 25–125)
RETICS/RBC NFR: 1.9 % — SIGNIFICANT CHANGE UP (ref 0.5–2.5)
REVIEW: SIGNIFICANT CHANGE UP
SODIUM SERPL-SCNC: 131 MMOL/L — LOW (ref 135–145)
SP GR SPEC: 1.02 — SIGNIFICANT CHANGE UP (ref 1–1.03)
SQUAMOUS # UR AUTO: 1 /HPF — SIGNIFICANT CHANGE UP (ref 0–5)
TIBC SERPL-MCNC: 99 UG/DL — LOW (ref 220–430)
TOTAL CELLS COUNTED, BODY FLUID: 1291 CELLS — SIGNIFICANT CHANGE UP
TOTAL NUCLEATED CELL COUNT, BODY FLUID: 1291 CELLS/UL — SIGNIFICANT CHANGE UP
TRIGL SERPL-MCNC: 58 MG/DL — SIGNIFICANT CHANGE UP
TSH SERPL-MCNC: 5.01 UIU/ML — HIGH (ref 0.27–4.2)
TUBE TYPE: SIGNIFICANT CHANGE UP
UIBC SERPL-MCNC: 61 UG/DL — LOW (ref 110–370)
UROBILINOGEN FLD QL: 4 MG/DL (ref 0.2–1)
UUN UR-MCNC: 373 MG/DL — SIGNIFICANT CHANGE UP
VIT B12 SERPL-MCNC: 887 PG/ML — SIGNIFICANT CHANGE UP (ref 232–1245)
WBC # BLD: 10.64 K/UL — HIGH (ref 3.8–10.5)
WBC # FLD AUTO: 10.64 K/UL — HIGH (ref 3.8–10.5)
WBC COUNT.: 1282 CELLS/UL — HIGH
WBC UR QL: 2 /HPF — SIGNIFICANT CHANGE UP (ref 0–5)

## 2025-06-13 PROCEDURE — 99223 1ST HOSP IP/OBS HIGH 75: CPT | Mod: GC

## 2025-06-13 PROCEDURE — 71045 X-RAY EXAM CHEST 1 VIEW: CPT | Mod: 26

## 2025-06-13 PROCEDURE — 88305 TISSUE EXAM BY PATHOLOGIST: CPT | Mod: 26

## 2025-06-13 PROCEDURE — 99233 SBSQ HOSP IP/OBS HIGH 50: CPT

## 2025-06-13 PROCEDURE — 88112 CYTOPATH CELL ENHANCE TECH: CPT | Mod: 26

## 2025-06-13 PROCEDURE — 49083 ABD PARACENTESIS W/IMAGING: CPT

## 2025-06-13 PROCEDURE — 49082 ABD PARACENTESIS: CPT

## 2025-06-13 RX ORDER — SPIRONOLACTONE 25 MG
50 TABLET ORAL DAILY
Refills: 0 | Status: DISCONTINUED | OUTPATIENT
Start: 2025-06-14 | End: 2025-06-25

## 2025-06-13 RX ORDER — SPIRONOLACTONE 25 MG
25 TABLET ORAL ONCE
Refills: 0 | Status: COMPLETED | OUTPATIENT
Start: 2025-06-13 | End: 2025-06-13

## 2025-06-13 RX ORDER — CARVEDILOL 3.12 MG/1
6.25 TABLET, FILM COATED ORAL EVERY 12 HOURS
Refills: 0 | Status: DISCONTINUED | OUTPATIENT
Start: 2025-06-13 | End: 2025-06-17

## 2025-06-13 RX ADMIN — MIDODRINE HYDROCHLORIDE 5 MILLIGRAM(S): 5 TABLET ORAL at 21:33

## 2025-06-13 RX ADMIN — FUROSEMIDE 40 MILLIGRAM(S): 10 INJECTION INTRAMUSCULAR; INTRAVENOUS at 06:01

## 2025-06-13 RX ADMIN — LACTULOSE 15 GRAM(S): 10 SOLUTION ORAL at 21:34

## 2025-06-13 RX ADMIN — ALBUMIN (HUMAN) 50 MILLILITER(S): 12.5 INJECTION, SOLUTION INTRAVENOUS at 05:28

## 2025-06-13 RX ADMIN — Medication 105 MILLIGRAM(S): at 22:59

## 2025-06-13 RX ADMIN — MIDODRINE HYDROCHLORIDE 5 MILLIGRAM(S): 5 TABLET ORAL at 05:28

## 2025-06-13 RX ADMIN — ALBUMIN (HUMAN) 50 MILLILITER(S): 12.5 INJECTION, SOLUTION INTRAVENOUS at 13:08

## 2025-06-13 RX ADMIN — LACTULOSE 15 GRAM(S): 10 SOLUTION ORAL at 05:27

## 2025-06-13 RX ADMIN — Medication 1 TABLET(S): at 13:03

## 2025-06-13 RX ADMIN — Medication 25 MILLIGRAM(S): at 06:01

## 2025-06-13 RX ADMIN — POLYETHYLENE GLYCOL 3350 17 GRAM(S): 17 POWDER, FOR SOLUTION ORAL at 13:03

## 2025-06-13 RX ADMIN — HEPARIN SODIUM 5000 UNIT(S): 1000 INJECTION INTRAVENOUS; SUBCUTANEOUS at 05:29

## 2025-06-13 RX ADMIN — HEPARIN SODIUM 5000 UNIT(S): 1000 INJECTION INTRAVENOUS; SUBCUTANEOUS at 21:34

## 2025-06-13 RX ADMIN — Medication 2 TABLET(S): at 21:33

## 2025-06-13 RX ADMIN — MIDODRINE HYDROCHLORIDE 5 MILLIGRAM(S): 5 TABLET ORAL at 13:04

## 2025-06-13 RX ADMIN — Medication 100 MILLIGRAM(S): at 13:04

## 2025-06-13 RX ADMIN — Medication 75 MICROGRAM(S): at 05:29

## 2025-06-13 RX ADMIN — ALBUMIN (HUMAN) 50 MILLILITER(S): 12.5 INJECTION, SOLUTION INTRAVENOUS at 21:34

## 2025-06-13 RX ADMIN — Medication 25 MILLIGRAM(S): at 13:03

## 2025-06-13 RX ADMIN — HEPARIN SODIUM 5000 UNIT(S): 1000 INJECTION INTRAVENOUS; SUBCUTANEOUS at 13:05

## 2025-06-13 RX ADMIN — FOLIC ACID 1 MILLIGRAM(S): 1 TABLET ORAL at 13:04

## 2025-06-13 RX ADMIN — LACTULOSE 15 GRAM(S): 10 SOLUTION ORAL at 13:04

## 2025-06-13 NOTE — CONSULT NOTE ADULT - SUBJECTIVE AND OBJECTIVE BOX
Chief Complaint:  Patient is a 65y old  Male who presents with a chief complaint of sob/cordova, bl le swelling, abdominal distention (12 Jun 2025 19:41)      HPI:  64yo M, PMH HTN, HLD, alcohol associated liver cirrhosis (last drink >1 year ago), gerd, hypothyroidism presented to [facility] w/ abdominal distension and SOB, found to have bilateral pleural effusions and ascites. He underwent thoracentesis on 6/4 and 6/9 (both 1.2L), paracentesis on 6/6 (1.5L, protein 5.1, PMNs 62.5). His hospital course was complicated by lethargy and confusion c/f HE, started on lactulose. For his ascites, he was treated with lasix 40 and spironolactone 25. He was also started on propanolol and midodrine 5 TID for blood pressure.     Allergies:  No Known Allergies      Home Medications:    Hospital Medications:  acetaminophen     Tablet .. 650 milliGRAM(s) Oral every 6 hours PRN  albumin human 25% IVPB 100 milliLiter(s) IV Intermittent every 8 hours  aluminum hydroxide/magnesium hydroxide/simethicone Suspension 30 milliLiter(s) Oral every 4 hours PRN  folic acid 1 milliGRAM(s) Oral daily  furosemide    Tablet 40 milliGRAM(s) Oral daily  heparin   Injectable 5000 Unit(s) SubCutaneous every 8 hours  lactulose Syrup 15 Gram(s) Oral three times a day  levothyroxine 75 MICROGram(s) Oral daily  melatonin 3 milliGRAM(s) Oral at bedtime PRN  midodrine 5 milliGRAM(s) Oral every 8 hours  multivitamin 1 Tablet(s) Oral daily  ondansetron Injectable 4 milliGRAM(s) IV Push every 8 hours PRN  polyethylene glycol 3350 17 Gram(s) Oral daily  propranolol 10 milliGRAM(s) Oral two times a day  rifAXIMin 550 milliGRAM(s) Oral two times a day  senna 2 Tablet(s) Oral at bedtime  spironolactone 25 milliGRAM(s) Oral daily  thiamine 100 milliGRAM(s) Oral daily      PMHX/PSHX:  ALC (alcoholic liver cirrhosis)    Hyperlipidemia    Positive FIT (fecal immunochemical test)    GERD (gastroesophageal reflux disease)    Hypothyroidism        Family history:      Denies family history of colon cancer/polyps, stomach cancer/polyps, pancreatic cancer/masses, liver cancer/disease, ovarian cancer and endometrial cancer.    Social History:   Tob: Denies  EtOH: Denies  Illicit Drugs: Denies    ROS:   Unable to obtain     PHYSICAL EXAM:     GENERAL:  No acute distress, not following commands   HEENT:  NCAT, no scleral icterus   CHEST:  no respiratory distress  HEART:  Regular rate and rhythm  ABDOMEN:  Soft, non-tender, + distended   EXTREMITIES: No edema  SKIN:  No rash/erythema/ecchymoses/petechiae/wounds/abscess/warm/dry  NEURO:  Alert and oriented x 3, no asterixis    Vital Signs:  Vital Signs Last 24 Hrs  T(C): 36.9 (13 Jun 2025 05:03), Max: 37.2 (12 Jun 2025 11:29)  T(F): 98.4 (13 Jun 2025 05:03), Max: 98.9 (12 Jun 2025 11:29)  HR: 102 (13 Jun 2025 05:55) (93 - 102)  BP: 112/72 (13 Jun 2025 05:55) (98/62 - 125/73)  BP(mean): 74 (12 Jun 2025 11:29) (74 - 74)  RR: 18 (13 Jun 2025 05:03) (17 - 18)  SpO2: 95% (13 Jun 2025 05:03) (93% - 95%)    Parameters below as of 13 Jun 2025 05:03  Patient On (Oxygen Delivery Method): room air      Daily         LABS:                        10.3   10.64 )-----------( 362      ( 13 Jun 2025 06:59 )             30.3     Mean Cell Volume: 90.7 fl (06-13-25 @ 06:59)    06-13    131[L]  |  95[L]  |  15  ----------------------------<  82  3.8   |  21[L]  |  0.54    Ca    9.3      13 Jun 2025 07:02  Phos  3.7     06-13  Mg     1.8     06-13    TPro  8.9[H]  /  Alb  3.5  /  TBili  1.3[H]  /  DBili  x   /  AST  22  /  ALT  8[L]  /  AlkPhos  168[H]  06-13    LIVER FUNCTIONS - ( 13 Jun 2025 07:02 )  Alb: 3.5 g/dL / Pro: 8.9 g/dL / ALK PHOS: 168 U/L / ALT: 8 U/L / AST: 22 U/L / GGT: x           PT/INR - ( 13 Jun 2025 07:02 )   PT: 15.9 sec;   INR: 1.40 ratio         PTT - ( 13 Jun 2025 07:02 )  PTT:52.8 sec  Urinalysis Basic - ( 13 Jun 2025 07:02 )    Color: x / Appearance: x / SG: x / pH: x  Gluc: 82 mg/dL / Ketone: x  / Bili: x / Urobili: x   Blood: x / Protein: x / Nitrite: x   Leuk Esterase: x / RBC: x / WBC x   Sq Epi: x / Non Sq Epi: x / Bacteria: x                              10.3   10.64 )-----------( 362      ( 13 Jun 2025 06:59 )             30.3                         10.1   10.32 )-----------( 377      ( 12 Jun 2025 07:36 )             29.4                         9.9    8.34  )-----------( 365      ( 11 Jun 2025 06:08 )             29.0       Imaging:    < from: US Abdomen Limited (06.12.25 @ 12:33) >  FINDINGS/  IMPRESSION:  1.  Mild/small volume diffuse abdominal ascites..  2.  Large bilateral pleural effusion  3.  Incidental cirrhotic liver    --- End of Report ---    < end of copied text >

## 2025-06-13 NOTE — PHYSICAL THERAPY INITIAL EVALUATION ADULT - GENERAL OBSERVATIONS, REHAB EVAL
Upon entry, pt semi-supine in bed in NAD. BP 91/51 HR 89 bpm SpO2 92% on RA. Pt left seated on EOB with all tubes/lines intact, bed alarm on, call bell in reach and in NAD.

## 2025-06-13 NOTE — PHYSICAL THERAPY INITIAL EVALUATION ADULT - IMPAIRMENTS CONTRIBUTING TO GAIT DEVIATIONS, PT EVAL
impaired endurance/impaired balance/cognition/impaired coordination/decreased flexibility/impaired motor control/impaired postural control/decreased strength

## 2025-06-13 NOTE — CONSULT NOTE ADULT - ASSESSMENT
64yo M, PMH HTN, HLD, alcohol associated liver cirrhosis (last drink >1 year ago), gerd, hypothyroidism presented to [facility] w/ abdominal distension and SOB, found to have bilateral pleural effusions and ascites.     #Decompensated alcohol associated cirrhosis   MELD 3.0=15  Volume- small volume ascites on US 6/12. S/p Paracentesis 6/6 with protein of 6, low PMNs.  On lasix 40 and aldactone 25  Infection- 1/2 bottle from paracentesis w/ GNR. No blood or urine cultures  Varices- no prior EGD. On BB prophylaxis. Hgb stable.   HE: Appears confused and lethargic  HCC: No lesions on CT abdomen 6/3/2025    #Positive outpatient FIT test   -was scheduled for EGD/colonoscopy with Dr. Michael Gallegos, never completed. Will need outpatient for colon cancer screening     Recommendations:   -please obtain TTE given high protein ascites.   -Increase spironolactone to 50mg, continue furosemide 40mg. Can increase midodrine if needed for BP support   -send blood cultures, UA, urine culture   -repeat diagnostic paracentesis given 1/2 bottle on 6/6 ascites with gram negative rods     Note incomplete until finalized by attending signature/attestation.    Antonina Vail  GI/Hepatology Fellow    MONDAY-FRIDAY 8AM-5PM:  Pager# [phone] ([facility]) or [phone] ([facility])    NON-URGENT CONSULTS:  Please email [email or web address]  AT NIGHT AND ON WEEKENDS:  Contact on-call GI fellow from 5pm-8am and on weekends/holidays         64yo M, PMH HTN, HLD, alcohol associated liver cirrhosis (last drink >1 year ago), gerd, hypothyroidism presented to [facility] w/ abdominal distension and SOB, found to have bilateral pleural effusions and ascites.     #Decompensated alcohol associated cirrhosis   MELD 3.0=15  Volume- small volume ascites on US 6/12. S/p Paracentesis 6/6 with protein of 6, low PMNs.  On lasix 40 and aldactone 25  Infection- 1/2 bottle from paracentesis w/ GNR. No blood or urine cultures  Varices- no prior EGD. On BB prophylaxis. Hgb stable.   HE: Appears confused and lethargic  HCC: No lesions on CT abdomen 6/3/2025    #Positive outpatient FIT test   -was scheduled for EGD/colonoscopy with Dr. Michael Gallegos, never completed. Will need outpatient for colon cancer screening     Recommendations:   -please obtain TTE given high protein ascites.   -Increase spironolactone to 50mg, continue furosemide 40mg. Can increase midodrine if needed for BP support   -send blood cultures, UA, urine culture   -repeat diagnostic paracentesis given 1/2 bottle on 6/6 ascites with gram negative rods   -switch propranolol to carvedilol     Note incomplete until finalized by attending signature/attestation.    Antonina Vail  GI/Hepatology Fellow    MONDAY-FRIDAY 8AM-5PM:  Pager# [phone] ([facility]) or [phone] ([facility])    NON-URGENT CONSULTS:  Please email [email or web address]  AT NIGHT AND ON WEEKENDS:  Contact on-call GI fellow from 5pm-8am and on weekends/holidays         66yo M, PMH HTN, HLD, alcohol associated liver cirrhosis (last drink >1 year ago), gerd, hypothyroidism presented to [facility] w/ abdominal distension and SOB, found to have bilateral pleural effusions and ascites.     #Decompensated alcohol associated cirrhosis   MELD 3.0=15  Volume- small volume ascites on US 6/12. S/p Paracentesis 6/6 with protein of 6, low PMNs. Also notable low SAAG.  On lasix 40 and aldactone 25  Infection- 1/2 bottle from paracentesis w/ GNR. No blood or urine cultures  Varices- no prior EGD. On BB prophylaxis. Hgb stable.   HE: Appears confused and lethargic  HCC: No lesions on CT abdomen 6/3/2025    #Positive outpatient FIT test   -was scheduled for EGD/colonoscopy with Dr. Michael Gallegos, never completed. Will need outpatient for colon cancer screening     Recommendations:   -please obtain TTE given high protein ascites.   -Increase spironolactone to 50mg, continue furosemide 40mg. Can increase midodrine if needed for BP support   -send blood cultures, UA, urine culture   -repeat paracentesis given 1/2 bottle on 6/6 ascites with gram negative rods. Please send fluid for cytology and adenosine deaminase given high protein and low SAAG.   -switch propranolol to carvedilol     Note incomplete until finalized by attending signature/attestation.    Antonina Vail  GI/Hepatology Fellow    MONDAY-FRIDAY 8AM-5PM:  Pager# [phone] ([facility]) or [phone] ([facility])    NON-URGENT CONSULTS:  Please email [email or web address]  AT NIGHT AND ON WEEKENDS:  Contact on-call GI fellow from 5pm-8am and on weekends/holidays         66yo M, PMH HTN, HLD, alcohol associated liver cirrhosis (last drink >1 year ago), gerd, hypothyroidism presented to [facility] w/ abdominal distension and SOB, found to have bilateral pleural effusions and ascites.     #Decompensated alcohol associated cirrhosis   MELD 3.0=15  Volume- small volume ascites on US 6/12. S/p Paracentesis 6/6 with protein of 6, low PMNs. Also notable low SAAG.  On lasix 40 and aldactone 25  Infection- 1/2 bottle from paracentesis w/ GNR. No blood or urine cultures  Varices- no prior EGD. On BB prophylaxis. Hgb stable.   HE: Appears confused and lethargic  HCC: No lesions on CT abdomen 6/3/2025    #Positive outpatient FIT test   -was scheduled for EGD/colonoscopy with Dr. Michael Gallegos, never completed. Will need outpatient for colon cancer screening     Recommendations:   -IV thiamine 500 TID for two days, 250 daily for three days   -please obtain TTE given high protein ascites.   -Increase spironolactone to 50mg, continue furosemide 40mg. Can increase midodrine if needed for BP support   -send blood cultures, UA, urine culture   -repeat paracentesis given 1/2 bottle on 6/6 ascites with gram negative rods. Please send fluid for cytology and adenosine deaminase given high protein and low SAAG.   -switch propranolol to carvedilol     Note incomplete until finalized by attending signature/attestation.    Antonina Vail  GI/Hepatology Fellow    MONDAY-FRIDAY 8AM-5PM:  Pager# [phone] ([facility]) or [phone] ([facility])    NON-URGENT CONSULTS:  Please email [email or web address]  AT NIGHT AND ON WEEKENDS:  Contact on-call GI fellow from 5pm-8am and on weekends/holidays

## 2025-06-13 NOTE — PROGRESS NOTE ADULT - ASSESSMENT
66yo m w pmh htn, hld, aud, alcoholic liver cirrhosis c/b portal htn, hpylori gastritis, gerd, hypothyroidism, presented to osh w sob/cordova, bl le swelling, abdominal distention; found to have bl pl eff and ascites; suspected 2/2 decompensated cirrhosis; admitted to osh for further mgmt; s/p thoracentesis x2 (1.2L on 6/4, 1.2L on 6/9; fluid studies consistent w exudative eff; pulm consulted, opine, "may require pleural biopsy") + paracentesis x1 (1.5L on 6/6); hospital course complicated by lethargy, 2/2 he/pse, lactulose started; once stabilized, decision made to have patient transferred to [facility] for tips, transplant evaluation in lieu of socioeconomic barriers that would make outpatient evaluation difficult.

## 2025-06-13 NOTE — PHYSICAL THERAPY INITIAL EVALUATION ADULT - GAIT DEVIATIONS NOTED, PT EVAL
forward flexed posture with downward gaze; poor rolling walker negotiation at times/decreased brady/decreased velocity of limb motion/decreased step length/decreased stride length/decreased weight-shifting ability

## 2025-06-13 NOTE — CONSULT NOTE ADULT - SUBJECTIVE AND OBJECTIVE BOX
CHIEF COMPLAINT:    HPI:     used: Scott [id number]      65M w/ PMH of EtOH cirrhosis c/b portal HTN, GERD, HTN, EtOH use. Initially presented to OSH w/ abdominal distnesion, bl LE swelling. found to have ascites, bl pleural effusions. Admitted for further work up.  Received a R thoracentesis (1.2L) on , paracentesis (1.5L) on , L thoracentesis (1.2L) on . Course complicated by encephalopathy. Transferred to [facility] for possible TIPS and possible liver transplant evaluation.    Pulmonary consulted for evaluation.    When seen, patient denies dyspnea, pleuritic pain. No known history of lung disease. Does not recall any serious lung problems or infections when he was younger. He's from [facility] but [place] in the [place] "for [place]".  Denies cigarette use or other inhalational exposure. Used to work "on the fields, pulling weeds".        PAST MEDICAL & SURGICAL HISTORY:  ALC (alcoholic liver cirrhosis)      Hyperlipidemia      Positive FIT (fecal immunochemical test)      GERD (gastroesophageal reflux disease)      Hypothyroidism          FAMILY HISTORY:      SOCIAL HISTORY:  Smoking: [ ] Never Smoked [ ] Former Smoker (__ packs x ___ years) [ ] Current Smoker  (__ packs x ___ years)  Substance Use: [ ] Never Used [ ] Used ____  EtOH Use:  Marital Status: [ ] Single [ ]  [ ]  [ ]   Sexual History:   Occupation:  Recent Travel:  Country of Birth:  Advance Directives:    Allergies    No Known Allergies    Intolerances        HOME MEDICATIONS:  Home Medications:  furosemide 40 mg oral tablet: 1 tab(s) orally once a day (2025 19:42)  lactulose 10 g/15 mL oral syrup: 22.5 milliliter(s) orally 3 times a day (2025 19:42)  levothyroxine 75 mcg (0.075 mg) oral tablet: 1 tab(s) orally once a day (2025 19:42)  midodrine 5 mg oral tablet: 1 tab(s) orally 3 times a day (2025 19:42)  Multiple Vitamins oral tablet: 1 tab(s) orally once a day (2025 19:42)  propranolol 10 mg oral tablet: 1 tab(s) orally 2 times a day (2025 19:42)  spironolactone 25 mg oral tablet: 1 tab(s) orally once a day (2025 19:42)      REVIEW OF SYSTEMS:  [ x ] All other systems negative  [ ] Unable to assess ROS because ________    OBJECTIVE:  ICU Vital Signs Last 24 Hrs  T(C): 36.9 (2025 05:03), Max: 36.9 (2025 05:03)  T(F): 98.4 (2025 05:03), Max: 98.4 (2025 05:03)  HR: 102 (2025 05:55) (93 - 102)  BP: 112/72 (2025 05:55) (98/62 - 125/73)  BP(mean): --  ABP: --  ABP(mean): --  RR: 18 (2025 05:03) (18 - 18)  SpO2: 95% (2025 05:03) (93% - 95%)    O2 Parameters below as of 2025 05:03  Patient On (Oxygen Delivery Method): room air              -12 @ 07:01  -  06-13 @ 07:00  --------------------------------------------------------  IN: 680 mL / OUT: 600 mL / NET: 80 mL      CAPILLARY BLOOD GLUCOSE          PHYSICAL EXAM:  General: NAD  HEENT: Normal sclera  Lymph Nodes: No LAD  Respiratory: basal blunting  Cardiovascular: Regular rate and rhythm no m/r/g  Abdomen: Nontender nondistended  Extremities: No peripheral edema  Skin: No rashes  Neurological: No appreciable neurologic deficits  Psychiatry: Appropriate mood and affect    LINES:     HOSPITAL MEDICATIONS:  Standing Meds:  albumin human 25% IVPB 100 milliLiter(s) IV Intermittent every 8 hours  carvedilol 6.25 milliGRAM(s) Oral every 12 hours  folic acid 1 milliGRAM(s) Oral daily  furosemide    Tablet 40 milliGRAM(s) Oral daily  heparin   Injectable 5000 Unit(s) SubCutaneous every 8 hours  lactulose Syrup 15 Gram(s) Oral three times a day  levothyroxine 75 MICROGram(s) Oral daily  midodrine 5 milliGRAM(s) Oral every 8 hours  multivitamin 1 Tablet(s) Oral daily  polyethylene glycol 3350 17 Gram(s) Oral daily  rifAXIMin 550 milliGRAM(s) Oral two times a day  senna 2 Tablet(s) Oral at bedtime  thiamine 100 milliGRAM(s) Oral daily      PRN Meds:  acetaminophen     Tablet .. 650 milliGRAM(s) Oral every 6 hours PRN  aluminum hydroxide/magnesium hydroxide/simethicone Suspension 30 milliLiter(s) Oral every 4 hours PRN  melatonin 3 milliGRAM(s) Oral at bedtime PRN  ondansetron Injectable 4 milliGRAM(s) IV Push every 8 hours PRN      LABS:                        10.3   10.64 )-----------( 362      ( 2025 06:59 )             30.3     Hgb Trend: 10.3<--, 10.1<--, 9.9<--, 9.8<--, 10.5<--  06-13    131[L]  |  95[L]  |  15  ----------------------------<  82  3.8   |  21[L]  |  0.54    Ca    9.3      2025 07:02  Phos  3.7     06-13  Mg     1.8         TPro  8.9[H]  /  Alb  3.5  /  TBili  1.3[H]  /  DBili  x   /  AST  22  /  ALT  8[L]  /  AlkPhos  168[H]      Creatinine Trend: 0.54<--, 0.87<--, 0.92<--, 0.84<--, 0.80<--, 0.88<--  PT/INR - ( 2025 07:02 )   PT: 15.9 sec;   INR: 1.40 ratio         PTT - ( 2025 07:02 )  PTT:52.8 sec  Urinalysis Basic - ( 2025 13:04 )    Color: Dark Yellow / Appearance: Clear / S.017 / pH: x  Gluc: x / Ketone: x  / Bili: Small / Urobili: 4.0 mg/dL   Blood: x / Protein: Negative mg/dL / Nitrite: Negative   Leuk Esterase: Moderate / RBC: 0 /HPF / WBC 2 /HPF   Sq Epi: x / Non Sq Epi: 1 /HPF / Bacteria: Negative /HPF            MICROBIOLOGY:     Urinalysis with Rflx Culture (collected 2025 13:04)        RADIOLOGY:  [ x ] Reviewed and interpreted by me    PULMONARY FUNCTION TESTS:    EKG:   CHIEF COMPLAINT: sob    HPI:     used: Scott [id number]      65M w/ PMH of EtOH cirrhosis c/b portal HTN, GERD, HTN, EtOH use. Initially presented to OSH w/ abdominal distnesion, bl LE swelling. found to have ascites, bl pleural effusions. Admitted for further work up.  Received a R thoracentesis (1.2L) on , paracentesis (1.5L) on , L thoracentesis (1.2L) on . Course complicated by encephalopathy. Transferred to [facility] for possible TIPS and possible liver transplant evaluation.    Pulmonary consulted for evaluation.    When seen, patient denies dyspnea, pleuritic pain. No known history of lung disease. Does not recall any serious lung problems or infections when he was younger. He's from [facility] but [place] "for years".  Denies cigarette use or other inhalational exposure. Used to work "on the fields, pulling weeds".        PAST MEDICAL & SURGICAL HISTORY:  ALC (alcoholic liver cirrhosis)      Hyperlipidemia      Positive FIT (fecal immunochemical test)      GERD (gastroesophageal reflux disease)      Hypothyroidism          FAMILY HISTORY:      SOCIAL HISTORY:  Smoking: [ ] Never Smoked [ ] Former Smoker (__ packs x ___ years) [ ] Current Smoker  (__ packs x ___ years)  Substance Use: [ ] Never Used [ ] Used ____  EtOH Use:  Marital Status: [ ] Single [ ]  [ ]  [ ]   Sexual History:   Occupation:  Recent Travel:  Country of Birth:  Advance Directives:    Allergies    No Known Allergies    Intolerances        HOME MEDICATIONS:  Home Medications:  furosemide 40 mg oral tablet: 1 tab(s) orally once a day (2025 19:42)  lactulose 10 g/15 mL oral syrup: 22.5 milliliter(s) orally 3 times a day (2025 19:42)  levothyroxine 75 mcg (0.075 mg) oral tablet: 1 tab(s) orally once a day (2025 19:42)  midodrine 5 mg oral tablet: 1 tab(s) orally 3 times a day (2025 19:42)  Multiple Vitamins oral tablet: 1 tab(s) orally once a day (2025 19:42)  propranolol 10 mg oral tablet: 1 tab(s) orally 2 times a day (2025 19:42)  spironolactone 25 mg oral tablet: 1 tab(s) orally once a day (2025 19:42)      REVIEW OF SYSTEMS:  [ x ] All other systems negative  [ ] Unable to assess ROS because ________    OBJECTIVE:  ICU Vital Signs Last 24 Hrs  T(C): 36.9 (2025 05:03), Max: 36.9 (2025 05:03)  T(F): 98.4 (2025 05:03), Max: 98.4 (2025 05:03)  HR: 102 (2025 05:55) (93 - 102)  BP: 112/72 (2025 05:55) (98/62 - 125/73)  BP(mean): --  ABP: --  ABP(mean): --  RR: 18 (2025 05:03) (18 - 18)  SpO2: 95% (2025 05:03) (93% - 95%)    O2 Parameters below as of 2025 05:03  Patient On (Oxygen Delivery Method): room air              -12 @ 07:01  -  06-13 @ 07:00  --------------------------------------------------------  IN: 680 mL / OUT: 600 mL / NET: 80 mL      CAPILLARY BLOOD GLUCOSE          PHYSICAL EXAM:  General: NAD  HEENT: Normal sclera  Lymph Nodes: No LAD  Respiratory: basal blunting  Cardiovascular: Regular rate and rhythm no m/r/g  Abdomen: Nontender nondistended  Extremities: No peripheral edema  Skin: No rashes  Neurological: No appreciable neurologic deficits  Psychiatry: Appropriate mood and affect    LINES:     HOSPITAL MEDICATIONS:  Standing Meds:  albumin human 25% IVPB 100 milliLiter(s) IV Intermittent every 8 hours  carvedilol 6.25 milliGRAM(s) Oral every 12 hours  folic acid 1 milliGRAM(s) Oral daily  furosemide    Tablet 40 milliGRAM(s) Oral daily  heparin   Injectable 5000 Unit(s) SubCutaneous every 8 hours  lactulose Syrup 15 Gram(s) Oral three times a day  levothyroxine 75 MICROGram(s) Oral daily  midodrine 5 milliGRAM(s) Oral every 8 hours  multivitamin 1 Tablet(s) Oral daily  polyethylene glycol 3350 17 Gram(s) Oral daily  rifAXIMin 550 milliGRAM(s) Oral two times a day  senna 2 Tablet(s) Oral at bedtime  thiamine 100 milliGRAM(s) Oral daily      PRN Meds:  acetaminophen     Tablet .. 650 milliGRAM(s) Oral every 6 hours PRN  aluminum hydroxide/magnesium hydroxide/simethicone Suspension 30 milliLiter(s) Oral every 4 hours PRN  melatonin 3 milliGRAM(s) Oral at bedtime PRN  ondansetron Injectable 4 milliGRAM(s) IV Push every 8 hours PRN      LABS:                        10.3   10.64 )-----------( 362      ( 2025 06:59 )             30.3     Hgb Trend: 10.3<--, 10.1<--, 9.9<--, 9.8<--, 10.5<--  06-13    131[L]  |  95[L]  |  15  ----------------------------<  82  3.8   |  21[L]  |  0.54    Ca    9.3      2025 07:02  Phos  3.7     06-13  Mg     1.8         TPro  8.9[H]  /  Alb  3.5  /  TBili  1.3[H]  /  DBili  x   /  AST  22  /  ALT  8[L]  /  AlkPhos  168[H]      Creatinine Trend: 0.54<--, 0.87<--, 0.92<--, 0.84<--, 0.80<--, 0.88<--  PT/INR - ( 2025 07:02 )   PT: 15.9 sec;   INR: 1.40 ratio         PTT - ( 2025 07:02 )  PTT:52.8 sec  Urinalysis Basic - ( 2025 13:04 )    Color: Dark Yellow / Appearance: Clear / S.017 / pH: x  Gluc: x / Ketone: x  / Bili: Small / Urobili: 4.0 mg/dL   Blood: x / Protein: Negative mg/dL / Nitrite: Negative   Leuk Esterase: Moderate / RBC: 0 /HPF / WBC 2 /HPF   Sq Epi: x / Non Sq Epi: 1 /HPF / Bacteria: Negative /HPF            MICROBIOLOGY:     Urinalysis with Rflx Culture (collected 2025 13:04)        RADIOLOGY:  [ x ] Reviewed and interpreted by me    PULMONARY FUNCTION TESTS:    EKG:

## 2025-06-13 NOTE — PHYSICAL THERAPY INITIAL EVALUATION ADULT - NSPTDMEREC_GEN_A_CORE
Pt will require a rolling walker at home due to diagnosis of generalized weakness and unsteadiness to complete the MRADLs within their home./rolling walker

## 2025-06-13 NOTE — CONSULT NOTE ADULT - ASSESSMENT
65M w/ PMH of EtOH cirrhosis c/b portal HTN, GERD, HTN, EtOH use. Initially presented to OSH w/ abdominal distnesion, bl LE swelling. found to have ascites, bl pleural effusions. Admitted for further work up.  Received a R thoracentesis (1.2L) on 6/4, paracentesis (1.5L) on 6/6, L thoracentesis (1.2L) on 6/9. Course complicated by encephalopathy. Transferred to [facility] for possible TIPS and possible liver transplant evaluation.    Patient originally from [facility], moved here years ago. No history of lung disease or infections.    # bilateral pleural effusions  Patient with bilateral proteinacious pleural effusions, R macrophage-predominant, R lymphocyte-predominant. pH high, Glucose not low, cytopath negative bilaterally.  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  Given lymphocytic predominance, L side concerning for alternative etiology, possibly tuberculous or malignant.  No pleural fluid ADA ordered.  On bedside POCUS (6/13), L-sided effusion is small with one available rib space, no safe room for thoracentesis. R-sided effusion is larger but also of less interest for repeat intervention.  Patient might require repeat thoracentesis for AFB culture, ADA - vs pleuroscopy with pleural biopsy to evaluate for tuberculous effusion.  - would obtain quantiferon gold test. If negative, concern for tuberculous effusion certainly lower  - will continue monitoring effusions for change in size. If large enough, can re-sample left side.  - can obtain CT chest, however would be more helpful with resolved pleural effusions to assess more of the lung parenchyma for signs of possible malignancy.

## 2025-06-13 NOTE — PHYSICAL THERAPY INITIAL EVALUATION ADULT - PERTINENT HX OF CURRENT PROBLEM, REHAB EVAL
Pt is a 65 year old male with PMH of HTN, HLD, alcohol use disorder, alcoholic liver cirrhosis, portal HTN, h pylori gastritis, GERD, and hypothyroidism transferred from OSH for TIPS and liver evaluation in lieu of socioeconomic barriers. Pt initially presented for SOB/SORIA, bilateral LE swelling, and abdominal distention; found with decompensated cirrhosis and bilateral pleural effusion s/p thoracocentesis x 2 and paracentesis x 1.

## 2025-06-13 NOTE — PHYSICAL THERAPY INITIAL EVALUATION ADULT - ADDITIONAL COMMENTS
Pt AAOx2; unable to provide information regarding prior level of function and living situation. Information obtained from care coordinator note. Pt lives in an apartment with his daughter, son-in-law, and girlfriend. Elevator vs. stair negotiation unknown.

## 2025-06-13 NOTE — CONSULT NOTE ADULT - SUBJECTIVE AND OBJECTIVE BOX
Interventional Radiology    Evaluate for Procedure: Paracentesis    HPI: 64yo M, PMH HTN, HLD, alcohol associated liver cirrhosis (last drink >1 year ago), gerd, hypothyroidism presented to [facility] w/ abdominal distension and SOB, found to have bilateral pleural effusions and ascites. He underwent thoracentesis on  and  (both 1.2L), paracentesis on  (1.5L, protein 5.1, PMNs 62.5). His hospital course was complicated by lethargy and confusion c/f HE, started on lactulose. For his ascites, he was treated with lasix 40 and spironolactone 25. He was also started on propanolol and midodrine 5 TID for blood pressure. IR consulted for paracentesis.     Allergies: No Known Allergies    Medications (Abx/Cardiac/Anticoagulation/Blood Products)  furosemide    Tablet: 40 milliGRAM(s) Oral ( @ 06:01)  furosemide    Tablet: 40 milliGRAM(s) Oral ( @ 14:54)  heparin   Injectable: 5000 Unit(s) SubCutaneous ( @ 14:54)  heparin   Injectable: 5000 Unit(s) SubCutaneous ( @ 13:05)  midodrine: 5 milliGRAM(s) Oral ( @ 13:04)  midodrine.: 5 milliGRAM(s) Oral ( @ 11:47)  propranolol: 10 milliGRAM(s) Oral ( @ 06:01)  propranolol: 10 milliGRAM(s) Oral ( @ 17:39)  rifAXIMin: 550 milliGRAM(s) Oral ( @ 05:28)  spironolactone: 25 milliGRAM(s) Oral ( @ 11:47)  spironolactone: 25 milliGRAM(s) Oral ( @ 06:01)  spironolactone: 25 milliGRAM(s) Oral ( @ 13:03)    Data:  T(C): 36.9  HR: 102  BP: 112/72  RR: 18  SpO2: 95%    -WBC 10.64 / HgB 10.3 / Hct 30.3 / Plt 362  -Na 131 / Cl 95 / BUN 15 / Glucose 82  -K 3.8 / CO2 21 / Cr 0.54  -ALT 8 / Alk Phos 168 / T.Bili 1.3  -INR 1.40 / PTT 52.8    Radiology: reviewed    Assessment/Plan:  64yo M, PMH HTN, HLD, alcohol associated liver cirrhosis (last drink >1 year ago), gerd, hypothyroidism presented to [facility] w/ abdominal distension and SOB, found to have bilateral pleural effusions and ascites. He underwent thoracentesis on  and  (both 1.2L), paracentesis on  (1.5L, protein 5.1, PMNs 62.5). His hospital course was complicated by lethargy and confusion c/f HE, started on lactulose. For his ascites, he was treated with lasix 40 and spironolactone 25. He was also started on propanolol and midodrine 5 TID for blood pressure. IR consulted for paracentesis.     - case reviewed and approved for today  - please place IR procedure order under NP Dalton  - STAT labs in AM (cbc,coags, bmp, T&S)  - hold AC  - does not need to be NPO  - d/w primary team    Any questions or concerns regarding above please reach out to IR:   -Available on [employer] teams  -During working hours (7a-5p): call [phone]  -Emergent issues after 5pm: page: [phone]  -Non-emergent consults: Please place a [place] order "IR Consult" with an appropriate callback number  -Scheduling questions: [phone]  -Clinic/Outpatient [phone]      ANTHONY Bertrand- BC  Available on teams

## 2025-06-14 LAB
ALBUMIN SERPL ELPH-MCNC: 3.5 G/DL — SIGNIFICANT CHANGE UP (ref 3.3–5)
ALP SERPL-CCNC: 187 U/L — HIGH (ref 40–120)
ALT FLD-CCNC: 9 U/L — LOW (ref 10–45)
ANION GAP SERPL CALC-SCNC: 13 MMOL/L — SIGNIFICANT CHANGE UP (ref 5–17)
APTT BLD: 39.4 SEC — HIGH (ref 26.1–36.8)
AST SERPL-CCNC: 28 U/L — SIGNIFICANT CHANGE UP (ref 10–40)
BASOPHILS # BLD AUTO: 0.07 K/UL — SIGNIFICANT CHANGE UP (ref 0–0.2)
BASOPHILS NFR BLD AUTO: 0.6 % — SIGNIFICANT CHANGE UP (ref 0–2)
BILIRUB SERPL-MCNC: 1.1 MG/DL — SIGNIFICANT CHANGE UP (ref 0.2–1.2)
BUN SERPL-MCNC: 15 MG/DL — SIGNIFICANT CHANGE UP (ref 7–23)
CALCIUM SERPL-MCNC: 9.6 MG/DL — SIGNIFICANT CHANGE UP (ref 8.4–10.5)
CHLORIDE SERPL-SCNC: 98 MMOL/L — SIGNIFICANT CHANGE UP (ref 96–108)
CO2 SERPL-SCNC: 21 MMOL/L — LOW (ref 22–31)
CREAT SERPL-MCNC: 0.67 MG/DL — SIGNIFICANT CHANGE UP (ref 0.5–1.3)
CULTURE RESULTS: SIGNIFICANT CHANGE UP
CULTURE RESULTS: SIGNIFICANT CHANGE UP
EGFR: 103 ML/MIN/1.73M2 — SIGNIFICANT CHANGE UP
EGFR: 103 ML/MIN/1.73M2 — SIGNIFICANT CHANGE UP
EOSINOPHIL # BLD AUTO: 0.15 K/UL — SIGNIFICANT CHANGE UP (ref 0–0.5)
EOSINOPHIL NFR BLD AUTO: 1.3 % — SIGNIFICANT CHANGE UP (ref 0–6)
GLUCOSE SERPL-MCNC: 126 MG/DL — HIGH (ref 70–99)
GRAM STN FLD: SIGNIFICANT CHANGE UP
HCT VFR BLD CALC: 29.5 % — LOW (ref 39–50)
HGB BLD-MCNC: 9.9 G/DL — LOW (ref 13–17)
IMM GRANULOCYTES NFR BLD AUTO: 0.4 % — SIGNIFICANT CHANGE UP (ref 0–0.9)
INR BLD: 1.32 RATIO — HIGH (ref 0.85–1.16)
LYMPHOCYTES # BLD AUTO: 1.59 K/UL — SIGNIFICANT CHANGE UP (ref 1–3.3)
LYMPHOCYTES # BLD AUTO: 14 % — SIGNIFICANT CHANGE UP (ref 13–44)
MCHC RBC-ENTMCNC: 30.4 PG — SIGNIFICANT CHANGE UP (ref 27–34)
MCHC RBC-ENTMCNC: 33.6 G/DL — SIGNIFICANT CHANGE UP (ref 32–36)
MCV RBC AUTO: 90.5 FL — SIGNIFICANT CHANGE UP (ref 80–100)
MELD SCORE WITH DIALYSIS: 26 POINTS — SIGNIFICANT CHANGE UP
MELD SCORE WITHOUT DIALYSIS: 10 POINTS — SIGNIFICANT CHANGE UP
MONOCYTES # BLD AUTO: 1.66 K/UL — HIGH (ref 0–0.9)
MONOCYTES NFR BLD AUTO: 14.6 % — HIGH (ref 2–14)
NEUTROPHILS # BLD AUTO: 7.86 K/UL — HIGH (ref 1.8–7.4)
NEUTROPHILS NFR BLD AUTO: 69.1 % — SIGNIFICANT CHANGE UP (ref 43–77)
NRBC BLD AUTO-RTO: 0 /100 WBCS — SIGNIFICANT CHANGE UP (ref 0–0)
PLATELET # BLD AUTO: 362 K/UL — SIGNIFICANT CHANGE UP (ref 150–400)
POTASSIUM SERPL-MCNC: 3.9 MMOL/L — SIGNIFICANT CHANGE UP (ref 3.5–5.3)
POTASSIUM SERPL-SCNC: 3.9 MMOL/L — SIGNIFICANT CHANGE UP (ref 3.5–5.3)
PROT SERPL-MCNC: 9 G/DL — HIGH (ref 6–8.3)
PROTHROM AB SERPL-ACNC: 15.1 SEC — HIGH (ref 9.9–13.4)
RBC # BLD: 3.26 M/UL — LOW (ref 4.2–5.8)
RBC # FLD: 15.3 % — HIGH (ref 10.3–14.5)
SODIUM SERPL-SCNC: 132 MMOL/L — LOW (ref 135–145)
SPECIMEN SOURCE: SIGNIFICANT CHANGE UP
WBC # BLD: 11.38 K/UL — HIGH (ref 3.8–10.5)
WBC # FLD AUTO: 11.38 K/UL — HIGH (ref 3.8–10.5)

## 2025-06-14 PROCEDURE — 99232 SBSQ HOSP IP/OBS MODERATE 35: CPT

## 2025-06-14 RX ORDER — LACTULOSE 10 G/15ML
10 SOLUTION ORAL EVERY 6 HOURS
Refills: 0 | Status: DISCONTINUED | OUTPATIENT
Start: 2025-06-14 | End: 2025-06-14

## 2025-06-14 RX ORDER — LACTULOSE 10 G/15ML
30 SOLUTION ORAL THREE TIMES A DAY
Refills: 0 | Status: DISCONTINUED | OUTPATIENT
Start: 2025-06-14 | End: 2025-06-25

## 2025-06-14 RX ADMIN — HEPARIN SODIUM 5000 UNIT(S): 1000 INJECTION INTRAVENOUS; SUBCUTANEOUS at 13:23

## 2025-06-14 RX ADMIN — FOLIC ACID 1 MILLIGRAM(S): 1 TABLET ORAL at 11:54

## 2025-06-14 RX ADMIN — CARVEDILOL 6.25 MILLIGRAM(S): 3.12 TABLET, FILM COATED ORAL at 05:16

## 2025-06-14 RX ADMIN — Medication 105 MILLIGRAM(S): at 13:25

## 2025-06-14 RX ADMIN — MIDODRINE HYDROCHLORIDE 5 MILLIGRAM(S): 5 TABLET ORAL at 04:33

## 2025-06-14 RX ADMIN — Medication 100 MILLIGRAM(S): at 11:54

## 2025-06-14 RX ADMIN — MIDODRINE HYDROCHLORIDE 5 MILLIGRAM(S): 5 TABLET ORAL at 11:54

## 2025-06-14 RX ADMIN — HEPARIN SODIUM 5000 UNIT(S): 1000 INJECTION INTRAVENOUS; SUBCUTANEOUS at 05:16

## 2025-06-14 RX ADMIN — LACTULOSE 15 GRAM(S): 10 SOLUTION ORAL at 05:16

## 2025-06-14 RX ADMIN — FUROSEMIDE 40 MILLIGRAM(S): 10 INJECTION INTRAMUSCULAR; INTRAVENOUS at 05:16

## 2025-06-14 RX ADMIN — ALBUMIN (HUMAN) 50 MILLILITER(S): 12.5 INJECTION, SOLUTION INTRAVENOUS at 13:22

## 2025-06-14 RX ADMIN — HEPARIN SODIUM 5000 UNIT(S): 1000 INJECTION INTRAVENOUS; SUBCUTANEOUS at 21:40

## 2025-06-14 RX ADMIN — Medication 2 TABLET(S): at 21:39

## 2025-06-14 RX ADMIN — ALBUMIN (HUMAN) 50 MILLILITER(S): 12.5 INJECTION, SOLUTION INTRAVENOUS at 05:17

## 2025-06-14 RX ADMIN — Medication 105 MILLIGRAM(S): at 06:24

## 2025-06-14 RX ADMIN — POLYETHYLENE GLYCOL 3350 17 GRAM(S): 17 POWDER, FOR SOLUTION ORAL at 11:54

## 2025-06-14 RX ADMIN — Medication 1 TABLET(S): at 11:54

## 2025-06-14 RX ADMIN — MIDODRINE HYDROCHLORIDE 5 MILLIGRAM(S): 5 TABLET ORAL at 21:39

## 2025-06-14 RX ADMIN — Medication 75 MICROGRAM(S): at 05:16

## 2025-06-14 RX ADMIN — Medication 50 MILLIGRAM(S): at 05:16

## 2025-06-14 RX ADMIN — LACTULOSE 30 GRAM(S): 10 SOLUTION ORAL at 21:39

## 2025-06-14 RX ADMIN — Medication 105 MILLIGRAM(S): at 21:39

## 2025-06-14 RX ADMIN — LACTULOSE 15 GRAM(S): 10 SOLUTION ORAL at 13:25

## 2025-06-14 NOTE — DIETITIAN INITIAL EVALUATION ADULT - OTHER INFO
Wt Hx:   Dosing wt 50 kG/110.2 lbs. Pt sitting on edge of bed - RD unable to obtain wt  UBW unknown; pt reports clothing tighter PTA ?accuracy as reported wt loss   Ht: 56 inches   IBW: 142 lbs   IBW%: 96% (based on wt from 6/2)  Wt Hx per HIE (lbs): 158 (11/5/24), 156 (12/11/24), 137 (6/2/25)  Visually pt appears close to wt from 6/2 compare to current dosing wt.     Nutrition-Related Concerns:   -  Wt Hx:   Dosing wt 50 kG/110.2 lbs. Pt sitting on edge of bed - RD unable to obtain wt  UBW unknown; pt reports clothing tighter PTA ?accuracy as reported wt loss   Ht per HIE: 52 inches   IBW: 118 lbs   IBW%: 116% (based on wt from 6/2)  Wt Hx per HIE (lbs): 158 (11/5/24), 156 (12/11/24), 137 (6/2/25)  Visually pt appears close to wt from 6/2 compare to current dosing wt. Wt fluctuations likely as pt being diuresed    Nutrition-Related Concerns:   - Hx of vicky. pleural effusion and ascites s/p thoracentesis x2 (6/4, 6/9) + paracentesis x1 (6/6)  - Decompensated cirrhosis; Hx of alcoholic cirrhosis   -> Ordered for thiamine, multivitamin, and folic acid  - po synthroid  Wt Hx:   Dosing wt 50 kG/110.2 lbs. Pt sitting on edge of bed - RD unable to obtain wt  UBW unknown; pt reports clothing tighter PTA ?accuracy as reported wt loss   Ht per HIE: 52 inches   IBW: 118 lbs   IBW%: 116% (based on wt from 6/2)  Wt Hx per HIE (lbs): 158 (11/5/24), 156 (12/11/24), 137 (6/2/25)  Visually pt appears close to wt from 6/2 compare to current dosing wt. Wt fluctuations likely as pt being diuresed    Nutrition-Related Concerns:   - Hx of vicky. pleural effusion and ascites s/p thoracentesis x2 (6/4, 6/9) + paracentesis x1 (6/6); paracentesis this admission 6/13   - Decompensated cirrhosis; Hx of alcoholic cirrhosis   -> Ordered for thiamine, multivitamin, and folic acid  - po synthroid

## 2025-06-14 NOTE — DIETITIAN INITIAL EVALUATION ADULT - NSFNSGIIOFT_GEN_A_CORE
13 Jun 2025 07:01  -  14 Jun 2025 07:00  --------------------------------------------------------  IN:    IV PiggyBack: 200 mL    IV PiggyBack: 200 mL  Total IN: 400 mL    OUT:  Total OUT: 0 mL    Total NET: 400 mL

## 2025-06-14 NOTE — DIETITIAN INITIAL EVALUATION ADULT - PROBLEM SELECTOR PLAN 5
a/w elevated serum total protein (elevated gamma gap) + coagulopathy w elevated inr + worsening anemia, iso cirrhosis  outpatient hiv and hepatitis panel negative  pet-ct showed lymphadenopathy as described below;  fu spep, upep, serum and urine immunofixation, ig panel;  nutrition consult in am

## 2025-06-14 NOTE — DIETITIAN INITIAL EVALUATION ADULT - PROBLEM SELECTOR PLAN 4
[FreeTextEntry1] : viral trigger\par Use humidifier, saline nasal drops, encourage fluids and fever control as needed. Elevate head of bed. Return for spiking fever, worsening symptoms, respiratory distress or concerns.\par steam\par Recommend using mist from a humidifier. Allow the child to breathe cool air during the night by opening a window or door. Fever can be treated with an over-the-counter medication such as acetaminophen or ibuprofen. Coughing can be treated with warm, clear fluids to loosen mucus on the vocal cords. Warm water, apple juice, or lemonade is safe for children older than four months. Frozen juice popsicles also can be given. Keep the child's head elevated. If the child's stridor does not improve contact health care provider immediately.\par advised if worse will send in rx for now steam h/o alcoholic cirrhosis  anemia, coagulopathy, hypoalbuminemia iso cirrhosis  a/w portal htn and its complications;   he/pse; see above  varices; ct a/p showed small varices; cont propranolol; gi consult in am  portal hypertensive colopathy; ct a/p showed Edematous wall thickening of distal and terminal ileum, cecum, ascending through mid transverse colon  ascites; see above  sbp; no prior history of sbp   hcc; recent imaging studies with no evidence of hcc; follow up afp  hepatology consult in am

## 2025-06-14 NOTE — DIETITIAN INITIAL EVALUATION ADULT - ORAL INTAKE PTA/DIET HISTORY
Pt was eating well with no changes in appetite. However, pt RD note 6/7/25 pt reported 1 year of poor po intake with unintentional wt loss. Pt was not following therapeutic diet. Denies micronutrient or oral nutrient supplement use. Denies Hx of chewing or swallowing issues. Confirms no known food allergies.

## 2025-06-14 NOTE — DIETITIAN INITIAL EVALUATION ADULT - PERTINENT LABORATORY DATA
06-14    132[L]  |  98  |  15  ----------------------------<  126[H]  3.9   |  21[L]  |  0.67    Ca    9.6      14 Jun 2025 06:36  Phos  3.7     06-13  Mg     1.8     06-13    TPro  9.0[H]  /  Alb  3.5  /  TBili  1.1  /  DBili  x   /  AST  28  /  ALT  9[L]  /  AlkPhos  187[H]  06-14  A1C with Estimated Average Glucose Result: 5.0 % (06-13-25 @ 07:02)  A1C with Estimated Average Glucose Result: 5.1 % (06-03-25 @ 06:48)

## 2025-06-14 NOTE — DIETITIAN INITIAL EVALUATION ADULT - PROBLEM SELECTOR PLAN 7
reportedly stopped drinking alcohol 1-2 years ago  christa miller   multivitamin, thiamine, folic acid supplementation

## 2025-06-14 NOTE — DIETITIAN INITIAL EVALUATION ADULT - PERTINENT MEDS FT
MEDICATIONS  (STANDING):  albumin human 25% IVPB 100 milliLiter(s) IV Intermittent every 8 hours  carvedilol 6.25 milliGRAM(s) Oral every 12 hours  folic acid 1 milliGRAM(s) Oral daily  furosemide    Tablet 40 milliGRAM(s) Oral daily  heparin   Injectable 5000 Unit(s) SubCutaneous every 8 hours  lactulose Syrup 15 Gram(s) Oral three times a day  levothyroxine 75 MICROGram(s) Oral daily  midodrine 5 milliGRAM(s) Oral every 8 hours  multivitamin 1 Tablet(s) Oral daily  polyethylene glycol 3350 17 Gram(s) Oral daily  rifAXIMin 550 milliGRAM(s) Oral two times a day  senna 2 Tablet(s) Oral at bedtime  spironolactone 50 milliGRAM(s) Oral daily  thiamine 100 milliGRAM(s) Oral daily  thiamine IVPB 500 milliGRAM(s) IV Intermittent every 8 hours    MEDICATIONS  (PRN):  acetaminophen     Tablet .. 650 milliGRAM(s) Oral every 6 hours PRN Temp greater or equal to 38C (100.4F), Mild Pain (1 - 3)  aluminum hydroxide/magnesium hydroxide/simethicone Suspension 30 milliLiter(s) Oral every 4 hours PRN Dyspepsia  melatonin 3 milliGRAM(s) Oral at bedtime PRN Insomnia  ondansetron Injectable 4 milliGRAM(s) IV Push every 8 hours PRN Nausea and/or Vomiting

## 2025-06-14 NOTE — DIETITIAN INITIAL EVALUATION ADULT - ADD RECOMMEND
As medically feasible, continue to provide micronutrients. Malnutrition alert placed in chart. Continue to trend labs, weight, skin integrity, and intake.

## 2025-06-14 NOTE — DIETITIAN INITIAL EVALUATION ADULT - PROBLEM SELECTOR PLAN 8
cont lasix + aldactone w hold parameters and albumin assistance  cont propranolol   cont midodrine  consider octreotide

## 2025-06-14 NOTE — PROGRESS NOTE ADULT - ASSESSMENT
66yo m w pmh htn, hld, aud, alcoholic liver cirrhosis c/b portal htn, hpylori gastritis, gerd, hypothyroidism, presented to osh w sob/cordova, bl le swelling, abdominal distention; found to have bl pl eff and ascites; suspected 2/2 decompensated cirrhosis; admitted to osh for further mgmt; s/p thoracentesis x2 (1.2L on 6/4, 1.2L on 6/9; fluid studies consistent w exudative eff; pulm consulted, opine, "may require pleural biopsy") + paracentesis x1 (1.5L on 6/6); hospital course complicated by lethargy, 2/2 he/pse, lactulose started; once stabilized, decision made to have patient transferred to [facility] for tips, transplant evaluation in lieu of socioeconomic barriers that would make outpatient evaluation difficult.

## 2025-06-14 NOTE — DIETITIAN INITIAL EVALUATION ADULT - PROBLEM SELECTOR PLAN 6
outpatient records + labs reviewed; worsening anemia since 11/2024  outpatient pet-ct reviewed; "Multiple hypermetabolic mediastinal and bilateral hilar lymph nodes, with the largest lesion adjacent to the descending thoracic aorta. Consider tissue characterization of the largest mediastinal lesion....Right level 2 neck lymph node with increased FDG uptake. Consider sonography if this will change patient management. Two periportal lymph nodes with increased FDG uptake, suspicious for malignant involvement....Previously noted nodule lateral to the inferior right hepatic lobe no longer appreciated....3.8 cm infrarenal abdominal aortic aneurysm"  was scheduled to undergo colonoscopy, but [place] the [place] and was subsequently lost to follow up  gi consult in am

## 2025-06-14 NOTE — DIETITIAN INITIAL EVALUATION ADULT - PROBLEM SELECTOR PLAN 3
neuroimaging wnl  nh3 ~80  Monitor mental status with frequent neurochecks  maintain fall, delirium, seizure, aspiration precautions; keep head end of bed elevated  bowel regimen w lactulose 15g tid + rifaximin 550 bid + miralax/senna; adjust to maintain goal 2-3 bm/day  hepatology consult in am

## 2025-06-14 NOTE — DIETITIAN INITIAL EVALUATION ADULT - PROBLEM SELECTOR PLAN 2
s/p paracentesis x1 (1.5L on 6/6)  fluid studies at that time showed white count ~1500, neutrophils ~4%, lymphocytes ~75%; saag ~0.3; fluid protein 5.1, glu 60, ldh 129, amylase 48; fluid gram stain and culture w ngtd  unclear etiology of ascitic fluid    most recent limited abdominal us shows Mild/small volume diffuse abdominal ascites  serial abdominal exam/abdominal imaging as needed  cont lasix + aldactone w albumin assistance  cont midodrine; consider adding octreotide   ir consult in am for repeat tap w repeat fluid studies   hepatology consult in am

## 2025-06-14 NOTE — DIETITIAN INITIAL EVALUATION ADULT - REASON INDICATOR FOR ASSESSMENT
Consult for assessment and education  Source: Medical record and pt (Macedonian speaking;  Luciana [id number]; pt noted confused and disoriented)

## 2025-06-15 LAB
ADD ON TEST-SPECIMEN IN LAB: SIGNIFICANT CHANGE UP
ANION GAP SERPL CALC-SCNC: 15 MMOL/L — SIGNIFICANT CHANGE UP (ref 5–17)
APTT BLD: 37.5 SEC — HIGH (ref 26.1–36.8)
BUN SERPL-MCNC: 14 MG/DL — SIGNIFICANT CHANGE UP (ref 7–23)
CALCIUM SERPL-MCNC: 9.7 MG/DL — SIGNIFICANT CHANGE UP (ref 8.4–10.5)
CHLORIDE SERPL-SCNC: 97 MMOL/L — SIGNIFICANT CHANGE UP (ref 96–108)
CO2 SERPL-SCNC: 20 MMOL/L — LOW (ref 22–31)
CREAT SERPL-MCNC: 0.71 MG/DL — SIGNIFICANT CHANGE UP (ref 0.5–1.3)
CREATININE, URINE RESULT: 57 MG/DL — SIGNIFICANT CHANGE UP
EGFR: 101 ML/MIN/1.73M2 — SIGNIFICANT CHANGE UP
EGFR: 101 ML/MIN/1.73M2 — SIGNIFICANT CHANGE UP
GLUCOSE SERPL-MCNC: 78 MG/DL — SIGNIFICANT CHANGE UP (ref 70–99)
HCT VFR BLD CALC: 27.5 % — LOW (ref 39–50)
HGB BLD-MCNC: 9.6 G/DL — LOW (ref 13–17)
HIV 1+2 AB+HIV1 P24 AG SERPL QL IA: SIGNIFICANT CHANGE UP
INR BLD: 1.27 RATIO — HIGH (ref 0.85–1.16)
MCHC RBC-ENTMCNC: 31.8 PG — SIGNIFICANT CHANGE UP (ref 27–34)
MCHC RBC-ENTMCNC: 34.9 G/DL — SIGNIFICANT CHANGE UP (ref 32–36)
MCV RBC AUTO: 91.1 FL — SIGNIFICANT CHANGE UP (ref 80–100)
NRBC BLD AUTO-RTO: 0 /100 WBCS — SIGNIFICANT CHANGE UP (ref 0–0)
PLATELET # BLD AUTO: 341 K/UL — SIGNIFICANT CHANGE UP (ref 150–400)
POTASSIUM SERPL-MCNC: 4 MMOL/L — SIGNIFICANT CHANGE UP (ref 3.5–5.3)
POTASSIUM SERPL-SCNC: 4 MMOL/L — SIGNIFICANT CHANGE UP (ref 3.5–5.3)
PROT ?TM UR-MCNC: 25 MG/DL — HIGH (ref 0–12)
PROT SERPL-MCNC: 9 G/DL — HIGH (ref 6–8.3)
PROTHROM AB SERPL-ACNC: 14.5 SEC — HIGH (ref 9.9–13.4)
RBC # BLD: 3.02 M/UL — LOW (ref 4.2–5.8)
RBC # FLD: 15.3 % — HIGH (ref 10.3–14.5)
SODIUM SERPL-SCNC: 132 MMOL/L — LOW (ref 135–145)
WBC # BLD: 9.99 K/UL — SIGNIFICANT CHANGE UP (ref 3.8–10.5)
WBC # FLD AUTO: 9.99 K/UL — SIGNIFICANT CHANGE UP (ref 3.8–10.5)

## 2025-06-15 PROCEDURE — 99232 SBSQ HOSP IP/OBS MODERATE 35: CPT

## 2025-06-15 RX ADMIN — LACTULOSE 30 GRAM(S): 10 SOLUTION ORAL at 13:21

## 2025-06-15 RX ADMIN — Medication 75 MICROGRAM(S): at 05:59

## 2025-06-15 RX ADMIN — Medication 105 MILLIGRAM(S): at 05:58

## 2025-06-15 RX ADMIN — Medication 105 MILLIGRAM(S): at 13:07

## 2025-06-15 RX ADMIN — Medication 1 TABLET(S): at 11:11

## 2025-06-15 RX ADMIN — HEPARIN SODIUM 5000 UNIT(S): 1000 INJECTION INTRAVENOUS; SUBCUTANEOUS at 21:08

## 2025-06-15 RX ADMIN — MIDODRINE HYDROCHLORIDE 5 MILLIGRAM(S): 5 TABLET ORAL at 11:11

## 2025-06-15 RX ADMIN — MIDODRINE HYDROCHLORIDE 5 MILLIGRAM(S): 5 TABLET ORAL at 21:14

## 2025-06-15 RX ADMIN — POLYETHYLENE GLYCOL 3350 17 GRAM(S): 17 POWDER, FOR SOLUTION ORAL at 11:12

## 2025-06-15 RX ADMIN — MIDODRINE HYDROCHLORIDE 5 MILLIGRAM(S): 5 TABLET ORAL at 05:59

## 2025-06-15 RX ADMIN — FOLIC ACID 1 MILLIGRAM(S): 1 TABLET ORAL at 11:11

## 2025-06-15 RX ADMIN — CARVEDILOL 6.25 MILLIGRAM(S): 3.12 TABLET, FILM COATED ORAL at 17:30

## 2025-06-15 RX ADMIN — HEPARIN SODIUM 5000 UNIT(S): 1000 INJECTION INTRAVENOUS; SUBCUTANEOUS at 13:06

## 2025-06-15 RX ADMIN — HEPARIN SODIUM 5000 UNIT(S): 1000 INJECTION INTRAVENOUS; SUBCUTANEOUS at 05:58

## 2025-06-15 RX ADMIN — Medication 50 MILLIGRAM(S): at 06:00

## 2025-06-15 RX ADMIN — LACTULOSE 30 GRAM(S): 10 SOLUTION ORAL at 21:13

## 2025-06-15 RX ADMIN — CARVEDILOL 6.25 MILLIGRAM(S): 3.12 TABLET, FILM COATED ORAL at 06:01

## 2025-06-15 RX ADMIN — Medication 105 MILLIGRAM(S): at 21:13

## 2025-06-15 RX ADMIN — Medication 2 TABLET(S): at 21:07

## 2025-06-15 RX ADMIN — LACTULOSE 30 GRAM(S): 10 SOLUTION ORAL at 06:00

## 2025-06-15 RX ADMIN — Medication 100 MILLIGRAM(S): at 11:11

## 2025-06-15 NOTE — PROGRESS NOTE ADULT - ASSESSMENT
64yo m w pmh htn, hld, aud, alcoholic liver cirrhosis c/b portal htn, hpylori gastritis, gerd, hypothyroidism, presented to osh w sob/cordova, bl le swelling, abdominal distention; found to have bl pl eff and ascites; suspected 2/2 decompensated cirrhosis; admitted to osh for further mgmt; s/p thoracentesis x2 (1.2L on 6/4, 1.2L on 6/9; fluid studies consistent w exudative eff; pulm consulted, opine, "may require pleural biopsy") + paracentesis x1 (1.5L on 6/6); hospital course complicated by lethargy, 2/2 he/pse, lactulose started; once stabilized, decision made to have patient transferred to [facility] for tips, transplant evaluation in lieu of socioeconomic barriers that would make outpatient evaluation difficult.

## 2025-06-16 DIAGNOSIS — Z29.9 ENCOUNTER FOR PROPHYLACTIC MEASURES, UNSPECIFIED: ICD-10-CM

## 2025-06-16 LAB
ALBUMIN SERPL ELPH-MCNC: 3.5 G/DL — SIGNIFICANT CHANGE UP (ref 3.3–5)
ALP SERPL-CCNC: 205 U/L — HIGH (ref 40–120)
ALT FLD-CCNC: 11 U/L — SIGNIFICANT CHANGE UP (ref 10–45)
ANION GAP SERPL CALC-SCNC: 11 MMOL/L — SIGNIFICANT CHANGE UP (ref 5–17)
APTT BLD: 35.8 SEC — SIGNIFICANT CHANGE UP (ref 26.1–36.8)
AST SERPL-CCNC: 30 U/L — SIGNIFICANT CHANGE UP (ref 10–40)
BILIRUB SERPL-MCNC: 0.9 MG/DL — SIGNIFICANT CHANGE UP (ref 0.2–1.2)
BUN SERPL-MCNC: 16 MG/DL — SIGNIFICANT CHANGE UP (ref 7–23)
CALCIUM SERPL-MCNC: 9.8 MG/DL — SIGNIFICANT CHANGE UP (ref 8.4–10.5)
CHLORIDE SERPL-SCNC: 101 MMOL/L — SIGNIFICANT CHANGE UP (ref 96–108)
CO2 SERPL-SCNC: 21 MMOL/L — LOW (ref 22–31)
CREAT SERPL-MCNC: 0.67 MG/DL — SIGNIFICANT CHANGE UP (ref 0.5–1.3)
EGFR: 103 ML/MIN/1.73M2 — SIGNIFICANT CHANGE UP
EGFR: 103 ML/MIN/1.73M2 — SIGNIFICANT CHANGE UP
GLUCOSE SERPL-MCNC: 82 MG/DL — SIGNIFICANT CHANGE UP (ref 70–99)
HCT VFR BLD CALC: 27.5 % — LOW (ref 39–50)
HGB BLD-MCNC: 9.4 G/DL — LOW (ref 13–17)
INR BLD: 1.25 RATIO — HIGH (ref 0.85–1.16)
KAPPA LC SER QL IFE: 21.86 MG/DL — HIGH (ref 0.33–1.94)
KAPPA/LAMBDA FREE LIGHT CHAIN RATIO, SERUM: 0.99 RATIO — SIGNIFICANT CHANGE UP (ref 0.26–1.65)
LAMBDA LC SER QL IFE: 21.97 MG/DL — HIGH (ref 0.57–2.63)
MCHC RBC-ENTMCNC: 30.7 PG — SIGNIFICANT CHANGE UP (ref 27–34)
MCHC RBC-ENTMCNC: 34.2 G/DL — SIGNIFICANT CHANGE UP (ref 32–36)
MCV RBC AUTO: 89.9 FL — SIGNIFICANT CHANGE UP (ref 80–100)
NRBC BLD AUTO-RTO: 0 /100 WBCS — SIGNIFICANT CHANGE UP (ref 0–0)
PLATELET # BLD AUTO: 373 K/UL — SIGNIFICANT CHANGE UP (ref 150–400)
POTASSIUM SERPL-MCNC: 4.4 MMOL/L — SIGNIFICANT CHANGE UP (ref 3.5–5.3)
POTASSIUM SERPL-SCNC: 4.4 MMOL/L — SIGNIFICANT CHANGE UP (ref 3.5–5.3)
PROT SERPL-MCNC: 8.9 G/DL — HIGH (ref 6–8.3)
PROTHROM AB SERPL-ACNC: 14.4 SEC — HIGH (ref 9.9–13.4)
RBC # BLD: 3.06 M/UL — LOW (ref 4.2–5.8)
RBC # FLD: 15.6 % — HIGH (ref 10.3–14.5)
SODIUM SERPL-SCNC: 133 MMOL/L — LOW (ref 135–145)
WBC # BLD: 9.09 K/UL — SIGNIFICANT CHANGE UP (ref 3.8–10.5)
WBC # FLD AUTO: 9.09 K/UL — SIGNIFICANT CHANGE UP (ref 3.8–10.5)

## 2025-06-16 PROCEDURE — 99232 SBSQ HOSP IP/OBS MODERATE 35: CPT

## 2025-06-16 PROCEDURE — 76705 ECHO EXAM OF ABDOMEN: CPT | Mod: 26

## 2025-06-16 PROCEDURE — 71045 X-RAY EXAM CHEST 1 VIEW: CPT | Mod: 26

## 2025-06-16 PROCEDURE — 99233 SBSQ HOSP IP/OBS HIGH 50: CPT | Mod: GC,25

## 2025-06-16 RX ADMIN — MIDODRINE HYDROCHLORIDE 5 MILLIGRAM(S): 5 TABLET ORAL at 11:05

## 2025-06-16 RX ADMIN — FOLIC ACID 1 MILLIGRAM(S): 1 TABLET ORAL at 11:06

## 2025-06-16 RX ADMIN — HEPARIN SODIUM 5000 UNIT(S): 1000 INJECTION INTRAVENOUS; SUBCUTANEOUS at 05:27

## 2025-06-16 RX ADMIN — LACTULOSE 30 GRAM(S): 10 SOLUTION ORAL at 21:27

## 2025-06-16 RX ADMIN — LACTULOSE 30 GRAM(S): 10 SOLUTION ORAL at 05:27

## 2025-06-16 RX ADMIN — MIDODRINE HYDROCHLORIDE 5 MILLIGRAM(S): 5 TABLET ORAL at 05:26

## 2025-06-16 RX ADMIN — Medication 2 TABLET(S): at 21:28

## 2025-06-16 RX ADMIN — HEPARIN SODIUM 5000 UNIT(S): 1000 INJECTION INTRAVENOUS; SUBCUTANEOUS at 21:27

## 2025-06-16 RX ADMIN — POLYETHYLENE GLYCOL 3350 17 GRAM(S): 17 POWDER, FOR SOLUTION ORAL at 11:06

## 2025-06-16 RX ADMIN — Medication 50 MILLIGRAM(S): at 05:26

## 2025-06-16 RX ADMIN — MIDODRINE HYDROCHLORIDE 5 MILLIGRAM(S): 5 TABLET ORAL at 19:14

## 2025-06-16 RX ADMIN — Medication 100 MILLIGRAM(S): at 11:06

## 2025-06-16 RX ADMIN — CARVEDILOL 6.25 MILLIGRAM(S): 3.12 TABLET, FILM COATED ORAL at 05:28

## 2025-06-16 RX ADMIN — Medication 105 MILLIGRAM(S): at 05:27

## 2025-06-16 RX ADMIN — CARVEDILOL 6.25 MILLIGRAM(S): 3.12 TABLET, FILM COATED ORAL at 17:04

## 2025-06-16 RX ADMIN — Medication 1 TABLET(S): at 11:06

## 2025-06-16 RX ADMIN — Medication 75 MICROGRAM(S): at 05:26

## 2025-06-16 RX ADMIN — FUROSEMIDE 40 MILLIGRAM(S): 10 INJECTION INTRAMUSCULAR; INTRAVENOUS at 05:29

## 2025-06-16 RX ADMIN — LACTULOSE 30 GRAM(S): 10 SOLUTION ORAL at 13:08

## 2025-06-16 RX ADMIN — HEPARIN SODIUM 5000 UNIT(S): 1000 INJECTION INTRAVENOUS; SUBCUTANEOUS at 13:00

## 2025-06-16 NOTE — PROGRESS NOTE ADULT - ASSESSMENT
66yo M, PMH HTN, HLD, alcohol associated liver cirrhosis (last drink >1 year ago), gerd, hypothyroidism presented to [facility] w/ abdominal distension and SOB, found to have bilateral pleural effusions and ascites.     #Decompensated alcohol associated cirrhosis   MELD 3.0=11  Volume- small volume ascites on US 6/12. S/p Paracentesis 6/6 with protein of 6, low PMNs. Repeat paracentesis 6/13 w/ no SBP. Also notable low SAAG.  On lasix 40 and aldactone 25  Infection- 1/2 bottle from paracentesis w/ GNR. No blood or urine cultures  Varices- no prior EGD. On BB prophylaxis. Hgb stable.   HE: Appears confused and lethargic  HCC: No lesions on CT abdomen 6/3/2025    #Positive outpatient FIT test   -was scheduled for EGD/colonoscopy with Dr. Michael Gallegos, never completed. Will need outpatient for colon cancer screening     Recommendations:   -s/p IV thiamine 500 TID for two days, start 250 daily for three days   -please obtain TTE given high protein ascites.   -continue spironolactone 50mg, furosemide 40mg. Can increase midodrine if needed for BP support    -FU fluid cytology given high protein and low SAAG.   -continue carvedilol     Note incomplete until finalized by attending signature/attestation.    Antonina Vail  GI/Hepatology Fellow    MONDAY-FRIDAY 8AM-5PM:  Pager# [phone] ([facility]) or [phone] ([facility])    NON-URGENT CONSULTS:  Please email [email or web address] OR [email or web address]  AT NIGHT AND ON WEEKENDS:  Contact on-call GI fellow from 5pm-8am and on weekends/holidays   66yo M, PMH HTN, HLD, alcohol associated liver cirrhosis (last drink >1 year ago), gerd, hypothyroidism presented to [facility] w/ abdominal distension and SOB, found to have bilateral pleural effusions and ascites.     #Decompensated alcohol associated cirrhosis   MELD 3.0=11  Volume- small volume ascites on US 6/12. S/p Paracentesis 6/6 with protein of 6, low PMNs. Repeat paracentesis 6/13 w/ no SBP. Also notable low SAAG.  On lasix 40 and aldactone 25  Infection- 1/2 bottle from paracentesis w/ GNR. No blood or urine cultures  Varices- no prior EGD. On BB prophylaxis. Hgb stable.   HE: Appears confused and lethargic  HCC: No lesions on CT abdomen 6/3/2025    #Positive outpatient FIT test   -was scheduled for EGD/colonoscopy with Dr. Michael Gallegos, never completed. Will need outpatient for colon cancer screening     Recommendations:   -s/p IV thiamine 500 TID for two days, start 250 daily for three days   -please obtain TTE given high protein ascites.   -continue spironolactone 50mg, furosemide 40mg. Can increase midodrine if needed for BP support    -FU fluid cytology given high protein and low SAAG.   -continue carvedilol         Antonina Vail  GI/Hepatology Fellow    MONDAY-FRIDAY 8AM-5PM:  Pager# [phone] ([facility]) or [phone] ([facility])    NON-URGENT CONSULTS:  Please email [email or web address] OR [email or web address]  AT NIGHT AND ON WEEKENDS:  Contact on-call GI fellow from 5pm-8am and on weekends/holidays

## 2025-06-16 NOTE — PROGRESS NOTE ADULT - ASSESSMENT
64yo m w pmh htn, hld, aud, alcoholic liver cirrhosis c/b portal htn, hpylori gastritis, gerd, hypothyroidism, presented to osh w sob/cordova, bl le swelling, abdominal distention; found to have bl pl eff and ascites; suspected 2/2 decompensated cirrhosis; admitted to osh for further mgmt; s/p thoracentesis x2 (1.2L on 6/4, 1.2L on 6/9; fluid studies consistent w exudative eff; pulm consulted, opine, "may require pleural biopsy") + paracentesis x1 (1.5L on 6/6); hospital course complicated by lethargy, 2/2 he/pse, lactulose started; once stabilized, decision made to have patient transferred to [facility] for tips, transplant evaluation in lieu of socioeconomic barriers that would make outpatient evaluation difficult.

## 2025-06-16 NOTE — PROGRESS NOTE ADULT - ASSESSMENT
65M w/ PMH of EtOH cirrhosis c/b portal HTN, GERD, HTN, EtOH use. Initially presented to OSH w/ abdominal distnesion, bl LE swelling. found to have ascites, bl pleural effusions. Admitted for further work up.  Received a R thoracentesis (1.2L) on 6/4, paracentesis (1.5L) on 6/6, L thoracentesis (1.2L) on 6/9. Course complicated by encephalopathy. Transferred to [facility] for possible TIPS and possible liver transplant evaluation.    Patient originally from [facility], moved here years ago. No history of lung disease or infections.    # bilateral pleural effusions  Patient with bilateral proteinacious pleural effusions, R macrophage-predominant, R lymphocyte-predominant. pH high, Glucose not low, cytopath negative bilaterally.  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  Given lymphocytic predominance, L side concerning for alternative etiology, possibly tuberculous or malignant.  No pleural fluid ADA ordered.  On bedside POCUS (6/13), L-sided effusion is small with one available rib space, no safe room for thoracentesis. R-sided effusion is larger but also of less interest for repeat intervention.  Patient might require repeat thoracentesis for AFB culture, ADA - vs pleuroscopy with pleural biopsy to evaluate for tuberculous effusion.  - would obtain quantiferon gold test. If negative, concern for tuberculous effusion certainly lower  - will continue monitoring effusions for change in size. If large enough, can re-sample left side.  - Repeat CXR   - Follow up UPEP, SPEP, would obtain flow cytometry

## 2025-06-16 NOTE — PROGRESS NOTE ADULT - ATTENDING COMMENTS
64 yo M with history as above transferred to [facility] for liver transplant eval and bilateral pleural effusions.  R appears to be related to cirrhosis and pleural fluid findings c/w.  L has lymphocytic predominance of unclear etiology - cyto negative for malignancy. Ascitic fluid also with lymphocyte predominance. Also with elevated serum protein. Imaging without evidence of lymphadenopathy making lymphoma seem less likely. Possible paraproteinemia?      Recommend:  #Pleural effusions  #Cirrhosis  #Encephalopathy  - f/u Quant gold  - would send SPEP/UPEP, serum flow cytometry  - [facility] Brain MRI  - will await above workup before consideration of further diagnotic procedures    Monika Galan MD, MSCR

## 2025-06-17 ENCOUNTER — RESULT REVIEW (OUTPATIENT)
Age: 65
End: 2025-06-17

## 2025-06-17 LAB
% ALBUMIN: 36.4 % — SIGNIFICANT CHANGE UP
% ALPHA 1: 3.6 % — SIGNIFICANT CHANGE UP
% ALPHA 2: 6.2 % — SIGNIFICANT CHANGE UP
% BETA: 7.3 % — SIGNIFICANT CHANGE UP
% GAMMA, URINE: 28.1 % — SIGNIFICANT CHANGE UP
% GAMMA: 46.5 % — SIGNIFICANT CHANGE UP
ALBUMIN 24H MFR UR ELPH: 8.6 % — SIGNIFICANT CHANGE UP
ALBUMIN SERPL ELPH-MCNC: 3.3 G/DL — LOW (ref 3.6–5.5)
ALBUMIN SERPL ELPH-MCNC: 3.6 G/DL — SIGNIFICANT CHANGE UP (ref 3.3–5)
ALBUMIN/GLOB SERPL ELPH: 0.6 RATIO — SIGNIFICANT CHANGE UP
ALP SERPL-CCNC: 244 U/L — HIGH (ref 40–120)
ALPHA1 GLOB 24H MFR UR ELPH: 21.3 % — SIGNIFICANT CHANGE UP
ALPHA1 GLOB SERPL ELPH-MCNC: 0.3 G/DL — SIGNIFICANT CHANGE UP (ref 0.1–0.4)
ALPHA2 GLOB 24H MFR UR ELPH: 16.1 % — SIGNIFICANT CHANGE UP
ALPHA2 GLOB SERPL ELPH-MCNC: 0.6 G/DL — SIGNIFICANT CHANGE UP (ref 0.5–1)
ALT FLD-CCNC: 13 U/L — SIGNIFICANT CHANGE UP (ref 10–45)
AMMONIA BLD-MCNC: 50 UMOL/L — SIGNIFICANT CHANGE UP (ref 11–55)
ANION GAP SERPL CALC-SCNC: 16 MMOL/L — SIGNIFICANT CHANGE UP (ref 5–17)
APTT BLD: 38.4 SEC — HIGH (ref 26.1–36.8)
AST SERPL-CCNC: 33 U/L — SIGNIFICANT CHANGE UP (ref 10–40)
B-GLOBULIN 24H MFR UR ELPH: 25.9 % — SIGNIFICANT CHANGE UP
B-GLOBULIN SERPL ELPH-MCNC: 0.7 G/DL — SIGNIFICANT CHANGE UP (ref 0.5–1)
BILIRUB SERPL-MCNC: 1 MG/DL — SIGNIFICANT CHANGE UP (ref 0.2–1.2)
BUN SERPL-MCNC: 16 MG/DL — SIGNIFICANT CHANGE UP (ref 7–23)
CALCIUM SERPL-MCNC: 10.2 MG/DL — SIGNIFICANT CHANGE UP (ref 8.4–10.5)
CHLORIDE SERPL-SCNC: 97 MMOL/L — SIGNIFICANT CHANGE UP (ref 96–108)
CO2 SERPL-SCNC: 21 MMOL/L — LOW (ref 22–31)
COLLECT DURATION TIME UR: 24 HR — SIGNIFICANT CHANGE UP
CREAT SERPL-MCNC: 0.67 MG/DL — SIGNIFICANT CHANGE UP (ref 0.5–1.3)
EGFR: 103 ML/MIN/1.73M2 — SIGNIFICANT CHANGE UP
EGFR: 103 ML/MIN/1.73M2 — SIGNIFICANT CHANGE UP
FLOW CYTOMETRY FINAL REPORT: SIGNIFICANT CHANGE UP
GAMMA GLOBULIN: 4.2 G/DL — HIGH (ref 0.6–1.6)
GLUCOSE SERPL-MCNC: 97 MG/DL — SIGNIFICANT CHANGE UP (ref 70–99)
HCT VFR BLD CALC: 32.3 % — LOW (ref 39–50)
HGB BLD-MCNC: 10.8 G/DL — LOW (ref 13–17)
INR BLD: 1.18 RATIO — HIGH (ref 0.85–1.16)
INTERPRETATION 24H UR IFE-IMP: SIGNIFICANT CHANGE UP
INTERPRETATION SERPL IFE-IMP: SIGNIFICANT CHANGE UP
M PROTEIN 24H UR ELPH-MRATE: 0 % — SIGNIFICANT CHANGE UP
M PROTEIN 24H UR ELPH-MRATE: 0 MG/24HR — SIGNIFICANT CHANGE UP (ref 0–0)
M PROTEIN 24H UR ELPH-MRATE: 0 MG/DL — SIGNIFICANT CHANGE UP
MAGNESIUM SERPL-MCNC: 2 MG/DL — SIGNIFICANT CHANGE UP (ref 1.6–2.6)
MCHC RBC-ENTMCNC: 30.6 PG — SIGNIFICANT CHANGE UP (ref 27–34)
MCHC RBC-ENTMCNC: 33.4 G/DL — SIGNIFICANT CHANGE UP (ref 32–36)
MCV RBC AUTO: 91.5 FL — SIGNIFICANT CHANGE UP (ref 80–100)
MELD SCORE WITH DIALYSIS: 23 POINTS — SIGNIFICANT CHANGE UP
MELD SCORE WITHOUT DIALYSIS: 8 POINTS — SIGNIFICANT CHANGE UP
NON-GYNECOLOGICAL CYTOLOGY STUDY: SIGNIFICANT CHANGE UP
NRBC BLD AUTO-RTO: 0 /100 WBCS — SIGNIFICANT CHANGE UP (ref 0–0)
PETH 16:0/18:1: NEGATIVE NG/ML — SIGNIFICANT CHANGE UP
PETH 16:0/18:2: NEGATIVE NG/ML — SIGNIFICANT CHANGE UP
PETH COMMENTS: SIGNIFICANT CHANGE UP
PHOSPHATE SERPL-MCNC: 3.9 MG/DL — SIGNIFICANT CHANGE UP (ref 2.5–4.5)
PLATELET # BLD AUTO: 392 K/UL — SIGNIFICANT CHANGE UP (ref 150–400)
POTASSIUM SERPL-MCNC: 4.1 MMOL/L — SIGNIFICANT CHANGE UP (ref 3.5–5.3)
POTASSIUM SERPL-SCNC: 4.1 MMOL/L — SIGNIFICANT CHANGE UP (ref 3.5–5.3)
PROT ?TM UR-MCNC: 25 MG/DL — HIGH (ref 0–12)
PROT PATTERN 24H UR ELPH-IMP: SIGNIFICANT CHANGE UP
PROT PATTERN SERPL ELPH-IMP: SIGNIFICANT CHANGE UP
PROT SERPL-MCNC: 9 G/DL — HIGH (ref 6–8.3)
PROT SERPL-MCNC: 9.4 G/DL — HIGH (ref 6–8.3)
PROT SERPL-MCNC: 9.7 G/DL — HIGH (ref 6–8.3)
PROTEIN QUANT CALC, URINE: 75 MG/24 H — SIGNIFICANT CHANGE UP (ref 50–100)
PROTHROM AB SERPL-ACNC: 13.4 SEC — SIGNIFICANT CHANGE UP (ref 9.9–13.4)
RBC # BLD: 3.53 M/UL — LOW (ref 4.2–5.8)
RBC # FLD: 15.9 % — HIGH (ref 10.3–14.5)
SODIUM SERPL-SCNC: 134 MMOL/L — LOW (ref 135–145)
TOTAL VOLUME - URINE: 300 ML — SIGNIFICANT CHANGE UP
URINE CREATININE CALCULATION: 0.2 G/24 H — LOW (ref 1–2)
WBC # BLD: 8.78 K/UL — SIGNIFICANT CHANGE UP (ref 3.8–10.5)
WBC # FLD AUTO: 8.78 K/UL — SIGNIFICANT CHANGE UP (ref 3.8–10.5)

## 2025-06-17 PROCEDURE — 99232 SBSQ HOSP IP/OBS MODERATE 35: CPT

## 2025-06-17 PROCEDURE — 88189 FLOWCYTOMETRY/READ 16 & >: CPT | Mod: 59

## 2025-06-17 PROCEDURE — 99233 SBSQ HOSP IP/OBS HIGH 50: CPT | Mod: GC,25

## 2025-06-17 PROCEDURE — 93356 MYOCRD STRAIN IMG SPCKL TRCK: CPT

## 2025-06-17 PROCEDURE — 93306 TTE W/DOPPLER COMPLETE: CPT | Mod: 26

## 2025-06-17 RX ORDER — CARVEDILOL 3.12 MG/1
3.12 TABLET, FILM COATED ORAL EVERY 12 HOURS
Refills: 0 | Status: DISCONTINUED | OUTPATIENT
Start: 2025-06-17 | End: 2025-06-25

## 2025-06-17 RX ADMIN — MIDODRINE HYDROCHLORIDE 5 MILLIGRAM(S): 5 TABLET ORAL at 20:24

## 2025-06-17 RX ADMIN — Medication 2 TABLET(S): at 21:13

## 2025-06-17 RX ADMIN — LACTULOSE 30 GRAM(S): 10 SOLUTION ORAL at 05:36

## 2025-06-17 RX ADMIN — Medication 102.5 MILLIGRAM(S): at 11:44

## 2025-06-17 RX ADMIN — FOLIC ACID 1 MILLIGRAM(S): 1 TABLET ORAL at 11:44

## 2025-06-17 RX ADMIN — HEPARIN SODIUM 5000 UNIT(S): 1000 INJECTION INTRAVENOUS; SUBCUTANEOUS at 21:13

## 2025-06-17 RX ADMIN — CARVEDILOL 6.25 MILLIGRAM(S): 3.12 TABLET, FILM COATED ORAL at 05:37

## 2025-06-17 RX ADMIN — CARVEDILOL 3.12 MILLIGRAM(S): 3.12 TABLET, FILM COATED ORAL at 17:20

## 2025-06-17 RX ADMIN — MIDODRINE HYDROCHLORIDE 5 MILLIGRAM(S): 5 TABLET ORAL at 11:43

## 2025-06-17 RX ADMIN — Medication 1 TABLET(S): at 11:43

## 2025-06-17 RX ADMIN — POLYETHYLENE GLYCOL 3350 17 GRAM(S): 17 POWDER, FOR SOLUTION ORAL at 11:44

## 2025-06-17 RX ADMIN — LACTULOSE 30 GRAM(S): 10 SOLUTION ORAL at 16:05

## 2025-06-17 RX ADMIN — LACTULOSE 30 GRAM(S): 10 SOLUTION ORAL at 21:13

## 2025-06-17 RX ADMIN — Medication 75 MICROGRAM(S): at 05:36

## 2025-06-17 RX ADMIN — Medication 50 MILLIGRAM(S): at 05:37

## 2025-06-17 RX ADMIN — HEPARIN SODIUM 5000 UNIT(S): 1000 INJECTION INTRAVENOUS; SUBCUTANEOUS at 05:36

## 2025-06-17 RX ADMIN — HEPARIN SODIUM 5000 UNIT(S): 1000 INJECTION INTRAVENOUS; SUBCUTANEOUS at 16:03

## 2025-06-17 RX ADMIN — MIDODRINE HYDROCHLORIDE 5 MILLIGRAM(S): 5 TABLET ORAL at 05:36

## 2025-06-17 NOTE — PROGRESS NOTE ADULT - ASSESSMENT
64 yo M with ETOH cirrhosis, originally from [facility] transferred to [facility] for liver transplant eval and bilateral pleural effusions.  R appears to be related to cirrhosis and pleural fluid findings c/w such.  L has lymphocytic predominance of unclear etiology - cyto negative for malignancy. Ascitic fluid also with lymphocyte predominance. Also with elevated serum protein. Imaging without evidence of lymphadenopathy making lymphoma seem less likely. Possible paraproteinemia?  CXR w/ persistent B/L Pleural effusions R>>L.  Remains on RA.    Recommend:  #Pleural effusions  #Cirrhosis  #Encephalopathy  - f/u Quant gold  - would send SPEP/UPEP, serum flow cytometry  - [facility] Brain MRI  - will await above workup before consideration of further diagnotic procedures    Monika Galan MD, MSCR

## 2025-06-17 NOTE — PROGRESS NOTE ADULT - ATTENDING COMMENTS
66 yo Male w/ Hx of HTN, HLD, hypothyroidism, GERD, remote AUD (last reported drink > a year ago, 6/13/25 Peth neg), and EtOH cirrhosis who presented initially to [facility] on 6/2/25 w/ progressively worsening abdominal distention, SOB and found to have moderate ascites and b/l pleural effusion (R>L), underwent paracentesis on 6/6/25, w/ ascites analysis showing SAAG < 1.1 and total protein > 2.5 (5.1), and lymphocytic ascites (total nucleated cells 1563, telma 4%, ly 75%), fluid amylase 48. normal serum lipase. He also had thoracocentesis on 6/2. CTA chest and CT a/p on 6/3 showed the pleural effusion, calcified granulomata, mild biapical scarring, calcified subcarinal and L infrahilar LNs, cirrhotic morphology liver, patent hepatic vasculature, small spleen w/ calcifications suspected sequela of prior infarct, moderate ascites, w/ extensive omental infiltration and areas of smooth peritoneal thickening and enhancement (recent instrumentation vs peritonitis?), no free air, small varices, colonic wall thickening (portal colopathy vs colitis/ileitis).   He was transferred to [facility] on 6/12/25 for further mgmt. and underwent repeat paracentesis 6/13.   His baseline mental status unknown, but remained awake, alert, oriented to self only, on HE mgmt. w/ lactulose and rifaximin, but w/o change or improvement in mental status despite reportedly adequate BMs and ammonia 50. Suspected possible Wernicke encephalopathy component, being treated w/ high dose thiamine since 6/13.   TTE was unremarkable, w/ LVEF 68%, no DD, normal RV function.    C/w thiamine  Consider neurology evaluation. (CT head was w/o acute findings on 6/3)  Reduce carvedilol to 3.125 mg bid as he required midodrine  Can c/w current dose diuretics, 40 mg Furosemide and 50 mg Spironolactone   Need to follow ascites analysis, cytology, TB studies.  Consider MR/MRCP    Thank you for consult  Hepatology will follow

## 2025-06-17 NOTE — PROGRESS NOTE ADULT - ASSESSMENT
64yo m w pmh htn, hld, aud, alcoholic liver cirrhosis c/b portal htn, hpylori gastritis, gerd, hypothyroidism, presented to osh w sob/cordova, bl le swelling, abdominal distention; found to have bl pl eff and ascites; suspected 2/2 decompensated cirrhosis; admitted to osh for further mgmt; s/p thoracentesis x2 (1.2L on 6/4, 1.2L on 6/9; fluid studies consistent w exudative eff; pulm consulted, opine, "may require pleural biopsy") + paracentesis x1 (1.5L on 6/6); hospital course complicated by lethargy, 2/2 he/pse, lactulose started; once stabilized, decision made to have patient transferred to [facility] for tips, transplant evaluation in lieu of socioeconomic barriers that would make outpatient evaluation difficult.

## 2025-06-17 NOTE — PROGRESS NOTE ADULT - NSPROGADDITIONALINFOA_GEN_ALL_CORE
Discussed with ACP, Zully    Attempted to reach out to daughter over the phone using  ID but no response.

## 2025-06-17 NOTE — PROGRESS NOTE ADULT - ASSESSMENT
66yo M, PMH HTN, HLD, alcohol associated liver cirrhosis (last drink >1 year ago), gerd, hypothyroidism presented to [facility] w/ abdominal distension and SOB, found to have bilateral pleural effusions and ascites.     #Decompensated alcohol associated cirrhosis   MELD 3.0=11  Volume- small volume ascites on US 6/12. S/p Paracentesis 6/6 with protein of 6, low PMNs. Repeat paracentesis 6/13 w/ no SBP. Also notable low SAAG.  On lasix 40 and aldactone 25. TTE WNL  Infection- 1/2 bottle from paracentesis w/ GNR. No blood or urine cultures  Varices- no prior EGD. On BB prophylaxis. Hgb stable.   HE: Appears confused and lethargic  HCC: No lesions on CT abdomen 6/3/2025    #Positive outpatient FIT test   -was scheduled for EGD/colonoscopy with Dr. Michael Gallegos, never completed. Will need outpatient for colon cancer screening     Recommendations:   -s/p IV thiamine 500 TID for two days, now 250 daily for three days (6/17-6/19)   -continue spironolactone 50mg, furosemide 40mg. Can increase midodrine if needed for BP support    -FU fluid cytology given high protein and low SAAG.   -continue carvedilol         Antonina Vail  GI/Hepatology Fellow    MONDAY-FRIDAY 8AM-5PM:  Pager# [phone] ([facility]) or [phone] ([facility])    NON-URGENT CONSULTS:  Please email [email or web address] OR [email or web address]  AT NIGHT AND ON WEEKENDS:  Contact on-call GI fellow from 5pm-8am and on weekends/holidays   66yo M, PMH HTN, HLD, alcohol associated liver cirrhosis (last drink >1 year ago), gerd, hypothyroidism presented to [facility] w/ abdominal distension and SOB, found to have bilateral pleural effusions and ascites.     #Decompensated alcohol associated cirrhosis   MELD 3.0=10 (3/17)  Volume- small volume ascites on US 6/12. S/p Paracentesis 6/6 with protein of 6, low PMNs. Repeat paracentesis 6/13 w/ no SBP. Also notable low SAAG.  On lasix 40 and aldactone 25. TTE WNL  Infection- 1/2 bottle from paracentesis w/ GNR. No blood or urine cultures  Varices- no prior EGD. On BB prophylaxis. Hgb stable.   HE: Appears confused and lethargic  HCC: No lesions on CT abdomen 6/3/2025    #Positive outpatient FIT test   -was scheduled for EGD/colonoscopy with Dr. Michael Gallegos, never completed. Will need outpatient for colon cancer screening     Recommendations:   -s/p IV thiamine 500 TID for two days, now 250 daily for three days (6/17-6/19)   -continue spironolactone 50mg, furosemide 40mg. Can increase midodrine if needed for BP support    -FU fluid cytology given high protein and low SAAG.   -continue carvedilol         Antonina Vail  GI/Hepatology Fellow    MONDAY-FRIDAY 8AM-5PM:  Pager# [phone] ([facility]) or [phone] ([facility])    NON-URGENT CONSULTS:  Please email [email or web address] OR [email or web address]  AT NIGHT AND ON WEEKENDS:  Contact on-call GI fellow from 5pm-8am and on weekends/holidays   66yo M, PMH HTN, HLD, alcohol associated liver cirrhosis (last drink >1 year ago), gerd, hypothyroidism presented to [facility] w/ abdominal distension and SOB, found to have bilateral pleural effusions and ascites.     #Decompensated alcohol associated cirrhosis   MELD 3.0=10 (3/17)  Volume- small volume ascites on US 6/12. S/p Paracentesis 6/6 with protein of 6, low PMNs. Repeat paracentesis 6/13 w/ no SBP. Also notable low SAAG.  On lasix 40 and aldactone 25. TTE WNL  Infection- 1/2 bottle from paracentesis w/ GNR. No blood or urine cultures  Varices- no prior EGD. On BB prophylaxis. Hgb stable.   HE: Appears confused and lethargic  HCC: No lesions on CT abdomen 6/3/2025    #Positive outpatient FIT test   -was scheduled for EGD/colonoscopy with Dr. Michael Gallegos, never completed. Will need outpatient for colon cancer screening     Recommendations:   -s/p IV thiamine 500 TID for two days, now 250 daily for three days (6/17-6/19)   -continue spironolactone 50mg, furosemide 40mg. Can increase midodrine if needed for BP support    -FU fluid cytology given high protein and low SAAG.   -decrease carvedilol to 3.125 BID  -please order ANISA Vail  GI/Hepatology Fellow    MONDAY-FRIDAY 8AM-5PM:  Pager# [phone] ([facility]) or [phone] ([facility])    NON-URGENT CONSULTS:  Please email [email or web address] OR [email or web address]  AT NIGHT AND ON WEEKENDS:  Contact on-call GI fellow from 5pm-8am and on weekends/holidays

## 2025-06-18 LAB
% ALBUMIN: 35.7 % — SIGNIFICANT CHANGE UP
% ALPHA 1: 3.5 % — SIGNIFICANT CHANGE UP
% ALPHA 2: 5.7 % — SIGNIFICANT CHANGE UP
% BETA: 14.8 % — SIGNIFICANT CHANGE UP
% GAMMA: 40.3 % — SIGNIFICANT CHANGE UP
ALBUMIN SERPL ELPH-MCNC: 3.3 G/DL — SIGNIFICANT CHANGE UP (ref 3.3–5)
ALBUMIN SERPL ELPH-MCNC: 3.4 G/DL — LOW (ref 3.6–5.5)
ALBUMIN/GLOB SERPL ELPH: 0.6 RATIO — SIGNIFICANT CHANGE UP
ALP SERPL-CCNC: 232 U/L — HIGH (ref 40–120)
ALPHA1 GLOB SERPL ELPH-MCNC: 0.3 G/DL — SIGNIFICANT CHANGE UP (ref 0.1–0.4)
ALPHA2 GLOB SERPL ELPH-MCNC: 0.5 G/DL — SIGNIFICANT CHANGE UP (ref 0.5–1)
ALT FLD-CCNC: 12 U/L — SIGNIFICANT CHANGE UP (ref 10–45)
ANION GAP SERPL CALC-SCNC: 11 MMOL/L — SIGNIFICANT CHANGE UP (ref 5–17)
APTT BLD: 37.7 SEC — HIGH (ref 26.1–36.8)
AST SERPL-CCNC: 32 U/L — SIGNIFICANT CHANGE UP (ref 10–40)
B-GLOBULIN SERPL ELPH-MCNC: 1.4 G/DL — HIGH (ref 0.5–1)
BILIRUB SERPL-MCNC: 0.9 MG/DL — SIGNIFICANT CHANGE UP (ref 0.2–1.2)
BUN SERPL-MCNC: 16 MG/DL — SIGNIFICANT CHANGE UP (ref 7–23)
CALCIUM SERPL-MCNC: 9.9 MG/DL — SIGNIFICANT CHANGE UP (ref 8.4–10.5)
CHLORIDE SERPL-SCNC: 100 MMOL/L — SIGNIFICANT CHANGE UP (ref 96–108)
CO2 SERPL-SCNC: 20 MMOL/L — LOW (ref 22–31)
CREAT SERPL-MCNC: 0.75 MG/DL — SIGNIFICANT CHANGE UP (ref 0.5–1.3)
CULTURE RESULTS: SIGNIFICANT CHANGE UP
CULTURE RESULTS: SIGNIFICANT CHANGE UP
EGFR: 100 ML/MIN/1.73M2 — SIGNIFICANT CHANGE UP
EGFR: 100 ML/MIN/1.73M2 — SIGNIFICANT CHANGE UP
GAMMA GLOBULIN: 3.8 G/DL — HIGH (ref 0.6–1.6)
GAMMA INTERFERON BACKGROUND BLD IA-ACNC: 0.03 IU/ML — SIGNIFICANT CHANGE UP
GLUCOSE SERPL-MCNC: 71 MG/DL — SIGNIFICANT CHANGE UP (ref 70–99)
HCT VFR BLD CALC: 30.5 % — LOW (ref 39–50)
HGB BLD-MCNC: 10 G/DL — LOW (ref 13–17)
INR BLD: 1.24 RATIO — HIGH (ref 0.85–1.16)
M TB IFN-G BLD-IMP: ABNORMAL
M TB IFN-G CD4+ BCKGRND COR BLD-ACNC: 0.16 IU/ML — SIGNIFICANT CHANGE UP
M TB IFN-G CD4+CD8+ BCKGRND COR BLD-ACNC: 0.16 IU/ML — SIGNIFICANT CHANGE UP
MAGNESIUM SERPL-MCNC: 1.8 MG/DL — SIGNIFICANT CHANGE UP (ref 1.6–2.6)
MCHC RBC-ENTMCNC: 30.4 PG — SIGNIFICANT CHANGE UP (ref 27–34)
MCHC RBC-ENTMCNC: 32.8 G/DL — SIGNIFICANT CHANGE UP (ref 32–36)
MCV RBC AUTO: 92.7 FL — SIGNIFICANT CHANGE UP (ref 80–100)
MELD SCORE WITH DIALYSIS: 26 POINTS — SIGNIFICANT CHANGE UP
MELD SCORE WITHOUT DIALYSIS: 9 POINTS — SIGNIFICANT CHANGE UP
NRBC # BLD AUTO: 0 K/UL — SIGNIFICANT CHANGE UP (ref 0–0)
NRBC # FLD: 0 K/UL — SIGNIFICANT CHANGE UP (ref 0–0)
NRBC BLD AUTO-RTO: 0 /100 WBCS — SIGNIFICANT CHANGE UP (ref 0–0)
PHOSPHATE SERPL-MCNC: 3.4 MG/DL — SIGNIFICANT CHANGE UP (ref 2.5–4.5)
PLATELET # BLD AUTO: 424 K/UL — HIGH (ref 150–400)
PMV BLD: 11.1 FL — SIGNIFICANT CHANGE UP (ref 7–13)
POTASSIUM SERPL-MCNC: 4.2 MMOL/L — SIGNIFICANT CHANGE UP (ref 3.5–5.3)
POTASSIUM SERPL-SCNC: 4.2 MMOL/L — SIGNIFICANT CHANGE UP (ref 3.5–5.3)
PROT PATTERN SERPL ELPH-IMP: SIGNIFICANT CHANGE UP
PROT SERPL-MCNC: 9.2 G/DL — HIGH (ref 6–8.3)
PROT SERPL-MCNC: 9.4 G/DL — HIGH (ref 6–8.3)
PROTHROM AB SERPL-ACNC: 14.1 SEC — HIGH (ref 9.9–13.4)
QUANT TB PLUS MITOGEN MINUS NIL: 0.21 IU/ML — SIGNIFICANT CHANGE UP
RBC # BLD: 3.29 M/UL — LOW (ref 4.2–5.8)
RBC # FLD: 16.4 % — HIGH (ref 10.3–14.5)
SODIUM SERPL-SCNC: 131 MMOL/L — LOW (ref 135–145)
SPECIMEN SOURCE: SIGNIFICANT CHANGE UP
SPECIMEN SOURCE: SIGNIFICANT CHANGE UP
WBC # BLD: 9.2 K/UL — SIGNIFICANT CHANGE UP (ref 3.8–10.5)
WBC # FLD AUTO: 9.2 K/UL — SIGNIFICANT CHANGE UP (ref 3.8–10.5)

## 2025-06-18 PROCEDURE — 99232 SBSQ HOSP IP/OBS MODERATE 35: CPT

## 2025-06-18 PROCEDURE — 99233 SBSQ HOSP IP/OBS HIGH 50: CPT

## 2025-06-18 RX ADMIN — CARVEDILOL 3.12 MILLIGRAM(S): 3.12 TABLET, FILM COATED ORAL at 05:14

## 2025-06-18 RX ADMIN — Medication 102.5 MILLIGRAM(S): at 12:36

## 2025-06-18 RX ADMIN — POLYETHYLENE GLYCOL 3350 17 GRAM(S): 17 POWDER, FOR SOLUTION ORAL at 12:35

## 2025-06-18 RX ADMIN — MIDODRINE HYDROCHLORIDE 5 MILLIGRAM(S): 5 TABLET ORAL at 12:35

## 2025-06-18 RX ADMIN — MIDODRINE HYDROCHLORIDE 5 MILLIGRAM(S): 5 TABLET ORAL at 20:46

## 2025-06-18 RX ADMIN — Medication 1 TABLET(S): at 12:35

## 2025-06-18 RX ADMIN — LACTULOSE 30 GRAM(S): 10 SOLUTION ORAL at 05:08

## 2025-06-18 RX ADMIN — FUROSEMIDE 40 MILLIGRAM(S): 10 INJECTION INTRAMUSCULAR; INTRAVENOUS at 05:08

## 2025-06-18 RX ADMIN — HEPARIN SODIUM 5000 UNIT(S): 1000 INJECTION INTRAVENOUS; SUBCUTANEOUS at 21:05

## 2025-06-18 RX ADMIN — HEPARIN SODIUM 5000 UNIT(S): 1000 INJECTION INTRAVENOUS; SUBCUTANEOUS at 15:45

## 2025-06-18 RX ADMIN — Medication 75 MICROGRAM(S): at 05:08

## 2025-06-18 RX ADMIN — HEPARIN SODIUM 5000 UNIT(S): 1000 INJECTION INTRAVENOUS; SUBCUTANEOUS at 05:08

## 2025-06-18 RX ADMIN — CARVEDILOL 3.12 MILLIGRAM(S): 3.12 TABLET, FILM COATED ORAL at 17:19

## 2025-06-18 RX ADMIN — LACTULOSE 30 GRAM(S): 10 SOLUTION ORAL at 21:04

## 2025-06-18 RX ADMIN — LACTULOSE 30 GRAM(S): 10 SOLUTION ORAL at 15:46

## 2025-06-18 RX ADMIN — MIDODRINE HYDROCHLORIDE 5 MILLIGRAM(S): 5 TABLET ORAL at 04:59

## 2025-06-18 RX ADMIN — FOLIC ACID 1 MILLIGRAM(S): 1 TABLET ORAL at 12:35

## 2025-06-18 RX ADMIN — Medication 2 TABLET(S): at 21:04

## 2025-06-18 RX ADMIN — Medication 50 MILLIGRAM(S): at 05:09

## 2025-06-18 NOTE — PROGRESS NOTE ADULT - ASSESSMENT
66yo m w pmh htn, hld, aud, alcoholic liver cirrhosis c/b portal htn, hpylori gastritis, gerd, hypothyroidism, presented to [facility] w sob/cordova, bl le swelling, abdominal distention; found to have bl pl eff and ascites; suspected 2/2 decompensated cirrhosis; admitted to osh for further mgmt; s/p thoracentesis x2 (1.2L on 6/4, 1.2L on 6/9; fluid studies consistent w exudative eff; pulm consulted, opine, "may require pleural biopsy") + paracentesis x1 (1.5L on 6/6); hospital course complicated by lethargy, 2/2 he/pse, lactulose started; once stabilized, decision made to have patient transferred to [facility] for tips, transplant evaluation in lieu of socioeconomic barriers that would make outpatient evaluation difficult.

## 2025-06-18 NOTE — PROGRESS NOTE ADULT - ASSESSMENT
66yo M, PMH HTN, HLD, alcohol associated liver cirrhosis (last drink >1 year ago), gerd, hypothyroidism presented to [facility] w/ abdominal distension and SOB, found to have bilateral pleural effusions and ascites.     #Decompensated alcohol associated cirrhosis   MELD 3.0=10 (3/17)  Volume- small volume ascites on US 6/12. S/p Paracentesis 6/6 with protein of 6, low PMNs. Repeat paracentesis 6/13 w/ no SBP. Also notable low SAAG.  On lasix 40 and aldactone 25. TTE WNL. Fluid cytology negative.   Infection- 1/2 bottle from paracentesis w/ GNR. No blood or urine cultures  Varices- no prior EGD. On BB prophylaxis. Hgb stable.   HE: Appears confused and lethargic  HCC: No lesions on CT abdomen 6/3/2025    #Positive outpatient FIT test   -was scheduled for EGD/colonoscopy with Dr. Michael Gallegos, never completed. Will need outpatient for colon cancer screening     Recommendations:   -s/p IV thiamine 500 TID for two days, now 250 daily for three days (6/17-6/19)   -continue spironolactone 50mg, furosemide 40mg. Can increase midodrine if needed for BP support    -carvedilol to 3.125 BID  -please order MRCP for elevated alk phos   -IR consult for peritoneal thickening and elevated ascitic protein         Antonina Vail  GI/Hepatology Fellow    MONDAY-FRIDAY 8AM-5PM:  Pager# [phone] ([facility]) or [phone] ([facility])    NON-URGENT CONSULTS:  Please email [email or web address] OR [email or web address]  AT NIGHT AND ON WEEKENDS:  Contact on-call GI fellow from 5pm-8am and on weekends/holidays 66yo M, PMH HTN, HLD, alcohol associated liver cirrhosis (last drink >1 year ago), gerd, hypothyroidism presented to [facility] w/ abdominal distension and SOB, found to have bilateral pleural effusions and ascites.     #Decompensated alcohol associated cirrhosis   MELD 3.0=10 (3/17)  Volume- small volume ascites on US 6/12. S/p Paracentesis 6/6 with protein of 6, low PMNs. Repeat paracentesis 6/13 w/ no SBP. Also notable low SAAG.  On lasix 40 and aldactone 25. TTE WNL. Fluid cytology negative.   Infection- 1/2 bottle from paracentesis w/ GNR. No blood or urine cultures  Varices- no prior EGD. On BB prophylaxis. Hgb stable.   HE: Appears confused and lethargic  HCC: No lesions on CT abdomen 6/3/2025    #Positive outpatient FIT test   -was scheduled for EGD/colonoscopy with Dr. Michael Gallegos, never completed. Will need outpatient for colon cancer screening     Recommendations:   -s/p IV thiamine 500 TID for two days, now 250 daily for three days (6/17-6/19)   -continue spironolactone 50mg, furosemide 40mg. Can increase midodrine if needed for BP support    -carvedilol to 3.125 BID  -please order MRCP for elevated alk phos   -IR consult for peritoneal thickening and elevated ascitic protein   -check JENNIFER, DSDNA         Antonina Vail  GI/Hepatology Fellow    MONDAY-FRIDAY 8AM-5PM:  Pager# [phone] ([facility]) or [phone] ([facility])    NON-URGENT CONSULTS:  Please email [email or web address] OR [email or web address]  AT NIGHT AND ON WEEKENDS:  Contact on-call GI fellow from 5pm-8am and on weekends/holidays 64yo M, PMH HTN, HLD, alcohol associated liver cirrhosis (last drink >1 year ago), gerd, hypothyroidism presented to [facility] w/ abdominal distension and SOB, found to have bilateral pleural effusions and ascites.     #Decompensated alcohol associated cirrhosis   MELD 3.0=10 (3/17)  Volume- small volume ascites on US 6/12. S/p Paracentesis 6/6 with protein of 6, low PMNs. Repeat paracentesis 6/13 w/ no SBP. Also notable low SAAG.  On lasix 40 and aldactone 25. TTE WNL. Fluid cytology negative.   Infection- 1/2 bottle from paracentesis w/ GNR. No blood or urine cultures  Varices- no prior EGD. On BB prophylaxis. Hgb stable.   HE: Appears confused and lethargic  HCC: No lesions on CT abdomen 6/3/2025    #Positive outpatient FIT test   -was scheduled for EGD/colonoscopy with Dr. Michael Gallegos, never completed. Will need outpatient for colon cancer screening     Recommendations:   -s/p IV thiamine 500 TID for two days, now 250 daily for three days (6/17-6/19)   -continue spironolactone 50mg, furosemide 40mg. Can increase midodrine if needed for BP support    -carvedilol to 3.125 BID  -please order MRCP for elevated alk phos   -IR consult for peritoneal thickening and elevated ascitic protein   -check JENNIFER, DSDNA, CRP, ESR         Antonina Vail  GI/Hepatology Fellow    MONDAY-FRIDAY 8AM-5PM:  Pager# [phone] ([facility]) or [phone] ([facility])    NON-URGENT CONSULTS:  Please email [email or web address] OR [email or web address]  AT NIGHT AND ON WEEKENDS:  Contact on-call GI fellow from 5pm-8am and on weekends/holidays 64yo M, PMH HTN, HLD, alcohol associated liver cirrhosis (last drink >1 year ago), gerd, hypothyroidism presented to [facility] w/ abdominal distension and SOB, found to have bilateral pleural effusions and ascites.     #Decompensated alcohol associated cirrhosis   MELD 3.0=10 (3/17)  Volume- small volume ascites on US 6/12. S/p Paracentesis 6/6 with protein of 6, low PMNs. Repeat paracentesis 6/13 w/ no SBP. Also notable low SAAG.  On lasix 40 and aldactone 25. TTE WNL. Fluid cytology negative.   Infection- 1/2 bottle from paracentesis w/ GNR. No blood or urine cultures  Varices- no prior EGD. On BB prophylaxis. Hgb stable.   HE: Appears confused and lethargic  HCC: No lesions on CT abdomen 6/3/2025    #Positive outpatient FIT test   -was scheduled for EGD/colonoscopy with Dr. Michael Gallegos, never completed. Will need outpatient for colon cancer screening     Recommendations:   -s/p IV thiamine 500 TID for two days, now 250 daily for three days (6/17-6/19)   -continue spironolactone 50mg, furosemide 40mg. Can increase midodrine if needed for BP support    -carvedilol to 3.125 BID  -please order MRCP for elevated alk phos   -IR consult for peritoneal thickening and elevated ascitic protein   -check JENNIFER, DSDNA, CRP, ESR, RF         Antonina Vail  GI/Hepatology Fellow    MONDAY-FRIDAY 8AM-5PM:  Pager# [phone] ([facility]) or [phone] ([facility])    NON-URGENT CONSULTS:  Please email [email or web address] OR [email or web address]  AT NIGHT AND ON WEEKENDS:  Contact on-call GI fellow from 5pm-8am and on weekends/holidays

## 2025-06-18 NOTE — PROGRESS NOTE ADULT - ATTENDING COMMENTS
64 yo Male w/ Hx of HTN, HLD, hypothyroidism, GERD, remote AUD (last reported drink > a year ago, 6/13/25 Peth neg), and EtOH cirrhosis who presented initially to [facility] on 6/2/25 w/ progressively worsening abdominal distention, SOB and found to have moderate ascites and b/l pleural effusion (R>L), underwent paracentesis on 6/6/25, w/ ascites analysis showing SAAG < 1.1 and total protein > 2.5 (5.1), and lymphocytic ascites (total nucleated cells 1563, telma 4%, ly 75%), fluid amylase 48. normal serum lipase. He also had thoracocentesis on 6/2. CTA chest and CT a/p on 6/3 showed the pleural effusion, calcified granulomata, mild biapical scarring, calcified subcarinal and L infrahilar LNs, cirrhotic morphology liver, patent hepatic vasculature, small spleen w/ calcifications suspected sequela of prior infarct, moderate ascites, w/ extensive omental infiltration and areas of smooth peritoneal thickening and enhancement (recent instrumentation vs peritonitis?), no free air, small varices, colonic wall thickening (portal colopathy vs colitis/ileitis).   He was transferred to [facility] on 6/12/25 for further mgmt. and underwent repeat paracentesis 6/13.   His baseline mental status unknown, but remained awake, alert, oriented to self only, on HE mgmt. w/ lactulose and rifaximin, but w/o change or improvement in mental status despite reportedly adequate BMs and ammonia 50. Suspected possible Wernicke encephalopathy component, being treated w/ high dose thiamine since 6/13.   TTE was unremarkable, w/ LVEF 68%, no DD, normal RV function.    C/w thiamine  C/w lactulose and rifaximin, record BMs (target 3-4BM/d)  Consider neurology evaluation. (CT head was w/o acute findings on 6/3)  C/w carvedilol to 3.125 mg bid and taper off midodrine  Can c/w current dose diuretics, 40 mg Furosemide and 50 mg Spironolactone   Need to follow ascites analysis, cytology, TB studies.  Consider MR/MRCP  Review imaging w/ radiology / IR if any peritoneal nodularity that can be amenable for sampling - IR consult appreciated, no safe window or target  Please, send autoimmune serologies, JENNIFER, dsDNA, CRP, ESR    Thank you for consult  Hepatology will follow . 64 yo Male w/ Hx of HTN, HLD, hypothyroidism, GERD, remote AUD (last reported drink > a year ago, 6/13/25 Peth neg), and EtOH cirrhosis who presented initially to [facility] on 6/2/25 w/ progressively worsening abdominal distention, SOB and found to have moderate ascites and b/l pleural effusion (R>L), underwent paracentesis on 6/6/25, w/ ascites analysis showing SAAG < 1.1 and total protein > 2.5 (5.1), and lymphocytic ascites (total nucleated cells 1563, telma 4%, ly 75%), fluid amylase 48. normal serum lipase. He also had thoracocentesis on 6/2. CTA chest and CT a/p on 6/3 showed the pleural effusion, calcified granulomata, mild biapical scarring, calcified subcarinal and L infrahilar LNs, cirrhotic morphology liver, patent hepatic vasculature, small spleen w/ calcifications suspected sequela of prior infarct, moderate ascites, w/ extensive omental infiltration and areas of smooth peritoneal thickening and enhancement (recent instrumentation vs peritonitis?), no free air, small varices, colonic wall thickening (portal colopathy vs colitis/ileitis).   He was transferred to [facility] on 6/12/25 for further mgmt. and underwent repeat paracentesis 6/13.   His baseline mental status unknown, but remained awake, alert, oriented to self only, on HE mgmt. w/ lactulose and rifaximin, but w/o change or improvement in mental status despite reportedly adequate BMs and ammonia 50. Suspected possible Wernicke encephalopathy component, being treated w/ high dose thiamine since 6/13.   TTE was unremarkable, w/ LVEF 68%, no DD, normal RV function.    C/w thiamine  C/w lactulose and rifaximin, record BMs (target 3-4BM/d)  Consider neurology evaluation. (CT head was w/o acute findings on 6/3)  C/w carvedilol to 3.125 mg bid and taper off midodrine  Can c/w current dose diuretics, 40 mg Furosemide and 50 mg Spironolactone   Need to follow ascites analysis, cytology, TB studies.  Consider MR/MRCP  Review imaging w/ radiology / IR if any peritoneal nodularity that can be amenable for sampling - IR consult appreciated, no safe window or target  Please, send autoimmune serologies, JENNIFER, dsDNA, RF, CRP, ESR    Thank you for consult  Hepatology will follow . 64 yo Male w/ Hx of HTN, HLD, hypothyroidism, GERD, remote AUD (last reported drink > a year ago, 6/13/25 Peth neg), and EtOH cirrhosis who presented initially to [facility] on 6/2/25 w/ progressively worsening abdominal distention, SOB and found to have moderate ascites and b/l pleural effusion (R>L), underwent paracentesis on 6/6/25, w/ ascites analysis showing SAAG < 1.1 and total protein > 2.5 (5.1), and lymphocytic ascites (total nucleated cells 1563, telma 4%, ly 75%), fluid amylase 48. normal serum lipase. He also had thoracocentesis on 6/2. CTA chest and CT a/p on 6/3 showed the pleural effusion, calcified granulomata, mild biapical scarring, calcified subcarinal and L infrahilar LNs, cirrhotic morphology liver, patent hepatic vasculature, small spleen w/ calcifications suspected sequela of prior infarct, moderate ascites, w/ extensive omental infiltration and areas of smooth peritoneal thickening and enhancement (recent instrumentation vs peritonitis?), no free air, small varices, colonic wall thickening (portal colopathy vs colitis/ileitis).   He was transferred to [facility] on 6/12/25 for further mgmt. and underwent repeat paracentesis 6/13.   His baseline mental status unknown, but remained awake, alert, oriented to self only, on HE mgmt. w/ lactulose and rifaximin, but w/o change or improvement in mental status despite reportedly adequate BMs and ammonia 50. Suspected possible Wernicke encephalopathy component, being treated w/ high dose thiamine since 6/13.   TTE was unremarkable, w/ LVEF 68%, no DD, normal RV function.  CT head was w/o acute findings on 6/3,    C/w thiamine  C/w lactulose and rifaximin, record BMs (target 3-4BM/d)  Consider neurology evaluation.   C/w carvedilol to 3.125 mg bid and taper off midodrine  Can c/w current dose diuretics, 40 mg Furosemide and 50 mg Spironolactone   Need to follow ascites analysis, cytology, TB studies.  Consider MR/MRCP  Review imaging w/ radiology / IR if any peritoneal nodularity that can be amenable for sampling - IR consult appreciated, no safe window or target  Please, send autoimmune serologies, JENNIFER, dsDNA, RF, CRP, ESR, and also send EBV, CMV.     Thank you for consult  Hepatology will follow .

## 2025-06-18 NOTE — PROGRESS NOTE ADULT - NSPROGADDITIONALINFOA_GEN_ALL_CORE
Discussed with ACP, Alicia Discussed with ACP, Alicia    Attempted to reach out to Sada mclaughlin over the phone with  ID# [phone] but with no response.

## 2025-06-18 NOTE — CONSULT NOTE ADULT - SUBJECTIVE AND OBJECTIVE BOX
Interventional Radiology    Evaluate for Procedure: Peritoneal bx    HPI: 66yo M, PMH HTN, HLD, alcohol associated liver cirrhosis (last drink >1 year ago), gerd, hypothyroidism presented to [facility] w/ abdominal distension and SOB, found to have bilateral pleural effusions and ascites.    IR consulted for omental bx.    Allergies: No Known Allergies    Medications (Abx/Cardiac/Anticoagulation/Blood Products)  carvedilol: 6.25 milliGRAM(s) Oral (06-17 @ 05:37)  carvedilol: 3.125 milliGRAM(s) Oral (06-18 @ 05:14)  furosemide    Tablet: 40 milliGRAM(s) Oral (06-18 @ 05:08)  heparin   Injectable: 5000 Unit(s) SubCutaneous (06-18 @ 05:08)  midodrine: 5 milliGRAM(s) Oral (06-18 @ 12:35)  rifAXIMin: 550 milliGRAM(s) Oral (06-18 @ 05:08)  spironolactone: 50 milliGRAM(s) Oral (06-18 @ 05:09)    Data:    T(C): 36.7  HR: 92  BP: 95/58  RR: 18  SpO2: 92%    -WBC 9.20 / HgB 10.0 / Hct 30.5 / Plt 424  -Na 131 / Cl 100 / BUN 16 / Glucose 71  -K 4.2 / CO2 20 / Cr 0.75  -ALT 12 / Alk Phos 232 / T.Bili 0.9  -INR 1.24 / PTT 37.7    Radiology:     Assessment/Plan: 66yo M, PMH HTN, HLD, alcohol associated liver cirrhosis (last drink >1 year ago), gerd, hypothyroidism presented to [facility] w/ abdominal distension and SOB, found to have bilateral pleural effusions and ascites.    IR consulted for omental bx.    - Given hypervascularity of peritoneum/omentum, no safe window for biopsy. No role for IR intervention at this time. D/w hepatology.  - d/w primary team

## 2025-06-19 LAB
ALBUMIN SERPL ELPH-MCNC: 3.3 G/DL — SIGNIFICANT CHANGE UP (ref 3.3–5)
ALP SERPL-CCNC: 225 U/L — HIGH (ref 40–120)
ALT FLD-CCNC: 13 U/L — SIGNIFICANT CHANGE UP (ref 10–45)
ANION GAP SERPL CALC-SCNC: 16 MMOL/L — SIGNIFICANT CHANGE UP (ref 5–17)
APTT BLD: 35.5 SEC — SIGNIFICANT CHANGE UP (ref 26.1–36.8)
AST SERPL-CCNC: 31 U/L — SIGNIFICANT CHANGE UP (ref 10–40)
BASOPHILS # BLD AUTO: 0.09 K/UL — SIGNIFICANT CHANGE UP (ref 0–0.2)
BASOPHILS NFR BLD AUTO: 1 % — SIGNIFICANT CHANGE UP (ref 0–2)
BILIRUB SERPL-MCNC: 0.9 MG/DL — SIGNIFICANT CHANGE UP (ref 0.2–1.2)
BLD GP AB SCN SERPL QL: NEGATIVE — SIGNIFICANT CHANGE UP
BUN SERPL-MCNC: 17 MG/DL — SIGNIFICANT CHANGE UP (ref 7–23)
CALCIUM SERPL-MCNC: 10 MG/DL — SIGNIFICANT CHANGE UP (ref 8.4–10.5)
CHLORIDE SERPL-SCNC: 97 MMOL/L — SIGNIFICANT CHANGE UP (ref 96–108)
CO2 SERPL-SCNC: 19 MMOL/L — LOW (ref 22–31)
CREAT SERPL-MCNC: 0.67 MG/DL — SIGNIFICANT CHANGE UP (ref 0.5–1.3)
CREATININE, URINE RESULT: 64 MG/DL — SIGNIFICANT CHANGE UP
CULTURE RESULTS: NO GROWTH — SIGNIFICANT CHANGE UP
EGFR: 103 ML/MIN/1.73M2 — SIGNIFICANT CHANGE UP
EGFR: 103 ML/MIN/1.73M2 — SIGNIFICANT CHANGE UP
EOSINOPHIL # BLD AUTO: 0.14 K/UL — SIGNIFICANT CHANGE UP (ref 0–0.5)
EOSINOPHIL NFR BLD AUTO: 1.6 % — SIGNIFICANT CHANGE UP (ref 0–6)
GLUCOSE SERPL-MCNC: 73 MG/DL — SIGNIFICANT CHANGE UP (ref 70–99)
HCT VFR BLD CALC: 28.8 % — LOW (ref 39–50)
HGB BLD-MCNC: 9.7 G/DL — LOW (ref 13–17)
IMM GRANULOCYTES # BLD AUTO: 0.03 K/UL — SIGNIFICANT CHANGE UP (ref 0–0.07)
IMM GRANULOCYTES NFR BLD AUTO: 0.3 % — SIGNIFICANT CHANGE UP (ref 0–0.9)
INR BLD: 1.23 RATIO — HIGH (ref 0.85–1.16)
LYMPHOCYTES # BLD AUTO: 1.88 K/UL — SIGNIFICANT CHANGE UP (ref 1–3.3)
LYMPHOCYTES NFR BLD AUTO: 20.9 % — SIGNIFICANT CHANGE UP (ref 13–44)
MAGNESIUM SERPL-MCNC: 1.7 MG/DL — SIGNIFICANT CHANGE UP (ref 1.6–2.6)
MCHC RBC-ENTMCNC: 31.2 PG — SIGNIFICANT CHANGE UP (ref 27–34)
MCHC RBC-ENTMCNC: 33.7 G/DL — SIGNIFICANT CHANGE UP (ref 32–36)
MCV RBC AUTO: 92.6 FL — SIGNIFICANT CHANGE UP (ref 80–100)
MELD SCORE WITH DIALYSIS: 25 POINTS — SIGNIFICANT CHANGE UP
MELD SCORE WITHOUT DIALYSIS: 9 POINTS — SIGNIFICANT CHANGE UP
MONOCYTES # BLD AUTO: 1.71 K/UL — HIGH (ref 0–0.9)
MONOCYTES NFR BLD AUTO: 19 % — HIGH (ref 2–14)
NEUTROPHILS # BLD AUTO: 5.13 K/UL — SIGNIFICANT CHANGE UP (ref 1.8–7.4)
NEUTROPHILS NFR BLD AUTO: 57.2 % — SIGNIFICANT CHANGE UP (ref 43–77)
NRBC # BLD AUTO: 0 K/UL — SIGNIFICANT CHANGE UP (ref 0–0)
NRBC # FLD: 0 K/UL — SIGNIFICANT CHANGE UP (ref 0–0)
NRBC BLD AUTO-RTO: 0 /100 WBCS — SIGNIFICANT CHANGE UP (ref 0–0)
PHOSPHATE SERPL-MCNC: 3.3 MG/DL — SIGNIFICANT CHANGE UP (ref 2.5–4.5)
PLATELET # BLD AUTO: 407 K/UL — HIGH (ref 150–400)
PMV BLD: 10.9 FL — SIGNIFICANT CHANGE UP (ref 7–13)
POTASSIUM SERPL-MCNC: 4.1 MMOL/L — SIGNIFICANT CHANGE UP (ref 3.5–5.3)
POTASSIUM SERPL-SCNC: 4.1 MMOL/L — SIGNIFICANT CHANGE UP (ref 3.5–5.3)
PROT ?TM UR-MCNC: 18 MG/DL — HIGH (ref 0–12)
PROT SERPL-MCNC: 9.3 G/DL — HIGH (ref 6–8.3)
PROTHROM AB SERPL-ACNC: 14 SEC — HIGH (ref 9.9–13.4)
RBC # BLD: 3.11 M/UL — LOW (ref 4.2–5.8)
RBC # FLD: 16.4 % — HIGH (ref 10.3–14.5)
RH IG SCN BLD-IMP: NEGATIVE — SIGNIFICANT CHANGE UP
SODIUM SERPL-SCNC: 132 MMOL/L — LOW (ref 135–145)
SPECIMEN SOURCE: SIGNIFICANT CHANGE UP
WBC # BLD: 8.98 K/UL — SIGNIFICANT CHANGE UP (ref 3.8–10.5)
WBC # FLD AUTO: 8.98 K/UL — SIGNIFICANT CHANGE UP (ref 3.8–10.5)

## 2025-06-19 PROCEDURE — 99233 SBSQ HOSP IP/OBS HIGH 50: CPT | Mod: GC

## 2025-06-19 PROCEDURE — 99233 SBSQ HOSP IP/OBS HIGH 50: CPT

## 2025-06-19 RX ADMIN — MIDODRINE HYDROCHLORIDE 5 MILLIGRAM(S): 5 TABLET ORAL at 05:21

## 2025-06-19 RX ADMIN — LACTULOSE 30 GRAM(S): 10 SOLUTION ORAL at 22:13

## 2025-06-19 RX ADMIN — MIDODRINE HYDROCHLORIDE 5 MILLIGRAM(S): 5 TABLET ORAL at 22:13

## 2025-06-19 RX ADMIN — HEPARIN SODIUM 5000 UNIT(S): 1000 INJECTION INTRAVENOUS; SUBCUTANEOUS at 13:21

## 2025-06-19 RX ADMIN — FUROSEMIDE 40 MILLIGRAM(S): 10 INJECTION INTRAMUSCULAR; INTRAVENOUS at 05:22

## 2025-06-19 RX ADMIN — CARVEDILOL 3.12 MILLIGRAM(S): 3.12 TABLET, FILM COATED ORAL at 17:11

## 2025-06-19 RX ADMIN — LACTULOSE 30 GRAM(S): 10 SOLUTION ORAL at 13:21

## 2025-06-19 RX ADMIN — HEPARIN SODIUM 5000 UNIT(S): 1000 INJECTION INTRAVENOUS; SUBCUTANEOUS at 22:13

## 2025-06-19 RX ADMIN — Medication 75 MICROGRAM(S): at 05:22

## 2025-06-19 RX ADMIN — Medication 2 TABLET(S): at 22:13

## 2025-06-19 RX ADMIN — MIDODRINE HYDROCHLORIDE 5 MILLIGRAM(S): 5 TABLET ORAL at 12:38

## 2025-06-19 RX ADMIN — POLYETHYLENE GLYCOL 3350 17 GRAM(S): 17 POWDER, FOR SOLUTION ORAL at 12:38

## 2025-06-19 RX ADMIN — Medication 1 TABLET(S): at 12:38

## 2025-06-19 RX ADMIN — CARVEDILOL 3.12 MILLIGRAM(S): 3.12 TABLET, FILM COATED ORAL at 05:22

## 2025-06-19 RX ADMIN — LACTULOSE 30 GRAM(S): 10 SOLUTION ORAL at 05:22

## 2025-06-19 RX ADMIN — FOLIC ACID 1 MILLIGRAM(S): 1 TABLET ORAL at 12:37

## 2025-06-19 RX ADMIN — Medication 102.5 MILLIGRAM(S): at 13:20

## 2025-06-19 RX ADMIN — HEPARIN SODIUM 5000 UNIT(S): 1000 INJECTION INTRAVENOUS; SUBCUTANEOUS at 05:22

## 2025-06-19 RX ADMIN — Medication 50 MILLIGRAM(S): at 05:22

## 2025-06-19 NOTE — PROGRESS NOTE ADULT - ASSESSMENT
66yo m w pmh htn, hld, aud, alcoholic liver cirrhosis c/b portal htn, hpylori gastritis, gerd, hypothyroidism, presented to [facility] w sob/cordova, bl le swelling, abdominal distention; found to have bl pl eff and ascites; suspected 2/2 decompensated cirrhosis; admitted to osh for further mgmt; s/p thoracentesis x2 (1.2L on 6/4, 1.2L on 6/9; fluid studies consistent w exudative eff; pulm consulted, opine, "may require pleural biopsy") + paracentesis x1 (1.5L on 6/6); hospital course complicated by lethargy, 2/2 he/pse, lactulose started; once stabilized, decision made to have patient transferred to [facility] for tips, transplant evaluation in lieu of socioeconomic barriers that would make outpatient evaluation difficult.

## 2025-06-19 NOTE — CONSULT NOTE ADULT - ASSESSMENT
66yo m  htn, hld, aud, alcoholic liver cirrhosis c/b portal htn, hpylori gastritis, gerd, hypothyroidism, presented to osh w sob/cordova, bl le swelling, abdominal distention; found to have bl pl eff and ascites; suspected 2/2 decompensated cirrhosis; admitted to osh for further mgmt; s/p thoracentesis x2 (1.2L on 6/4, 1.2L on 6/9; fluid studies consistent w exudative eff; pulm consulted, opine, "may require pleural biopsy") + paracentesis x1 (1.5L on 6/6); hospital course complicated by lethargy, 2/2 he/pse, lactulose started; once stabilized, decision made to have patient transferred to [facility] for tips, transplant evaluation in lieu of socioeconomic barriers that would make outpatient evaluation difficult.   CTH 6/3 no acute findings. crhonic microvascular chagnes   o/e AAOx1-2, VALENCIA, dysconjugate gaze? mild R facial? VALENCIA     ImprssioN:   1) AMS in setting of hepatic encephaloaphty and liver cirrhosis  2) ETOH abuse     - trend ammonia  - on lactulose and rifaxamin  - b12, RPR, TSH if not arleady checked   - thimaine and folic acid suppelment   - would pursue MRI brain for furhter eval   - liver workup in process   - PT/OT   - check FS, glucose control <180  - GI/DVT ppx  - Thank you for allowing me to participate in the care of this patient. Call with questions.   Jeremie Brock MD  Vascular Neurology  Office: [phone]

## 2025-06-19 NOTE — CONSULT NOTE ADULT - SUBJECTIVE AND OBJECTIVE BOX
Neurology Consult    Reason for Consult: Patient is a 65y old  Male who presents with a chief complaint of sob/cordova, bl le swelling, abdominal distention (19 Jun 2025 08:26)      HPI:  64yo m w pmh htn, hld, aud, alcoholic liver cirrhosis c/b portal htn, hpylori gastritis, gerd, hypothyroidism, presented to osh w sob/cordova, bl le swelling, abdominal distention; found to have bl pl eff and ascites; suspected 2/2 decompensated cirrhosis; admitted to osh for further mgmt; s/p thoracentesis x2 (1.2L on 6/4, 1.2L on 6/9; fluid studies consistent w exudative eff; pulm consulted, opine, "may require pleural biopsy") + paracentesis x1 (1.5L on 6/6); hospital course complicated by lethargy, 2/2 he/pse, lactulose started; once stabilized, decision made to have patient transferred to [facility] for tips, transplant evaluation in lieu of socioeconomic barriers that would make outpatient evaluation difficult. (12 Jun 2025 19:41)       PAST MEDICAL & SURGICAL HISTORY:  ALC (alcoholic liver cirrhosis)      Hyperlipidemia      Positive FIT (fecal immunochemical test)      GERD (gastroesophageal reflux disease)      Hypothyroidism          Allergies: Allergies    No Known Allergies    Intolerances        Social History: Denies toxic habits including tobacco, ETOH or illicit drugs.    Family History: FAMILY HISTORY:  . No family history of strokes    Medications: MEDICATIONS  (STANDING):  carvedilol 3.125 milliGRAM(s) Oral every 12 hours  folic acid 1 milliGRAM(s) Oral daily  furosemide    Tablet 40 milliGRAM(s) Oral daily  heparin   Injectable 5000 Unit(s) SubCutaneous every 8 hours  lactulose Syrup 30 Gram(s) Oral three times a day  levothyroxine 75 MICROGram(s) Oral daily  midodrine 5 milliGRAM(s) Oral every 8 hours  multivitamin 1 Tablet(s) Oral daily  polyethylene glycol 3350 17 Gram(s) Oral daily  rifAXIMin 550 milliGRAM(s) Oral two times a day  senna 2 Tablet(s) Oral at bedtime  spironolactone 50 milliGRAM(s) Oral daily  thiamine IVPB 250 milliGRAM(s) IV Intermittent daily    MEDICATIONS  (PRN):  acetaminophen     Tablet .. 650 milliGRAM(s) Oral every 6 hours PRN Temp greater or equal to 38C (100.4F), Mild Pain (1 - 3)  aluminum hydroxide/magnesium hydroxide/simethicone Suspension 30 milliLiter(s) Oral every 4 hours PRN Dyspepsia  melatonin 3 milliGRAM(s) Oral at bedtime PRN Insomnia  ondansetron Injectable 4 milliGRAM(s) IV Push every 8 hours PRN Nausea and/or Vomiting      Review of Systems: limited given mental status   CONSTITUTIONAL:  No weight loss, fever, chills, weakness or fatigue.  HEENT:  Eyes:  No visual loss, blurred vision, double vision or yellow sclera. Ears, Nose, Throat:  No hearing loss, sneezing, congestion, runny nose or sore throat.  SKIN:  No rash or itching.  CARDIOVASCULAR:  No chest pain, chest pressure or chest discomfort. No palpitations or edema.  RESPIRATORY:  No shortness of breath, cough or sputum.  GASTROINTESTINAL:  No anorexia, nausea, vomiting or diarrhea. No abdominal pain or blood.  GENITOURINARY:  No burning on urination or incontinence   NEUROLOGICAL:  No headache, dizziness, syncope, paralysis, ataxia, numbness or tingling in the extremities. No change in bowel or bladder control. no limb weakness. no vision changes.   MUSCULOSKELETAL:  No muscle, back pain, joint pain or stiffness.  HEMATOLOGIC:  No anemia, bleeding or bruising.  LYMPHATICS:  No enlarged nodes. No history of splenectomy.  PSYCHIATRIC:  No history of depression or anxiety.  ENDOCRINOLOGIC:  No reports of sweating, cold or heat intolerance. No polyuria or polydipsia.      Vitals:  Vital Signs Last 24 Hrs  T(C): 36.8 (19 Jun 2025 05:38), Max: 37 (18 Jun 2025 20:56)  T(F): 98.3 (19 Jun 2025 05:38), Max: 98.6 (18 Jun 2025 20:56)  HR: 100 (19 Jun 2025 05:38) (92 - 100)  BP: 101/71 (19 Jun 2025 05:38) (95/58 - 104/67)  BP(mean): --  RR: 18 (19 Jun 2025 05:38) (18 - 18)  SpO2: 93% (19 Jun 2025 05:38) (92% - 93%)    Parameters below as of 19 Jun 2025 05:38  Patient On (Oxygen Delivery Method): room air  Orthostatic VS        General Exam:   General Appearance: Appropriately dressed and in no acute distress       Head: Normocephalic, atraumatic and no dysmorphic features  Ear, Nose, and Throat: Moist mucous membranes  CVS: S1S2+  Resp: No SOB, no wheeze or rhonchi  GI: soft NT/ND  Extremities: No edema or cyanosis  Skin: No bruises or rashes     Neurological Exam:  Mental Status: Awake, alert and oriented x 1-2 Able to follow simple and complex verbal commands. Able to name and repeat. fluent speech. No obvious aphasia or dysarthria noted.   Cranial Nerves: PERRL, dysconjugate gaze?  VFFC, sensation V1-V3 intact,? mild f acial asymmetry, equal elevation of palate, scm/trap 5/5, tongue is midline on protrusion.  hearing is grossly intact.   Motor: Normal bulk, tone and strength throughout. Fine finger movements were intact and symmetric. no tremors or drift noted.    Sensation: Intact to light touch and pinprick throughout. no right/left confusion. no extinction to tactile on DSS.    Reflexes: 1+ throughout at biceps, brachioradialis, triceps, patellars and ankles bilaterally and equal. No clonus. R toe and L toe were both downgoing.  Coordination: No dysmetria on FNF    Gait: deferred     Data/Labs/Imaging which I personally reviewed.     Labs:     CBC Full  -  ( 19 Jun 2025 07:18 )  WBC Count : 8.98 K/uL  RBC Count : 3.11 M/uL  Hemoglobin : 9.7 g/dL  Hematocrit : 28.8 %  Platelet Count - Automated : 407 K/uL  Mean Cell Volume : 92.6 fl  Mean Cell Hemoglobin : 31.2 pg  Mean Cell Hemoglobin Concentration : 33.7 g/dL  Auto Neutrophil # : 5.13 K/uL  Auto Lymphocyte # : 1.88 K/uL  Auto Monocyte # : 1.71 K/uL  Auto Eosinophil # : 0.14 K/uL  Auto Basophil # : 0.09 K/uL  Auto Neutrophil % : 57.2 %  Auto Lymphocyte % : 20.9 %  Auto Monocyte % : 19.0 %  Auto Eosinophil % : 1.6 %  Auto Basophil % : 1.0 %    06-19    132[L]  |  97  |  17  ----------------------------<  73  4.1   |  19[L]  |  0.67    Ca    10.0      19 Jun 2025 07:19  Phos  3.3     06-19  Mg     1.7     06-19    TPro  9.3[H]  /  Alb  3.3  /  TBili  0.9  /  DBili  x   /  AST  31  /  ALT  13  /  AlkPhos  225[H]  06-19    LIVER FUNCTIONS - ( 19 Jun 2025 07:19 )  Alb: 3.3 g/dL / Pro: 9.3 g/dL / ALK PHOS: 225 U/L / ALT: 13 U/L / AST: 31 U/L / GGT: x           PT/INR - ( 19 Jun 2025 07:26 )   PT: 14.0 sec;   INR: 1.23 ratio         PTT - ( 19 Jun 2025 07:26 )  PTT:35.5 sec  Urinalysis Basic - ( 19 Jun 2025 07:19 )    Color: x / Appearance: x / SG: x / pH: x  Gluc: 73 mg/dL / Ketone: x  / Bili: x / Urobili: x   Blood: x / Protein: x / Nitrite: x   Leuk Esterase: x / RBC: x / WBC x   Sq Epi: x / Non Sq Epi: x / Bacteria: x      < from: CT Head No Cont (06.03.25 @ 09:04) >    ACC: [id number] EXAM:  CT BRAIN   ORDERED BY:  DANGELO GAYTAN     PROCEDURE DATE:  06/03/2025          INTERPRETATION:  CLINICAL INDICATIONS:  confusion    COMPARISON: None    TECHNIQUE: Noncontrast CT of the head. Multiplanar reformations are   submitted.    FINDINGS:  There is periventricular and subcortical white matter hypodensity without   mass effect, nonspecific, likely representing mild chronic microvascular   ischemic changes. There is no compelling evidence for an acute   transcortical infarction. There is no evidence of mass, mass effect,   midline shift or extra-axial fluid collection. The lateral ventricles and   cortical sulci are age-appropriate in size and configuration. The orbits,   mastoid air cells and visualized paranasal sinuses are unremarkable. The   calvarium is intact. Consider MRI as clinically warranted.    IMPRESSION:  Mild chronic microvascular changes without evidence of an   acute transcortical infarction or hemorrhage.    --- End of Report ---            CARLOS ZIMMERMAN MD; Attending Radiologist  This document has been electronically signed. Sheldon  3 2025  9:07AM    < end of copied text >

## 2025-06-19 NOTE — CONSULT NOTE ADULT - SUBJECTIVE AND OBJECTIVE BOX
Interventional Radiology    Evaluate for Procedure: repeat paracentesis with ADA and flow cytometry per pulmonology    HPI: 65M w/ PMH of EtOH cirrhosis c/b portal HTN, GERD, HTN, EtOH use. Initially presented to OSH w/ abdominal distnesion, bl LE swelling. found to have ascites, bl pleural effusions. Admitted for further work up.  Received a R thoracentesis (1.2L) on 6/4, paracentesis (1.5L) on 6/6, L thoracentesis (1.2L) on 6/9. Course complicated by encephalopathy. Transferred to [facility] for possible TIPS and possible liver transplant evaluation. Primary team requesting repeat paracentesis with ADA and flow cytometry specimens.      Allergies: No Known Allergies    Medications (Abx/Cardiac/Anticoagulation/Blood Products)  carvedilol: 3.125 milliGRAM(s) Oral (06-19 @ 05:22)  furosemide    Tablet: 40 milliGRAM(s) Oral (06-19 @ 05:22)  heparin   Injectable: 5000 Unit(s) SubCutaneous (06-19 @ 13:21)  midodrine: 5 milliGRAM(s) Oral (06-19 @ 12:38)  rifAXIMin: 550 milliGRAM(s) Oral (06-19 @ 05:22)  spironolactone: 50 milliGRAM(s) Oral (06-19 @ 05:22)    Data:    T(C): 36.4  HR: 89  BP: 114/72  RR: 18  SpO2: 94%    -WBC 8.98 / HgB 9.7 / Hct 28.8 / Plt 407  -Na 132 / Cl 97 / BUN 17 / Glucose 73  -K 4.1 / CO2 19 / Cr 0.67  -ALT 13 / Alk Phos 225 / T.Bili 0.9  -INR 1.23 / PTT 35.5    Radiology: Reviewed    Assessment/Plan:    65M w/ PMH of EtOH cirrhosis c/b portal HTN, GERD, HTN, EtOH use. Initially presented to OSH w/ abdominal distnesion, bl LE swelling. found to have ascites, bl pleural effusions. Admitted for further work up.  Received a R thoracentesis (1.2L) on 6/4, paracentesis (1.5L) on 6/6, L thoracentesis (1.2L) on 6/9. Course complicated by encephalopathy. Transferred to [facility] for possible TIPS and possible liver transplant evaluation. Primary team requesting repeat paracentesis with ADA and flow cytometry specimens.    - case reviewed and approved for tomorrow  - please place IR procedure order under NP Elizabeth  - STAT labs in AM (cbc,coags, bmp, T&S)  - hold AM SQ Heparin  - No need to be NPO  - d/w primary team    --  Moises Johnson, ALTHEA  Interventional Radiology  Available on [employer] TEAMS / sliceX    For EMERGENT inquiries/questions:  IR Pager ([facility]): [phone]    For non-emergent consults/questions:   Please place a sunrise order "Consult- Interventional Radiology" with an appropriate callback number    For questions about scheduling during appropriate work hours, call IR :  [facility]: [phone]    For outpatient IR booking:  [facility]: [phone]

## 2025-06-19 NOTE — PROGRESS NOTE ADULT - ATTENDING COMMENTS
66 yo M with PMHx ETOH cirrhosis, originally from [facility] transferred to [facility] for liver transplant eval and bilateral pleural effusions.  R appears to be related to cirrhosis and pleural fluid findings c/w such.  L has lymphocytic predominance of unclear etiology - cyto negative for malignancy. Ascitic fluid also with lymphocyte predominance. Also with elevated serum protein. Imaging without evidence of lymphadenopathy making lymphoma seem less likely. Possible paraproteinemia?  CXR w/ persistent B/L Pleural effusions R>>L.  Remains on RA.  SPEP/UPEP without monoclonal gammopathy.  Flow cytometry WNL.    Recommend:  #Pleural effusions  #Cirrhosis  #Encephalopathy    Recommend:  - Quant gold indeterminate, please send repeat  - would repeat paracentesis and send flow cytometry and ADA  - awtg Brain MRI and MRCP      Monika Galan MD, 66 yo M with PMHx ETOH cirrhosis, originally from [facility] transferred to [facility] for liver transplant eval and bilateral pleural effusions.  R appears to be related to cirrhosis and pleural fluid findings c/w such.  L has lymphocytic predominance of unclear etiology - cyto negative for malignancy. Ascitic fluid also with lymphocyte predominance. Also with elevated serum protein. Imaging without evidence of lymphadenopathy making lymphoma seem less likely. Possible paraproteinemia?  CXR w/ persistent B/L Pleural effusions R>>L.  Remains on RA.  SPEP/UPEP without monoclonal gammopathy.  Flow cytometry WNL.    Recommend:  #Pleural effusions  #Cirrhosis  #Encephalopathy    Recommend:  - Quant gold indeterminate, please send repeat  - would repeat paracentesis and send flow cytometry and ADA (as well as typical chem, cell count, gram stain/culture)  - awtg Brain MRI and MRCP      Monika Galan MD,

## 2025-06-19 NOTE — PROGRESS NOTE ADULT - ASSESSMENT
65M w/ PMH of EtOH cirrhosis c/b portal HTN, GERD, HTN, EtOH use. Initially presented to OSH w/ abdominal distnesion, bl LE swelling. found to have ascites, bl pleural effusions. Admitted for further work up.  Received a R thoracentesis (1.2L) on 6/4, paracentesis (1.5L) on 6/6, L thoracentesis (1.2L) on 6/9. Course complicated by encephalopathy. Transferred to [facility] for possible TIPS and possible liver transplant evaluation.    Patient originally from [facility], moved here years ago. No history of lung disease or infections.    #Bilateral pleural effusions  #Omental thickening  Patient with bilateral proteinacious pleural effusions, R macrophage-predominant, R lymphocyte-predominant. pH high, Glucose not low, cytopath negative bilaterally.  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  Given lymphocytic predominance, L side concerning for alternative etiology, possibly tuberculous or malignant.  No pleural fluid ADA ordered.  On bedside POCUS (6/13), L-sided effusion is small with one available rib space, no safe room for thoracentesis. R-sided effusion is larger but also of less interest for repeat intervention.  Patient might require repeat thoracentesis for AFB culture, ADA - vs pleuroscopy with pleural biopsy to evaluate for tuberculous effusion.  - Quantiferon indeterminate, please repeat   - will continue monitoring effusions for change in size. If large enough, can re-sample left side.  - UPEP neg, SPEP polyclonal gammopathy, flow cytometry negative  65M w/ PMH of EtOH cirrhosis c/b portal HTN, GERD, HTN, EtOH use. Initially presented to OSH w/ abdominal distnesion, bl LE swelling. found to have ascites, bl pleural effusions. Admitted for further work up.  Received a R thoracentesis (1.2L) on 6/4, paracentesis (1.5L) on 6/6, L thoracentesis (1.2L) on 6/9. Course complicated by encephalopathy. Transferred to [facility] for possible TIPS and possible liver transplant evaluation.    Patient originally from [facility], moved here years ago. No history of lung disease or infections.    #Bilateral pleural effusions  #Omental thickening  Patient with bilateral proteinacious pleural effusions, R macrophage-predominant, R lymphocyte-predominant. pH high, Glucose not low, cytopath negative bilaterally.  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  Given lymphocytic predominance, L side concerning for alternative etiology, possibly tuberculous or malignant - given omental thickening concerning for malignancy   No pleural fluid ADA ordered.  On bedside POCUS (6/13), L-sided effusion is small with one available rib space, no safe room for thoracentesis. R-sided effusion is larger but also of less interest for repeat intervention.  Patient might require repeat thoracentesis for AFB culture, ADA - vs pleuroscopy with pleural biopsy to evaluate for tuberculous effusion.  - Quantiferon indeterminate, please repeat   - Would advise repeating paracentesis with ADA and flow cytometry   - Pending MRCP  - will continue monitoring effusions for change in size. If large enough, can re-sample left side.  - UPEP neg, SPEP polyclonal gammopathy, flow cytometry negative

## 2025-06-20 LAB
% GAMMA, URINE: 21.1 % — SIGNIFICANT CHANGE UP
ALBUMIN 24H MFR UR ELPH: 6.7 % — SIGNIFICANT CHANGE UP
ALBUMIN FLD-MCNC: 2.8 G/DL — SIGNIFICANT CHANGE UP
ALBUMIN SERPL ELPH-MCNC: 3.4 G/DL — SIGNIFICANT CHANGE UP (ref 3.3–5)
ALP SERPL-CCNC: 246 U/L — HIGH (ref 40–120)
ALPHA1 GLOB 24H MFR UR ELPH: 21.8 % — SIGNIFICANT CHANGE UP
ALPHA2 GLOB 24H MFR UR ELPH: 18.1 % — SIGNIFICANT CHANGE UP
ALT FLD-CCNC: 15 U/L — SIGNIFICANT CHANGE UP (ref 10–45)
AMMONIA BLD-MCNC: 53 UMOL/L — SIGNIFICANT CHANGE UP (ref 11–55)
ANION GAP SERPL CALC-SCNC: 14 MMOL/L — SIGNIFICANT CHANGE UP (ref 5–17)
APTT BLD: 34.7 SEC — SIGNIFICANT CHANGE UP (ref 26.1–36.8)
AST SERPL-CCNC: 32 U/L — SIGNIFICANT CHANGE UP (ref 10–40)
B PERT IGG+IGM PNL SER: CLEAR — SIGNIFICANT CHANGE UP
B-GLOBULIN 24H MFR UR ELPH: 32.3 % — SIGNIFICANT CHANGE UP
BILIRUB SERPL-MCNC: 1 MG/DL — SIGNIFICANT CHANGE UP (ref 0.2–1.2)
BUN SERPL-MCNC: 15 MG/DL — SIGNIFICANT CHANGE UP (ref 7–23)
CALCIUM SERPL-MCNC: 10.3 MG/DL — SIGNIFICANT CHANGE UP (ref 8.4–10.5)
CHLORIDE SERPL-SCNC: 97 MMOL/L — SIGNIFICANT CHANGE UP (ref 96–108)
CMV DNA CSF QL NAA+PROBE: SIGNIFICANT CHANGE UP IU/ML
CMV DNA SPEC NAA+PROBE-LOG#: SIGNIFICANT CHANGE UP LOG10IU/ML
CO2 SERPL-SCNC: 20 MMOL/L — LOW (ref 22–31)
COLLECT DURATION TIME UR: 24 HR — SIGNIFICANT CHANGE UP
COLOR FLD: YELLOW
CREAT SERPL-MCNC: 0.65 MG/DL — SIGNIFICANT CHANGE UP (ref 0.5–1.3)
CRP SERPL-MCNC: 41 MG/L — HIGH (ref 0–4)
DSDNA AB SER-ACNC: <1 IU/ML — SIGNIFICANT CHANGE UP
EBV DNA SERPL NAA+PROBE-ACNC: SIGNIFICANT CHANGE UP IU/ML
EBVPCR LOG: SIGNIFICANT CHANGE UP LOG10IU/ML
EGFR: 104 ML/MIN/1.73M2 — SIGNIFICANT CHANGE UP
EGFR: 104 ML/MIN/1.73M2 — SIGNIFICANT CHANGE UP
ERYTHROCYTE [SEDIMENTATION RATE] IN BLOOD: 120 MM/HR — HIGH (ref 0–15)
GLUCOSE FLD-MCNC: 76 MG/DL — SIGNIFICANT CHANGE UP
GLUCOSE SERPL-MCNC: 76 MG/DL — SIGNIFICANT CHANGE UP (ref 70–99)
GRAM STN FLD: SIGNIFICANT CHANGE UP
GRAM STN FLD: SIGNIFICANT CHANGE UP
HCT VFR BLD CALC: 29.2 % — LOW (ref 39–50)
HGB BLD-MCNC: 9.8 G/DL — LOW (ref 13–17)
INR BLD: 1.23 RATIO — HIGH (ref 0.85–1.16)
INTERPRETATION 24H UR IFE-IMP: SIGNIFICANT CHANGE UP
LDH SERPL L TO P-CCNC: 144 U/L — SIGNIFICANT CHANGE UP
LYMPHOCYTES # FLD: 78 % — SIGNIFICANT CHANGE UP
M PROTEIN 24H UR ELPH-MRATE: SIGNIFICANT CHANGE UP %
M PROTEIN 24H UR ELPH-MRATE: SIGNIFICANT CHANGE UP MG/24HR (ref 0–0)
M PROTEIN 24H UR ELPH-MRATE: SIGNIFICANT CHANGE UP MG/DL
MAGNESIUM SERPL-MCNC: 1.7 MG/DL — SIGNIFICANT CHANGE UP (ref 1.6–2.6)
MCHC RBC-ENTMCNC: 31.2 PG — SIGNIFICANT CHANGE UP (ref 27–34)
MCHC RBC-ENTMCNC: 33.6 G/DL — SIGNIFICANT CHANGE UP (ref 32–36)
MCV RBC AUTO: 93 FL — SIGNIFICANT CHANGE UP (ref 80–100)
MELD SCORE WITH DIALYSIS: 26 POINTS — SIGNIFICANT CHANGE UP
MELD SCORE WITHOUT DIALYSIS: 9 POINTS — SIGNIFICANT CHANGE UP
MONOS+MACROS # FLD: 21 % — SIGNIFICANT CHANGE UP
NEUTROPHILS-BODY FLUID: 1 % — SIGNIFICANT CHANGE UP
NRBC # BLD AUTO: 0 K/UL — SIGNIFICANT CHANGE UP (ref 0–0)
NRBC # FLD: 0 K/UL — SIGNIFICANT CHANGE UP (ref 0–0)
NRBC BLD AUTO-RTO: 0 /100 WBCS — SIGNIFICANT CHANGE UP (ref 0–0)
PHOSPHATE SERPL-MCNC: 3.3 MG/DL — SIGNIFICANT CHANGE UP (ref 2.5–4.5)
PLATELET # BLD AUTO: 415 K/UL — HIGH (ref 150–400)
PMV BLD: 11.1 FL — SIGNIFICANT CHANGE UP (ref 7–13)
POTASSIUM SERPL-MCNC: 4.2 MMOL/L — SIGNIFICANT CHANGE UP (ref 3.5–5.3)
POTASSIUM SERPL-SCNC: 4.2 MMOL/L — SIGNIFICANT CHANGE UP (ref 3.5–5.3)
PROT ?TM UR-MCNC: 18 MG/DL — HIGH (ref 0–12)
PROT FLD-MCNC: 7.1 G/DL — SIGNIFICANT CHANGE UP
PROT PATTERN 24H UR ELPH-IMP: SIGNIFICANT CHANGE UP
PROT SERPL-MCNC: 9.7 G/DL — HIGH (ref 6–8.3)
PROTEIN QUANT CALC, URINE: 198 MG/24 H — HIGH (ref 50–100)
PROTHROM AB SERPL-ACNC: 14 SEC — HIGH (ref 9.9–13.4)
RBC # BLD: 3.14 M/UL — LOW (ref 4.2–5.8)
RBC # FLD: 16.8 % — HIGH (ref 10.3–14.5)
RCV VOL RI: <2000 CELLS/UL — HIGH
RHEUMATOID FACT SERPL-ACNC: <10 IU/ML — SIGNIFICANT CHANGE UP (ref 0–13)
SODIUM SERPL-SCNC: 131 MMOL/L — LOW (ref 135–145)
SPECIMEN SOURCE: SIGNIFICANT CHANGE UP
T PALLIDUM AB TITR SER: NEGATIVE — SIGNIFICANT CHANGE UP
TOTAL NUCLEATED CELL COUNT, BODY FLUID: 1519 CELLS/UL — SIGNIFICANT CHANGE UP
TOTAL VOLUME - URINE: 1100 ML — SIGNIFICANT CHANGE UP
TUBE TYPE: SIGNIFICANT CHANGE UP
URINE CREATININE CALCULATION: 0.7 G/24 H — LOW (ref 1–2)
WBC # BLD: 8.26 K/UL — SIGNIFICANT CHANGE UP (ref 3.8–10.5)
WBC # FLD AUTO: 8.26 K/UL — SIGNIFICANT CHANGE UP (ref 3.8–10.5)
WBC COUNT.: 1519 CELLS/UL — SIGNIFICANT CHANGE UP

## 2025-06-20 PROCEDURE — 99232 SBSQ HOSP IP/OBS MODERATE 35: CPT

## 2025-06-20 PROCEDURE — 49083 ABD PARACENTESIS W/IMAGING: CPT

## 2025-06-20 PROCEDURE — 99233 SBSQ HOSP IP/OBS HIGH 50: CPT | Mod: GC

## 2025-06-20 PROCEDURE — 88189 FLOWCYTOMETRY/READ 16 & >: CPT | Mod: 59

## 2025-06-20 RX ADMIN — FOLIC ACID 1 MILLIGRAM(S): 1 TABLET ORAL at 09:15

## 2025-06-20 RX ADMIN — Medication 50 MILLIGRAM(S): at 06:02

## 2025-06-20 RX ADMIN — FUROSEMIDE 40 MILLIGRAM(S): 10 INJECTION INTRAMUSCULAR; INTRAVENOUS at 06:03

## 2025-06-20 RX ADMIN — HEPARIN SODIUM 5000 UNIT(S): 1000 INJECTION INTRAVENOUS; SUBCUTANEOUS at 13:00

## 2025-06-20 RX ADMIN — CARVEDILOL 3.12 MILLIGRAM(S): 3.12 TABLET, FILM COATED ORAL at 06:03

## 2025-06-20 RX ADMIN — MIDODRINE HYDROCHLORIDE 5 MILLIGRAM(S): 5 TABLET ORAL at 12:58

## 2025-06-20 RX ADMIN — LACTULOSE 30 GRAM(S): 10 SOLUTION ORAL at 13:00

## 2025-06-20 RX ADMIN — MIDODRINE HYDROCHLORIDE 5 MILLIGRAM(S): 5 TABLET ORAL at 21:32

## 2025-06-20 RX ADMIN — Medication 1 TABLET(S): at 09:14

## 2025-06-20 RX ADMIN — HEPARIN SODIUM 5000 UNIT(S): 1000 INJECTION INTRAVENOUS; SUBCUTANEOUS at 21:33

## 2025-06-20 RX ADMIN — Medication 100 MILLIGRAM(S): at 09:14

## 2025-06-20 RX ADMIN — MIDODRINE HYDROCHLORIDE 5 MILLIGRAM(S): 5 TABLET ORAL at 06:02

## 2025-06-20 RX ADMIN — POLYETHYLENE GLYCOL 3350 17 GRAM(S): 17 POWDER, FOR SOLUTION ORAL at 09:15

## 2025-06-20 RX ADMIN — CARVEDILOL 3.12 MILLIGRAM(S): 3.12 TABLET, FILM COATED ORAL at 17:19

## 2025-06-20 RX ADMIN — Medication 75 MICROGRAM(S): at 06:03

## 2025-06-20 RX ADMIN — Medication 2 TABLET(S): at 21:32

## 2025-06-20 RX ADMIN — LACTULOSE 30 GRAM(S): 10 SOLUTION ORAL at 21:33

## 2025-06-20 RX ADMIN — LACTULOSE 30 GRAM(S): 10 SOLUTION ORAL at 06:04

## 2025-06-20 NOTE — PROGRESS NOTE ADULT - ASSESSMENT
65M w/ PMH of EtOH cirrhosis c/b portal HTN, GERD, HTN, EtOH use. Initially presented to OSH w/ abdominal distnesion, bl LE swelling. found to have ascites, bl pleural effusions. Admitted for further work up.  Received a R thoracentesis (1.2L) on 6/4, paracentesis (1.5L) on 6/6, L thoracentesis (1.2L) on 6/9. Course complicated by encephalopathy. Transferred to [facility] for possible TIPS and possible liver transplant evaluation.    Patient originally from [facility], moved here years ago. No history of lung disease or infections.    #Bilateral pleural effusions  #Omental thickening  Patient with bilateral proteinacious pleural effusions, R macrophage-predominant, R lymphocyte-predominant. pH high, Glucose not low, cytopath negative bilaterally.  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  Given lymphocytic predominance, L side concerning for alternative etiology, possibly tuberculous or malignant - given omental thickening concerning for malignancy   No pleural fluid ADA ordered.  On bedside POCUS (6/13), L-sided effusion is small with one available rib space, no safe room for thoracentesis. R-sided effusion is larger but also of less interest for repeat intervention.  Patient might require repeat thoracentesis for AFB culture, ADA - vs pleuroscopy with pleural biopsy to evaluate for tuberculous effusion.  - Quantiferon indeterminate, pending repeat   - Follow up paracentesis with ADA and flow cytometry   - Pending MRCP  - will continue monitoring effusions for change in size. If large enough, can re-sample left side.  - UPEP neg, SPEP polyclonal gammopathy, flow cytometry negative

## 2025-06-20 NOTE — PROCEDURE NOTE - ADDITIONAL PROCEDURE DETAILS
Technically successful paracentesis through RLQ window. 500 of clear fluid was drained. Full report to follow.
Successful RUQ diagnostic paracentesis with 50ml of clear yellow color fluid drained with 5Fr Yueh centesis via ultrasound guidance. Ascited noted to be loculated, unable to drain to completion. Residual small vol ascites remained. Images sent to PACS.     [ x ] Fluid sent for chemistry, gram stain, and culture   [  x ] Fluid sent for flow cytometry and ADA as requested per pulm.   Full report to follow.

## 2025-06-20 NOTE — PROGRESS NOTE ADULT - ASSESSMENT
64yo m w pmh htn, hld, aud, alcoholic liver cirrhosis c/b portal htn, hpylori gastritis, gerd, hypothyroidism, presented to osh w sob/cordova, bl le swelling, abdominal distention; found to have bl pl eff and ascites; suspected 2/2 decompensated cirrhosis; admitted to osh for further mgmt; s/p thoracentesis x2 (1.2L on 6/4, 1.2L on 6/9; fluid studies consistent w exudative eff; pulm consulted, opine, "may require pleural biopsy") + paracentesis x1 (1.5L on 6/6); hospital course complicated by lethargy, 2/2 he/pse, lactulose started; once stabilized, decision made to have patient transferred to [facility] for tips, transplant evaluation in lieu of socioeconomic barriers that would make outpatient evaluation difficult.

## 2025-06-20 NOTE — PROGRESS NOTE ADULT - ATTENDING COMMENTS
66 yo M with PMHx ETOH cirrhosis, originally from [facility] transferred to [facility] for liver transplant eval and bilateral pleural effusions.  R appears to be related to cirrhosis and pleural fluid findings c/w such.  L has lymphocytic predominance of unclear etiology - cyto negative for malignancy. Ascitic fluid also with lymphocyte predominance. Also with elevated serum protein. Imaging without evidence of lymphadenopathy making lymphoma seem less likely. Possible paraproteinemia?  CXR w/ persistent B/L Pleural effusions R>>L.  Remains on RA.  SPEP without monoclonal gammopathy.  Flow cytometry WNL.  UPEP with TWO WEAK BENCE OZUNA proteins?  Remains on RA.  No increased dyspnea.    Recommend:  #Pleural effusions  #Cirrhosis  #Encephalopathy    Recommend:  - Quant gold indeterminate, f/u repeat  - repeat paracentesis with lymph predominance again, f/u flow cytometry and ADA   - two weak Bence ozuna proteins, unclear significance, consider Heme/Onc consult  - [facility] Brain MRI and MRCP  - no indication for repeat thoracentesis at this time      Monika Galan MD, MSCR

## 2025-06-20 NOTE — PRE PROCEDURE NOTE - PRE PROCEDURE EVALUATION
Interventional Radiology    HPI: 65M w/ PMH of EtOH cirrhosis c/b portal HTN, GERD, HTN, EtOH use. Initially presented to OSH w/ abdominal distnesion, bl LE swelling. found to have ascites, bl pleural effusions. Admitted for further work up. Received a R thoracentesis (1.2L) on 6/4, paracentesis (1.5L) on 6/6, L thoracentesis (1.2L) on 6/9. Course complicated by encephalopathy. Transferred to [facility] for possible TIPS and possible liver transplant evaluation. Primary team requesting repeat paracentesis with ADA and flow cytometry specimens.    Allergies: No Known Allergies    Medications (Abx/Cardiac/Anticoagulation/Blood Products)    carvedilol: 3.125 milliGRAM(s) Oral (06-20 @ 06:03)  furosemide    Tablet: 40 milliGRAM(s) Oral (06-20 @ 06:03)  heparin   Injectable: 5000 Unit(s) SubCutaneous (06-19 @ 22:13)  midodrine: 5 milliGRAM(s) Oral (06-20 @ 06:02)  rifAXIMin: 550 milliGRAM(s) Oral (06-20 @ 06:02)  spironolactone: 50 milliGRAM(s) Oral (06-20 @ 06:02)    Data:    T(C): 36.7  HR: 89  BP: 107/66  RR: 15  SpO2: 94%    Exam  General: No acute distress  Chest: Non labored breathing  Abdomen: Non-distended    -WBC 8.26 / HgB 9.8 / Hct 29.2 / Plt 415  -Na 131 / Cl 97 / BUN 15 / Glucose 76  -K 4.2 / CO2 20 / Cr 0.65  -ALT 15 / Alk Phos 246 / T.Bili 1.0  -INR1.23    Imaging: Reviewed    Plan: 65y Male presents for repeat diagnostic paracentesis  -Risks/Benefits/alternatives explained with the patient and/or healthcare proxy and witnessed informed consent obtained.   
Interventional Radiology    HPI: 66yo M, PMH HTN, HLD, alcohol associated liver cirrhosis (last drink >1 year ago), gerd, hypothyroidism presented to [facility] w/ abdominal distension and SOB, found to have bilateral pleural effusions and ascites. He underwent thoracentesis on 6/4 and 6/9 (both 1.2L), paracentesis on 6/6 (1.5L, protein 5.1, PMNs 62.5). His hospital course was complicated by lethargy and confusion c/f HE, started on lactulose. For his ascites, he was treated with lasix 40 and spironolactone 25. He was also started on propanolol and midodrine 5 TID for blood pressure. IR consulted for paracentesis.      Allergies: No Known Allergies    Medications (Abx/Cardiac/Anticoagulation/Blood Products)    furosemide    Tablet: 40 milliGRAM(s) Oral (06-13 @ 06:01)  heparin   Injectable: 5000 Unit(s) SubCutaneous (06-13 @ 13:05)  midodrine: 5 milliGRAM(s) Oral (06-13 @ 13:04)  propranolol: 10 milliGRAM(s) Oral (06-13 @ 06:01)  rifAXIMin: 550 milliGRAM(s) Oral (06-13 @ 05:28)  spironolactone: 25 milliGRAM(s) Oral (06-13 @ 13:03)  spironolactone: 25 milliGRAM(s) Oral (06-13 @ 06:01)    Data:    T(C): 36.7  HR: 89  BP: 91/51  RR: 18  SpO2: 92%    Exam  General: No acute distress  Chest: Non labored breathing  Abdomen: Distended    -WBC 10.64 / HgB 10.3 / Hct 30.3 / Plt 362  -Na 131 / Cl 95 / BUN 15 / Glucose 82  -K 3.8 / CO2 21 / Cr 0.54  -ALT 8 / Alk Phos 168 / T.Bili 1.3  -INR1.40    Imaging:     Plan: 65y Male presents for paracentesis.    -Risks/Benefits/alternatives explained with the patient and/or healthcare proxy and witnessed informed consent obtained.

## 2025-06-20 NOTE — PROGRESS NOTE ADULT - ASSESSMENT
64yo m  htn, hld, aud, alcoholic liver cirrhosis c/b portal htn, hpylori gastritis, gerd, hypothyroidism, presented to osh w sob/cordova, bl le swelling, abdominal distention; found to have bl pl eff and ascites; suspected 2/2 decompensated cirrhosis; admitted to osh for further mgmt; s/p thoracentesis x2 (1.2L on 6/4, 1.2L on 6/9; fluid studies consistent w exudative eff; pulm consulted, opine, "may require pleural biopsy") + paracentesis x1 (1.5L on 6/6); hospital course complicated by lethargy, 2/2 he/pse, lactulose started; once stabilized, decision made to have patient transferred to [facility] for tips, transplant evaluation in lieu of socioeconomic barriers that would make outpatient evaluation difficult.   CTH 6/3 no acute findings. crhonic microvascular chagnes   o/e AAOx1-2, VALENCIA, dysconjugate gaze? mild R facial? VALENCIA     ImprssioN:   1) AMS in setting of hepatic encephaloaphty and liver cirrhosis  2) ETOH abuse     - trend ammonia  - on lactulose and rifaxamin  - b12, RPR, TSH if not arleady checked   - thimaine and folic acid suppelment   - would pursue MRI brain for furhter eval , pending   - liver workup in process   - PT/OT   - check FS, glucose control <180  - GI/DVT ppx  - Thank you for allowing me to participate in the care of this patient. Call with questions.   Jeremie Brock MD  Vascular Neurology  Office: [phone]

## 2025-06-21 LAB
ALBUMIN SERPL ELPH-MCNC: 3.5 G/DL — SIGNIFICANT CHANGE UP (ref 3.3–5)
ALP SERPL-CCNC: 266 U/L — HIGH (ref 40–120)
ALT FLD-CCNC: 16 U/L — SIGNIFICANT CHANGE UP (ref 10–45)
ANION GAP SERPL CALC-SCNC: 15 MMOL/L — SIGNIFICANT CHANGE UP (ref 5–17)
APTT BLD: 36.5 SEC — SIGNIFICANT CHANGE UP (ref 26.1–36.8)
AST SERPL-CCNC: 34 U/L — SIGNIFICANT CHANGE UP (ref 10–40)
BILIRUB SERPL-MCNC: 1.1 MG/DL — SIGNIFICANT CHANGE UP (ref 0.2–1.2)
BUN SERPL-MCNC: 15 MG/DL — SIGNIFICANT CHANGE UP (ref 7–23)
CALCIUM SERPL-MCNC: 10.7 MG/DL — HIGH (ref 8.4–10.5)
CHLORIDE SERPL-SCNC: 99 MMOL/L — SIGNIFICANT CHANGE UP (ref 96–108)
CO2 SERPL-SCNC: 19 MMOL/L — LOW (ref 22–31)
CREAT SERPL-MCNC: 0.73 MG/DL — SIGNIFICANT CHANGE UP (ref 0.5–1.3)
EGFR: 100 ML/MIN/1.73M2 — SIGNIFICANT CHANGE UP
EGFR: 100 ML/MIN/1.73M2 — SIGNIFICANT CHANGE UP
GLUCOSE SERPL-MCNC: 77 MG/DL — SIGNIFICANT CHANGE UP (ref 70–99)
HCT VFR BLD CALC: 32 % — LOW (ref 39–50)
HGB BLD-MCNC: 10.5 G/DL — LOW (ref 13–17)
INR BLD: 1.2 RATIO — HIGH (ref 0.85–1.16)
MAGNESIUM SERPL-MCNC: 1.8 MG/DL — SIGNIFICANT CHANGE UP (ref 1.6–2.6)
MCHC RBC-ENTMCNC: 31 PG — SIGNIFICANT CHANGE UP (ref 27–34)
MCHC RBC-ENTMCNC: 32.8 G/DL — SIGNIFICANT CHANGE UP (ref 32–36)
MCV RBC AUTO: 94.4 FL — SIGNIFICANT CHANGE UP (ref 80–100)
NRBC # BLD AUTO: 0 K/UL — SIGNIFICANT CHANGE UP (ref 0–0)
NRBC # FLD: 0 K/UL — SIGNIFICANT CHANGE UP (ref 0–0)
NRBC BLD AUTO-RTO: 0 /100 WBCS — SIGNIFICANT CHANGE UP (ref 0–0)
PHOSPHATE SERPL-MCNC: 3.7 MG/DL — SIGNIFICANT CHANGE UP (ref 2.5–4.5)
PLATELET # BLD AUTO: 399 K/UL — SIGNIFICANT CHANGE UP (ref 150–400)
PMV BLD: 11.5 FL — SIGNIFICANT CHANGE UP (ref 7–13)
POTASSIUM SERPL-MCNC: 4.2 MMOL/L — SIGNIFICANT CHANGE UP (ref 3.5–5.3)
POTASSIUM SERPL-SCNC: 4.2 MMOL/L — SIGNIFICANT CHANGE UP (ref 3.5–5.3)
PROT SERPL-MCNC: 10.4 G/DL — HIGH (ref 6–8.3)
PROTHROM AB SERPL-ACNC: 13.6 SEC — HIGH (ref 9.9–13.4)
RBC # BLD: 3.39 M/UL — LOW (ref 4.2–5.8)
RBC # FLD: 17.2 % — HIGH (ref 10.3–14.5)
SODIUM SERPL-SCNC: 133 MMOL/L — LOW (ref 135–145)
WBC # BLD: 7.55 K/UL — SIGNIFICANT CHANGE UP (ref 3.8–10.5)
WBC # FLD AUTO: 7.55 K/UL — SIGNIFICANT CHANGE UP (ref 3.8–10.5)

## 2025-06-21 PROCEDURE — 99232 SBSQ HOSP IP/OBS MODERATE 35: CPT

## 2025-06-21 RX ADMIN — LACTULOSE 30 GRAM(S): 10 SOLUTION ORAL at 21:12

## 2025-06-21 RX ADMIN — MIDODRINE HYDROCHLORIDE 5 MILLIGRAM(S): 5 TABLET ORAL at 20:06

## 2025-06-21 RX ADMIN — LACTULOSE 30 GRAM(S): 10 SOLUTION ORAL at 06:04

## 2025-06-21 RX ADMIN — HEPARIN SODIUM 5000 UNIT(S): 1000 INJECTION INTRAVENOUS; SUBCUTANEOUS at 13:02

## 2025-06-21 RX ADMIN — POLYETHYLENE GLYCOL 3350 17 GRAM(S): 17 POWDER, FOR SOLUTION ORAL at 13:02

## 2025-06-21 RX ADMIN — CARVEDILOL 3.12 MILLIGRAM(S): 3.12 TABLET, FILM COATED ORAL at 06:03

## 2025-06-21 RX ADMIN — Medication 1 TABLET(S): at 13:03

## 2025-06-21 RX ADMIN — LACTULOSE 30 GRAM(S): 10 SOLUTION ORAL at 13:03

## 2025-06-21 RX ADMIN — FUROSEMIDE 40 MILLIGRAM(S): 10 INJECTION INTRAMUSCULAR; INTRAVENOUS at 06:03

## 2025-06-21 RX ADMIN — Medication 75 MICROGRAM(S): at 06:03

## 2025-06-21 RX ADMIN — Medication 100 MILLIGRAM(S): at 13:03

## 2025-06-21 RX ADMIN — HEPARIN SODIUM 5000 UNIT(S): 1000 INJECTION INTRAVENOUS; SUBCUTANEOUS at 21:12

## 2025-06-21 RX ADMIN — MIDODRINE HYDROCHLORIDE 5 MILLIGRAM(S): 5 TABLET ORAL at 13:02

## 2025-06-21 RX ADMIN — HEPARIN SODIUM 5000 UNIT(S): 1000 INJECTION INTRAVENOUS; SUBCUTANEOUS at 06:03

## 2025-06-21 RX ADMIN — FOLIC ACID 1 MILLIGRAM(S): 1 TABLET ORAL at 13:03

## 2025-06-21 RX ADMIN — Medication 2 TABLET(S): at 21:12

## 2025-06-21 RX ADMIN — MIDODRINE HYDROCHLORIDE 5 MILLIGRAM(S): 5 TABLET ORAL at 06:03

## 2025-06-21 RX ADMIN — Medication 50 MILLIGRAM(S): at 06:03

## 2025-06-21 RX ADMIN — CARVEDILOL 3.12 MILLIGRAM(S): 3.12 TABLET, FILM COATED ORAL at 17:37

## 2025-06-21 NOTE — PROGRESS NOTE ADULT - ASSESSMENT
66yo m w pmh htn, hld, aud, alcoholic liver cirrhosis c/b portal htn, hpylori gastritis, gerd, hypothyroidism, presented to osh w sob/cordova, bl le swelling, abdominal distention; found to have bl pl eff and ascites; suspected 2/2 decompensated cirrhosis; admitted to osh for further mgmt; s/p thoracentesis x2 (1.2L on 6/4, 1.2L on 6/9; fluid studies consistent w exudative eff; pulm consulted, opine, "may require pleural biopsy") + paracentesis x1 (1.5L on 6/6); hospital course complicated by lethargy, 2/2 he/pse, lactulose started; once stabilized, decision made to have patient transferred to [facility] for tips, transplant evaluation in lieu of socioeconomic barriers that would make outpatient evaluation difficult.

## 2025-06-22 LAB
ADENOSINE DEAMINASE, PERIT FLUID RESULT: 21 U/L — SIGNIFICANT CHANGE UP (ref 0–30)
ADENOSINE DEAMINASE, PERIT FLUID RESULT: 23 U/L — SIGNIFICANT CHANGE UP (ref 0–30)
ALBUMIN SERPL ELPH-MCNC: 3.3 G/DL — SIGNIFICANT CHANGE UP (ref 3.3–5)
ALP SERPL-CCNC: 277 U/L — HIGH (ref 40–120)
ALT FLD-CCNC: 16 U/L — SIGNIFICANT CHANGE UP (ref 10–45)
ANION GAP SERPL CALC-SCNC: 15 MMOL/L — SIGNIFICANT CHANGE UP (ref 5–17)
APTT BLD: 35.6 SEC — SIGNIFICANT CHANGE UP (ref 26.1–36.8)
AST SERPL-CCNC: 42 U/L — HIGH (ref 10–40)
BILIRUB SERPL-MCNC: 1 MG/DL — SIGNIFICANT CHANGE UP (ref 0.2–1.2)
BUN SERPL-MCNC: 15 MG/DL — SIGNIFICANT CHANGE UP (ref 7–23)
CALCIUM SERPL-MCNC: 11 MG/DL — HIGH (ref 8.4–10.5)
CHLORIDE SERPL-SCNC: 97 MMOL/L — SIGNIFICANT CHANGE UP (ref 96–108)
CO2 SERPL-SCNC: 19 MMOL/L — LOW (ref 22–31)
CREAT SERPL-MCNC: 0.71 MG/DL — SIGNIFICANT CHANGE UP (ref 0.5–1.3)
EGFR: 101 ML/MIN/1.73M2 — SIGNIFICANT CHANGE UP
EGFR: 101 ML/MIN/1.73M2 — SIGNIFICANT CHANGE UP
FLOW CYTOMETRY FINAL REPORT: SIGNIFICANT CHANGE UP
GLUCOSE SERPL-MCNC: 94 MG/DL — SIGNIFICANT CHANGE UP (ref 70–99)
HCT VFR BLD CALC: 31.7 % — LOW (ref 39–50)
HGB BLD-MCNC: 10.5 G/DL — LOW (ref 13–17)
INR BLD: 1.16 RATIO — SIGNIFICANT CHANGE UP (ref 0.85–1.16)
MAGNESIUM SERPL-MCNC: 1.8 MG/DL — SIGNIFICANT CHANGE UP (ref 1.6–2.6)
MCHC RBC-ENTMCNC: 31 PG — SIGNIFICANT CHANGE UP (ref 27–34)
MCHC RBC-ENTMCNC: 33.1 G/DL — SIGNIFICANT CHANGE UP (ref 32–36)
MCV RBC AUTO: 93.5 FL — SIGNIFICANT CHANGE UP (ref 80–100)
NRBC # BLD AUTO: 0 K/UL — SIGNIFICANT CHANGE UP (ref 0–0)
NRBC # FLD: 0 K/UL — SIGNIFICANT CHANGE UP (ref 0–0)
NRBC BLD AUTO-RTO: 0 /100 WBCS — SIGNIFICANT CHANGE UP (ref 0–0)
PHOSPHATE SERPL-MCNC: 3 MG/DL — SIGNIFICANT CHANGE UP (ref 2.5–4.5)
PLATELET # BLD AUTO: 413 K/UL — HIGH (ref 150–400)
PMV BLD: 11.1 FL — SIGNIFICANT CHANGE UP (ref 7–13)
POTASSIUM SERPL-MCNC: 4.1 MMOL/L — SIGNIFICANT CHANGE UP (ref 3.5–5.3)
POTASSIUM SERPL-SCNC: 4.1 MMOL/L — SIGNIFICANT CHANGE UP (ref 3.5–5.3)
PROT SERPL-MCNC: 10 G/DL — HIGH (ref 6–8.3)
PROTHROM AB SERPL-ACNC: 13.3 SEC — SIGNIFICANT CHANGE UP (ref 9.9–13.4)
RBC # BLD: 3.39 M/UL — LOW (ref 4.2–5.8)
RBC # FLD: 17.2 % — HIGH (ref 10.3–14.5)
SODIUM SERPL-SCNC: 131 MMOL/L — LOW (ref 135–145)
WBC # BLD: 8.12 K/UL — SIGNIFICANT CHANGE UP (ref 3.8–10.5)
WBC # FLD AUTO: 8.12 K/UL — SIGNIFICANT CHANGE UP (ref 3.8–10.5)

## 2025-06-22 PROCEDURE — 99232 SBSQ HOSP IP/OBS MODERATE 35: CPT

## 2025-06-22 RX ADMIN — MIDODRINE HYDROCHLORIDE 5 MILLIGRAM(S): 5 TABLET ORAL at 20:15

## 2025-06-22 RX ADMIN — LACTULOSE 30 GRAM(S): 10 SOLUTION ORAL at 05:17

## 2025-06-22 RX ADMIN — Medication 1 TABLET(S): at 13:07

## 2025-06-22 RX ADMIN — Medication 50 MILLIGRAM(S): at 05:18

## 2025-06-22 RX ADMIN — Medication 75 MICROGRAM(S): at 05:18

## 2025-06-22 RX ADMIN — Medication 100 MILLIGRAM(S): at 13:08

## 2025-06-22 RX ADMIN — POLYETHYLENE GLYCOL 3350 17 GRAM(S): 17 POWDER, FOR SOLUTION ORAL at 13:05

## 2025-06-22 RX ADMIN — HEPARIN SODIUM 5000 UNIT(S): 1000 INJECTION INTRAVENOUS; SUBCUTANEOUS at 13:05

## 2025-06-22 RX ADMIN — Medication 2 TABLET(S): at 21:08

## 2025-06-22 RX ADMIN — LACTULOSE 30 GRAM(S): 10 SOLUTION ORAL at 21:08

## 2025-06-22 RX ADMIN — CARVEDILOL 3.12 MILLIGRAM(S): 3.12 TABLET, FILM COATED ORAL at 17:29

## 2025-06-22 RX ADMIN — FUROSEMIDE 40 MILLIGRAM(S): 10 INJECTION INTRAMUSCULAR; INTRAVENOUS at 05:18

## 2025-06-22 RX ADMIN — MIDODRINE HYDROCHLORIDE 5 MILLIGRAM(S): 5 TABLET ORAL at 13:07

## 2025-06-22 RX ADMIN — FOLIC ACID 1 MILLIGRAM(S): 1 TABLET ORAL at 13:07

## 2025-06-22 RX ADMIN — HEPARIN SODIUM 5000 UNIT(S): 1000 INJECTION INTRAVENOUS; SUBCUTANEOUS at 05:17

## 2025-06-22 RX ADMIN — HEPARIN SODIUM 5000 UNIT(S): 1000 INJECTION INTRAVENOUS; SUBCUTANEOUS at 21:08

## 2025-06-22 RX ADMIN — LACTULOSE 30 GRAM(S): 10 SOLUTION ORAL at 13:07

## 2025-06-22 RX ADMIN — MIDODRINE HYDROCHLORIDE 5 MILLIGRAM(S): 5 TABLET ORAL at 04:07

## 2025-06-22 RX ADMIN — CARVEDILOL 3.12 MILLIGRAM(S): 3.12 TABLET, FILM COATED ORAL at 05:17

## 2025-06-22 NOTE — PROGRESS NOTE ADULT - PROBLEM SELECTOR PLAN 8
LDL 45, HDL 18, triglycerides 58
fu lipid profile

## 2025-06-22 NOTE — PROGRESS NOTE ADULT - PROBLEM SELECTOR PLAN 11
- HSQ for DVT ppx  - PT: HPT    DISPO: pending clinical improvement; hepatology recs; neuro recs; MRCP; MRI brain; pulm recs
- HSQ for DVT ppx  - PT: HPT    DISPO: pending clinical improvement; hepatology recs; neuro recs; MRCP; MRI brain; pulm recs  Daughter updated over phone with  on 6/19; daughter states that she would want patient to go to a facility when discharged as doesn't think she can help take care of him at home; CM f/u
- HSQ for DVT ppx  - PT: HPT    DISPO: pending clinical improvement; hepatology recs; MRCP; MRI brain
- HSQ for DVT ppx  - PT: HPT    DISPO: pending clinical improvement; hepatology recs
- HSQ for DVT ppx  - PT: HPT    DISPO: pending clinical improvement; hepatology recs; neuro recs; MRCP; MRI brain; pulm recs  Daughter updated over phone with  on 6/19; daughter states that she would want patient to go to a facility when discharged as doesn't think she can help take care of him at home; CM f/u
- HSQ for DVT ppx  - PT: HPT    DISPO: pending clinical improvement; hepatology recs
- HSQ for DVT ppx  - PT: HPT    DISPO: pending clinical improvement; hepatology recs; neuro recs; MRCP; MRI brain; pulm recs  Daughter updated over phone with  on 6/19; daughter states that she would want patient to go to a facility when discharged as doesn't think she can help take care of him at home; CM f/u

## 2025-06-22 NOTE — PROGRESS NOTE ADULT - PROBLEM/PLAN-10
DISPLAY PLAN FREE TEXT
La Roche Posay Extra Dry skin moisturizer  Aquaphor or Eucerin ointment on top of moisturizer   
DISPLAY PLAN FREE TEXT

## 2025-06-22 NOTE — PROGRESS NOTE ADULT - ASSESSMENT
66yo m w pmh htn, hld, aud, alcoholic liver cirrhosis c/b portal htn, hpylori gastritis, gerd, hypothyroidism, presented to osh w sob/cordova, bl le swelling, abdominal distention; found to have bl pl eff and ascites; suspected 2/2 decompensated cirrhosis; admitted to osh for further mgmt; s/p thoracentesis x2 (1.2L on 6/4, 1.2L on 6/9; fluid studies consistent w exudative eff; pulm consulted, opine, "may require pleural biopsy") + paracentesis x1 (1.5L on 6/6); hospital course complicated by lethargy, 2/2 he/pse, lactulose started; once stabilized, decision made to have patient transferred to [facility] for tips, transplant evaluation in lieu of socioeconomic barriers that would make outpatient evaluation difficult. Remy

## 2025-06-22 NOTE — PROGRESS NOTE ADULT - PROBLEM SELECTOR PLAN 10
h/o h.pylori gastritis (reportedly, not treated), gerd
h/o h.pylori gastritis (reportedly, not treated), gerd
h/o h.pylori gastritis (reportedly, not treated), gerd  fu h pylori stool test
h/o h.pylori gastritis (reportedly, not treated), gerd
h/o h.pylori gastritis (reportedly, not treated), gerd  fu h pylori stool test
h/o h.pylori gastritis (reportedly, not treated), gerd  fu h pylori stool test
h/o h.pylori gastritis (reportedly, not treated), gerd
h/o h.pylori gastritis (reportedly, not treated), gerd  fu h pylori stool test
h/o h.pylori gastritis (reportedly, not treated), gerd  fu h pylori stool test
h/o h.pylori gastritis (reportedly, not treated), gerd

## 2025-06-22 NOTE — PROGRESS NOTE ADULT - PROBLEM SELECTOR PLAN 9
tsh 5.01  synthroid
fu tsh  synthroid
tsh 5.01  synthroid

## 2025-06-22 NOTE — PROGRESS NOTE ADULT - ASSESSMENT
66yo m  htn, hld, aud, alcoholic liver cirrhosis c/b portal htn, hpylori gastritis, gerd, hypothyroidism, presented to osh w sob/cordova, bl le swelling, abdominal distention; found to have bl pl eff and ascites; suspected 2/2 decompensated cirrhosis; admitted to osh for further mgmt; s/p thoracentesis x2 (1.2L on 6/4, 1.2L on 6/9; fluid studies consistent w exudative eff; pulm consulted, opine, "may require pleural biopsy") + paracentesis x1 (1.5L on 6/6); hospital course complicated by lethargy, 2/2 he/pse, lactulose started; once stabilized, decision made to have patient transferred to [facility] for tips, transplant evaluation in lieu of socioeconomic barriers that would make outpatient evaluation difficult.   CTH 6/3 no acute findings. crhonic microvascular chagnes   o/e AAOx1-2, VALENCIA, dysconjugate gaze? mild R facial? VALENCIA     ImprssioN:   1) AMS in setting of hepatic encephaloaphty and liver cirrhosis  2) ETOH abuse     - trend ammonia  - on lactulose and rifaxamin  - b12, RPR, TSH if not arleady checked   - thimaine and folic acid suppelment   - would pursue MRI brain for furhter eval , pending   - liver workup in process   - PT/OT   - check FS, glucose control <180  - GI/DVT ppx  - Thank you for allowing me to participate in the care of this patient. Call with questions.   Jeremie Brock MD  Vascular Neurology  Office: [phone]

## 2025-06-23 LAB
ALBUMIN SERPL ELPH-MCNC: 3.3 G/DL — SIGNIFICANT CHANGE UP (ref 3.3–5)
ALP SERPL-CCNC: 265 U/L — HIGH (ref 40–120)
ALT FLD-CCNC: 18 U/L — SIGNIFICANT CHANGE UP (ref 10–45)
ANA PAT FLD IF-IMP: ABNORMAL
ANA TITR SER: ABNORMAL
ANION GAP SERPL CALC-SCNC: 14 MMOL/L — SIGNIFICANT CHANGE UP (ref 5–17)
APTT BLD: 35.1 SEC — SIGNIFICANT CHANGE UP (ref 26.1–36.8)
AST SERPL-CCNC: 36 U/L — SIGNIFICANT CHANGE UP (ref 10–40)
BILIRUB SERPL-MCNC: 1 MG/DL — SIGNIFICANT CHANGE UP (ref 0.2–1.2)
BUN SERPL-MCNC: 19 MG/DL — SIGNIFICANT CHANGE UP (ref 7–23)
CALCIUM SERPL-MCNC: 10.8 MG/DL — HIGH (ref 8.4–10.5)
CHLORIDE SERPL-SCNC: 98 MMOL/L — SIGNIFICANT CHANGE UP (ref 96–108)
CO2 SERPL-SCNC: 21 MMOL/L — LOW (ref 22–31)
CREAT SERPL-MCNC: 0.8 MG/DL — SIGNIFICANT CHANGE UP (ref 0.5–1.3)
EGFR: 98 ML/MIN/1.73M2 — SIGNIFICANT CHANGE UP
EGFR: 98 ML/MIN/1.73M2 — SIGNIFICANT CHANGE UP
GLUCOSE SERPL-MCNC: 82 MG/DL — SIGNIFICANT CHANGE UP (ref 70–99)
HCT VFR BLD CALC: 30.8 % — LOW (ref 39–50)
HGB BLD-MCNC: 10.1 G/DL — LOW (ref 13–17)
INR BLD: 1.2 RATIO — HIGH (ref 0.85–1.16)
MCHC RBC-ENTMCNC: 31.1 PG — SIGNIFICANT CHANGE UP (ref 27–34)
MCHC RBC-ENTMCNC: 32.8 G/DL — SIGNIFICANT CHANGE UP (ref 32–36)
MCV RBC AUTO: 94.8 FL — SIGNIFICANT CHANGE UP (ref 80–100)
NRBC # BLD AUTO: 0 K/UL — SIGNIFICANT CHANGE UP (ref 0–0)
NRBC # FLD: 0 K/UL — SIGNIFICANT CHANGE UP (ref 0–0)
NRBC BLD AUTO-RTO: 0 /100 WBCS — SIGNIFICANT CHANGE UP (ref 0–0)
PLATELET # BLD AUTO: 404 K/UL — HIGH (ref 150–400)
PMV BLD: 11 FL — SIGNIFICANT CHANGE UP (ref 7–13)
POTASSIUM SERPL-MCNC: 4.2 MMOL/L — SIGNIFICANT CHANGE UP (ref 3.5–5.3)
POTASSIUM SERPL-SCNC: 4.2 MMOL/L — SIGNIFICANT CHANGE UP (ref 3.5–5.3)
PROT SERPL-MCNC: 10 G/DL — HIGH (ref 6–8.3)
PROTHROM AB SERPL-ACNC: 13.8 SEC — HIGH (ref 9.9–13.4)
RBC # BLD: 3.25 M/UL — LOW (ref 4.2–5.8)
RBC # FLD: 17.5 % — HIGH (ref 10.3–14.5)
SODIUM SERPL-SCNC: 133 MMOL/L — LOW (ref 135–145)
WBC # BLD: 7.73 K/UL — SIGNIFICANT CHANGE UP (ref 3.8–10.5)
WBC # FLD AUTO: 7.73 K/UL — SIGNIFICANT CHANGE UP (ref 3.8–10.5)

## 2025-06-23 PROCEDURE — 70551 MRI BRAIN STEM W/O DYE: CPT | Mod: 26

## 2025-06-23 PROCEDURE — 74183 MRI ABD W/O CNTR FLWD CNTR: CPT | Mod: 26

## 2025-06-23 PROCEDURE — 99232 SBSQ HOSP IP/OBS MODERATE 35: CPT

## 2025-06-23 RX ORDER — URSODIOL 300 MG/1
500 CAPSULE ORAL
Refills: 0 | Status: DISCONTINUED | OUTPATIENT
Start: 2025-06-23 | End: 2025-06-25

## 2025-06-23 RX ORDER — CEFTRIAXONE 500 MG/1
2000 INJECTION, POWDER, FOR SOLUTION INTRAMUSCULAR; INTRAVENOUS EVERY 24 HOURS
Refills: 0 | Status: DISCONTINUED | OUTPATIENT
Start: 2025-06-23 | End: 2025-06-25

## 2025-06-23 RX ADMIN — HEPARIN SODIUM 5000 UNIT(S): 1000 INJECTION INTRAVENOUS; SUBCUTANEOUS at 14:42

## 2025-06-23 RX ADMIN — CARVEDILOL 3.12 MILLIGRAM(S): 3.12 TABLET, FILM COATED ORAL at 05:39

## 2025-06-23 RX ADMIN — FUROSEMIDE 40 MILLIGRAM(S): 10 INJECTION INTRAMUSCULAR; INTRAVENOUS at 05:39

## 2025-06-23 RX ADMIN — Medication 100 MILLIGRAM(S): at 12:23

## 2025-06-23 RX ADMIN — CARVEDILOL 3.12 MILLIGRAM(S): 3.12 TABLET, FILM COATED ORAL at 17:15

## 2025-06-23 RX ADMIN — HEPARIN SODIUM 5000 UNIT(S): 1000 INJECTION INTRAVENOUS; SUBCUTANEOUS at 05:39

## 2025-06-23 RX ADMIN — MIDODRINE HYDROCHLORIDE 5 MILLIGRAM(S): 5 TABLET ORAL at 21:50

## 2025-06-23 RX ADMIN — CEFTRIAXONE 100 MILLIGRAM(S): 500 INJECTION, POWDER, FOR SOLUTION INTRAMUSCULAR; INTRAVENOUS at 17:14

## 2025-06-23 RX ADMIN — MIDODRINE HYDROCHLORIDE 5 MILLIGRAM(S): 5 TABLET ORAL at 12:22

## 2025-06-23 RX ADMIN — Medication 50 MILLIGRAM(S): at 05:39

## 2025-06-23 RX ADMIN — Medication 75 MICROGRAM(S): at 05:39

## 2025-06-23 RX ADMIN — LACTULOSE 30 GRAM(S): 10 SOLUTION ORAL at 05:40

## 2025-06-23 RX ADMIN — URSODIOL 500 MILLIGRAM(S): 300 CAPSULE ORAL at 17:14

## 2025-06-23 RX ADMIN — LACTULOSE 30 GRAM(S): 10 SOLUTION ORAL at 21:50

## 2025-06-23 RX ADMIN — LACTULOSE 30 GRAM(S): 10 SOLUTION ORAL at 14:42

## 2025-06-23 RX ADMIN — Medication 1 TABLET(S): at 12:23

## 2025-06-23 RX ADMIN — POLYETHYLENE GLYCOL 3350 17 GRAM(S): 17 POWDER, FOR SOLUTION ORAL at 12:23

## 2025-06-23 RX ADMIN — MIDODRINE HYDROCHLORIDE 5 MILLIGRAM(S): 5 TABLET ORAL at 04:14

## 2025-06-23 RX ADMIN — HEPARIN SODIUM 5000 UNIT(S): 1000 INJECTION INTRAVENOUS; SUBCUTANEOUS at 21:50

## 2025-06-23 RX ADMIN — FOLIC ACID 1 MILLIGRAM(S): 1 TABLET ORAL at 12:23

## 2025-06-23 NOTE — PROGRESS NOTE ADULT - ASSESSMENT
64yo m  htn, hld, aud, alcoholic liver cirrhosis c/b portal htn, hpylori gastritis, gerd, hypothyroidism, presented to osh w sob/cordova, bl le swelling, abdominal distention; found to have bl pl eff and ascites; suspected 2/2 decompensated cirrhosis; admitted to osh for further mgmt; s/p thoracentesis x2 (1.2L on 6/4, 1.2L on 6/9; fluid studies consistent w exudative eff; pulm consulted, opine, "may require pleural biopsy") + paracentesis x1 (1.5L on 6/6); hospital course complicated by lethargy, 2/2 he/pse, lactulose started; once stabilized, decision made to have patient transferred to [facility] for tips, transplant evaluation in lieu of socioeconomic barriers that would make outpatient evaluation difficult.   CTH 6/3 no acute findings. crhonic microvascular chagnes   o/e AAOx1-2, VALENCIA, dysconjugate gaze? mild R facial? VALENCIA     ImprssioN:   1) AMS in setting of hepatic encephaloaphty and liver cirrhosis  2) ETOH abuse     - trend ammonia  - on lactulose and rifaxamin  - b12, RPR, TSH if not arleady checked   - thimaine and folic acid suppelment   - would pursue MRI brain for furhter eval , pending   - liver workup in process   - PT/OT   - check FS, glucose control <180  - GI/DVT ppx  - Thank you for allowing me to participate in the care of this patient. Call with questions.   Jeremie Brock MD  Vascular Neurology  Office: [phone]

## 2025-06-23 NOTE — PROGRESS NOTE ADULT - ASSESSMENT
64yo m w pmh htn, hld, aud, alcoholic liver cirrhosis c/b portal htn, hpylori gastritis, gerd, hypothyroidism, presented to osh w sob/cordova, bl le swelling, abdominal distention; found to have bl pl eff and ascites; suspected 2/2 decompensated cirrhosis; admitted to osh for further mgmt; s/p thoracentesis x2 (1.2L on 6/4, 1.2L on 6/9; fluid studies consistent w exudative eff; pulm consulted, opine, "may require pleural biopsy") + paracentesis x1 (1.5L on 6/6); hospital course complicated by lethargy, 2/2 he/pse, lactulose started; once stabilized, decision made to have patient transferred to [facility] for tips, transplant evaluation in lieu of socioeconomic barriers that would make outpatient evaluation difficult.

## 2025-06-23 NOTE — PROGRESS NOTE ADULT - ASSESSMENT
64yo M, PMH HTN, HLD, alcohol associated liver cirrhosis (last drink >1 year ago), gerd, hypothyroidism presented to [facility] w/ abdominal distension and SOB, found to have bilateral pleural effusions and ascites.     #Decompensated alcohol associated cirrhosis   MELD 3.0=11 (6/23)  Volume- small volume ascites on US 6/12. S/p Paracentesis 6/6 with protein of 6, low PMNs. Repeat paracentesis 6/13 and 6/20 w/ no SBP. Also notable low SAAG.  On lasix 40 and aldactone 25. TTE WNL. Fluid cytology negative.   Infection- 1/2 bottle from paracentesis w/ GNR. No blood or urine cultures  Varices- no prior EGD. On BB prophylaxis. Hgb stable.   HE: AOx2, unknown baseline. No asterixis. S/P high dose IV thiamine   HCC: No lesions on CT abdomen 6/3/2025    #Positive outpatient FIT test   -was scheduled for EGD/colonoscopy with Dr. Michael Gallegos, never completed. Will need outpatient for colon cancer screening     #Hyperproteinemia  -Serum protein 10, ascites protein 7.   -UPEP neg, SPEP polyclonal gammopathy, flow cytometry negative  -Elevated ESR and CRP  -Also w/ peritoneal thickening, IR unable to biopsy     Recommendations:   -continue IV thiamine 100mg (s/p high dose IV thiamine for potential Wernickes)  -continue spironolactone 50mg, furosemide 40mg.   -carvedilol 3.125 BID  -please obtain MRCP for elevated alk phos       Antonina Vail  GI/Hepatology Fellow    MONDAY-FRIDAY 8AM-5PM:  Pager# [phone] ([facility]) or [phone] ([facility])    NON-URGENT CONSULTS:  Please email [email or web address]  AT NIGHT AND ON WEEKENDS:  Contact on-call GI fellow from 5pm-8am and on weekends/holidays 66yo M, PMH HTN, HLD, alcohol associated liver cirrhosis (last drink >1 year ago), gerd, hypothyroidism presented to [facility] w/ abdominal distension and SOB, found to have bilateral pleural effusions and ascites.     #Decompensated alcohol associated cirrhosis   MELD 3.0=11 (6/23)  Volume- small volume ascites on US 6/12. S/p Paracentesis 6/6 with protein of 6, low PMNs. Repeat paracentesis 6/13 and 6/20 w/ no SBP. Also notable low SAAG.  On lasix 40 and aldactone 25. TTE WNL. Fluid cytology negative.   Infection- 1/2 bottle from paracentesis w/ GNR. No blood or urine cultures  Varices- no prior EGD. On BB prophylaxis. Hgb stable.   HE: AOx2, unknown baseline. No asterixis. S/P high dose IV thiamine   HCC: No lesions on CT abdomen 6/3/2025    #Positive outpatient FIT test   -was scheduled for EGD/colonoscopy with Dr. Michael Gallegos, never completed. Will need outpatient for colon cancer screening     #Hyperproteinemia  -Serum protein 10, ascites protein 7.   -UPEP neg, SPEP polyclonal gammopathy, flow cytometry negative  -Elevated ESR and CRP  -Also w/ peritoneal thickening, IR unable to biopsy     Recommendations:   -consider hematology consult for hyperproteinemia   -continue IV thiamine 100mg (s/p high dose IV thiamine for potential Wernickes)  -continue spironolactone 50mg, furosemide 40mg.   -carvedilol 3.125 BID  -please obtain MRCP for elevated alk phos       Antonina Vail  GI/Hepatology Fellow    MONDAY-FRIDAY 8AM-5PM:  Pager# [phone] ([facility]) or [phone] ([facility])    NON-URGENT CONSULTS:  Please email [email or web address]  AT NIGHT AND ON WEEKENDS:  Contact on-call GI fellow from 5pm-8am and on weekends/holidays

## 2025-06-24 LAB
ADD ON TEST-SPECIMEN IN LAB: SIGNIFICANT CHANGE UP
ALBUMIN SERPL ELPH-MCNC: 3.2 G/DL — LOW (ref 3.3–5)
ALP SERPL-CCNC: 259 U/L — HIGH (ref 40–120)
ALT FLD-CCNC: 18 U/L — SIGNIFICANT CHANGE UP (ref 10–45)
ANION GAP SERPL CALC-SCNC: 16 MMOL/L — SIGNIFICANT CHANGE UP (ref 5–17)
AST SERPL-CCNC: 38 U/L — SIGNIFICANT CHANGE UP (ref 10–40)
BILIRUB SERPL-MCNC: 0.9 MG/DL — SIGNIFICANT CHANGE UP (ref 0.2–1.2)
BUN SERPL-MCNC: 16 MG/DL — SIGNIFICANT CHANGE UP (ref 7–23)
CALCIUM SERPL-MCNC: 10.7 MG/DL — HIGH (ref 8.4–10.5)
CHLORIDE SERPL-SCNC: 99 MMOL/L — SIGNIFICANT CHANGE UP (ref 96–108)
CO2 SERPL-SCNC: 18 MMOL/L — LOW (ref 22–31)
CREAT SERPL-MCNC: 0.69 MG/DL — SIGNIFICANT CHANGE UP (ref 0.5–1.3)
EGFR: 102 ML/MIN/1.73M2 — SIGNIFICANT CHANGE UP
EGFR: 102 ML/MIN/1.73M2 — SIGNIFICANT CHANGE UP
GLUCOSE SERPL-MCNC: 70 MG/DL — SIGNIFICANT CHANGE UP (ref 70–99)
HCT VFR BLD CALC: 30.4 % — LOW (ref 39–50)
HGB BLD-MCNC: 10.1 G/DL — LOW (ref 13–17)
MCHC RBC-ENTMCNC: 31.3 PG — SIGNIFICANT CHANGE UP (ref 27–34)
MCHC RBC-ENTMCNC: 33.2 G/DL — SIGNIFICANT CHANGE UP (ref 32–36)
MCV RBC AUTO: 94.1 FL — SIGNIFICANT CHANGE UP (ref 80–100)
NRBC # BLD AUTO: 0 K/UL — SIGNIFICANT CHANGE UP (ref 0–0)
NRBC # FLD: 0 K/UL — SIGNIFICANT CHANGE UP (ref 0–0)
NRBC BLD AUTO-RTO: 0 /100 WBCS — SIGNIFICANT CHANGE UP (ref 0–0)
PLATELET # BLD AUTO: 356 K/UL — SIGNIFICANT CHANGE UP (ref 150–400)
PMV BLD: 11.4 FL — SIGNIFICANT CHANGE UP (ref 7–13)
POTASSIUM SERPL-MCNC: 3.8 MMOL/L — SIGNIFICANT CHANGE UP (ref 3.5–5.3)
POTASSIUM SERPL-SCNC: 3.8 MMOL/L — SIGNIFICANT CHANGE UP (ref 3.5–5.3)
PROT SERPL-MCNC: 9.6 G/DL — HIGH (ref 6–8.3)
RBC # BLD: 3.23 M/UL — LOW (ref 4.2–5.8)
RBC # FLD: 17.3 % — HIGH (ref 10.3–14.5)
SODIUM SERPL-SCNC: 133 MMOL/L — LOW (ref 135–145)
WBC # BLD: 7.91 K/UL — SIGNIFICANT CHANGE UP (ref 3.8–10.5)
WBC # FLD AUTO: 7.91 K/UL — SIGNIFICANT CHANGE UP (ref 3.8–10.5)

## 2025-06-24 PROCEDURE — 99232 SBSQ HOSP IP/OBS MODERATE 35: CPT

## 2025-06-24 RX ADMIN — MIDODRINE HYDROCHLORIDE 5 MILLIGRAM(S): 5 TABLET ORAL at 11:29

## 2025-06-24 RX ADMIN — Medication 1 TABLET(S): at 11:30

## 2025-06-24 RX ADMIN — CARVEDILOL 3.12 MILLIGRAM(S): 3.12 TABLET, FILM COATED ORAL at 05:15

## 2025-06-24 RX ADMIN — URSODIOL 500 MILLIGRAM(S): 300 CAPSULE ORAL at 17:06

## 2025-06-24 RX ADMIN — FUROSEMIDE 40 MILLIGRAM(S): 10 INJECTION INTRAMUSCULAR; INTRAVENOUS at 05:16

## 2025-06-24 RX ADMIN — Medication 50 MILLIGRAM(S): at 05:16

## 2025-06-24 RX ADMIN — HEPARIN SODIUM 5000 UNIT(S): 1000 INJECTION INTRAVENOUS; SUBCUTANEOUS at 05:16

## 2025-06-24 RX ADMIN — LACTULOSE 30 GRAM(S): 10 SOLUTION ORAL at 13:04

## 2025-06-24 RX ADMIN — FOLIC ACID 1 MILLIGRAM(S): 1 TABLET ORAL at 11:30

## 2025-06-24 RX ADMIN — CARVEDILOL 3.12 MILLIGRAM(S): 3.12 TABLET, FILM COATED ORAL at 17:06

## 2025-06-24 RX ADMIN — MIDODRINE HYDROCHLORIDE 5 MILLIGRAM(S): 5 TABLET ORAL at 20:27

## 2025-06-24 RX ADMIN — Medication 100 MILLIGRAM(S): at 11:30

## 2025-06-24 RX ADMIN — POLYETHYLENE GLYCOL 3350 17 GRAM(S): 17 POWDER, FOR SOLUTION ORAL at 11:29

## 2025-06-24 RX ADMIN — URSODIOL 500 MILLIGRAM(S): 300 CAPSULE ORAL at 05:15

## 2025-06-24 RX ADMIN — HEPARIN SODIUM 5000 UNIT(S): 1000 INJECTION INTRAVENOUS; SUBCUTANEOUS at 13:04

## 2025-06-24 RX ADMIN — HEPARIN SODIUM 5000 UNIT(S): 1000 INJECTION INTRAVENOUS; SUBCUTANEOUS at 22:28

## 2025-06-24 RX ADMIN — Medication 75 MICROGRAM(S): at 05:16

## 2025-06-24 RX ADMIN — CEFTRIAXONE 100 MILLIGRAM(S): 500 INJECTION, POWDER, FOR SOLUTION INTRAMUSCULAR; INTRAVENOUS at 17:12

## 2025-06-24 RX ADMIN — Medication 2 TABLET(S): at 22:27

## 2025-06-24 RX ADMIN — LACTULOSE 30 GRAM(S): 10 SOLUTION ORAL at 22:28

## 2025-06-24 RX ADMIN — LACTULOSE 30 GRAM(S): 10 SOLUTION ORAL at 05:16

## 2025-06-24 RX ADMIN — MIDODRINE HYDROCHLORIDE 5 MILLIGRAM(S): 5 TABLET ORAL at 05:15

## 2025-06-24 NOTE — PROGRESS NOTE ADULT - NUTRITIONAL ASSESSMENT
This patient has been assessed with a concern for Malnutrition and has been determined to have a diagnosis/diagnoses of Moderate protein-calorie malnutrition.    This patient is being managed with:   Diet Regular-  Low Sodium  Supplement Feeding Modality:  Oral  Ensure Plus High Protein Cans or Servings Per Day:  1       Frequency:  Two Times a day  Entered: Jun 14 2025 12:07PM  

## 2025-06-24 NOTE — PROGRESS NOTE ADULT - PROBLEM SELECTOR PROBLEM 2
Decompensated cirrhosis
Ascites
Decompensated cirrhosis
Ascites

## 2025-06-24 NOTE — PROGRESS NOTE ADULT - PROBLEM SELECTOR PROBLEM 7
Need for prophylactic measure
History of alcohol use disorder
Need for prophylactic measure
History of alcohol use disorder

## 2025-06-24 NOTE — CHART NOTE - NSCHARTNOTEFT_GEN_A_CORE
65M HTN, HLD, AUD, alcoholic liver cirrhosis c/b portal htn, hpylori gastritis, gerd, hypothyroidism, presented to OSH with sob/cordova, bl le swelling, abdominal distention; found to have bilateral pleural effusions and ascites i/s/o albumin 1.3.    Labs:  - CMP (6/24/25): notable for total protein 9.6, albumin 3.2, alk phos 259  - CMP (6/02/25): notable for total protein 9.1, albumin 1.3  - CBC (6/24/25): WBC 7.91, Hgb 10.1, MCV 94.1, Plt 356  - , K/L FLC WNL (6/14/25)  - IgG 2632 [[id number]], IgA 869 [], IgM WNL  - serum immunofixation (6/13/25): no monoclonal band identified  - urine immunofixaiton (6/14/25): no monoclonal band identified; repeat with two weak Bence   Garcia proteins, kappa type  - SPEP with polyclonal gammopathy (6/13, 6/17)  - UPEP (6/18/25): one weak beta migrating and one weak gamma migrating paraproteins identified  - JENNIFER 1:80 speckled with negative dsDNA and RF < 10  - syphilis screen negative, HIV negative, Hepatitis B, C panel negative, EBV PCR negative  - SAAG < 1.1, ascites protein 5-6    Pathology:  - 2 pleural and 3 peritoneal/ascites fluid samples all negative for malignancy (6/4-6/13)  - peripheral blood flow cytometry (6/17/25): Peripheral blood, for flow cytometry: - The lymphocyte immunophenotypic findings show no diagnostic abnormalities. - The myeloid immunophenotypic findings show no myeloblasts, normal myeloid granularity, and  normal myelomonocytic antigen pattern. Please see interpretation.  INTERPRETATION: MORPHOLOGY: No blast seen. IMMUNOPHENOTYPE:  Lymphocytes (16% of cells): Heterogeneous population of T-cells (with a normal CD4 to CD8 ratio)with no aberrancy or loss of pan-T cell antigen, immunophenotypically unremarkable natural killer  cells, and polytypic B-cells. D45/side scatter shows no blast population. There is no increase in CD34, CD14/CD64 or   positive cells. Myeloid antigen pattern is normal with CD13, CD16, CD11b, CD15, and CD33. Monocytes show normal antigen pattern.  - peritoneal fluid flow cytometry (6/20/25): The lymphocyte immunophenotypic findings show no diagnostic abnormalities Please see interpretation. INTERPRETATION: IMMUNOPHENOTYPE: Lymphocytes (97% of cells): Heterogeneous population of T-cells (with a CD4 to CD8 ratio of   5.8) with no aberrancy or loss of pan-T cell antigen, immunophenotypically unremarkable natural killer cells, and polytypic B-cells    Imaging:  - CT chest/abd/pelvis (6/3/25): CHEST WALL AND LOWER NECK: Small approximately 4 x 1.5 cm intramuscular   lipoma superficial to the right scapular body... LYMPH NODES: No lymphadenopathy... IMPRESSION: Motion degraded exam No acute pulmonary embolism. Large right and moderate left pleural effusions. Complete right middle and lower lobe atelectasis with compressive atelectasis of remaining lungs. Cirrhosis with sequela of portal hypertension as described notably moderate volume abdominal and pelvic ascites Edematous wall thickening of distal and terminal ileum, cecum, ascending through mid transverse colon may represent sequela of portal hypertensive change versus ileitis and colitis in the correct clinical scenario. Areas of smooth peritoneal thickening and enhancement suggests sequela of   recent instrumentation versus peritonitis  - MRCP (6/23/25): IMPRESSION: No evidence of liver mass. Mild peritoneal thickening and haziness of the peritoneal fat of uncertain etiology.  - MR head (6/23/25): IMPRESSION: No acute intracranial hemorrhage or evidence of acute   ischemia. Multiple patchy confluent nonspecific abnormal white matter foci of T2/FLAIR prolongation statistically favoring microvascular type changes.    Hospital course:  - s/p thoracentesis x2 (1.2L on 6/4, 1.2L on 6/9; fluid studies consistent w exudative eff; pulm consulted, opine, "may require pleural biopsy") + paracentesis x1 (1.5L on 6/6)  - hospital course complicated by lethargy, 2/2 he/pse, lactulose started; once stabilized, decision made to have patient transferred to [facility] for tips, transplant evaluation in lieu of socioeconomic barriers that would make outpatient evaluation difficult.  - rest of workup as above    Impression: Overall data above with no clear evidence of malignancy on labs, pathology, or imaging. In particular, studies are showing a polyclonal IgG elevation with a normal kappa/lamda ratio, no monoclonal band identified, normal LDH, and no lymphadenopathy on imaging. Would favor further infectious and rheumatological investigation.    Will follow.    ***************************************************************  Yg Duran MD  Hematology/Oncology Fellow, PGY5  MS TEAMS  After 5pm or on weekends please contact  to page on-call fellow   *************************************************************** 65M HTN, HLD, AUD, alcoholic liver cirrhosis c/b portal htn, hpylori gastritis, gerd, hypothyroidism, presented to OSH with sob/cordova, bl le swelling, abdominal distention; found to have bilateral pleural effusions and ascites i/s/o albumin 1.3.    Labs:  - CMP (6/24/25): notable for total protein 9.6, albumin 3.2, alk phos 259  - CMP (6/02/25): notable for total protein 9.1, albumin 1.3  - CBC (6/24/25): WBC 7.91, Hgb 10.1, MCV 94.1, Plt 356  - , K/L FLC WNL (6/14/25)  - IgG 2632 [[id number]], IgA 869 [], IgM WNL  - serum immunofixation (6/13/25): no monoclonal band identified  - urine immunofixaiton (6/14/25): no monoclonal band identified; repeat with two weak Bence   Garcia proteins, kappa type  - SPEP with polyclonal gammopathy (6/13, 6/17)  - UPEP (6/18/25): one weak beta migrating and one weak gamma migrating paraproteins identified  - JENNIFER 1:80 speckled with negative dsDNA and RF < 10  - syphilis screen negative, HIV negative, Hepatitis B, C panel negative, EBV PCR negative  - SAAG < 1.1, ascites protein 5-6    Peripheral blood smear (6/24/25): WBCs neutrophil predominant with increased granulation, no dysplasia noted. RBCs normocytic, normochromic with increased Ann-Marie cells, 0-1 schistocyte per HPF, and some target cells. Increased rouleaux formation. Increased large platelets with some platelet clumping.     Pathology:  - 2 pleural and 3 peritoneal/ascites fluid samples all negative for malignancy (6/4-6/13)  - peripheral blood flow cytometry (6/17/25): Peripheral blood, for flow cytometry: - The lymphocyte immunophenotypic findings show no diagnostic abnormalities. - The myeloid immunophenotypic findings show no myeloblasts, normal myeloid granularity, and  normal myelomonocytic antigen pattern. Please see interpretation.  INTERPRETATION: MORPHOLOGY: No blast seen. IMMUNOPHENOTYPE:  Lymphocytes (16% of cells): Heterogeneous population of T-cells (with a normal CD4 to CD8 ratio)with no aberrancy or loss of pan-T cell antigen, immunophenotypically unremarkable natural killer  cells, and polytypic B-cells. D45/side scatter shows no blast population. There is no increase in CD34, CD14/CD64 or   positive cells. Myeloid antigen pattern is normal with CD13, CD16, CD11b, CD15, and CD33. Monocytes show normal antigen pattern.  - peritoneal fluid flow cytometry (6/20/25): The lymphocyte immunophenotypic findings show no diagnostic abnormalities Please see interpretation. INTERPRETATION: IMMUNOPHENOTYPE: Lymphocytes (97% of cells): Heterogeneous population of T-cells (with a CD4 to CD8 ratio of   5.8) with no aberrancy or loss of pan-T cell antigen, immunophenotypically unremarkable natural killer cells, and polytypic B-cells    Imaging:  - CT chest/abd/pelvis (6/3/25): CHEST WALL AND LOWER NECK: Small approximately 4 x 1.5 cm intramuscular   lipoma superficial to the right scapular body... LYMPH NODES: No lymphadenopathy... IMPRESSION: Motion degraded exam No acute pulmonary embolism. Large right and moderate left pleural effusions. Complete right middle and lower lobe atelectasis with compressive atelectasis of remaining lungs. Cirrhosis with sequela of portal hypertension as described notably moderate volume abdominal and pelvic ascites Edematous wall thickening of distal and terminal ileum, cecum, ascending through mid transverse colon may represent sequela of portal hypertensive change versus ileitis and colitis in the correct clinical scenario. Areas of smooth peritoneal thickening and enhancement suggests sequela of recent instrumentation versus peritonitis  - MRCP (6/23/25): IMPRESSION: No evidence of liver mass. Mild peritoneal thickening and haziness of the peritoneal fat of uncertain etiology.  - MR head (6/23/25): IMPRESSION: No acute intracranial hemorrhage or evidence of acute   ischemia. Multiple patchy confluent nonspecific abnormal white matter foci of T2/FLAIR prolongation statistically favoring microvascular type changes.    Hospital course:  - s/p thoracentesis x2 (1.2L on 6/4, 1.2L on 6/9; fluid studies consistent w exudative eff; pulm consulted, opine, "may require pleural biopsy") + paracentesis x1 (1.5L on 6/6)  - hospital course complicated by lethargy, 2/2 he/pse, lactulose started; once stabilized, decision made to have patient transferred to [facility] for tips, transplant evaluation in lieu of socioeconomic barriers that would make outpatient evaluation difficult.  - rest of workup as above    Impression: Overall data above with no clear evidence of malignancy on labs, pathology, or imaging. In particular, studies are showing a polyclonal IgG elevation with a normal kappa/lamda ratio, no monoclonal band identified, normal LDH, and no lymphadenopathy on imaging. Rouleaux formation on peripheral smear suggestive of paraproteinemia/inflammation. Would favor further infectious and rheumatological investigation.    Will follow.    ***************************************************************  Yg Duran MD  Hematology/Oncology Fellow, PGY5  [facility]  After 5pm or on weekends please contact  to page on-call fellow   ***************************************************************

## 2025-06-24 NOTE — PROGRESS NOTE ADULT - PROBLEM SELECTOR PROBLEM 6
GERD (gastroesophageal reflux disease)
Positive FIT (fecal immunochemical test)
Positive FIT (fecal immunochemical test)
GERD (gastroesophageal reflux disease)
Positive FIT (fecal immunochemical test)

## 2025-06-24 NOTE — PROGRESS NOTE ADULT - PROBLEM SELECTOR PLAN 7
reportedly stopped drinking alcohol 1-2 years ago  christa miller   multivitamin, thiamine, folic acid supplementation
- HSQ for DVT ppx  - PT: HPT    DISPO: pending clinical improvement and MRIs
reportedly stopped drinking alcohol 1-2 years ago  christa miller   multivitamin, thiamine, folic acid supplementation  - concern for wernicke's encephalopathy
- HSQ for DVT ppx  - PT: HPT    DISPO: pending clinical improvement and  heme onc recs
reportedly stopped drinking alcohol 1-2 years ago  christa miller   multivitamin, thiamine, folic acid supplementation
reportedly stopped drinking alcohol 1-2 years ago  christa miller   multivitamin, thiamine, folic acid supplementation  - concern for wernicke's encephalopathy

## 2025-06-24 NOTE — CHART NOTE - NSCHARTNOTEFT_GEN_A_CORE
NUTRITION FOLLOW UP NOTE    PATIENT SEEN FOR: malnutrition initial follow up    SOURCE: [x] Patient  [x] Current Medical Record  [] RN  [x] Family/support person at bedside  [] Patient unavailable/inappropriate  [] Other:  Patient speaks [place]. Language Line solutions offered & accepted: [place], ID# [id number]    CHART REVIEWED/EVENTS NOTED.  [x] No changes to nutrition care plan to note  [x] Nutrition Status:  - Bilateral pleural effusion  - Decompensated alcohol associated cirrhosis     DIET ORDER:   Diet, Regular:   Low Sodium  Supplement Feeding Modality:  Oral  Ensure Plus High Protein Cans or Servings Per Day:  1       Frequency:  Two Times a day (06-14-25)      CURRENT DIET ORDER IS:  [x] Appropriate:  [] Inadequate:  [] Other:    NUTRITION INTAKE/PROVISION:  [x] PO: poor appetite/intake, daughter [place] Lunch untouched. Reports patient drinking Ensure oral nutrition supplements. Mostly % meal intake since admit per flowsheets.  [] Enteral Nutrition:  [] Parenteral Nutrition:    ANTHROPOMETRICS:  Drug Dosing Weight  Height (cm): 157.5 - per [facility]  Weight (kg): 50 (13 Jun 2025 12:23) - ? accuracy  BMI (kg/m2): 20.2  No daily weights to assess. RD unable to obtain bed weight at time of visit as patient with belongings on bed.   Note patient ordered for spironolactone and lasix which may cause weight fluctuations due to fluid shifts. RD to continue to monitor weight trends as available/able.     NUTRITIONALLY PERTINENT MEDICATIONS:  MEDICATIONS  (STANDING):  carvedilol 3.125 milliGRAM(s) Oral every 12 hours  cefTRIAXone   IVPB 2000 milliGRAM(s) IV Intermittent every 24 hours  folic acid 1 milliGRAM(s) Oral daily  furosemide    Tablet 40 milliGRAM(s) Oral daily  heparin   Injectable 5000 Unit(s) SubCutaneous every 8 hours  lactulose Syrup 30 Gram(s) Oral three times a day  levothyroxine 75 MICROGram(s) Oral daily  midodrine 5 milliGRAM(s) Oral every 8 hours  multivitamin 1 Tablet(s) Oral daily  polyethylene glycol 3350 17 Gram(s) Oral daily  rifAXIMin 550 milliGRAM(s) Oral two times a day  senna 2 Tablet(s) Oral at bedtime  spironolactone 50 milliGRAM(s) Oral daily  thiamine 100 milliGRAM(s) Oral daily  ursodiol Tablet 500 milliGRAM(s) Oral two times a day    MEDICATIONS  (PRN):  acetaminophen     Tablet .. 650 milliGRAM(s) Oral every 6 hours PRN Temp greater or equal to 38C (100.4F), Mild Pain (1 - 3)  aluminum hydroxide/magnesium hydroxide/simethicone Suspension 30 milliLiter(s) Oral every 4 hours PRN Dyspepsia  melatonin 3 milliGRAM(s) Oral at bedtime PRN Insomnia  ondansetron Injectable 4 milliGRAM(s) IV Push every 8 hours PRN Nausea and/or Vomiting         NUTRITIONALLY PERTINENT LABS:  06-24 Na133 mmol/L[L] Glu 70 mg/dL K+ 3.8 mmol/L Cr  0.69 mg/dL BUN 16 mg/dL 06-22 Phos 3.0 mg/dL 06-24 Alb 3.2 g/dL[L] 06-13 Chol 77 mg/dL LDL --    HDL 18 mg/dL[L] Trig 58 mg/dL06-24 ALT 18 U/L AST 38 U/L Alkaline Phosphatase 259 U/L[H]  06-13-25 @ 07:02 a1c 5.0    A1C with Estimated Average Glucose Result: 5.0 % (06-13-25 @ 07:02)  A1C with Estimated Average Glucose Result: 5.1 % (06-03-25 @ 06:48)    Finger Sticks: n/a      NUTRITIONALLY PERTINENT MEDICATIONS/LABS:  [x] Reviewed  [x] Relevant notes on medications/labs:  - Oral synthroid    EDEMA:  [x] Reviewed  [x] Relevant notes: 1+ edema left leg, right leg; 2+ edema left ankle, right ankle; 3+ edema left foot, right foot per flowsheets     GI/ I&O:  [x] Reviewed  [x] Relevant notes: Patient reports no current nausea/vomiting, diarrhea, or constipation. Note patient ordered for lactulose. Reports burning when urinating.  [] Other:    SKIN:   [] No pressure injuries documented, per nursing flowsheet  [x] Pressure injury previously noted  - per flowsheets: Sacrum/Coccyx/bilateral buttocks stage 1 pressure injury, left ankle stage 1 pressure injury, right ankle stage 1 pressure injury   [] Change in pressure injury documentation:  [] Other:    ESTIMATED NEEDS:  [x] No change:  [] Updated:  Energy:  9694-8622 kcal/day (30-35 kcal/kg)  Protein:  80-96 g/day (1.5-1.8 g/kg)  Fluid:   ml/day or [x] defer to team  Based on: IBW 118lb/53.5kg with consideration for ? accuracy of weight history    NUTRITION DIAGNOSIS:  [x] Prior Dx: Moderate Chronic Malnutrition; Increased Nutrient Needs (protein-energy, micronutrients)   [] New Dx:    EDUCATION:  [x] Yes: Emphasized the importance of adequate kcal and protein intake. Patient without specific food preferences for RD at this time. Pt and family made aware of menu ordering procedure in house with [place] menu provided, encouraged ordering preferred foods. Pt made aware RD remains available and will follow up for any additional questions/concerns and per protocol.   [] Not appropriate/warranted    NUTRITION CARE PLAN:  1. Diet: Continue Low Sodium diet.   2. Supplements: Continue Ensure Plus High Protein x 2/day (provides 700 kcal, 40 g protein)   3. Multivitamin/mineral supplementation: Continue Multivitamin, folic acid, thiamine pending no medical contraindications, due to h/o alcohol use disorder.    [] Achieved - Continue current nutrition intervention(s)  [] Current medical condition precludes nutrition intervention at this time.    MONITORING AND EVALUATION:   RD remains available upon request and will follow up per protocol.    Corinne Lima RDN, CDN - available via [facility] TEAMS

## 2025-06-24 NOTE — PROGRESS NOTE ADULT - ASSESSMENT
66yo m  htn, hld, aud, alcoholic liver cirrhosis c/b portal htn, hpylori gastritis, gerd, hypothyroidism, presented to [facility] w sob/[place], bl le swelling, abdominal distention; found to have bl pl eff and ascites; suspected 2/2 decompensated cirrhosis; admitted to [facility] for further mgmt; s/p thoracentesis x2 (1.2L on 6/4, 1.2L on 6/9; fluid studies consistent w exudative eff; pulm consulted, opine, "may require pleural biopsy") + paracentesis x1 (1.5L on 6/6); hospital course complicated by lethargy, 2/2 he/pse, lactulose started; once stabilized, decision made to have patient transferred to [facility] for tips, transplant evaluation in lieu of socioeconomic barriers that would make outpatient evaluation difficult.   CTH 6/3 no acute findings. crhonic microvascular chagnes   o/e AAOx1-2, VALENCIA, dysconjugate gaze? mild R facial? VALENCIA   MRI brain neg     ImprssioN:   1) AMS in setting of hepatic encephaloaphty and liver cirrhosis  2) ETOH abuse     - trend ammonia  - on lactulose and rifaxamin  - b12, RPR, TSH if not arleady checked   - thimaine and folic acid suppelment    - liver workup in process   - PT/OT   - check FS, glucose control <180  - GI/DVT ppx  - Thank you for allowing me to participate in the care of this patient. Call with questions.   Jeremie Brock MD  Vascular Neurology  Office: [phone]

## 2025-06-24 NOTE — PROGRESS NOTE ADULT - PROBLEM SELECTOR PLAN 1
s/p thoracentesis x2 (1.2L on 6/4, 1.2L on 6/9; fluid studies consistent w exudative eff; cytology negative for malignant cells; pulm consulted, opine, "may require pleural biopsy")  unclear etiology of pleural fluid  most recent cxr w persistent bilateral pl eff  currently in no respiratory distress with adequate spo2 at room air while resting   TTE  Pulm consulted-may require repeat thora for AFB culture, ADA vs pleuroscopy with pleural biopsy to eval for tuberculous effusion  -quant gold pending  -CT chest after pleural effusions improve to assess lung parenchyma
s/p thoracentesis x2 (1.2L on 6/4, 1.2L on 6/9; fluid studies consistent w exudative eff; cytology negative for malignant cells; pulm consulted, opine, "may require pleural biopsy")  unclear etiology of pleural fluid  most recent cxr w persistent bilateral pl eff  currently in no respiratory distress with adequate spo2 at room air while resting   TTE with EF 68%   Pulm consulted-may require repeat thora for AFB culture, ADA vs pleuroscopy with pleural biopsy to eval for tuberculous effusion  -quant gold indet; repeat quant testing   -CT chest after pleural effusions improve to assess lung parenchyma  - UPEP neg, SPEP polyclonal gammopathy, flow cytometry negative  - f/u pulm recs
s/p thoracentesis x2 (1.2L on 6/4, 1.2L on 6/9; fluid studies consistent w exudative eff; cytology negative for malignant cells; pulm consulted, opine, "may require pleural biopsy")  unclear etiology of pleural fluid  most recent cxr w persistent bilateral pl eff  currently in no respiratory distress with adequate spo2 at room air while resting   TTE pending   Pulm consulted-may require repeat thora for AFB culture, ADA vs pleuroscopy with pleural biopsy to eval for tuberculous effusion  -quant gold pending  -CT chest after pleural effusions improve to assess lung parenchyma
s/p thoracentesis x2 (1.2L on 6/4, 1.2L on 6/9; fluid studies consistent w exudative eff; cytology negative for malignant cells; pulm consulted, opine, "may require pleural biopsy")  unclear etiology of pleural fluid  most recent cxr w persistent bilateral pl eff, repeat ordered for today  currently in no respiratory distress with adequate spo2 at room air while resting   TTE  Pulm consulted-may require repeat thora for AFB culture, ADA vs pleuroscopy with pleural biopsy to eval for tuberculous effusion  -quant gold ordered for AM   -CT chest after pleural effusions improve to assess lung parenchyma
s/p thoracentesis x2 (1.2L on 6/4, 1.2L on 6/9; fluid studies consistent w exudative eff; cytology negative for malignant cells; pulm consulted, opine, "may require pleural biopsy")  unclear etiology of pleural fluid  most recent cxr w persistent bilateral pl eff  currently in no respiratory distress with adequate spo2 at room air while resting   TTE with EF 68%   Pulm consulted-may require repeat thora for AFB culture, ADA vs pleuroscopy with pleural biopsy to eval for tuberculous effusion  -quant gold pending  -CT chest after pleural effusions improve to assess lung parenchyma  - f/u SPEP; UPEP  - flow cytometry neg
s/p thoracentesis x2 (1.2L on 6/4, 1.2L on 6/9; fluid studies consistent w exudative eff; cytology negative for malignant cells; pulm consulted, opine, "may require pleural biopsy")  unclear etiology of pleural fluid  most recent cxr w persistent bilateral pl eff  currently in no respiratory distress with adequate spo2 at room air while resting   TTE pending   Pulm consulted-may require repeat thora for AFB culture, ADA vs pleuroscopy with pleural biopsy to eval for tuberculous effusion  -quant gold pending  -CT chest after pleural effusions improve to assess lung parenchyma  - f/u SPEP; UPEP
s/p thoracentesis x2 (1.2L on 6/4, 1.2L on 6/9; fluid studies consistent w exudative eff; cytology negative for malignant cells; pulm consulted, opine, "may require pleural biopsy")  unclear etiology of pleural fluid   TTE with EF 68%   Pulm consulted and appreciate recs  -quant gold indet; repeat quant testing   -CT chest after pleural effusions improve to assess lung parenchyma  - UPEP neg, SPEP polyclonal gammopathy, flow cytometry negative, heme consulted
s/p thoracentesis x2 (1.2L on 6/4, 1.2L on 6/9; fluid studies consistent w exudative eff; cytology negative for malignant cells; pulm consulted, opine, "may require pleural biopsy")  unclear etiology of pleural fluid   TTE with EF 68%   Pulm consulted and appreciate recs  -quant gold indet; repeat quant testing   -CT chest after pleural effusions improve to assess lung parenchyma  - UPEP neg, SPEP polyclonal gammopathy, flow cytometry negative
s/p thoracentesis x2 (1.2L on 6/4, 1.2L on 6/9; fluid studies consistent w exudative eff; cytology negative for malignant cells; pulm consulted, opine, "may require pleural biopsy")  unclear etiology of pleural fluid  most recent cxr w persistent bilateral pl eff  currently in no respiratory distress with adequate spo2 at room air while resting   TTE  Pulm consulted-may require repeat thora for AFB culture, ADA vs pleuroscopy with pleural biopsy to eval for tuberculous effusion  -quant gold pending  -CT chest after pleural effusions improve to assess lung parenchyma
s/p thoracentesis x2 (1.2L on 6/4, 1.2L on 6/9; fluid studies consistent w exudative eff; cytology negative for malignant cells; pulm consulted, opine, "may require pleural biopsy")  unclear etiology of pleural fluid  most recent cxr w persistent bilateral pl eff  currently in no respiratory distress with adequate spo2 at room air while resting   TTE with EF 68%   Pulm consulted-may require repeat thora for AFB culture, ADA vs pleuroscopy with pleural biopsy to eval for tuberculous effusion  -quant gold indet; repeat quant testing   -CT chest after pleural effusions improve to assess lung parenchyma  - UPEP neg, SPEP polyclonal gammopathy, flow cytometry negative  - f/u pulm recs
s/p thoracentesis x2 (1.2L on 6/4, 1.2L on 6/9; fluid studies consistent w exudative eff; cytology negative for malignant cells; pulm consulted, opine, "may require pleural biopsy")  unclear etiology of pleural fluid  most recent cxr w persistent bilateral pl eff  currently in no respiratory distress with adequate spo2 at room air while resting   TTE with EF 68%   Pulm consulted-may require repeat thora for AFB culture, ADA vs pleuroscopy with pleural biopsy to eval for tuberculous effusion  -quant gold indet; repeat quant testing   -CT chest after pleural effusions improve to assess lung parenchyma  - UPEP neg, SPEP polyclonal gammopathy, flow cytometry negative
s/p thoracentesis x2 (1.2L on 6/4, 1.2L on 6/9; fluid studies consistent w exudative eff; cytology negative for malignant cells; pulm consulted, opine, "may require pleural biopsy")  unclear etiology of pleural fluid  most recent cxr w persistent bilateral pl eff  currently in no respiratory distress with adequate spo2 at room air while resting   TTE with EF 68%   Pulm consulted-may require repeat thora for AFB culture, ADA vs pleuroscopy with pleural biopsy to eval for tuberculous effusion  -quant gold indet; repeat quant testing   -CT chest after pleural effusions improve to assess lung parenchyma  - UPEP neg, SPEP polyclonal gammopathy, flow cytometry negative  - f/u pulm recs

## 2025-06-24 NOTE — PROGRESS NOTE ADULT - PROBLEM SELECTOR PLAN 2
s/p paracentesis x1 (1.5L on 6/6)  fluid studies at that time showed white count ~1500, neutrophils ~4%, lymphocytes ~75%; saag ~0.3; fluid protein 5.1, glu 60, ldh 129, amylase 48; fluid gram stain and culture w ngtd  unclear etiology of ascitic fluid    most recent limited abdominal us shows Mild/small volume diffuse abdominal ascites  c/w lasix, aldactone increased per hep recs  midodrine can be uptitrated if needed   IR consulted for repeat tap, unable to reach family for consent. --when tap done, please send fluid for cytology and adenosine deaminase given high protein and low SAAG  Hepatology consulted, f/u final recs   infectious workup sent off  propranolol changed to coreg
s/p paracentesis x1 (1.5L on 6/6)   fluid studies at that time showed white count ~1500, neutrophils ~4%, lymphocytes ~75%; saag ~0.3; fluid protein 5.1, glu 60, ldh 129, amylase 48; fluid gram stain and culture w ngtd  unclear etiology of ascitic fluid    most recent limited abdominal us shows Mild/small volume diffuse abdominal ascites  c/w lasix, aldactone increased per hep recs  midodrine can be uptitrated if needed   s/p paracentesis with IR; send fluid for cytology and adenosine deaminase given high protein and low SAAG  Hepatology consulted, f/u final recs   infectious workup sent off  propranolol changed to coreg; dosing decreased per hepatology recs   - obtain MRCP  - diagnostic paracentesis done 6/20; obtain fluid ADA and flow cytometry per pulm recs
s/p paracentesis x1 (1.5L on 6/6)   fluid studies at that time showed white count ~1500, neutrophils ~4%, lymphocytes ~75%; saag ~0.3; fluid protein 5.1, glu 60, ldh 129, amylase 48; fluid gram stain and culture w ngtd  unclear etiology of ascitic fluid    most recent limited abdominal us shows Mild/small volume diffuse abdominal ascites  c/w lasix, aldactone increased per hep recs  midodrine can be uptitrated if needed   s/p paracentesis with IR; send fluid for cytology and adenosine deaminase given high protein and low SAAG  Hepatology consulted, f/u final recs   infectious workup sent off  propranolol changed to coreg; dosing decreased per hepatology recs   - obtain MRCP  - diagnostic paracentesis done 6/20; obtain fluid ADA and flow cytometry per pulm recs
Decompensated alcohol associated cirrhosis   MELD 3.0=11 (6/23)  Volume- small volume ascites on US 6/12. S/p Paracentesis 6/6 with protein of 6, low PMNs. Repeat paracentesis 6/13 and 6/20 w/ no SBP. Also notable low SAAG.    On lasix 40 and aldactone 25. TTE WNL. Fluid cytology negative.   Infection- 1/2 bottle from paracentesis w/ GNR so will continue Ceftriaxone and will need sBP ppx on DC  Negative blood or urine cultures   Varices- no prior EGD. On BB prophylaxis.    HE: AOx2, unknown baseline. No asterixis. S/P high dose IV thiamine and continue Lactulose and Xifaxan  HCC: No lesions on CT abdomen 6/3/2025with no evidence of hcc; afp neg  hepatology consulted, recs as above
s/p paracentesis x1 (1.5L on 6/6)  fluid studies at that time showed white count ~1500, neutrophils ~4%, lymphocytes ~75%; saag ~0.3; fluid protein 5.1, glu 60, ldh 129, amylase 48; fluid gram stain and culture w ngtd  unclear etiology of ascitic fluid    most recent limited abdominal us shows Mild/small volume diffuse abdominal ascites  c/w lasix, aldactone increased per hep recs  midodrine can be uptitrated if needed   s/p paracentesis with IR; send fluid for cytology and adenosine deaminase given high protein and low SAAG  Hepatology consulted, f/u final recs   infectious workup sent off  propranolol changed to coreg; dosing decreased per hepatology recs   - obtain MRCP  - repeat paracentesis with ADA and flow cytometry per pulm recs; IR f/u
Decompensated alcohol associated cirrhosis   MELD 3.0=11 (6/23)  Volume- small volume ascites on US 6/12. S/p Paracentesis 6/6 with protein of 6, low PMNs. Repeat paracentesis 6/13 and 6/20 w/ no SBP. Also notable low SAAG.    On lasix 40 and aldactone 25. TTE WNL. Fluid cytology negative.   Infection- 1/2 bottle from paracentesis w/ GNR so will continue Ceftriaxone and will need sBP ppx on DC  Negative blood or urine cultures   Varices- no prior EGD. On BB prophylaxis.    HE: AOx2, unknown baseline. No asterixis. S/P high dose IV thiamine and continue Lactulose and Xifaxan  HCC: No lesions on CT abdomen 6/3/2025with no evidence of hcc; afp neg  hepatology consulted, recs as above
s/p paracentesis x1 (1.5L on 6/6)  fluid studies at that time showed white count ~1500, neutrophils ~4%, lymphocytes ~75%; saag ~0.3; fluid protein 5.1, glu 60, ldh 129, amylase 48; fluid gram stain and culture w ngtd  unclear etiology of ascitic fluid    most recent limited abdominal us shows Mild/small volume diffuse abdominal ascites  c/w lasix, aldactone increased per hep recs  midodrine can be uptitrated if needed   IR consulted for repeat tap, unable to reach family for consent. --when tap done, please send fluid for cytology and adenosine deaminase given high protein and low SAAG  Hepatology consulted, f/u final recs   infectious workup sent off  propranolol changed to coreg  - obtain repeat US abd to eval for ascites
s/p paracentesis x1 (1.5L on 6/6)  fluid studies at that time showed white count ~1500, neutrophils ~4%, lymphocytes ~75%; saag ~0.3; fluid protein 5.1, glu 60, ldh 129, amylase 48; fluid gram stain and culture w ngtd  unclear etiology of ascitic fluid    most recent limited abdominal us shows Mild/small volume diffuse abdominal ascites  c/w lasix, aldactone increased per hep recs  midodrine can be uptitrated if needed   s/p paracentesis with IR; send fluid for cytology and adenosine deaminase given high protein and low SAAG  Hepatology consulted, f/u final recs   infectious workup sent off  propranolol changed to coreg  - repeat US abd with mild-mod ascites; monitor for now
s/p paracentesis x1 (1.5L on 6/6)  fluid studies at that time showed white count ~1500, neutrophils ~4%, lymphocytes ~75%; saag ~0.3; fluid protein 5.1, glu 60, ldh 129, amylase 48; fluid gram stain and culture w ngtd  unclear etiology of ascitic fluid    most recent limited abdominal us shows Mild/small volume diffuse abdominal ascites  c/w lasix, aldactone increased per hep recs  midodrine can be uptitrated if needed   s/p paracentesis with IR; send fluid for cytology and adenosine deaminase given high protein and low SAAG  Hepatology consulted, f/u final recs   infectious workup sent off  propranolol changed to coreg; dosing decreased per hepatology recs   - repeat US abd with mild-mod ascites; monitor for now  - obtain MRCP
s/p paracentesis x1 (1.5L on 6/6)  fluid studies at that time showed white count ~1500, neutrophils ~4%, lymphocytes ~75%; saag ~0.3; fluid protein 5.1, glu 60, ldh 129, amylase 48; fluid gram stain and culture w ngtd  unclear etiology of ascitic fluid    most recent limited abdominal us shows Mild/small volume diffuse abdominal ascites  c/w lasix, aldactone increased per hep recs  midodrine can be uptitrated if needed   IR consulted for repeat tap, unable to reach family for consent. --when tap done, please send fluid for cytology and adenosine deaminase given high protein and low SAAG  Hepatology consulted, f/u final recs   infectious workup sent off  propranolol changed to coreg
s/p paracentesis x1 (1.5L on 6/6)   fluid studies at that time showed white count ~1500, neutrophils ~4%, lymphocytes ~75%; saag ~0.3; fluid protein 5.1, glu 60, ldh 129, amylase 48; fluid gram stain and culture w ngtd  unclear etiology of ascitic fluid    most recent limited abdominal us shows Mild/small volume diffuse abdominal ascites  c/w lasix, aldactone increased per hep recs  midodrine can be uptitrated if needed   s/p paracentesis with IR; send fluid for cytology and adenosine deaminase given high protein and low SAAG  Hepatology consulted, f/u final recs   infectious workup sent off  propranolol changed to coreg; dosing decreased per hepatology recs   - obtain MRCP  - diagnostic paracentesis done 6/20; obtain fluid ADA and flow cytometry per pulm recs
s/p paracentesis x1 (1.5L on 6/6)  fluid studies at that time showed white count ~1500, neutrophils ~4%, lymphocytes ~75%; saag ~0.3; fluid protein 5.1, glu 60, ldh 129, amylase 48; fluid gram stain and culture w ngtd  unclear etiology of ascitic fluid    most recent limited abdominal us shows Mild/small volume diffuse abdominal ascites  c/w lasix, aldactone increased per hep recs  midodrine can be uptitrated if needed   IR consulted for repeat tap, unable to reach family for consent. --when tap done, please send fluid for cytology and adenosine deaminase given high protein and low SAAG  Hepatology consulted, f/u final recs   infectious workup sent off  propranolol changed to coreg

## 2025-06-24 NOTE — PROGRESS NOTE ADULT - PROBLEM SELECTOR PLAN 3
neuroimaging wnl  nh3 ~80  Monitor mental status with frequent neurochecks  maintain fall, delirium, seizure, aspiration precautions; keep head end of bed elevated  bowel regimen w lactulose 15g tid + rifaximin 550 bid + miralax/senna; adjust to maintain goal 2-3 bm/day  hepatology consulted  Unable to obtain collateral, no response from daughter despite multiple providers attempting to reach
neuroimaging wnl  nh3 ~80  Monitor mental status with frequent neurochecks  maintain fall, delirium, seizure, aspiration precautions; keep head end of bed elevated  bowel regimen w lactulose 30g tid + rifaximin 550 bid + miralax/senna; adjust to maintain goal 2-3 bm/day  hepatology consulted  Unable to obtain collateral, no response from daughter despite multiple providers attempting to reach  - concern that confusion is wernicke's or other causes. completed IV thiamine 500mg q8 for 2 days, now changed to 250mg daily for 3 days and then will be 100mg PO daily  - will obtain MRI brain as well since mental status not improving overall
neuroimaging wnl  nh3 ~80  Monitor mental status with frequent neurochecks  maintain fall, delirium, seizure, aspiration precautions; keep head end of bed elevated  bowel regimen w lactulose 30g tid + rifaximin 550 bid + miralax/senna; adjust to maintain goal 2-3 bm/day  hepatology consulted  Unable to obtain collateral, no response from daughter despite multiple providers attempting to reach  - concern that confusion is wernicke's or other causes. consider brain MRI if not improving; completed IV thiamine 500mg q8 for 2 days, now changed to 250mg daily for 3 days and then will be 100mg PO daily
reportedly stopped drinking alcohol 1-2 years ago  negative peth   multivitamin, thiamine, folic acid supplementation  - concern for wernicke's encephalopathy now on PO thiamine
nh3 ~80  Monitor mental status with frequent neurochecks  maintain fall, delirium, seizure, aspiration precautions; keep head end of bed elevated  bowel regimen w lactulose 30g tid + rifaximin 550 bid + miralax/senna; adjust to maintain goal 2-3 bm/day  hepatology consulted  daughter states patient has been confused for about 4 months; had been alert prior   - concern that confusion is wernicke's or other causes. completed IV thiamine 500mg q8 for 2 days, 250mg daily for 3 days and now is on 100mg PO daily  - will obtain MRI brain as well since mental status not improving overall  - neuro consulted for further recs; f/u RPR; B12 and TSH wnl   - f/u Afia, dsdna, rf, crp, esr ebv cmv per hepatology recs
reportedly stopped drinking alcohol 1-2 years ago  negative peth   multivitamin, thiamine, folic acid supplementation  - concern for wernicke's encephalopathy now on PO thiamine
nh3 ~80  Monitor mental status with frequent neurochecks  maintain fall, delirium, seizure, aspiration precautions; keep head end of bed elevated  bowel regimen w lactulose 30g tid + rifaximin 550 bid + miralax/senna; adjust to maintain goal 2-3 bm/day  hepatology consulted  daughter states patient has been confused for about 4 months; had been alert prior   - concern that confusion is wernicke's or other causes. completed IV thiamine 500mg q8 for 2 days, 250mg daily for 3 days and now is on 100mg PO daily  - will obtain MRI brain as well since mental status not improving overall  - neuro consulted for further recs; f/u RPR; B12 and TSH wnl   - f/u Afia, dsdna, rf, crp, esr ebv cmv per hepatology recs
nh3 ~80  Monitor mental status with frequent neurochecks  maintain fall, delirium, seizure, aspiration precautions; keep head end of bed elevated  bowel regimen w lactulose 30g tid + rifaximin 550 bid + miralax/senna; adjust to maintain goal 2-3 bm/day  hepatology consulted  daughter states patient has been confused for about 4 months; had been alert prior   - concern that confusion is wernicke's or other causes. completed IV thiamine 500mg q8 for 2 days, 250mg daily for 3 days and now is on 100mg PO daily  - will obtain MRI brain as well since mental status not improving overall  - neuro consulted for further recs; f/u RPR; B12 and TSH wnl   - f/u Afia, dsdna, rf, crp, esr ebv cmv per hepatology recs
neuroimaging wnl  nh3 ~80  Monitor mental status with frequent neurochecks  maintain fall, delirium, seizure, aspiration precautions; keep head end of bed elevated  bowel regimen w lactulose 30g tid + rifaximin 550 bid + miralax/senna; adjust to maintain goal 2-3 bm/day  hepatology consulted  Unable to obtain collateral, no response from daughter despite multiple providers attempting to reach  - concern that confusion is wernicke's or other causes. consider brain MRI if not improving
neuroimaging wnl  nh3 ~80  Monitor mental status with frequent neurochecks  maintain fall, delirium, seizure, aspiration precautions; keep head end of bed elevated  bowel regimen w lactulose 30g tid + rifaximin 550 bid + miralax/senna; adjust to maintain goal 2-3 bm/day  hepatology consulted  Unable to obtain collateral, no response from daughter despite multiple providers attempting to reach  - concern that confusion is wernicke's or other causes. consider brain MRI if not improving; completed IV thiamine 500mg q8 for 2 days, now changed to 250mg daily for 3 days and then will be 100mg PO daily
neuroimaging wnl  nh3 ~80  Monitor mental status with frequent neurochecks  maintain fall, delirium, seizure, aspiration precautions; keep head end of bed elevated  bowel regimen w lactulose 30g tid + rifaximin 550 bid + miralax/senna; adjust to maintain goal 2-3 bm/day  hepatology consulted  Unable to obtain collateral, no response from daughter despite multiple providers attempting to reach
nh3 ~80  Monitor mental status with frequent neurochecks  maintain fall, delirium, seizure, aspiration precautions; keep head end of bed elevated  bowel regimen w lactulose 30g tid + rifaximin 550 bid + miralax/senna; adjust to maintain goal 2-3 bm/day  hepatology consulted  daughter states patient has been confused for about 4 months; had been alert prior   - concern that confusion is wernicke's or other causes. completed IV thiamine 500mg q8 for 2 days, now changed to 250mg daily for 3 days and then will be 100mg PO daily  - will obtain MRI brain as well since mental status not improving overall  - neuro consulted for further recs; obtain RPR; B12 and TSH wnl   - obtain Afia, dsdna, rf, crp, esr ebv cmv per hepatology recs

## 2025-06-24 NOTE — PROGRESS NOTE ADULT - PROBLEM SELECTOR PLAN 6
outpatient records + labs reviewed; worsening anemia since 11/2024  outpatient pet-ct reviewed; "Multiple hypermetabolic mediastinal and bilateral hilar lymph nodes, with the largest lesion adjacent to the descending thoracic aorta. Consider tissue characterization of the largest mediastinal lesion....Right level 2 neck lymph node with increased FDG uptake. Consider sonography if this will change patient management. Two periportal lymph nodes with increased FDG uptake, suspicious for malignant involvement....Previously noted nodule lateral to the inferior right hepatic lobe no longer appreciated....3.8 cm infrarenal abdominal aortic aneurysm"  was scheduled to undergo colonoscopy, but left the country and was subsequently lost to follow up
h/o h.pylori gastritis (reportedly, not treated), gerd
outpatient records + labs reviewed; worsening anemia since 11/2024  outpatient pet-ct reviewed; "Multiple hypermetabolic mediastinal and bilateral hilar lymph nodes, with the largest lesion adjacent to the descending thoracic aorta. Consider tissue characterization of the largest mediastinal lesion....Right level 2 neck lymph node with increased FDG uptake. Consider sonography if this will change patient management. Two periportal lymph nodes with increased FDG uptake, suspicious for malignant involvement....Previously noted nodule lateral to the inferior right hepatic lobe no longer appreciated....3.8 cm infrarenal abdominal aortic aneurysm"  was scheduled to undergo colonoscopy, but left the country and was subsequently lost to follow up
h/o h.pylori gastritis (reportedly, not treated), gerd
outpatient records + labs reviewed; worsening anemia since 11/2024  outpatient pet-ct reviewed; "Multiple hypermetabolic mediastinal and bilateral hilar lymph nodes, with the largest lesion adjacent to the descending thoracic aorta. Consider tissue characterization of the largest mediastinal lesion....Right level 2 neck lymph node with increased FDG uptake. Consider sonography if this will change patient management. Two periportal lymph nodes with increased FDG uptake, suspicious for malignant involvement....Previously noted nodule lateral to the inferior right hepatic lobe no longer appreciated....3.8 cm infrarenal abdominal aortic aneurysm"  was scheduled to undergo colonoscopy, but left the country and was subsequently lost to follow up

## 2025-06-24 NOTE — PROGRESS NOTE ADULT - PROBLEM SELECTOR PLAN 4
h/o alcoholic cirrhosis  anemia, coagulopathy, hypoalbuminemia iso cirrhosis  a/w portal htn and its complications;   he/pse; see above  varices; ct a/p showed small varices; cont propranolol  portal hypertensive colopathy; ct a/p showed Edematous wall thickening of distal and terminal ileum, cecum, ascending through mid transverse colon  ascites; see above  sbp; no prior history of sbp, 1/2 bottle on 6/6 ascites with GNR  hcc; recent imaging studies with no evidence of hcc; afp neg  hepatology consulted, recs as above
LDL 45, HDL 18, triglycerides 58
LDL 45, HDL 18, triglycerides 58
h/o alcoholic cirrhosis  anemia, coagulopathy, hypoalbuminemia iso cirrhosis  a/w portal htn and its complications;   he/pse; see above  varices; ct a/p showed small varices; cont propranolol; gi consult in am  portal hypertensive colopathy; ct a/p showed Edematous wall thickening of distal and terminal ileum, cecum, ascending through mid transverse colon  ascites; see above  sbp; no prior history of sbp, 1/2 bottle on 6/6 ascites with GNR  hcc; recent imaging studies with no evidence of hcc; follow up afp  hepatology consulted, recs as above
h/o alcoholic cirrhosis  anemia, coagulopathy, hypoalbuminemia iso cirrhosis  a/w portal htn and its complications;   he/pse; see above  varices; ct a/p showed small varices; cont propranolol  portal hypertensive colopathy; ct a/p showed Edematous wall thickening of distal and terminal ileum, cecum, ascending through mid transverse colon  ascites; see above  sbp; no prior history of sbp, 1/2 bottle on 6/6 ascites with GNR  hcc; recent imaging studies with no evidence of hcc; afp neg  hepatology consulted, recs as above
h/o alcoholic cirrhosis  anemia, coagulopathy, hypoalbuminemia iso cirrhosis  a/w portal htn and its complications;   he/pse; see above  varices; ct a/p showed small varices; cont propranolol  portal hypertensive colopathy; ct a/p showed Edematous wall thickening of distal and terminal ileum, cecum, ascending through mid transverse colon  ascites; see above  sbp; no prior history of sbp, 1/2 bottle on 6/6 ascites with GNR  hcc; recent imaging studies with no evidence of hcc; afp neg  hepatology consulted, recs as above
h/o alcoholic cirrhosis  anemia, coagulopathy, hypoalbuminemia iso cirrhosis  a/w portal htn and its complications;   he/pse; see above  varices; ct a/p showed small varices; cont propranolol  portal hypertensive colopathy; ct a/p showed Edematous wall thickening of distal and terminal ileum, cecum, ascending through mid transverse colon  ascites; see above  sbp; no prior history of sbp, 1/2 bottle on 6/6 ascites with GNR  hcc; recent imaging studies with no evidence of hcc; follow up afp  hepatology consulted, recs as above
h/o alcoholic cirrhosis  anemia, coagulopathy, hypoalbuminemia iso cirrhosis  a/w portal htn and its complications;   he/pse; see above  varices; ct a/p showed small varices; cont propranolol  portal hypertensive colopathy; ct a/p showed Edematous wall thickening of distal and terminal ileum, cecum, ascending through mid transverse colon  ascites; see above  sbp; no prior history of sbp, 1/2 bottle on 6/6 ascites with GNR  hcc; recent imaging studies with no evidence of hcc; follow up afp  hepatology consulted, recs as above
h/o alcoholic cirrhosis  anemia, coagulopathy, hypoalbuminemia iso cirrhosis  a/w portal htn and its complications;   he/pse; see above  varices; ct a/p showed small varices; cont propranolol; gi consult in am  portal hypertensive colopathy; ct a/p showed Edematous wall thickening of distal and terminal ileum, cecum, ascending through mid transverse colon  ascites; see above  sbp; no prior history of sbp, 1/2 bottle on 6/6 ascites with GNR  hcc; recent imaging studies with no evidence of hcc; follow up afp  hepatology consulted, recs as above
h/o alcoholic cirrhosis  anemia, coagulopathy, hypoalbuminemia iso cirrhosis  a/w portal htn and its complications;   he/pse; see above  varices; ct a/p showed small varices; cont propranolol  portal hypertensive colopathy; ct a/p showed Edematous wall thickening of distal and terminal ileum, cecum, ascending through mid transverse colon  ascites; see above  sbp; no prior history of sbp, 1/2 bottle on 6/6 ascites with GNR  hcc; recent imaging studies with no evidence of hcc; follow up afp  hepatology consulted, recs as above
h/o alcoholic cirrhosis  anemia, coagulopathy, hypoalbuminemia iso cirrhosis  a/w portal htn and its complications;   he/pse; see above  varices; ct a/p showed small varices; cont propranolol  portal hypertensive colopathy; ct a/p showed Edematous wall thickening of distal and terminal ileum, cecum, ascending through mid transverse colon  ascites; see above  sbp; no prior history of sbp, 1/2 bottle on 6/6 ascites with GNR  hcc; recent imaging studies with no evidence of hcc; afp neg  hepatology consulted, recs as above
h/o alcoholic cirrhosis  anemia, coagulopathy, hypoalbuminemia iso cirrhosis  a/w portal htn and its complications;   he/pse; see above  varices; ct a/p showed small varices; cont propranolol  portal hypertensive colopathy; ct a/p showed Edematous wall thickening of distal and terminal ileum, cecum, ascending through mid transverse colon  ascites; see above  sbp; no prior history of sbp, 1/2 bottle on 6/6 ascites with GNR  hcc; recent imaging studies with no evidence of hcc; follow up afp  hepatology consulted, recs as above

## 2025-06-24 NOTE — PROGRESS NOTE ADULT - ASSESSMENT
64yo M, PMH HTN, HLD, alcohol associated liver cirrhosis (last drink >1 year ago), gerd, hypothyroidism presented to [facility] w/ abdominal distension and SOB, found to have bilateral pleural effusions and ascites.     #Decompensated alcohol associated cirrhosis   MELD 3.0=11 (6/23)  Volume- small volume ascites on US 6/12. S/p Paracentesis 6/6 with protein of 6, low PMNs. Repeat paracentesis 6/13 and 6/20 w/ no SBP. Also notable low SAAG.  On lasix 40 and aldactone 25. TTE WNL. Fluid cytology negative.   Infection- 1/2 bottle from paracentesis w/ GNR. No blood or urine cultures  Varices- no prior EGD. On BB prophylaxis. Hgb stable.   HE: AOx2, unknown baseline. No asterixis. S/P high dose IV thiamine   HCC: No lesions on CT abdomen 6/3/2025    #Positive outpatient FIT test   -was scheduled for EGD/colonoscopy with Dr. Michael Gallegos, never completed. Will need outpatient for colon cancer screening     #Hyperproteinemia  -Serum protein 10, ascites protein 7.   -UPEP neg, SPEP polyclonal gammopathy, flow cytometry negative  -Elevated ESR and CRP  -Also w/ peritoneal thickening, IR unable to biopsy     Recommendations:   -heme/onc consult for hyperproteinemia and peritoneal thickening   -full malignancy workup, including psa, CT chest. Will need outpatient EGD/colonoscopy   -continue IV thiamine 100mg (s/p high dose IV thiamine for potential Wernickes)  -continue spironolactone 50mg, furosemide 40mg.   -carvedilol 3.125 BID    Hepatology team will sign off at this time. Please feel free to reconsult as needed.     Antonina Vail  GI/Hepatology Fellow    MONDAY-FRIDAY 8AM-5PM:  Pager# [phone] ([facility]) or [phone] ([facility])    NON-URGENT CONSULTS:  Please email [email or web address]  AT NIGHT AND ON WEEKENDS:  Contact on-call GI fellow from 5pm-8am and on weekends/holidays

## 2025-06-24 NOTE — PROGRESS NOTE ADULT - PROBLEM SELECTOR PROBLEM 4
Decompensated cirrhosis
HLD (hyperlipidemia)
HLD (hyperlipidemia)
Decompensated cirrhosis

## 2025-06-24 NOTE — PROGRESS NOTE ADULT - PROBLEM SELECTOR PROBLEM 3
Hepatic encephalopathy
History of alcohol use disorder
History of alcohol use disorder
Hepatic encephalopathy
5 (moderate pain)

## 2025-06-24 NOTE — PROGRESS NOTE ADULT - TIME BILLING
reviewing chart and coordinating care with primary team/staff, as well as reviewing vitals, radiology, medication list, recent labs, and prior records.
time spent reviewing prior charts, meds, discussing plan with patient, consultants= 66 minutes
- Ordering, reviewing, and interpreting labs, testing, and imaging.  - Independently obtaining a review of systems and performing a physical exam  - Reviewing consultant documentation/recommendations in addition to discussing plan of care with consultants.
reviewing chart and coordinating care with primary team/staff, as well as reviewing vitals, radiology, medication list, recent labs, and prior records.
reviewing chart and coordinating care with primary team/staff, as well as reviewing vitals, radiology, medication list, recent labs, and prior records.
- Ordering, reviewing, and interpreting labs, testing, and imaging.  - Independently obtaining a review of systems and performing a physical exam  - Reviewing consultant documentation/recommendations in addition to discussing plan of care with consultants.
reviewing chart and coordinating care with primary team/staff, as well as reviewing vitals, radiology, medication list, recent labs, and prior records.
- Ordering, reviewing, and interpreting labs, testing, and imaging.  - Independently obtaining a review of systems and performing a physical exam  - Reviewing consultant documentation/recommendations in addition to discussing plan of care with consultants.  - Counselling and educating patient and family regarding interpretation of aforementioned items and plan of care.
- Ordering, reviewing, and interpreting labs, testing, and imaging.  - Independently obtaining a review of systems and performing a physical exam  - Reviewing consultant documentation/recommendations in addition to discussing plan of care with consultants.  - Counselling and educating patient and family regarding interpretation of aforementioned items and plan of care.
time spent reviewing prior charts, meds, discussing plan with patient, consultants= 40 minutes
time spent reviewing prior charts, meds, discussing plan with patient, consultants= 40 minutes

## 2025-06-24 NOTE — PROGRESS NOTE ADULT - PROBLEM SELECTOR PLAN 5
a/w elevated serum total protein (elevated gamma gap) + coagulopathy w elevated inr + worsening anemia, iso cirrhosis  outpatient hiv and hepatitis panel negative  pet-ct showed lymphadenopathy as described below;  fu spep, upep, serum and urine immunofixation, ig panel
a/w elevated serum total protein (elevated gamma gap) + coagulopathy w elevated inr + worsening anemia, iso cirrhosis  outpatient hiv and hepatitis panel negative  pet-ct showed lymphadenopathy as described below;  fu spep, upep, serum and urine immunofixation, ig panel;
tsh 5.01  synthroid
a/w elevated serum total protein (elevated gamma gap) + coagulopathy w elevated inr + worsening anemia, iso cirrhosis  outpatient hiv and hepatitis panel negative  pet-ct showed lymphadenopathy as described below;
tsh 5.01  synthroid
a/w elevated serum total protein (elevated gamma gap) + coagulopathy w elevated inr + worsening anemia, iso cirrhosis  outpatient hiv and hepatitis panel negative  pet-ct showed lymphadenopathy as described below;  fu spep, upep, serum and urine immunofixation, ig panel
a/w elevated serum total protein (elevated gamma gap) + coagulopathy w elevated inr + worsening anemia, iso cirrhosis  outpatient hiv and hepatitis panel negative  pet-ct showed lymphadenopathy as described below;  fu spep, upep, serum and urine immunofixation, ig panel;
a/w elevated serum total protein (elevated gamma gap) + coagulopathy w elevated inr + worsening anemia, iso cirrhosis  outpatient hiv and hepatitis panel negative  pet-ct showed lymphadenopathy as described below;  fu spep, upep, serum and urine immunofixation, ig panel;
a/w elevated serum total protein (elevated gamma gap) + coagulopathy w elevated inr + worsening anemia, iso cirrhosis  outpatient hiv and hepatitis panel negative  pet-ct showed lymphadenopathy as described below;
a/w elevated serum total protein (elevated gamma gap) + coagulopathy w elevated inr + worsening anemia, iso cirrhosis  outpatient hiv and hepatitis panel negative  pet-ct showed lymphadenopathy as described below;
a/w elevated serum total protein (elevated gamma gap) + coagulopathy w elevated inr + worsening anemia, iso cirrhosis  outpatient hiv and hepatitis panel negative  pet-ct showed lymphadenopathy as described below;  fu spep, upep, serum and urine immunofixation, ig panel
a/w elevated serum total protein (elevated gamma gap) + coagulopathy w elevated inr + worsening anemia, iso cirrhosis  outpatient hiv and hepatitis panel negative  pet-ct showed lymphadenopathy as described below;

## 2025-06-24 NOTE — PROGRESS NOTE ADULT - ASSESSMENT
64yo m w pmh htn, hld, aud, alcoholic liver cirrhosis c/b portal htn, hpylori gastritis, gerd, hypothyroidism, presented to [facility] w sob/cordova, bl le swelling, abdominal distention; found to have bl pl eff and ascites; suspected 2/2 decompensated cirrhosis; admitted to [facility] for further mgmt; s/p thoracentesis x2 (1.2L on 6/4, 1.2L on 6/9; fluid studies consistent w exudative eff; pulm consulted, opine, "may require pleural biopsy") + paracentesis x1 (1.5L on 6/6); hospital course complicated by lethargy, 2/2 he/pse, lactulose started; once stabilized, decision made to have patient transferred to [facility] for tips, transplant evaluation in lieu of socioeconomic barriers that would make outpatient evaluation difficult.

## 2025-06-24 NOTE — PROGRESS NOTE ADULT - PROBLEM SELECTOR PROBLEM 5
Hypoalbuminemia
Hypothyroidism
Hypoalbuminemia
Hypothyroidism
Hypoalbuminemia

## 2025-06-25 ENCOUNTER — TRANSCRIPTION ENCOUNTER (OUTPATIENT)
Age: 65
End: 2025-06-25

## 2025-06-25 VITALS
TEMPERATURE: 99 F | OXYGEN SATURATION: 94 % | RESPIRATION RATE: 17 BRPM | DIASTOLIC BLOOD PRESSURE: 78 MMHG | HEART RATE: 98 BPM | SYSTOLIC BLOOD PRESSURE: 100 MMHG

## 2025-06-25 LAB
ALBUMIN SERPL ELPH-MCNC: 3.3 G/DL — SIGNIFICANT CHANGE UP (ref 3.3–5)
ALP SERPL-CCNC: 235 U/L — HIGH (ref 40–120)
ALT FLD-CCNC: 16 U/L — SIGNIFICANT CHANGE UP (ref 10–45)
ANION GAP SERPL CALC-SCNC: 14 MMOL/L — SIGNIFICANT CHANGE UP (ref 5–17)
AST SERPL-CCNC: 32 U/L — SIGNIFICANT CHANGE UP (ref 10–40)
BILIRUB SERPL-MCNC: 1 MG/DL — SIGNIFICANT CHANGE UP (ref 0.2–1.2)
BUN SERPL-MCNC: 16 MG/DL — SIGNIFICANT CHANGE UP (ref 7–23)
CALCIUM SERPL-MCNC: 10.8 MG/DL — HIGH (ref 8.4–10.5)
CHLORIDE SERPL-SCNC: 99 MMOL/L — SIGNIFICANT CHANGE UP (ref 96–108)
CO2 SERPL-SCNC: 21 MMOL/L — LOW (ref 22–31)
CREAT SERPL-MCNC: 0.69 MG/DL — SIGNIFICANT CHANGE UP (ref 0.5–1.3)
CULTURE RESULTS: SIGNIFICANT CHANGE UP
EGFR: 102 ML/MIN/1.73M2 — SIGNIFICANT CHANGE UP
EGFR: 102 ML/MIN/1.73M2 — SIGNIFICANT CHANGE UP
GAMMA INTERFERON BACKGROUND BLD IA-ACNC: 0.04 IU/ML — SIGNIFICANT CHANGE UP
GLUCOSE SERPL-MCNC: 81 MG/DL — SIGNIFICANT CHANGE UP (ref 70–99)
IGG FLD-MCNC: 3898 MG/DL — HIGH (ref 610–1660)
IGG1 SER-MCNC: 1980 MG/DL — HIGH (ref 240–1118)
IGG2 SER-MCNC: 891 MG/DL — HIGH (ref 124–549)
IGG3 SER-MCNC: >218.7 MG/DL — HIGH (ref 15–102)
IGG4 SER-MCNC: 929.7 MG/DL — HIGH (ref 1–123)
M TB IFN-G BLD-IMP: ABNORMAL
M TB IFN-G CD4+ BCKGRND COR BLD-ACNC: 0.21 IU/ML — SIGNIFICANT CHANGE UP
M TB IFN-G CD4+CD8+ BCKGRND COR BLD-ACNC: 0.16 IU/ML — SIGNIFICANT CHANGE UP
POTASSIUM SERPL-MCNC: 4 MMOL/L — SIGNIFICANT CHANGE UP (ref 3.5–5.3)
POTASSIUM SERPL-SCNC: 4 MMOL/L — SIGNIFICANT CHANGE UP (ref 3.5–5.3)
PROT SERPL-MCNC: 10 G/DL — HIGH (ref 6–8.3)
QUANT TB PLUS MITOGEN MINUS NIL: 0.38 IU/ML — SIGNIFICANT CHANGE UP
SODIUM SERPL-SCNC: 134 MMOL/L — LOW (ref 135–145)
SPECIMEN SOURCE: SIGNIFICANT CHANGE UP

## 2025-06-25 PROCEDURE — 82042 OTHER SOURCE ALBUMIN QUAN EA: CPT

## 2025-06-25 PROCEDURE — 86901 BLOOD TYPING SEROLOGIC RH(D): CPT

## 2025-06-25 PROCEDURE — 87075 CULTR BACTERIA EXCEPT BLOOD: CPT

## 2025-06-25 PROCEDURE — 86780 TREPONEMA PALLIDUM: CPT

## 2025-06-25 PROCEDURE — 85018 HEMOGLOBIN: CPT

## 2025-06-25 PROCEDURE — 87040 BLOOD CULTURE FOR BACTERIA: CPT

## 2025-06-25 PROCEDURE — 82947 ASSAY GLUCOSE BLOOD QUANT: CPT

## 2025-06-25 PROCEDURE — 93306 TTE W/DOPPLER COMPLETE: CPT

## 2025-06-25 PROCEDURE — P9047: CPT

## 2025-06-25 PROCEDURE — 82945 GLUCOSE OTHER FLUID: CPT

## 2025-06-25 PROCEDURE — 97116 GAIT TRAINING THERAPY: CPT

## 2025-06-25 PROCEDURE — 49083 ABD PARACENTESIS W/IMAGING: CPT

## 2025-06-25 PROCEDURE — 97530 THERAPEUTIC ACTIVITIES: CPT

## 2025-06-25 PROCEDURE — 86900 BLOOD TYPING SEROLOGIC ABO: CPT

## 2025-06-25 PROCEDURE — 97162 PT EVAL MOD COMPLEX 30 MIN: CPT

## 2025-06-25 PROCEDURE — 82105 ALPHA-FETOPROTEIN SERUM: CPT

## 2025-06-25 PROCEDURE — 93356 MYOCRD STRAIN IMG SPCKL TRCK: CPT

## 2025-06-25 PROCEDURE — 80053 COMPREHEN METABOLIC PANEL: CPT

## 2025-06-25 PROCEDURE — 83605 ASSAY OF LACTIC ACID: CPT

## 2025-06-25 PROCEDURE — 81003 URINALYSIS AUTO W/O SCOPE: CPT

## 2025-06-25 PROCEDURE — 74183 MRI ABD W/O CNTR FLWD CNTR: CPT

## 2025-06-25 PROCEDURE — 88305 TISSUE EXAM BY PATHOLOGIST: CPT

## 2025-06-25 PROCEDURE — 83615 LACTATE (LD) (LDH) ENZYME: CPT

## 2025-06-25 PROCEDURE — 84300 ASSAY OF URINE SODIUM: CPT

## 2025-06-25 PROCEDURE — 83550 IRON BINDING TEST: CPT

## 2025-06-25 PROCEDURE — 85027 COMPLETE CBC AUTOMATED: CPT

## 2025-06-25 PROCEDURE — 86431 RHEUMATOID FACTOR QUANT: CPT

## 2025-06-25 PROCEDURE — 80061 LIPID PANEL: CPT

## 2025-06-25 PROCEDURE — 86038 ANTINUCLEAR ANTIBODIES: CPT

## 2025-06-25 PROCEDURE — 86140 C-REACTIVE PROTEIN: CPT

## 2025-06-25 PROCEDURE — 88112 CYTOPATH CELL ENHANCE TECH: CPT

## 2025-06-25 PROCEDURE — 84156 ASSAY OF PROTEIN URINE: CPT

## 2025-06-25 PROCEDURE — 76705 ECHO EXAM OF ABDOMEN: CPT

## 2025-06-25 PROCEDURE — 86335 IMMUNFIX E-PHORSIS/URINE/CSF: CPT

## 2025-06-25 PROCEDURE — 86225 DNA ANTIBODY NATIVE: CPT

## 2025-06-25 PROCEDURE — 82140 ASSAY OF AMMONIA: CPT

## 2025-06-25 PROCEDURE — 85610 PROTHROMBIN TIME: CPT

## 2025-06-25 PROCEDURE — 86480 TB TEST CELL IMMUN MEASURE: CPT

## 2025-06-25 PROCEDURE — 99222 1ST HOSP IP/OBS MODERATE 55: CPT

## 2025-06-25 PROCEDURE — 84311 SPECTROPHOTOMETRY: CPT

## 2025-06-25 PROCEDURE — 71045 X-RAY EXAM CHEST 1 VIEW: CPT

## 2025-06-25 PROCEDURE — 82803 BLOOD GASES ANY COMBINATION: CPT

## 2025-06-25 PROCEDURE — 85025 COMPLETE CBC W/AUTO DIFF WBC: CPT

## 2025-06-25 PROCEDURE — 85045 AUTOMATED RETICULOCYTE COUNT: CPT

## 2025-06-25 PROCEDURE — 82787 IGG 1 2 3 OR 4 EACH: CPT

## 2025-06-25 PROCEDURE — 82435 ASSAY OF BLOOD CHLORIDE: CPT

## 2025-06-25 PROCEDURE — 83521 IG LIGHT CHAINS FREE EACH: CPT

## 2025-06-25 PROCEDURE — 84157 ASSAY OF PROTEIN OTHER: CPT

## 2025-06-25 PROCEDURE — 82607 VITAMIN B-12: CPT

## 2025-06-25 PROCEDURE — 85014 HEMATOCRIT: CPT

## 2025-06-25 PROCEDURE — G0480: CPT

## 2025-06-25 PROCEDURE — 86334 IMMUNOFIX E-PHORESIS SERUM: CPT

## 2025-06-25 PROCEDURE — 87070 CULTURE OTHR SPECIMN AEROBIC: CPT

## 2025-06-25 PROCEDURE — 84540 ASSAY OF URINE/UREA-N: CPT

## 2025-06-25 PROCEDURE — 84155 ASSAY OF PROTEIN SERUM: CPT

## 2025-06-25 PROCEDURE — 82330 ASSAY OF CALCIUM: CPT

## 2025-06-25 PROCEDURE — 83010 ASSAY OF HAPTOGLOBIN QUANT: CPT

## 2025-06-25 PROCEDURE — 80048 BASIC METABOLIC PNL TOTAL CA: CPT

## 2025-06-25 PROCEDURE — 84166 PROTEIN E-PHORESIS/URINE/CSF: CPT

## 2025-06-25 PROCEDURE — 82728 ASSAY OF FERRITIN: CPT

## 2025-06-25 PROCEDURE — 97110 THERAPEUTIC EXERCISES: CPT

## 2025-06-25 PROCEDURE — 70551 MRI BRAIN STEM W/O DYE: CPT

## 2025-06-25 PROCEDURE — 82784 ASSAY IGA/IGD/IGG/IGM EACH: CPT

## 2025-06-25 PROCEDURE — 83036 HEMOGLOBIN GLYCOSYLATED A1C: CPT

## 2025-06-25 PROCEDURE — 36415 COLL VENOUS BLD VENIPUNCTURE: CPT

## 2025-06-25 PROCEDURE — 89051 BODY FLUID CELL COUNT: CPT

## 2025-06-25 PROCEDURE — 82746 ASSAY OF FOLIC ACID SERUM: CPT

## 2025-06-25 PROCEDURE — 84443 ASSAY THYROID STIM HORMONE: CPT

## 2025-06-25 PROCEDURE — 87086 URINE CULTURE/COLONY COUNT: CPT

## 2025-06-25 PROCEDURE — 85730 THROMBOPLASTIN TIME PARTIAL: CPT

## 2025-06-25 PROCEDURE — 87102 FUNGUS ISOLATION CULTURE: CPT

## 2025-06-25 PROCEDURE — 87205 SMEAR GRAM STAIN: CPT

## 2025-06-25 PROCEDURE — 87389 HIV-1 AG W/HIV-1&-2 AB AG IA: CPT

## 2025-06-25 PROCEDURE — 84295 ASSAY OF SERUM SODIUM: CPT

## 2025-06-25 PROCEDURE — 85652 RBC SED RATE AUTOMATED: CPT

## 2025-06-25 PROCEDURE — 87015 SPECIMEN INFECT AGNT CONCNTJ: CPT

## 2025-06-25 PROCEDURE — 84100 ASSAY OF PHOSPHORUS: CPT

## 2025-06-25 PROCEDURE — 84165 PROTEIN E-PHORESIS SERUM: CPT

## 2025-06-25 PROCEDURE — 83735 ASSAY OF MAGNESIUM: CPT

## 2025-06-25 PROCEDURE — 88184 FLOWCYTOMETRY/ TC 1 MARKER: CPT

## 2025-06-25 PROCEDURE — 80074 ACUTE HEPATITIS PANEL: CPT

## 2025-06-25 PROCEDURE — 82570 ASSAY OF URINE CREATININE: CPT

## 2025-06-25 PROCEDURE — C1729: CPT

## 2025-06-25 PROCEDURE — 86850 RBC ANTIBODY SCREEN: CPT

## 2025-06-25 PROCEDURE — 83540 ASSAY OF IRON: CPT

## 2025-06-25 PROCEDURE — 87799 DETECT AGENT NOS DNA QUANT: CPT

## 2025-06-25 PROCEDURE — 84132 ASSAY OF SERUM POTASSIUM: CPT

## 2025-06-25 PROCEDURE — 81001 URINALYSIS AUTO W/SCOPE: CPT

## 2025-06-25 PROCEDURE — 88185 FLOWCYTOMETRY/TC ADD-ON: CPT

## 2025-06-25 PROCEDURE — 99233 SBSQ HOSP IP/OBS HIGH 50: CPT | Mod: GC

## 2025-06-25 RX ORDER — FUROSEMIDE 10 MG/ML
1 INJECTION INTRAMUSCULAR; INTRAVENOUS
Qty: 30 | Refills: 0
Start: 2025-06-25 | End: 2025-07-24

## 2025-06-25 RX ORDER — LACTULOSE 10 G/15ML
22.5 SOLUTION ORAL
Qty: 2025 | Refills: 0
Start: 2025-06-25 | End: 2025-07-24

## 2025-06-25 RX ORDER — CARVEDILOL 3.12 MG/1
1 TABLET, FILM COATED ORAL
Qty: 60 | Refills: 0
Start: 2025-06-25 | End: 2025-07-24

## 2025-06-25 RX ORDER — LEVOTHYROXINE SODIUM 300 MCG
1 TABLET ORAL
Qty: 30 | Refills: 0
Start: 2025-06-25 | End: 2025-07-24

## 2025-06-25 RX ORDER — PROPRANOLOL HCL 60 MG
1 TABLET ORAL
Qty: 60 | Refills: 0
Start: 2025-06-25 | End: 2025-07-24

## 2025-06-25 RX ORDER — FOLIC ACID 1 MG/1
1 TABLET ORAL
Qty: 30 | Refills: 0
Start: 2025-06-25 | End: 2025-07-24

## 2025-06-25 RX ORDER — CIPROFLOXACIN HCL 250 MG
1 TABLET ORAL
Qty: 30 | Refills: 0
Start: 2025-06-25 | End: 2025-07-24

## 2025-06-25 RX ORDER — MIDODRINE HYDROCHLORIDE 5 MG/1
1 TABLET ORAL
Qty: 90 | Refills: 0
Start: 2025-06-25 | End: 2025-07-24

## 2025-06-25 RX ORDER — SPIRONOLACTONE 25 MG
1 TABLET ORAL
Qty: 30 | Refills: 0
Start: 2025-06-25 | End: 2025-07-24

## 2025-06-25 RX ORDER — URSODIOL 300 MG/1
1 CAPSULE ORAL
Qty: 60 | Refills: 0
Start: 2025-06-25 | End: 2025-07-24

## 2025-06-25 RX ADMIN — CEFTRIAXONE 100 MILLIGRAM(S): 500 INJECTION, POWDER, FOR SOLUTION INTRAMUSCULAR; INTRAVENOUS at 17:22

## 2025-06-25 RX ADMIN — URSODIOL 500 MILLIGRAM(S): 300 CAPSULE ORAL at 17:25

## 2025-06-25 RX ADMIN — LACTULOSE 30 GRAM(S): 10 SOLUTION ORAL at 05:44

## 2025-06-25 RX ADMIN — FOLIC ACID 1 MILLIGRAM(S): 1 TABLET ORAL at 12:20

## 2025-06-25 RX ADMIN — Medication 75 MICROGRAM(S): at 05:40

## 2025-06-25 RX ADMIN — MIDODRINE HYDROCHLORIDE 5 MILLIGRAM(S): 5 TABLET ORAL at 04:38

## 2025-06-25 RX ADMIN — Medication 100 MILLIGRAM(S): at 12:21

## 2025-06-25 RX ADMIN — CARVEDILOL 3.12 MILLIGRAM(S): 3.12 TABLET, FILM COATED ORAL at 05:40

## 2025-06-25 RX ADMIN — Medication 1 TABLET(S): at 12:21

## 2025-06-25 RX ADMIN — MIDODRINE HYDROCHLORIDE 5 MILLIGRAM(S): 5 TABLET ORAL at 20:20

## 2025-06-25 RX ADMIN — Medication 50 MILLIGRAM(S): at 05:39

## 2025-06-25 RX ADMIN — HEPARIN SODIUM 5000 UNIT(S): 1000 INJECTION INTRAVENOUS; SUBCUTANEOUS at 12:20

## 2025-06-25 RX ADMIN — POLYETHYLENE GLYCOL 3350 17 GRAM(S): 17 POWDER, FOR SOLUTION ORAL at 12:20

## 2025-06-25 RX ADMIN — URSODIOL 500 MILLIGRAM(S): 300 CAPSULE ORAL at 05:40

## 2025-06-25 RX ADMIN — CARVEDILOL 3.12 MILLIGRAM(S): 3.12 TABLET, FILM COATED ORAL at 17:23

## 2025-06-25 RX ADMIN — LACTULOSE 30 GRAM(S): 10 SOLUTION ORAL at 12:20

## 2025-06-25 RX ADMIN — FUROSEMIDE 40 MILLIGRAM(S): 10 INJECTION INTRAMUSCULAR; INTRAVENOUS at 05:39

## 2025-06-25 RX ADMIN — MIDODRINE HYDROCHLORIDE 5 MILLIGRAM(S): 5 TABLET ORAL at 12:19

## 2025-06-25 RX ADMIN — HEPARIN SODIUM 5000 UNIT(S): 1000 INJECTION INTRAVENOUS; SUBCUTANEOUS at 05:42

## 2025-06-25 NOTE — PROGRESS NOTE ADULT - ATTENDING COMMENTS
64 yo M w/ PMH of EtOH cirrhosis c/b portal HTN who presented with decompensated cirrhosis, ascites, pleural effusion - bilateral. Sp R sided thoracentesis on 6/4 L thoracentesis on 6.9. Plerual fluid - lymphocytic predominance, not consistent with infection. Suspect R side 2/2 hepatic hydrothorax benjamin given recurrence. Concern for potential malignancy vs tuberculous effusion however cytopathology results negative. Quantiferon indeterminate. Fluid cultures with no growth to date. At this time - not a candidate for pleuroscopy unless L sided effusion recurs. Consider IR biopsy of peritoneal thickening. Please ensure pt has outpatient pulm fu to monitor effusion/continue further workup.

## 2025-06-25 NOTE — CONSULT NOTE ADULT - REASON FOR ADMISSION
sob/cordova, bl le swelling, abdominal distention

## 2025-06-25 NOTE — DISCHARGE NOTE NURSING/CASE MANAGEMENT/SOCIAL WORK - NSDCPEFALRISK_GEN_ALL_CORE
For information on Fall & Injury Prevention, visit: [email or web address]  [email or web address] OR  https://www.cdc.gov/steadi/patient.html

## 2025-06-25 NOTE — PROGRESS NOTE ADULT - ASSESSMENT
65M w/ PMH of EtOH cirrhosis c/b portal HTN, GERD, HTN, EtOH use. Initially presented to OSH w/ abdominal distnesion, bl LE swelling. found to have ascites, bl pleural effusions. Admitted for further work up.  Received a R thoracentesis (1.2L) on 6/4, paracentesis (1.5L) on 6/6, L thoracentesis (1.2L) on 6/9. Course complicated by encephalopathy. Transferred to [facility] for possible TIPS and possible liver transplant evaluation.    Patient originally from [facility], moved here years ago. No history of lung disease or infections.    #Bilateral pleural effusions  #Omental thickening  Patient with bilateral proteinacious pleural effusions, R macrophage-predominant, R lymphocyte-predominant. pH high, Glucose not low, cytopath negative bilaterally.  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  Given lymphocytic predominance, L side concerning for alternative etiology, possibly tuberculous or malignant - given omental thickening concerning for malignancy   No pleural fluid ADA ordered.  On bedside POCUS (6/13), L-sided effusion is small with one available rib space, no safe room for thoracentesis. R-sided effusion is larger but also of less interest for repeat intervention.  Patient might require repeat thoracentesis for AFB culture, ADA - vs pleuroscopy with pleural biopsy to evaluate for tuberculous effusion.  - Quantiferon indeterminate x2 with negative ADA both in pleural and ascites - at this time no reaccumulation on L side that would allow for pleuroscopy, if it were to reaccumulate would favor CT surgery for biospy. At this time would first have IR consider biopsy of peritoneal thickening rather than pursue more pleural work up.   - Pulm will sign off   - UPEP neg, SPEP polyclonal gammopathy, flow cytometry negative

## 2025-06-25 NOTE — DISCHARGE NOTE PROVIDER - NSDCMRMEDTOKEN_GEN_ALL_CORE_FT
carvedilol 3.125 mg oral tablet: 1 tab(s) orally every 12 hours  Cipro 250 mg oral tablet: 1 tab(s) orally once a day  folic acid 1 mg oral tablet: 1 tab(s) orally once a day  furosemide 40 mg oral tablet: 1 tab(s) orally once a day  lactulose 10 g/15 mL oral syrup: 22.5 milliliter(s) orally 3 times a day  levothyroxine 75 mcg (0.075 mg) oral tablet: 1 tab(s) orally once a day  midodrine 5 mg oral tablet: 1 tab(s) orally 3 times a day  Multiple Vitamins oral tablet: 1 tab(s) orally once a day  propranolol 10 mg oral tablet: 1 tab(s) orally 2 times a day  spironolactone 25 mg oral tablet: 1 tab(s) orally once a day  thiamine 100 mg oral tablet: 1 tab(s) orally once a day  ursodiol 500 mg oral tablet: 1 tab(s) orally 2 times a day   carvedilol 3.125 mg oral tablet: 1 tab(s) orally every 12 hours  Cipro 500 mg oral tablet: 1 tab(s) orally once a day  folic acid 1 mg oral tablet: 1 tab(s) orally once a day  furosemide 40 mg oral tablet: 1 tab(s) orally once a day  lactulose 10 g/15 mL oral syrup: 22.5 milliliter(s) orally 3 times a day  levothyroxine 75 mcg (0.075 mg) oral tablet: 1 tab(s) orally once a day  midodrine 5 mg oral tablet: 1 tab(s) orally 3 times a day  Multiple Vitamins oral tablet: 1 tab(s) orally once a day  propranolol 10 mg oral tablet: 1 tab(s) orally 2 times a day  spironolactone 25 mg oral tablet: 1 tab(s) orally once a day  thiamine 100 mg oral tablet: 1 tab(s) orally once a day  ursodiol 500 mg oral tablet: 1 tab(s) orally 2 times a day

## 2025-06-25 NOTE — CONSULT NOTE ADULT - TIME BILLING
coordinating care
reviewing chart and coordinating care with primary team/staff, as well as reviewing vitals, radiology, medication list, recent labs, and prior records.

## 2025-06-25 NOTE — PROGRESS NOTE ADULT - ASSESSMENT
64yo m  htn, hld, aud, alcoholic liver cirrhosis c/b portal htn, hpylori gastritis, gerd, hypothyroidism, presented to osh w sob/cordova, bl le swelling, abdominal distention; found to have bl pl eff and ascites; suspected 2/2 decompensated cirrhosis; admitted to osh for further mgmt; s/p thoracentesis x2 (1.2L on 6/4, 1.2L on 6/9; fluid studies consistent w exudative eff; pulm consulted, opine, "may require pleural biopsy") + paracentesis x1 (1.5L on 6/6); hospital course complicated by lethargy, 2/2 he/pse, lactulose started; once stabilized, decision made to have patient transferred to [facility] for tips, transplant evaluation in lieu of socioeconomic barriers that would make outpatient evaluation difficult.   CTH 6/3 no acute findings. crhonic microvascular chagnes   o/e AAOx1-2, VALENCIA, dysconjugate gaze? mild R facial? VALENCIA   MRI brain neg     ImprssioN:   1) AMS in setting of hepatic encephaloaphty and liver cirrhosis  2) ETOH abuse     - f/u MRCP   - trend ammonia  - on lactulose and rifaxamin  - b12, RPR, TSH if not arleady checked   - thimaine and folic acid suppelment    - liver workup in process   - PT/OT   - check FS, glucose control <180  - GI/DVT ppx  - Thank you for allowing me to participate in the care of this patient. Call with questions.   Jeremie Brock MD  Vascular Neurology  Office: [phone]

## 2025-06-25 NOTE — PROGRESS NOTE ADULT - PROVIDER SPECIALTY LIST ADULT
Hepatology
Hepatology
Hospitalist
Hospitalist
Neurology
Neurology
Pulmonology
Hepatology
Hepatology
Hospitalist
Hospitalist
Pulmonology
Hospitalist
Neurology
Neurology
Pulmonology
Pulmonology
Hepatology
Hospitalist
Neurology
Pulmonology
Hospitalist
Hospitalist
Internal Medicine
Hospitalist
Hospitalist
Internal Medicine

## 2025-06-25 NOTE — DISCHARGE NOTE PROVIDER - NSFOLLOWUPCLINICS_GEN_ALL_ED_FT
[facility] Rheumatology  Rheumatology  [address]  [place] [address]  Phone: [phone]  Fax:     [facility]  Rheumatology  [address] [place] [address]  Phone: [phone]  Fax: [phone]

## 2025-06-25 NOTE — DISCHARGE NOTE PROVIDER - CARE PROVIDER_API CALL
Jessi Claire  Internal Medicine Hepatology  [address], [address]  [place] [address]  Phone: [phone]  Fax: [phone]  Follow Up Time:

## 2025-06-25 NOTE — PROGRESS NOTE ADULT - REASON FOR ADMISSION
sob/cordova, bl le swelling, abdominal distention

## 2025-06-25 NOTE — CONSULT NOTE ADULT - CONSULT REQUESTED DATE/TIME
13-Jun-2025 13:21
18-Jun-2025 15:06
13-Jun-2025 11:13
25-Jun-2025 14:36
13-Jun-2025 10:54
19-Jun-2025 15:05
19-Jun-2025 11:29

## 2025-06-25 NOTE — CONSULT NOTE ADULT - CONSULT REASON
Cirrhosis
protein gap, concern for malignancy
pleural effusions
AMS  hepatic encephalopathy  ETOH
paracentesis
repeat paracentesis with ADA and flow cytometry per pulmonology
hepatology requesting IR courtney for biopsy of peritoneal thickening

## 2025-06-25 NOTE — DISCHARGE NOTE NURSING/CASE MANAGEMENT/SOCIAL WORK - PATIENT PORTAL LINK FT
You can access the FollowMyHealth Patient Portal offered by [facility] by registering at the following website: [email or web address]. By joining [facility]’s FollowMyHealth portal, you will also be able to view your health information using other applications (apps) compatible with our system.

## 2025-06-25 NOTE — DISCHARGE NOTE PROVIDER - NSDCCPCAREPLAN_GEN_ALL_CORE_FT
PRINCIPAL DISCHARGE DIAGNOSIS  Diagnosis: Hepatic encephalopathy  Assessment and Plan of Treatment: Continue to take your medications as prescribed to avoid hepatic encephalopathy.  Lactulose- make sure you take enough to have 3 Bowel movements per day.      SECONDARY DISCHARGE DIAGNOSES  Diagnosis: Bilateral pleural effusion  Assessment and Plan of Treatment: No malignancy was found, but it was suggested to follow up with a rheumatologist to see if you have underlying autoimmune disease causing your inflammation and abnormal labs.    Diagnosis: Positive FIT (fecal immunochemical test)  Assessment and Plan of Treatment: Please follow up with GI for outpatient colonoscopy and endoscopy

## 2025-06-25 NOTE — DISCHARGE NOTE PROVIDER - HOSPITAL COURSE
HPI:  64yo m w pmh htn, hld, aud, alcoholic liver cirrhosis c/b portal htn, hpylori gastritis, gerd, hypothyroidism, presented to osh w sob/cordova, bl le swelling, abdominal distention; found to have bl pl eff and ascites; suspected 2/2 decompensated cirrhosis; admitted to osh for further mgmt; s/p thoracentesis x2 (1.2L on 6/4, 1.2L on 6/9; fluid studies consistent w exudative eff; pulm consulted, opine, "may require pleural biopsy") + paracentesis x1 (1.5L on 6/6); hospital course complicated by lethargy, 2/2 he/pse, lactulose started; once stabilized, decision made to have patient transferred to [facility] for tips, transplant evaluation in lieu of socioeconomic barriers that would make outpatient evaluation difficult. (12 Jun 2025 19:41)    Hospital Course:  Hepatology- Decompensated alcohol associated cirrhosis   MELD 3.0=11 (6/23)  Volume- small volume ascites on US 6/12. S/p Paracentesis 6/6 with protein of 6, low PMNs. Repeat paracentesis 6/13 and 6/20 w/ no SBP. Also notable low SAAG.  On lasix 40 and aldactone 25. TTE WNL. Fluid cytology negative.   Infection- 1/2 bottle from paracentesis w/ GNR. No blood or urine cultures  Varices- no prior EGD. On BB prophylaxis. Hgb stable.   HE: AOx2, unknown baseline. No asterixis. S/P high dose IV thiamine   HCC: No lesions on CT abdomen 6/3/2025  -full malignancy workup, including psa, CT chest. Will need outpatient EGD/colonoscopy   -continue IV thiamine 100mg (s/p high dose IV thiamine for potential Wernickes)  -continue spironolactone 50mg, furosemide 40mg.   -carvedilol 3.125 BID  Oncology consulted:  Oncology :"Overall data above with no clear evidence of malignancy on labs, pathology, or imaging. In particular, studies are showing a polyclonal IgG elevation with a normal kappa/lamda ratio, no monoclonal band identified, normal LDH, and no lymphadenopathy on imaging. Rouleaux formation on peripheral smear suggestive of paraproteinemia/inflammation. Would favor further infectious and rheumatological investigation."      Important Medication Changes and Reason:  Continue Lactulose, Coreg, Spironolactone, Furosemide and Cipro    Active or Pending Issues Requiring Follow-up:  Outpatient colonoscopy and endoscopy  Outpatient rheumatology follow up    Advanced Directives:   [ ] Full code  [ ] DNR  [ ] Hospice    Discharge Diagnoses:  Hepatic encephalopathy  Decompensated hepatic cirrhosis  HLD  Hypothyroidism  GErD  Anemia  polygammopathy

## 2025-06-25 NOTE — DISCHARGE NOTE NURSING/CASE MANAGEMENT/SOCIAL WORK - FINANCIAL ASSISTANCE
[facility] provides services at a reduced cost to those who are determined to be eligible through [facility]’s financial assistance program. Information regarding [facility]’s financial assistance program can be found by going to [email or web address] or by calling [phone].

## 2025-06-25 NOTE — CONSULT NOTE ADULT - SUBJECTIVE AND OBJECTIVE BOX
HEME/ONC INITIAL CONSULT NOTE    CHIEF COMPLAINT:  swelling, SOB    HPI:  64yo m w pmh htn, hld, aud, alcoholic liver cirrhosis c/b portal htn, hpylori gastritis, gerd, hypothyroidism, presented to osh w sob/cordova, bl le swelling, abdominal distention; found to have bl pl eff and ascites; suspected 2/2 decompensated cirrhosis; admitted to osh for further mgmt; s/p thoracentesis x2 (1.2L on 6/4, 1.2L on 6/9; fluid studies consistent w exudative eff; pulm consulted, opine, "may require pleural biopsy") + paracentesis x1 (1.5L on 6/6); hospital course complicated by lethargy, 2/2 he/pse, lactulose started; once stabilized, decision made to have patient transferred to [facility] for tips, transplant evaluation in lieu of socioeconomic barriers that would make outpatient evaluation difficult. (12 Jun 2025 19:41)      REVIEW OF SYSTEMS:  As per HPI    PAST MEDICAL & SURGICAL HISTORY:  ALC (alcoholic liver cirrhosis)  Hyperlipidemia  Positive FIT (fecal immunochemical test)  GERD (gastroesophageal reflux disease)  Hypothyroidism        MEDICATIONS  (STANDING):  carvedilol 3.125 milliGRAM(s) Oral every 12 hours  cefTRIAXone   IVPB 2000 milliGRAM(s) IV Intermittent every 24 hours  folic acid 1 milliGRAM(s) Oral daily  furosemide    Tablet 40 milliGRAM(s) Oral daily  heparin   Injectable 5000 Unit(s) SubCutaneous every 8 hours  lactulose Syrup 30 Gram(s) Oral three times a day  levothyroxine 75 MICROGram(s) Oral daily  midodrine 5 milliGRAM(s) Oral every 8 hours  multivitamin 1 Tablet(s) Oral daily  polyethylene glycol 3350 17 Gram(s) Oral daily  rifAXIMin 550 milliGRAM(s) Oral two times a day  senna 2 Tablet(s) Oral at bedtime  spironolactone 50 milliGRAM(s) Oral daily  thiamine 100 milliGRAM(s) Oral daily  ursodiol Tablet 500 milliGRAM(s) Oral two times a day    MEDICATIONS  (PRN):  acetaminophen     Tablet .. 650 milliGRAM(s) Oral every 6 hours PRN Temp greater or equal to 38C (100.4F), Mild Pain (1 - 3)  aluminum hydroxide/magnesium hydroxide/simethicone Suspension 30 milliLiter(s) Oral every 4 hours PRN Dyspepsia  melatonin 3 milliGRAM(s) Oral at bedtime PRN Insomnia  ondansetron Injectable 4 milliGRAM(s) IV Push every 8 hours PRN Nausea and/or Vomiting      Allergies    No Known Allergies    Intolerances    VITAL SIGNS:  Vital Signs Last 24 Hrs  T(C): 36.7 (25 Jun 2025 12:42), Max: 36.9 (24 Jun 2025 20:43)  T(F): 98.1 (25 Jun 2025 12:42), Max: 98.4 (24 Jun 2025 20:43)  HR: 100 (25 Jun 2025 12:42) (90 - 100)  BP: 100/87 (25 Jun 2025 12:42) (100/61 - 104/64)  BP(mean): --  RR: 16 (25 Jun 2025 12:42) (16 - 18)  SpO2: 93% (25 Jun 2025 12:42) (93% - 94%)    Parameters below as of 25 Jun 2025 12:42  Patient On (Oxygen Delivery Method): room air    PHYSICAL EXAMINATION:      LABS:                        10.1   7.91  )-----------( 356      ( 24 Jun 2025 07:24 )             30.4     06-25    134[L]  |  99  |  16  ----------------------------<  81  4.0   |  21[L]  |  0.69    Ca    10.8[H]      25 Jun 2025 07:00    TPro  10.0[H]  /  Alb  3.3  /  TBili  1.0  /  DBili  x   /  AST  32  /  ALT  16  /  AlkPhos  235[H]  06-25      Urinalysis Basic - ( 25 Jun 2025 07:00 )    Color: x / Appearance: x / SG: x / pH: x  Gluc: 81 mg/dL / Ketone: x  / Bili: x / Urobili: x   Blood: x / Protein: x / Nitrite: x   Leuk Esterase: x / RBC: x / WBC x   Sq Epi: x / Non Sq Epi: x / Bacteria: x        RADIOLOGY & ADDITIONAL STUDIES:      IMPRESSION & RECOMMENDATIONS:   HEME/ONC INITIAL CONSULT NOTE    CHIEF COMPLAINT:  swelling, SOB    HPI:  64yo m w pmh htn, hld, aud, alcoholic liver cirrhosis c/b portal htn, hpylori gastritis, gerd, hypothyroidism, presented to osh w sob/cordova, bl le swelling, abdominal distention; found to have bl pl eff and ascites; suspected 2/2 decompensated cirrhosis; admitted to osh for further mgmt; s/p thoracentesis x2 (1.2L on 6/4, 1.2L on 6/9; fluid studies consistent w exudative eff; pulm consulted, opine, "may require pleural biopsy") + paracentesis x1 (1.5L on 6/6); hospital course complicated by lethargy, 2/2 he/pse, lactulose started; once stabilized, decision made to have patient transferred to [facility] for tips, transplant evaluation in lieu of socioeconomic barriers that would make outpatient evaluation difficult. (12 Jun 2025 19:41)    REVIEW OF SYSTEMS:  As per HPI    PAST MEDICAL & SURGICAL HISTORY:  ALC (alcoholic liver cirrhosis)  Hyperlipidemia  Positive FIT (fecal immunochemical test)  GERD (gastroesophageal reflux disease)  Hypothyroidism      MEDICATIONS  (STANDING):  carvedilol 3.125 milliGRAM(s) Oral every 12 hours  cefTRIAXone   IVPB 2000 milliGRAM(s) IV Intermittent every 24 hours  folic acid 1 milliGRAM(s) Oral daily  furosemide    Tablet 40 milliGRAM(s) Oral daily  heparin   Injectable 5000 Unit(s) SubCutaneous every 8 hours  lactulose Syrup 30 Gram(s) Oral three times a day  levothyroxine 75 MICROGram(s) Oral daily  midodrine 5 milliGRAM(s) Oral every 8 hours  multivitamin 1 Tablet(s) Oral daily  polyethylene glycol 3350 17 Gram(s) Oral daily  rifAXIMin 550 milliGRAM(s) Oral two times a day  senna 2 Tablet(s) Oral at bedtime  spironolactone 50 milliGRAM(s) Oral daily  thiamine 100 milliGRAM(s) Oral daily  ursodiol Tablet 500 milliGRAM(s) Oral two times a day    MEDICATIONS  (PRN):  acetaminophen     Tablet .. 650 milliGRAM(s) Oral every 6 hours PRN Temp greater or equal to 38C (100.4F), Mild Pain (1 - 3)  aluminum hydroxide/magnesium hydroxide/simethicone Suspension 30 milliLiter(s) Oral every 4 hours PRN Dyspepsia  melatonin 3 milliGRAM(s) Oral at bedtime PRN Insomnia  ondansetron Injectable 4 milliGRAM(s) IV Push every 8 hours PRN Nausea and/or Vomiting      Allergies    No Known Allergies    Intolerances    VITAL SIGNS:  Vital Signs Last 24 Hrs  T(C): 36.7 (25 Jun 2025 12:42), Max: 36.9 (24 Jun 2025 20:43)  T(F): 98.1 (25 Jun 2025 12:42), Max: 98.4 (24 Jun 2025 20:43)  HR: 100 (25 Jun 2025 12:42) (90 - 100)  BP: 100/87 (25 Jun 2025 12:42) (100/61 - 104/64)  BP(mean): --  RR: 16 (25 Jun 2025 12:42) (16 - 18)  SpO2: 93% (25 Jun 2025 12:42) (93% - 94%)    Parameters below as of 25 Jun 2025 12:42  Patient On (Oxygen Delivery Method): room air    PHYSICAL EXAMINATION:  Constitutional: resting comfortably in bed; NAD  HEENT: NC/AT, EOMI, anicteric sclera, no nasal discharge  Respiratory: (+) decreased lung sounds on the Right  Cardiac: decreased cardiac sounds  Gastrointestinal: abdomen soft, tender to palpation  Extremities: legs WWP  Dermatologic: skin warm, dry and intact; no rashes, wounds, or scars noted  Lymphatic: no axillary, supraclavicular, anterior/poserior cervical, inguinal, or submental ANDREI appreciated  Neurologic: not fully oriented- unaware of season, president, or reason for hospitalization  Psychiatric: affect and characteristics of appearance, verbalizations, behaviors are appropriate    LABS:                        10.1   7.91  )-----------( 356      ( 24 Jun 2025 07:24 )             30.4     06-25    134[L]  |  99  |  16  ----------------------------<  81  4.0   |  21[L]  |  0.69    Ca    10.8[H]      25 Jun 2025 07:00    TPro  10.0[H]  /  Alb  3.3  /  TBili  1.0  /  DBili  x   /  AST  32  /  ALT  16  /  AlkPhos  235[H]  06-25      Urinalysis Basic - ( 25 Jun 2025 07:00 )    Color: x / Appearance: x / SG: x / pH: x  Gluc: 81 mg/dL / Ketone: x  / Bili: x / Urobili: x   Blood: x / Protein: x / Nitrite: x   Leuk Esterase: x / RBC: x / WBC x   Sq Epi: x / Non Sq Epi: x / Bacteria: x        RADIOLOGY & ADDITIONAL STUDIES:      IMPRESSION & RECOMMENDATIONS:

## 2025-06-25 NOTE — DISCHARGE NOTE PROVIDER - ATTENDING DISCHARGE PHYSICAL EXAMINATION:
Discussed with daughter discharge plan. She is interested in LTC for him, but in the process of applying for medicaid  Will DC w/ prescriptions to [facility] and outpt rheumatology and hepatology follow up for ongoing management.  MRI negative, MRCP no mass and no infection.

## 2025-06-25 NOTE — DISCHARGE NOTE NURSING/CASE MANAGEMENT/SOCIAL WORK - NSDCFUADDAPPT_GEN_ALL_CORE_FT
APPTS ARE READY TO BE MADE: [ x] YES    Best Family or Patient Contact (if needed):    Additional Information about above appointments (if needed):    1: Rheumatology within 1 month  2: Hepatology within 2 weeks  3:     Other comments or requests:

## 2025-06-25 NOTE — CONSULT NOTE ADULT - ATTENDING COMMENTS
65 M with history as above transferred to [facility] for liver transplant eval and bilateral pleural effusions.  R appears to be related to cirrhosis and is consistent with that  L has lymphocytic predominance of unclear etiology - cyto negative for malignancy    no pleural fluid ADA sent, no ascitic ADA sent    # pleural effusions  # cirrhosis  - please send quant gold  - agree with hepatology re: sending adenosine deaminase from paracentesis today  - will d/w IP re: role for pleuroscopy
65M HTN, HLD, AUD, alcoholic liver cirrhosis c/b portal htn, hpylori gastritis, gerd, hypothyroidism, presented to OSH with sob/cordova, bl le swelling, abdominal distention; found to have bilateral pleural effusions and ascites i/s/o albumin 1.3. Hematology consulted for protein gap and concern for malignancy.    #Protein gap  #Anemia    Labs:    - IgG 2632 [[id number]], IgA 869 [], IgM WNL  - serum immunofixation (6/13/25): no monoclonal band identified  - urine immunofixation (6/14/25): no monoclonal band identified; repeat with two weak Bence   Garcia proteins, kappa type  - SPEP with polyclonal gammopathy (6/13, 6/17)  - UPEP (6/18/25): one weak beta migrating and one weak gamma migrating paraproteins identified  - JENNIFER 1:80 speckled with negative dsDNA and RF < 10  - syphilis screen negative, HIV negative, Hepatitis B, C panel negative, EBV PCR negative  - SAAG < 1.1, ascites protein 5-6    Peripheral blood smear (6/24/25): WBCs neutrophil predominant with increased granulation, no dysplasia noted. RBCs normocytic, normochromic with increased [place] cells, 0-1 schistocyte per HPF, and some target cells. Increased rouleaux formation. Increased large platelets with some platelet clumping.     Studies are showing a polyclonal IgG elevation with a normal kappa/lambda ratio, no monoclonal band identified, normal LDH, and no lymphadenopathy on imaging. This is a reactive process. No evidence of malignancy.
65M, PMH HTN, HLD, alcohol-related cirrhosis (last drink >1 year ago), gerd, hypothyroidism presented to [facility] w/ abdominal distension and SOB, found to have bilateral pleural effusions (large R, moderate L), and moderate ascites.    # alcoholic cirrhosis with ascites and pl. effusions. MELD 15  	- ascites: s/p LVP 6/06, GNR in 1/2 bottles, SAAG <1.1, protein 5.1  	-     init. albumin very low 1.3 g/dL  	-     may be peritoneal process incl. malignancy  	- pleural effusion: thora 6/04 and 6/09  	- HE: does not follow commands  	- Wernicke encephalopathy, suspected - did not look up, may have ophthalmoplegia; also has L cataract  	- varices: unknown  	- HCC: No lesions on CT abdomen 6/3/2025; extensive omental infiltration probably 22/ ascites and small varices, area s of smooth pritoneal thickening and enhancement - poss. isntrumentation  # AUD: last alcohol 1 year ago  #  pos. FIT test    Plan:  - echo  - ID workup, ascites cytology and ascites ADA (adenosine deaminase)  - increase lactulose to 30g TID  - start thiamine 500 mg IV TID x2d, then 250 mg/d x 3d, then 100 mg/d PO

## 2025-06-25 NOTE — DISCHARGE NOTE PROVIDER - NSDCFUADDAPPT_GEN_ALL_CORE_FT
APPTS ARE READY TO BE MADE: [ ] YES    Best Family or Patient Contact (if needed):    Additional Information about above appointments (if needed):    1: Rheumatology within 1 month  2: Hepatology within 2 weeks  3:     Other comments or requests:    APPTS ARE READY TO BE MADE: [ x] YES    Best Family or Patient Contact (if needed):    Additional Information about above appointments (if needed):    1: Rheumatology within 1 month  2: Hepatology within 2 weeks  3:     Other comments or requests:    APPTS ARE READY TO BE MADE: [ x] YES    Best Family or Patient Contact (if needed):    Additional Information about above appointments (if needed):    1: Rheumatology within 1 month  2: Hepatology within 2 weeks  3:     Other comments or requests:   Appointment was scheduled in [place]. Patient was scheduled for an appointment on 07/14/25 3:20pm at [address] [place] [address] with Dr. Nima Kent.     Appointment was scheduled in [place]. Patient was scheduled for an appointment on 10/30/25 9:30am at [address] [place] [address] with Dr. Daisy Milligan. -I sent a task for an earlier appointment. APPTS ARE READY TO BE MADE: [ x] YES    Best Family or Patient Contact (if needed):    Additional Information about above appointments (if needed):    1: Rheumatology within 1 month  2: Hepatology within 2 weeks  3:     Other comments or requests:   Appointment was scheduled in [place]. Patient was scheduled for an appointment on 07/14/25 3:20pm at [address] [place] [address] with Dr. Nima Kent.     Appointment was scheduled in [place]. Patient was scheduled for an appointment on 10/30/25 9:30am at [address] [place] [address] with Dr. Daisy Milligan. -I sent a task for an earlier appointment.

## 2025-06-25 NOTE — DISCHARGE NOTE PROVIDER - DETAILS OF MALNUTRITION DIAGNOSIS/DIAGNOSES
This patient has been assessed with a concern for Malnutrition and was treated during this hospitalization for the following Nutrition diagnosis/diagnoses:     -  06/14/2025: Moderate protein-calorie malnutrition

## 2025-06-25 NOTE — DISCHARGE NOTE PROVIDER - NSDCFUSCHEDAPPT_GEN_ALL_CORE_FT
[facility]  [address]  Scheduled Appointment: 07/30/2025     [facility]  [address]  Scheduled Appointment: 07/14/2025    [facility]  [address]  Scheduled Appointment: 07/30/2025

## 2025-06-25 NOTE — CONSULT NOTE ADULT - ASSESSMENT
65M HTN, HLD, AUD, alcoholic liver cirrhosis c/b portal htn, hpylori gastritis, gerd, hypothyroidism, presented to OSH with sob/cordova, bl le swelling, abdominal distention; found to have bilateral pleural effusions and ascites i/s/o albumin 1.3. Hematology consulted for protein gap and concern for malignancy.    #Protein gap  #Anemia    Labs:  - CMP (6/24/25): notable for total protein 9.6, albumin 3.2, alk phos 259  - CMP (6/02/25): notable for total protein 9.1, albumin 1.3  - CBC (6/24/25): WBC 7.91, Hgb 10.1, MCV 94.1, Plt 356  - , K/L FLC WNL (6/14/25)  - vitB12 887, folate 13.4, haptoglobin 130  - IgG 2632 [[id number]], IgA 869 [], IgM WNL  - serum immunofixation (6/13/25): no monoclonal band identified  - urine immunofixation (6/14/25): no monoclonal band identified; repeat with two weak Bence   Garcia proteins, kappa type  - SPEP with polyclonal gammopathy (6/13, 6/17)  - UPEP (6/18/25): one weak beta migrating and one weak gamma migrating paraproteins identified  - JENNIFER 1:80 speckled with negative dsDNA and RF < 10  - syphilis screen negative, HIV negative, Hepatitis B, C panel negative, EBV PCR negative  - SAAG < 1.1, ascites protein 5-6    Peripheral blood smear (6/24/25): WBCs neutrophil predominant with increased granulation, no dysplasia noted. RBCs normocytic, normochromic with increased [place] cells, 0-1 schistocyte per HPF, and some target cells. Increased rouleaux formation. Increased large platelets with some platelet clumping.     Pathology:  - 2 pleural and 3 peritoneal/ascites fluid samples all negative for malignancy (6/4-6/13)  - peripheral blood flow cytometry (6/17/25): Peripheral blood, for flow cytometry: - The lymphocyte immunophenotypic findings show no diagnostic abnormalities. - The myeloid immunophenotypic findings show no myeloblasts, normal myeloid granularity, and normal myelomonocytic antigen pattern. Please see interpretation. INTERPRETATION: MORPHOLOGY: No blast seen. IMMUNOPHENOTYPE: Lymphocytes (16% of cells): Heterogeneous population of T-cells (with a normal CD4 to CD8 ratio)with no aberrancy or loss of pan-T cell antigen, immunophenotypically unremarkable natural killer cells, and polytypic B-cells. D45/side scatter shows no blast population. There is no increase in CD34, CD14/CD64 or  positive cells. Myeloid antigen pattern is normal with CD13, CD16, CD11b, CD15, and CD33. Monocytes show normal antigen pattern.  - peritoneal fluid flow cytometry (6/20/25): The lymphocyte immunophenotypic findings show no diagnostic abnormalities Please see interpretation. 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 LYMPH NODES: No lymphadenopathy... IMPRESSION: Motion degraded exam No acute pulmonary embolism. Large right and moderate left pleural effusions. Complete right middle and lower lobe atelectasis with compressive atelectasis of remaining lungs. Cirrhosis with sequela of portal hypertension as described notably moderate volume abdominal and pelvic ascites Edematous wall thickening of distal and terminal ileum, cecum, ascending through mid transverse colon may represent sequela of portal hypertensive change versus ileitis and colitis in the correct clinical scenario. Areas of smooth peritoneal thickening and enhancement suggests sequela of recent instrumentation versus peritonitis  - MRCP (6/23/25): IMPRESSION: No evidence of liver mass. Mild peritoneal thickening and haziness of the peritoneal fat of uncertain etiology.  - MR head (6/23/25): IMPRESSION: No acute intracranial hemorrhage or evidence of acute   ischemia. Multiple patchy confluent nonspecific abnormal white matter foci of T2/FLAIR prolongation statistically favoring microvascular type changes.    Hospital course:  - s/p thoracentesis x2 (1.2L on 6/4, 1.2L on 6/9; fluid studies consistent w exudative eff; pulm consulted, opine, "may require pleural biopsy") + paracentesis x1 (1.5L on 6/6)  - hospital course complicated by lethargy, 2/2 he/pse, lactulose started; once stabilized, decision made to have patient transferred to [facility] for tips, transplant evaluation in lieu of socioeconomic barriers that would make outpatient evaluation difficult.  - rest of workup as above    Impression: Overall data above with no clear evidence of malignancy on labs, pathology, or imaging. In particular, studies are showing a polyclonal IgG elevation with a normal kappa/lambda ratio, no monoclonal band identified, normal LDH, and no lymphadenopathy on imaging. Rouleaux formation on peripheral smear suggestive of paraproteinemia/inflammation. Ferritin/iron studies also consistent with anemia of chronic disease. Would favor further infectious and rheumatological investigation (IgG subsets for IgG disease, ACE level for sarcoidsois, etc.).    Hematology to sign off. Please call back with any questions or concerns.    ***************************************************************  Yg Duran MD  Hematology/Oncology Fellow, PGY5  [facility]  After 5pm or on weekends please contact  to page on-call fellow   ***************************************************************.

## 2025-06-25 NOTE — CONSULT NOTE ADULT - PROVIDER SPECIALTY LIST ADULT
Neurology
Hepatology
Intervent Radiology
Intervent Radiology
Pulmonology
Heme/Onc
Intervent Radiology

## 2025-06-25 NOTE — PROGRESS NOTE ADULT - SUBJECTIVE AND OBJECTIVE BOX
Interval Events:      REVIEW OF SYSTEMS:  Negative except as documented above.      OBJECTIVE:  ICU Vital Signs Last 24 Hrs  T(C): 36.4 (25 Jun 2025 04:37), Max: 36.9 (24 Jun 2025 20:43)  T(F): 97.6 (25 Jun 2025 04:37), Max: 98.4 (24 Jun 2025 20:43)  HR: 90 (25 Jun 2025 05:35) (90 - 93)  BP: 104/64 (25 Jun 2025 05:35) (100/61 - 104/64)  BP(mean): --  ABP: --  ABP(mean): --  RR: 17 (25 Jun 2025 04:37) (17 - 18)  SpO2: 94% (25 Jun 2025 04:37) (93% - 94%)    O2 Parameters below as of 25 Jun 2025 04:37  Patient On (Oxygen Delivery Method): room air              06-24 @ 07:01  -  06-25 @ 07:00  --------------------------------------------------------  IN: 245 mL / OUT: 900 mL / NET: -655 mL    06-25 @ 07:01  -  06-25 @ 12:34  --------------------------------------------------------  IN: 240 mL / OUT: 0 mL / NET: 240 mL      CAPILLARY BLOOD GLUCOSE          PHYSICAL EXAM:  General: NAD  HEENT:  EOMI, sclera anicteric, moist mucus membranes  Neck: supple  Cardiovascular: RRR  Respiratory: CTAB, no wheezes, crackles, or rhonci  Abdomen: soft, nontender  Extremities: warm and well perfused, no edema, no clubbing  Skin: no rashes  Neurological: no focal deficits    HOSPITAL MEDICATIONS:  MEDICATIONS  (STANDING):  carvedilol 3.125 milliGRAM(s) Oral every 12 hours  cefTRIAXone   IVPB 2000 milliGRAM(s) IV Intermittent every 24 hours  folic acid 1 milliGRAM(s) Oral daily  furosemide    Tablet 40 milliGRAM(s) Oral daily  heparin   Injectable 5000 Unit(s) SubCutaneous every 8 hours  lactulose Syrup 30 Gram(s) Oral three times a day  levothyroxine 75 MICROGram(s) Oral daily  midodrine 5 milliGRAM(s) Oral every 8 hours  multivitamin 1 Tablet(s) Oral daily  polyethylene glycol 3350 17 Gram(s) Oral daily  rifAXIMin 550 milliGRAM(s) Oral two times a day  senna 2 Tablet(s) Oral at bedtime  spironolactone 50 milliGRAM(s) Oral daily  thiamine 100 milliGRAM(s) Oral daily  ursodiol Tablet 500 milliGRAM(s) Oral two times a day    MEDICATIONS  (PRN):  acetaminophen     Tablet .. 650 milliGRAM(s) Oral every 6 hours PRN Temp greater or equal to 38C (100.4F), Mild Pain (1 - 3)  aluminum hydroxide/magnesium hydroxide/simethicone Suspension 30 milliLiter(s) Oral every 4 hours PRN Dyspepsia  melatonin 3 milliGRAM(s) Oral at bedtime PRN Insomnia  ondansetron Injectable 4 milliGRAM(s) IV Push every 8 hours PRN Nausea and/or Vomiting      LABS:                        10.1   7.91  )-----------( 356      ( 24 Jun 2025 07:24 )             30.4     Hgb Trend: 10.1<--, 10.1<--, 10.5<--, 10.5<--, 9.8<--  06-25    134[L]  |  99  |  16  ----------------------------<  81  4.0   |  21[L]  |  0.69    Ca    10.8[H]      25 Jun 2025 07:00    TPro  10.0[H]  /  Alb  3.3  /  TBili  1.0  /  DBili  x   /  AST  32  /  ALT  16  /  AlkPhos  235[H]  06-25    Creatinine Trend: 0.69<--, 0.69<--, 0.80<--, 0.71<--, 0.73<--, 0.65<--    Urinalysis Basic - ( 25 Jun 2025 07:00 )    Color: x / Appearance: x / SG: x / pH: x  Gluc: 81 mg/dL / Ketone: x  / Bili: x / Urobili: x   Blood: x / Protein: x / Nitrite: x   Leuk Esterase: x / RBC: x / WBC x   Sq Epi: x / Non Sq Epi: x / Bacteria: x            MICROBIOLOGY:       RADIOLOGY:  [x] Reviewed and interpreted by me  
    SUBJECTIVE / OVERNIGHT EVENTS: No acute events overnight. Patient feeling well overall. No new complaints or concerns at this time. Remains disoriented.  on RA     Review of Systems:   CONSTITUTIONAL: No fever   EYES: No eye pain, visual disturbances, or discharge  ENMT: No difficulty hearing   RESPIRATORY: No SOB. No cough   GASTROINTESTINAL: No abdominal or epigastric pain. No nausea, vomiting, or hematemesis; No diarrhea    GENITOURINARY: No dysuria   NEUROLOGICAL: No headache   SKIN: No itching    MUSCULOSKELETAL: No joint pain or swelling; No muscle or back pain  PSYCHIATRIC: No depression or anxiety  HEME/LYMPH: No easy bruising or bleeding gums      MEDICATIONS  (STANDING):  carvedilol 6.25 milliGRAM(s) Oral every 12 hours  folic acid 1 milliGRAM(s) Oral daily  furosemide    Tablet 40 milliGRAM(s) Oral daily  heparin   Injectable 5000 Unit(s) SubCutaneous every 8 hours  lactulose Syrup 30 Gram(s) Oral three times a day  levothyroxine 75 MICROGram(s) Oral daily  midodrine 5 milliGRAM(s) Oral every 8 hours  multivitamin 1 Tablet(s) Oral daily  polyethylene glycol 3350 17 Gram(s) Oral daily  rifAXIMin 550 milliGRAM(s) Oral two times a day  senna 2 Tablet(s) Oral at bedtime  spironolactone 50 milliGRAM(s) Oral daily  thiamine IVPB 250 milliGRAM(s) IV Intermittent daily    MEDICATIONS  (PRN):  acetaminophen     Tablet .. 650 milliGRAM(s) Oral every 6 hours PRN Temp greater or equal to 38C (100.4F), Mild Pain (1 - 3)  aluminum hydroxide/magnesium hydroxide/simethicone Suspension 30 milliLiter(s) Oral every 4 hours PRN Dyspepsia  melatonin 3 milliGRAM(s) Oral at bedtime PRN Insomnia  ondansetron Injectable 4 milliGRAM(s) IV Push every 8 hours PRN Nausea and/or Vomiting      I&O's Summary    16 Jun 2025 07:01  -  17 Jun 2025 07:00  --------------------------------------------------------  IN: 0 mL / OUT: 950 mL / NET: -950 mL      PHYSICAL EXAM:  Vital Signs Last 24 Hrs  T(C): 36.3 (17 Jun 2025 12:33), Max: 37 (17 Jun 2025 05:00)  T(F): 97.4 (17 Jun 2025 12:33), Max: 98.6 (17 Jun 2025 05:00)  HR: 84 (17 Jun 2025 12:33) (84 - 93)  BP: 102/69 (17 Jun 2025 12:33) (94/57 - 103/65)  RR: 18 (17 Jun 2025 12:33) (17 - 18)  SpO2: 94% (17 Jun 2025 12:33) (92% - 94%)    Parameters below as of 17 Jun 2025 12:33  Patient On (Oxygen Delivery Method): room air      CONSTITUTIONAL: NAD, well-developed   EYES: PERRLA; conjunctiva and sclera clear  ENMT: Moist oral mucosa, no pharyngeal injection or exudates   NECK: Supple   RESPIRATORY: Normal respiratory effort; lungs are clear to auscultation bilaterally  CARDIOVASCULAR: Regular rate and rhythm, normal S1 and S2  ABDOMEN:  Nontender to palpation, normoactive bowel sounds, mildly distended   MUSCULOSKELETAL: no clubbing or cyanosis of digits; no joint swelling or tenderness to palpation  PSYCH: A+O x2; affect appropriate  NEUROLOGY: no gross sensory deficits   SKIN: No rashes     LABS:                        10.8   8.78  )-----------( 392      ( 17 Jun 2025 07:42 )             32.3     06-17    134[L]  |  97  |  16  ----------------------------<  97  4.1   |  21[L]  |  0.67    Ca    10.2      17 Jun 2025 07:39  Phos  3.9     06-17  Mg     2.0     06-17    TPro  9.4[H]  /  Alb  x   /  TBili  x   /  DBili  x   /  AST  x   /  ALT  x   /  AlkPhos  x   06-17    PT/INR - ( 17 Jun 2025 07:43 )   PT: 13.4 sec;   INR: 1.18 ratio         PTT - ( 17 Jun 2025 07:43 )  PTT:38.4 sec      Urinalysis Basic - ( 17 Jun 2025 07:39 )    Color: x / Appearance: x / SG: x / pH: x  Gluc: 97 mg/dL / Ketone: x  / Bili: x / Urobili: x   Blood: x / Protein: x / Nitrite: x   Leuk Esterase: x / RBC: x / WBC x   Sq Epi: x / Non Sq Epi: x / Bacteria: x      RADIOLOGY & ADDITIONAL TESTS:  New Imaging Personally Reviewed Today:  New Electrocardiogram Personally Reviewed Today:  Other Results Reviewed Today:   Prior or Outpatient Records Reviewed Today with Summary:    COORDINATION OF CARE:  Consultant Communication and Details of Discussion (where applicable):    
  Chief Complaint:  Patient is a 65y old  Male who presents with a chief complaint of sob/cordova, bl le swelling, abdominal distention (15 Sheldon 2025 14:40)      Interval Events:   -no overnight events  -remains confused, AOx2     Allergies:  No Known Allergies      Hospital Medications:  acetaminophen     Tablet .. 650 milliGRAM(s) Oral every 6 hours PRN  aluminum hydroxide/magnesium hydroxide/simethicone Suspension 30 milliLiter(s) Oral every 4 hours PRN  carvedilol 6.25 milliGRAM(s) Oral every 12 hours  folic acid 1 milliGRAM(s) Oral daily  furosemide    Tablet 40 milliGRAM(s) Oral daily  heparin   Injectable 5000 Unit(s) SubCutaneous every 8 hours  lactulose Syrup 30 Gram(s) Oral three times a day  levothyroxine 75 MICROGram(s) Oral daily  melatonin 3 milliGRAM(s) Oral at bedtime PRN  midodrine 5 milliGRAM(s) Oral every 8 hours  multivitamin 1 Tablet(s) Oral daily  ondansetron Injectable 4 milliGRAM(s) IV Push every 8 hours PRN  polyethylene glycol 3350 17 Gram(s) Oral daily  rifAXIMin 550 milliGRAM(s) Oral two times a day  senna 2 Tablet(s) Oral at bedtime  spironolactone 50 milliGRAM(s) Oral daily  thiamine 100 milliGRAM(s) Oral daily  thiamine IVPB 500 milliGRAM(s) IV Intermittent every 8 hours        PHYSICAL EXAM:   Vital Signs:  Vital Signs Last 24 Hrs  T(C): 37 (16 Jun 2025 04:30), Max: 37 (16 Jun 2025 04:30)  T(F): 98.6 (16 Jun 2025 04:30), Max: 98.6 (16 Jun 2025 04:30)  HR: 97 (16 Jun 2025 04:30) (93 - 97)  BP: 100/64 (16 Jun 2025 04:30) (95/56 - 100/64)  BP(mean): --  RR: 18 (16 Jun 2025 04:30) (18 - 18)  SpO2: 91% (16 Jun 2025 04:30) (91% - 99%)    Parameters below as of 15 Sheldon 2025 20:50  Patient On (Oxygen Delivery Method): room air      Daily         GENERAL:  No acute distress  HEENT:  NCAT, no scleral icterus  CHEST: no resp distress  HEART:  RRR  ABDOMEN:  Soft, non-tender, non-distended, normoactive bowel sounds, no masses, no hepato-splenomegaly, no signs of chronic liver disease  EXTREMITIES:  No cyanosis, clubbing, or edema  SKIN:  No rash/erythema/ecchymoses/petechiae/wounds/abscess/warm/dry  NEURO:  Alert and oriented x 3, no asterixis, no tremor    LABS:                        9.4    9.09  )-----------( 373      ( 16 Jun 2025 06:45 )             27.5     Mean Cell Volume: 89.9 fl (06-16-25 @ 06:45)    06-16    133[L]  |  101  |  16  ----------------------------<  82  4.4   |  21[L]  |  0.67    Ca    9.8      16 Jun 2025 06:41    TPro  8.9[H]  /  Alb  3.5  /  TBili  0.9  /  DBili  x   /  AST  30  /  ALT  11  /  AlkPhos  205[H]  06-16    LIVER FUNCTIONS - ( 16 Jun 2025 06:41 )  Alb: 3.5 g/dL / Pro: 8.9 g/dL / ALK PHOS: 205 U/L / ALT: 11 U/L / AST: 30 U/L / GGT: x           PT/INR - ( 16 Jun 2025 06:41 )   PT: 14.4 sec;   INR: 1.25 ratio         PTT - ( 16 Jun 2025 06:41 )  PTT:35.8 sec  Urinalysis Basic - ( 16 Jun 2025 06:41 )    Color: x / Appearance: x / SG: x / pH: x  Gluc: 82 mg/dL / Ketone: x  / Bili: x / Urobili: x   Blood: x / Protein: x / Nitrite: x   Leuk Esterase: x / RBC: x / WBC x   Sq Epi: x / Non Sq Epi: x / Bacteria: x            Imaging:      < from: US Abdomen Limited (06.12.25 @ 12:33) >  FINDINGS/  IMPRESSION:  1.  Mild/small volume diffuse abdominal ascites..  2.  Large bilateral pleural effusion  3.  Incidental cirrhotic liver    --- End of Report ---    < end of copied text >      
  Chief Complaint:  Patient is a 65y old  Male who presents with a chief complaint of sob/cordova, bl le swelling, abdominal distention (22 Jun 2025 15:53)      Interval Events:   -no overnight events      ID # [id number]    Allergies:  No Known Allergies      Hospital Medications:  acetaminophen     Tablet .. 650 milliGRAM(s) Oral every 6 hours PRN  aluminum hydroxide/magnesium hydroxide/simethicone Suspension 30 milliLiter(s) Oral every 4 hours PRN  carvedilol 3.125 milliGRAM(s) Oral every 12 hours  folic acid 1 milliGRAM(s) Oral daily  furosemide    Tablet 40 milliGRAM(s) Oral daily  heparin   Injectable 5000 Unit(s) SubCutaneous every 8 hours  lactulose Syrup 30 Gram(s) Oral three times a day  levothyroxine 75 MICROGram(s) Oral daily  melatonin 3 milliGRAM(s) Oral at bedtime PRN  midodrine 5 milliGRAM(s) Oral every 8 hours  multivitamin 1 Tablet(s) Oral daily  ondansetron Injectable 4 milliGRAM(s) IV Push every 8 hours PRN  polyethylene glycol 3350 17 Gram(s) Oral daily  rifAXIMin 550 milliGRAM(s) Oral two times a day  senna 2 Tablet(s) Oral at bedtime  spironolactone 50 milliGRAM(s) Oral daily  thiamine 100 milliGRAM(s) Oral daily        PHYSICAL EXAM:   Vital Signs:  Vital Signs Last 24 Hrs  T(C): 37 (23 Jun 2025 04:18), Max: 37 (23 Jun 2025 04:18)  T(F): 98.6 (23 Jun 2025 04:18), Max: 98.6 (23 Jun 2025 04:18)  HR: 92 (23 Jun 2025 04:18) (88 - 97)  BP: 106/67 (23 Jun 2025 05:36) (98/62 - 109/73)  BP(mean): --  RR: 18 (23 Jun 2025 04:18) (18 - 18)  SpO2: 92% (23 Jun 2025 04:18) (92% - 92%)    Parameters below as of 23 Jun 2025 04:18  Patient On (Oxygen Delivery Method): room air      Daily       GENERAL:  No acute distress  HEENT:  NCAT, no scleral icterus  CHEST: no resp distress  HEART:  RRR  ABDOMEN:  Soft, non-tender, non-distended, normoactive bowel sounds  EXTREMITIES:  No cyanosis, clubbing, or edema  SKIN:  No rash/erythema/ecchymoses/petechiae/wounds/abscess/warm/dry  NEURO:  Alert and oriented x 2, no asterixis    LABS:                        10.1   7.73  )-----------( 404      ( 23 Jun 2025 07:08 )             30.8     Mean Cell Volume: 94.8 fl (06-23-25 @ 07:08)    06-23    133[L]  |  98  |  19  ----------------------------<  82  4.2   |  21[L]  |  0.80    Ca    10.8[H]      23 Jun 2025 07:17  Phos  3.0     06-22  Mg     1.8     06-22    TPro  10.0[H]  /  Alb  3.3  /  TBili  1.0  /  DBili  x   /  AST  36  /  ALT  18  /  AlkPhos  265[H]  06-23    LIVER FUNCTIONS - ( 23 Jun 2025 07:17 )  Alb: 3.3 g/dL / Pro: 10.0 g/dL / ALK PHOS: 265 U/L / ALT: 18 U/L / AST: 36 U/L / GGT: x           PT/INR - ( 23 Jun 2025 07:16 )   PT: 13.8 sec;   INR: 1.20 ratio         PTT - ( 23 Jun 2025 07:16 )  PTT:35.1 sec  Urinalysis Basic - ( 23 Jun 2025 07:17 )    Color: x / Appearance: x / SG: x / pH: x  Gluc: 82 mg/dL / Ketone: x  / Bili: x / Urobili: x   Blood: x / Protein: x / Nitrite: x   Leuk Esterase: x / RBC: x / WBC x   Sq Epi: x / Non Sq Epi: x / Bacteria: x            Imaging:          
Interval Events:      REVIEW OF SYSTEMS:  Negative except as documented above.      OBJECTIVE:  ICU Vital Signs Last 24 Hrs  T(C): 36.4 (20 Jun 2025 12:53), Max: 36.9 (19 Jun 2025 20:39)  T(F): 97.6 (20 Jun 2025 12:53), Max: 98.4 (19 Jun 2025 20:39)  HR: 76 (20 Jun 2025 17:20) (67 - 93)  BP: 114/86 (20 Jun 2025 17:20) (100/65 - 114/86)  BP(mean): --  ABP: --  ABP(mean): --  RR: 19 (20 Jun 2025 12:53) (15 - 19)  SpO2: 95% (20 Jun 2025 12:53) (92% - 95%)    O2 Parameters below as of 20 Jun 2025 12:53  Patient On (Oxygen Delivery Method): room air              06-19 @ 07:01  -  06-20 @ 07:00  --------------------------------------------------------  IN: 1040 mL / OUT: 950 mL / NET: 90 mL    06-20 @ 07:01  -  06-20 @ 18:20  --------------------------------------------------------  IN: 0 mL / OUT: 500 mL / NET: -500 mL      CAPILLARY BLOOD GLUCOSE          PHYSICAL EXAM:  General: NAD  HEENT:  EOMI, sclera anicteric, moist mucus membranes  Neck: supple  Cardiovascular: RRR  Respiratory: CTAB, no wheezes, crackles, or rhonci  Abdomen: soft, nontender  Extremities: warm and well perfused, no edema, no clubbing  Skin: no rashes  Neurological: no focal deficits    HOSPITAL MEDICATIONS:  MEDICATIONS  (STANDING):  carvedilol 3.125 milliGRAM(s) Oral every 12 hours  folic acid 1 milliGRAM(s) Oral daily  furosemide    Tablet 40 milliGRAM(s) Oral daily  heparin   Injectable 5000 Unit(s) SubCutaneous every 8 hours  lactulose Syrup 30 Gram(s) Oral three times a day  levothyroxine 75 MICROGram(s) Oral daily  midodrine 5 milliGRAM(s) Oral every 8 hours  multivitamin 1 Tablet(s) Oral daily  polyethylene glycol 3350 17 Gram(s) Oral daily  rifAXIMin 550 milliGRAM(s) Oral two times a day  senna 2 Tablet(s) Oral at bedtime  spironolactone 50 milliGRAM(s) Oral daily  thiamine 100 milliGRAM(s) Oral daily    MEDICATIONS  (PRN):  acetaminophen     Tablet .. 650 milliGRAM(s) Oral every 6 hours PRN Temp greater or equal to 38C (100.4F), Mild Pain (1 - 3)  aluminum hydroxide/magnesium hydroxide/simethicone Suspension 30 milliLiter(s) Oral every 4 hours PRN Dyspepsia  melatonin 3 milliGRAM(s) Oral at bedtime PRN Insomnia  ondansetron Injectable 4 milliGRAM(s) IV Push every 8 hours PRN Nausea and/or Vomiting      LABS:                        9.8    8.26  )-----------( 415      ( 20 Jun 2025 07:17 )             29.2     Hgb Trend: 9.8<--, 9.7<--, 10.0<--, 10.8<--, 9.4<--  06-20    131[L]  |  97  |  15  ----------------------------<  76  4.2   |  20[L]  |  0.65    Ca    10.3      20 Jun 2025 07:17  Phos  3.3     06-20  Mg     1.7     06-20    TPro  9.7[H]  /  Alb  3.4  /  TBili  1.0  /  DBili  x   /  AST  32  /  ALT  15  /  AlkPhos  246[H]  06-20    Creatinine Trend: 0.65<--, 0.67<--, 0.75<--, 0.67<--, 0.67<--, 0.71<--  PT/INR - ( 20 Jun 2025 07:17 )   PT: 14.0 sec;   INR: 1.23 ratio         PTT - ( 20 Jun 2025 07:17 )  PTT:34.7 sec  Urinalysis Basic - ( 20 Jun 2025 07:17 )    Color: x / Appearance: x / SG: x / pH: x  Gluc: 76 mg/dL / Ketone: x  / Bili: x / Urobili: x   Blood: x / Protein: x / Nitrite: x   Leuk Esterase: x / RBC: x / WBC x   Sq Epi: x / Non Sq Epi: x / Bacteria: x            MICROBIOLOGY:       RADIOLOGY:  [x] Reviewed and interpreted by me  
Interval Events:      REVIEW OF SYSTEMS:  Negative except as documented above.      OBJECTIVE:  ICU Vital Signs Last 24 Hrs  T(C): 36.8 (19 Jun 2025 05:38), Max: 37 (18 Jun 2025 20:56)  T(F): 98.3 (19 Jun 2025 05:38), Max: 98.6 (18 Jun 2025 20:56)  HR: 100 (19 Jun 2025 05:38) (92 - 100)  BP: 101/71 (19 Jun 2025 05:38) (95/58 - 104/67)  BP(mean): --  ABP: --  ABP(mean): --  RR: 18 (19 Jun 2025 05:38) (18 - 18)  SpO2: 93% (19 Jun 2025 05:38) (92% - 93%)    O2 Parameters below as of 19 Jun 2025 05:38  Patient On (Oxygen Delivery Method): room air              06-18 @ 07:01  -  06-19 @ 07:00  --------------------------------------------------------  IN: 660 mL / OUT: 1000 mL / NET: -340 mL      CAPILLARY BLOOD GLUCOSE          PHYSICAL EXAM:  General: NAD  HEENT:  EOMI, sclera anicteric, moist mucus membranes  Neck: supple  Cardiovascular: RRR  Respiratory: CTAB, no wheezes, crackles, or rhonci  Abdomen: soft, nontender  Extremities: warm and well perfused, no edema, no clubbing  Skin: no rashes  Neurological: no focal deficits    HOSPITAL MEDICATIONS:  MEDICATIONS  (STANDING):  carvedilol 3.125 milliGRAM(s) Oral every 12 hours  folic acid 1 milliGRAM(s) Oral daily  furosemide    Tablet 40 milliGRAM(s) Oral daily  heparin   Injectable 5000 Unit(s) SubCutaneous every 8 hours  lactulose Syrup 30 Gram(s) Oral three times a day  levothyroxine 75 MICROGram(s) Oral daily  midodrine 5 milliGRAM(s) Oral every 8 hours  multivitamin 1 Tablet(s) Oral daily  polyethylene glycol 3350 17 Gram(s) Oral daily  rifAXIMin 550 milliGRAM(s) Oral two times a day  senna 2 Tablet(s) Oral at bedtime  spironolactone 50 milliGRAM(s) Oral daily  thiamine IVPB 250 milliGRAM(s) IV Intermittent daily    MEDICATIONS  (PRN):  acetaminophen     Tablet .. 650 milliGRAM(s) Oral every 6 hours PRN Temp greater or equal to 38C (100.4F), Mild Pain (1 - 3)  aluminum hydroxide/magnesium hydroxide/simethicone Suspension 30 milliLiter(s) Oral every 4 hours PRN Dyspepsia  melatonin 3 milliGRAM(s) Oral at bedtime PRN Insomnia  ondansetron Injectable 4 milliGRAM(s) IV Push every 8 hours PRN Nausea and/or Vomiting      LABS:                        9.7    8.98  )-----------( 407      ( 19 Jun 2025 07:18 )             28.8     Hgb Trend: 9.7<--, 10.0<--, 10.8<--, 9.4<--, 9.6<--  06-18    131[L]  |  100  |  16  ----------------------------<  71  4.2   |  20[L]  |  0.75    Ca    9.9      18 Jun 2025 07:11  Phos  3.4     06-18  Mg     1.8     06-18    TPro  9.2[H]  /  Alb  3.3  /  TBili  0.9  /  DBili  x   /  AST  32  /  ALT  12  /  AlkPhos  232[H]  06-18    Creatinine Trend: 0.75<--, 0.67<--, 0.67<--, 0.71<--, 0.67<--, 0.54<--  PT/INR - ( 19 Jun 2025 07:26 )   PT: 14.0 sec;   INR: 1.23 ratio         PTT - ( 19 Jun 2025 07:26 )  PTT:35.5 sec  Urinalysis Basic - ( 18 Jun 2025 07:11 )    Color: x / Appearance: x / SG: x / pH: x  Gluc: 71 mg/dL / Ketone: x  / Bili: x / Urobili: x   Blood: x / Protein: x / Nitrite: x   Leuk Esterase: x / RBC: x / WBC x   Sq Epi: x / Non Sq Epi: x / Bacteria: x            MICROBIOLOGY:       RADIOLOGY:  [x] Reviewed and interpreted by me  
Neurology      S: patient seen. no neuro changes      Medications: MEDICATIONS  (STANDING):  carvedilol 3.125 milliGRAM(s) Oral every 12 hours  cefTRIAXone   IVPB 2000 milliGRAM(s) IV Intermittent every 24 hours  folic acid 1 milliGRAM(s) Oral daily  furosemide    Tablet 40 milliGRAM(s) Oral daily  heparin   Injectable 5000 Unit(s) SubCutaneous every 8 hours  lactulose Syrup 30 Gram(s) Oral three times a day  levothyroxine 75 MICROGram(s) Oral daily  midodrine 5 milliGRAM(s) Oral every 8 hours  multivitamin 1 Tablet(s) Oral daily  polyethylene glycol 3350 17 Gram(s) Oral daily  rifAXIMin 550 milliGRAM(s) Oral two times a day  senna 2 Tablet(s) Oral at bedtime  spironolactone 50 milliGRAM(s) Oral daily  thiamine 100 milliGRAM(s) Oral daily  ursodiol Tablet 500 milliGRAM(s) Oral two times a day    MEDICATIONS  (PRN):  acetaminophen     Tablet .. 650 milliGRAM(s) Oral every 6 hours PRN Temp greater or equal to 38C (100.4F), Mild Pain (1 - 3)  aluminum hydroxide/magnesium hydroxide/simethicone Suspension 30 milliLiter(s) Oral every 4 hours PRN Dyspepsia  melatonin 3 milliGRAM(s) Oral at bedtime PRN Insomnia  ondansetron Injectable 4 milliGRAM(s) IV Push every 8 hours PRN Nausea and/or Vomiting       Vitals:  Vital Signs Last 24 Hrs  T(C): 36.7 (25 Jun 2025 12:42), Max: 36.9 (24 Jun 2025 20:43)  T(F): 98.1 (25 Jun 2025 12:42), Max: 98.4 (24 Jun 2025 20:43)  HR: 100 (25 Jun 2025 12:42) (90 - 100)  BP: 100/87 (25 Jun 2025 12:42) (100/61 - 104/64)  BP(mean): --  RR: 16 (25 Jun 2025 12:42) (16 - 18)  SpO2: 93% (25 Jun 2025 12:42) (93% - 94%)    Parameters below as of 25 Jun 2025 12:42  Patient On (Oxygen Delivery Method): room air              General Exam:   General Appearance: Appropriately dressed and in no acute distress       Head: Normocephalic, atraumatic and no dysmorphic features  Ear, Nose, and Throat: Moist mucous membranes  CVS: S1S2+  Resp: No SOB, no wheeze or rhonchi  GI: soft NT/ND  Extremities: No edema or cyanosis  Skin: No bruises or rashes     Neurological Exam:  Mental Status: Awake, alert and oriented x 1-2 Able to follow simple and complex verbal commands. Able to name and repeat. fluent speech. No obvious aphasia or dysarthria noted.   Cranial Nerves: PERRL, dysconjugate gaze?  VFFC, sensation V1-V3 intact,? mild f acial asymmetry, equal elevation of palate, scm/trap 5/5, tongue is midline on protrusion.  hearing is grossly intact.   Motor: Normal bulk, tone and strength throughout. Fine finger movements were intact and symmetric. no tremors or drift noted.    Sensation: Intact to light touch and pinprick throughout. no right/left confusion. no extinction to tactile on DSS.    Reflexes: 1+ throughout at biceps, brachioradialis, triceps, patellars and ankles bilaterally and equal. No clonus. R toe and L toe were both downgoing.  Coordination: No dysmetria on FNF    Gait: deferred     Data/Labs/Imaging which I personally reviewed.      LABS:                          10.1   7.91  )-----------( 356      ( 24 Jun 2025 07:24 )             30.4     06-25    134[L]  |  99  |  16  ----------------------------<  81  4.0   |  21[L]  |  0.69    Ca    10.8[H]      25 Jun 2025 07:00    TPro  10.0[H]  /  Alb  3.3  /  TBili  1.0  /  DBili  x   /  AST  32  /  ALT  16  /  AlkPhos  235[H]  06-25    LIVER FUNCTIONS - ( 25 Jun 2025 07:00 )  Alb: 3.3 g/dL / Pro: 10.0 g/dL / ALK PHOS: 235 U/L / ALT: 16 U/L / AST: 32 U/L / GGT: x             Urinalysis Basic - ( 25 Jun 2025 07:00 )    Color: x / Appearance: x / SG: x / pH: x  Gluc: 81 mg/dL / Ketone: x  / Bili: x / Urobili: x   Blood: x / Protein: x / Nitrite: x   Leuk Esterase: x / RBC: x / WBC x   Sq Epi: x / Non Sq Epi: x / Bacteria: x          < from: CT Head No Cont (06.03.25 @ 09:04) >    ACC: [id number] EXAM:  CT BRAIN   ORDERED BY:  DANGELO GAYTAN     PROCEDURE DATE:  06/03/2025          INTERPRETATION:  CLINICAL INDICATIONS:  confusion    COMPARISON: None    TECHNIQUE: Noncontrast CT of the head. Multiplanar reformations are   submitted.    FINDINGS:  There is periventricular and subcortical white matter hypodensity without   mass effect, nonspecific, likely representing mild chronic microvascular   ischemic changes. There is no compelling evidence for an acute   transcortical infarction. There is no evidence of mass, mass effect,   midline shift or extra-axial fluid collection. The lateral ventricles and   cortical sulci are age-appropriate in size and configuration. The orbits,   mastoid air cells and visualized paranasal sinuses are unremarkable. The   calvarium is intact. Consider MRI as clinically warranted.    IMPRESSION:  Mild chronic microvascular changes without evidence of an   acute transcortical infarction or hemorrhage.    --- End of Report ---       < from: MR Head No Cont (06.23.25 @ 21:02) >    ACC: [id number] EXAM:  MR BRAIN   ORDERED BY:  ERMELINDA HARRIS     PROCEDURE DATE:  06/23/2025          INTERPRETATION:  .    CLINICAL INFORMATION: Confusion. Cirrhosis.    TECHNIQUE: Multiplanar multisequential MRI of the brain was acquired   without the administration of IV gadolinium.    COMPARISON: Prior CT examination of the head performed on 6/3/2025. No   prior brain MRI studies are available for comparison.    FINDINGS: Examination is slightly motion limited.    Multiple patchy confluent nonspecific foci of T2/FLAIR hyperintensity are   noted throughout the deep and periventricular white matter of the   cerebral hemispheres. There is no associated mass effect. There is no   evidence of acute ischemia on the diffusion-weighted images.    There is mild diffuse cerebral volume loss with prominence of the sulci,   fissures, and cisternal spaces which is normal for the patient's age.   Ventricular size and configuration is unremarkable. Flow-voids are noted   throughout the major intracranial vessels, on the T2 weighted images,   consistent with their patency. The sellar region and posterior fossa   appear unremarkable.    The paranasal sinuses and tympanomastoid cavities are clear. The   calvarium is intact. The orbits appear unremarkable.    IMPRESSION: No acute intracranial hemorrhage or evidence of acute   ischemia.    Multiple patchy confluent nonspecific abnormal white matter foci of   T2/FLAIR prolongation statistically favoring microvascular type changes.    --- End of Report ---            YONIS CAMERON MD; Attending Radiologist  This document has been electronically signed. Jun 23 2025  9:46PM    < end of copied text >  
  Chief Complaint:  Patient is a 65y old  Male who presents with a chief complaint of sob/cordova, bl le swelling, abdominal distention (18 Jun 2025 11:46)      Interval Events:   -no overnight events   -remains confused     Allergies:  No Known Allergies      Hospital Medications:  acetaminophen     Tablet .. 650 milliGRAM(s) Oral every 6 hours PRN  aluminum hydroxide/magnesium hydroxide/simethicone Suspension 30 milliLiter(s) Oral every 4 hours PRN  carvedilol 3.125 milliGRAM(s) Oral every 12 hours  folic acid 1 milliGRAM(s) Oral daily  furosemide    Tablet 40 milliGRAM(s) Oral daily  heparin   Injectable 5000 Unit(s) SubCutaneous every 8 hours  lactulose Syrup 30 Gram(s) Oral three times a day  levothyroxine 75 MICROGram(s) Oral daily  melatonin 3 milliGRAM(s) Oral at bedtime PRN  midodrine 5 milliGRAM(s) Oral every 8 hours  multivitamin 1 Tablet(s) Oral daily  ondansetron Injectable 4 milliGRAM(s) IV Push every 8 hours PRN  polyethylene glycol 3350 17 Gram(s) Oral daily  rifAXIMin 550 milliGRAM(s) Oral two times a day  senna 2 Tablet(s) Oral at bedtime  spironolactone 50 milliGRAM(s) Oral daily  thiamine IVPB 250 milliGRAM(s) IV Intermittent daily        PHYSICAL EXAM:   Vital Signs:  Vital Signs Last 24 Hrs  T(C): 36.7 (18 Jun 2025 12:22), Max: 37.2 (17 Jun 2025 20:53)  T(F): 98.1 (18 Jun 2025 12:22), Max: 99 (17 Jun 2025 20:53)  HR: 92 (18 Jun 2025 12:22) (92 - 98)  BP: 95/58 (18 Jun 2025 12:22) (95/58 - 122/76)  BP(mean): --  RR: 18 (18 Jun 2025 12:22) (18 - 18)  SpO2: 92% (18 Jun 2025 12:22) (92% - 94%)    Parameters below as of 18 Jun 2025 12:22  Patient On (Oxygen Delivery Method): room air      Daily         GENERAL:  No acute distress  HEENT:  NCAT, no scleral icterus  CHEST: no resp distress  HEART:  RRR  ABDOMEN:  Soft, +distended, non-tender   EXTREMITIES:  No cyanosis, clubbing, or edema  SKIN:  No rash/erythema/ecchymoses/petechiae/wounds/abscess/warm/dry  NEURO:  Alert and oriented x 1-2    LABS:                        10.0   9.20  )-----------( 424      ( 18 Jun 2025 07:14 )             30.5     Mean Cell Volume: 92.7 fl (06-18-25 @ 07:14)    06-18    131[L]  |  100  |  16  ----------------------------<  71  4.2   |  20[L]  |  0.75    Ca    9.9      18 Jun 2025 07:11  Phos  3.4     06-18  Mg     1.8     06-18    TPro  9.2[H]  /  Alb  3.3  /  TBili  0.9  /  DBili  x   /  AST  32  /  ALT  12  /  AlkPhos  232[H]  06-18    LIVER FUNCTIONS - ( 18 Jun 2025 07:11 )  Alb: 3.3 g/dL / Pro: 9.2 g/dL / ALK PHOS: 232 U/L / ALT: 12 U/L / AST: 32 U/L / GGT: x           PT/INR - ( 18 Jun 2025 07:11 )   PT: 14.1 sec;   INR: 1.24 ratio         PTT - ( 18 Jun 2025 07:11 )  PTT:37.7 sec  Urinalysis Basic - ( 18 Jun 2025 07:11 )    Color: x / Appearance: x / SG: x / pH: x  Gluc: 71 mg/dL / Ketone: x  / Bili: x / Urobili: x   Blood: x / Protein: x / Nitrite: x   Leuk Esterase: x / RBC: x / WBC x   Sq Epi: x / Non Sq Epi: x / Bacteria: x            Imaging:          
  Chief Complaint:  Patient is a 65y old  Male who presents with a chief complaint of sob/cordova, bl le swelling, abdominal distention (24 Jun 2025 11:51)      Interval Events:   -no overnight events  -spoke to patient with daughter at bedside, w/      Allergies:  No Known Allergies      Hospital Medications:  acetaminophen     Tablet .. 650 milliGRAM(s) Oral every 6 hours PRN  aluminum hydroxide/magnesium hydroxide/simethicone Suspension 30 milliLiter(s) Oral every 4 hours PRN  carvedilol 3.125 milliGRAM(s) Oral every 12 hours  cefTRIAXone   IVPB 2000 milliGRAM(s) IV Intermittent every 24 hours  folic acid 1 milliGRAM(s) Oral daily  furosemide    Tablet 40 milliGRAM(s) Oral daily  heparin   Injectable 5000 Unit(s) SubCutaneous every 8 hours  lactulose Syrup 30 Gram(s) Oral three times a day  levothyroxine 75 MICROGram(s) Oral daily  melatonin 3 milliGRAM(s) Oral at bedtime PRN  midodrine 5 milliGRAM(s) Oral every 8 hours  multivitamin 1 Tablet(s) Oral daily  ondansetron Injectable 4 milliGRAM(s) IV Push every 8 hours PRN  polyethylene glycol 3350 17 Gram(s) Oral daily  rifAXIMin 550 milliGRAM(s) Oral two times a day  senna 2 Tablet(s) Oral at bedtime  spironolactone 50 milliGRAM(s) Oral daily  thiamine 100 milliGRAM(s) Oral daily  ursodiol Tablet 500 milliGRAM(s) Oral two times a day        PHYSICAL EXAM:   Vital Signs:  Vital Signs Last 24 Hrs  T(C): 36.6 (24 Jun 2025 11:58), Max: 36.6 (24 Jun 2025 04:15)  T(F): 97.9 (24 Jun 2025 11:58), Max: 97.9 (24 Jun 2025 04:15)  HR: 91 (24 Jun 2025 11:58) (91 - 100)  BP: 104/66 (24 Jun 2025 11:58) (103/67 - 110/72)  BP(mean): --  RR: 18 (24 Jun 2025 11:58) (18 - 18)  SpO2: 93% (24 Jun 2025 11:58) (93% - 96%)    Parameters below as of 24 Jun 2025 11:58  Patient On (Oxygen Delivery Method): room air      Daily     GENERAL:  No acute distress  HEENT:  NCAT, no scleral icterus  CHEST: no resp distress  HEART:  RRR  ABDOMEN:  Soft, non-tender + distended w/ scar from prior surgery  EXTREMITIES:  No cyanosis, clubbing, or edema  SKIN:  No rash/erythema/ecchymoses/petechiae/wounds/abscess/warm/dry  NEURO:  Alert and oriented x 3, no asterixis, no tremor    LABS:                        10.1   7.91  )-----------( 356      ( 24 Jun 2025 07:24 )             30.4     Mean Cell Volume: 94.1 fl (06-24-25 @ 07:24)    06-24    133[L]  |  99  |  16  ----------------------------<  70  3.8   |  18[L]  |  0.69    Ca    10.7[H]      24 Jun 2025 07:24    TPro  9.6[H]  /  Alb  3.2[L]  /  TBili  0.9  /  DBili  x   /  AST  38  /  ALT  18  /  AlkPhos  259[H]  06-24    LIVER FUNCTIONS - ( 24 Jun 2025 07:24 )  Alb: 3.2 g/dL / Pro: 9.6 g/dL / ALK PHOS: 259 U/L / ALT: 18 U/L / AST: 38 U/L / GGT: x           PT/INR - ( 23 Jun 2025 07:16 )   PT: 13.8 sec;   INR: 1.20 ratio         PTT - ( 23 Jun 2025 07:16 )  PTT:35.1 sec  Urinalysis Basic - ( 24 Jun 2025 07:24 )    Color: x / Appearance: x / SG: x / pH: x  Gluc: 70 mg/dL / Ketone: x  / Bili: x / Urobili: x   Blood: x / Protein: x / Nitrite: x   Leuk Esterase: x / RBC: x / WBC x   Sq Epi: x / Non Sq Epi: x / Bacteria: x            Imaging:    < from: MR MRCP w/wo IV Cont (06.23.25 @ 21:01) >  IMPRESSION:  No evidence of liver mass.    Mild peritoneal thickening and haziness of the peritoneal fat of   uncertain etiology.    --- End of Report ---      < end of copied text >        
Interval Events:      REVIEW OF SYSTEMS:  Negative except as documented above.      OBJECTIVE:  ICU Vital Signs Last 24 Hrs  T(C): 37 (16 Jun 2025 04:30), Max: 37 (16 Jun 2025 04:30)  T(F): 98.6 (16 Jun 2025 04:30), Max: 98.6 (16 Jun 2025 04:30)  HR: 97 (16 Jun 2025 04:30) (93 - 97)  BP: 100/64 (16 Jun 2025 04:30) (95/56 - 100/64)  BP(mean): --  ABP: --  ABP(mean): --  RR: 18 (16 Jun 2025 04:30) (18 - 18)  SpO2: 91% (16 Jun 2025 04:30) (91% - 99%)    O2 Parameters below as of 15 Sheldon 2025 20:50  Patient On (Oxygen Delivery Method): room air              06-15 @ 07:01  -  06-16 @ 07:00  --------------------------------------------------------  IN: 540 mL / OUT: 0 mL / NET: 540 mL      CAPILLARY BLOOD GLUCOSE          PHYSICAL EXAM:  General: NAD  HEENT:  EOMI, sclera anicteric, moist mucus membranes  Neck: supple  Cardiovascular: RRR  Respiratory: CTAB, no wheezes, crackles, or rhonci  Abdomen: soft, nontender  Extremities: warm and well perfused, no edema, no clubbing  Skin: no rashes  Neurological: no focal deficits    HOSPITAL MEDICATIONS:  MEDICATIONS  (STANDING):  carvedilol 6.25 milliGRAM(s) Oral every 12 hours  folic acid 1 milliGRAM(s) Oral daily  furosemide    Tablet 40 milliGRAM(s) Oral daily  heparin   Injectable 5000 Unit(s) SubCutaneous every 8 hours  lactulose Syrup 30 Gram(s) Oral three times a day  levothyroxine 75 MICROGram(s) Oral daily  midodrine 5 milliGRAM(s) Oral every 8 hours  multivitamin 1 Tablet(s) Oral daily  polyethylene glycol 3350 17 Gram(s) Oral daily  rifAXIMin 550 milliGRAM(s) Oral two times a day  senna 2 Tablet(s) Oral at bedtime  spironolactone 50 milliGRAM(s) Oral daily  thiamine 100 milliGRAM(s) Oral daily  thiamine IVPB 500 milliGRAM(s) IV Intermittent every 8 hours    MEDICATIONS  (PRN):  acetaminophen     Tablet .. 650 milliGRAM(s) Oral every 6 hours PRN Temp greater or equal to 38C (100.4F), Mild Pain (1 - 3)  aluminum hydroxide/magnesium hydroxide/simethicone Suspension 30 milliLiter(s) Oral every 4 hours PRN Dyspepsia  melatonin 3 milliGRAM(s) Oral at bedtime PRN Insomnia  ondansetron Injectable 4 milliGRAM(s) IV Push every 8 hours PRN Nausea and/or Vomiting      LABS:                        9.4    9.09  )-----------( 373      ( 16 Jun 2025 06:45 )             27.5     Hgb Trend: 9.4<--, 9.6<--, 9.9<--, 10.3<--, 10.1<--  06-16    133[L]  |  101  |  16  ----------------------------<  82  4.4   |  21[L]  |  0.67    Ca    9.8      16 Jun 2025 06:41    TPro  8.9[H]  /  Alb  3.5  /  TBili  0.9  /  DBili  x   /  AST  30  /  ALT  11  /  AlkPhos  205[H]  06-16    Creatinine Trend: 0.67<--, 0.71<--, 0.67<--, 0.54<--, 0.87<--, 0.92<--  PT/INR - ( 16 Jun 2025 06:41 )   PT: 14.4 sec;   INR: 1.25 ratio         PTT - ( 16 Jun 2025 06:41 )  PTT:35.8 sec  Urinalysis Basic - ( 16 Jun 2025 06:41 )    Color: x / Appearance: x / SG: x / pH: x  Gluc: 82 mg/dL / Ketone: x  / Bili: x / Urobili: x   Blood: x / Protein: x / Nitrite: x   Leuk Esterase: x / RBC: x / WBC x   Sq Epi: x / Non Sq Epi: x / Bacteria: x            MICROBIOLOGY:     Culture - Blood (collected 13 Jun 2025 17:44)  Source: Blood Blood-Peripheral  Preliminary Report (15 Sheldon 2025 23:01):    No growth at 48 Hours    Culture - Blood (collected 13 Jun 2025 17:44)  Source: Blood Blood-Venous  Preliminary Report (15 Sheldon 2025 23:01):    No growth at 48 Hours    Culture - Body Fluid with Gram Stain (collected 13 Jun 2025 16:50)  Source: Peritoneal Peritoneal Fluid  Gram Stain (14 Jun 2025 03:20):    polymorphonuclear leukocytes seen    No organisms seen    by cytocentrifuge  Preliminary Report (15 Sheldon 2025 09:45):    No growth to date    Culture - Fungal, Body Fluid (collected 13 Jun 2025 16:50)  Source: Peritoneal Peritoneal Fluid  Preliminary Report (15 Sheldon 2025 08:38):    No growth    Urinalysis with Rflx Culture (collected 13 Jun 2025 13:04)    Culture - Urine (collected 13 Jun 2025 13:04)  Source: Catheterized  Final Report (14 Jun 2025 16:19):    <10,000 CFU/mL Normal Urogenital Alyssa        RADIOLOGY:  [x] Reviewed and interpreted by me  
Kandy Carmona MD  [facility] Division of Hospital Medicine    SUBJECTIVE / OVERNIGHT EVENTS:  - no events overnight,  ID [id number]  - had headaches overnight, but this is now improved. no nausea, vomiting, sob, cough.   MEDICATIONS  (STANDING):  carvedilol 3.125 milliGRAM(s) Oral every 12 hours  folic acid 1 milliGRAM(s) Oral daily  furosemide    Tablet 40 milliGRAM(s) Oral daily  heparin   Injectable 5000 Unit(s) SubCutaneous every 8 hours  lactulose Syrup 30 Gram(s) Oral three times a day  levothyroxine 75 MICROGram(s) Oral daily  midodrine 5 milliGRAM(s) Oral every 8 hours  multivitamin 1 Tablet(s) Oral daily  polyethylene glycol 3350 17 Gram(s) Oral daily  rifAXIMin 550 milliGRAM(s) Oral two times a day  senna 2 Tablet(s) Oral at bedtime  spironolactone 50 milliGRAM(s) Oral daily  thiamine 100 milliGRAM(s) Oral daily    MEDICATIONS  (PRN):  acetaminophen     Tablet .. 650 milliGRAM(s) Oral every 6 hours PRN Temp greater or equal to 38C (100.4F), Mild Pain (1 - 3)  aluminum hydroxide/magnesium hydroxide/simethicone Suspension 30 milliLiter(s) Oral every 4 hours PRN Dyspepsia  melatonin 3 milliGRAM(s) Oral at bedtime PRN Insomnia  ondansetron Injectable 4 milliGRAM(s) IV Push every 8 hours PRN Nausea and/or Vomiting      I&O's Summary    21 Jun 2025 07:01  -  22 Jun 2025 07:00  --------------------------------------------------------  IN: 240 mL / OUT: 1150 mL / NET: -910 mL        PHYSICAL EXAM:  Vital Signs Last 24 Hrs  T(C): 36.7 (22 Jun 2025 12:30), Max: 36.7 (21 Jun 2025 20:27)  T(F): 98 (22 Jun 2025 12:30), Max: 98.1 (21 Jun 2025 20:27)  HR: 88 (22 Jun 2025 12:30) (88 - 99)  BP: 102/68 (22 Jun 2025 12:30) (98/61 - 113/74)  BP(mean): --  RR: 18 (22 Jun 2025 12:30) (18 - 18)  SpO2: 92% (22 Jun 2025 12:30) (92% - 92%)    Parameters below as of 22 Jun 2025 12:30  Patient On (Oxygen Delivery Method): room air      CONSTITUTIONAL: NAD, well-developed   EYES: PERRLA; conjunctiva and sclera clear  RESPIRATORY: Normal respiratory effort; lungs are clear to auscultation bilaterally  CARDIOVASCULAR: Regular rate and rhythm, normal S1 and S2   ABDOMEN: Nontender to palpation, normoactive bowel sounds   MUSCULOSKELETAL: no clubbing or cyanosis of digits; no joint swelling or tenderness to palpation  PSYCH: A+O x2; affect appropriate  NEUROLOGY: no gross sensory deficits     LABS:                        10.5   8.12  )-----------( 413      ( 22 Jun 2025 07:15 )             31.7     06-22    131[L]  |  97  |  15  ----------------------------<  94  4.1   |  19[L]  |  0.71    Ca    11.0[H]      22 Jun 2025 07:24  Phos  3.0     06-22  Mg     1.8     06-22    TPro  10.0[H]  /  Alb  3.3  /  TBili  1.0  /  DBili  x   /  AST  42[H]  /  ALT  16  /  AlkPhos  277[H]  06-22    PT/INR - ( 22 Jun 2025 07:24 )   PT: 13.3 sec;   INR: 1.16 ratio         PTT - ( 22 Jun 2025 07:24 )  PTT:35.6 sec      Urinalysis Basic - ( 22 Jun 2025 07:24 )    Color: x / Appearance: x / SG: x / pH: x  Gluc: 94 mg/dL / Ketone: x  / Bili: x / Urobili: x   Blood: x / Protein: x / Nitrite: x   Leuk Esterase: x / RBC: x / WBC x   Sq Epi: x / Non Sq Epi: x / Bacteria: x        Culture - Fungal, Body Fluid (collected 20 Jun 2025 13:59)  Source: Peritoneal Peritoneal Fluid  Preliminary Report (22 Jun 2025 08:35):    No growth    Culture - Body Fluid with Gram Stain (collected 20 Jun 2025 13:59)  Source: Ascites Fl Ascites Fluid  Gram Stain (20 Jun 2025 22:27):    polymorphonuclear leukocytes seen by cytocentrifuge    No organisms seen by cytocentrifuge  Preliminary Report (21 Jun 2025 11:30):    No growth        
Neurology      S: patient seen. no neuro changes         Medications: MEDICATIONS  (STANDING):  carvedilol 3.125 milliGRAM(s) Oral every 12 hours  cefTRIAXone   IVPB 2000 milliGRAM(s) IV Intermittent every 24 hours  folic acid 1 milliGRAM(s) Oral daily  furosemide    Tablet 40 milliGRAM(s) Oral daily  heparin   Injectable 5000 Unit(s) SubCutaneous every 8 hours  lactulose Syrup 30 Gram(s) Oral three times a day  levothyroxine 75 MICROGram(s) Oral daily  midodrine 5 milliGRAM(s) Oral every 8 hours  multivitamin 1 Tablet(s) Oral daily  polyethylene glycol 3350 17 Gram(s) Oral daily  rifAXIMin 550 milliGRAM(s) Oral two times a day  senna 2 Tablet(s) Oral at bedtime  spironolactone 50 milliGRAM(s) Oral daily  thiamine 100 milliGRAM(s) Oral daily  ursodiol Tablet 500 milliGRAM(s) Oral two times a day    MEDICATIONS  (PRN):  acetaminophen     Tablet .. 650 milliGRAM(s) Oral every 6 hours PRN Temp greater or equal to 38C (100.4F), Mild Pain (1 - 3)  aluminum hydroxide/magnesium hydroxide/simethicone Suspension 30 milliLiter(s) Oral every 4 hours PRN Dyspepsia  melatonin 3 milliGRAM(s) Oral at bedtime PRN Insomnia  ondansetron Injectable 4 milliGRAM(s) IV Push every 8 hours PRN Nausea and/or Vomiting       Vitals:  Vital Signs Last 24 Hrs  T(C): 36.8 (23 Jun 2025 12:19), Max: 37 (23 Jun 2025 04:18)  T(F): 98.2 (23 Jun 2025 12:19), Max: 98.6 (23 Jun 2025 04:18)  HR: 87 (23 Jun 2025 12:19) (87 - 97)  BP: 100/62 (23 Jun 2025 12:19) (98/62 - 109/73)  BP(mean): --  RR: 18 (23 Jun 2025 12:19) (18 - 18)  SpO2: 94% (23 Jun 2025 12:19) (92% - 94%)    Parameters below as of 23 Jun 2025 12:19  Patient On (Oxygen Delivery Method): room air                General Exam:   General Appearance: Appropriately dressed and in no acute distress       Head: Normocephalic, atraumatic and no dysmorphic features  Ear, Nose, and Throat: Moist mucous membranes  CVS: S1S2+  Resp: No SOB, no wheeze or rhonchi  GI: soft NT/ND  Extremities: No edema or cyanosis  Skin: No bruises or rashes     Neurological Exam:  Mental Status: Awake, alert and oriented x 1-2 Able to follow simple and complex verbal commands. Able to name and repeat. fluent speech. No obvious aphasia or dysarthria noted.   Cranial Nerves: PERRL, dysconjugate gaze?  VFFC, sensation V1-V3 intact,? mild f acial asymmetry, equal elevation of palate, scm/trap 5/5, tongue is midline on protrusion.  hearing is grossly intact.   Motor: Normal bulk, tone and strength throughout. Fine finger movements were intact and symmetric. no tremors or drift noted.    Sensation: Intact to light touch and pinprick throughout. no right/left confusion. no extinction to tactile on DSS.    Reflexes: 1+ throughout at biceps, brachioradialis, triceps, patellars and ankles bilaterally and equal. No clonus. R toe and L toe were both downgoing.  Coordination: No dysmetria on FNF    Gait: deferred     Data/Labs/Imaging which I personally reviewed.     Labs:      LABS:                          10.1   7.73  )-----------( 404      ( 23 Jun 2025 07:08 )             30.8     06-23    133[L]  |  98  |  19  ----------------------------<  82  4.2   |  21[L]  |  0.80    Ca    10.8[H]      23 Jun 2025 07:17  Phos  3.0     06-22  Mg     1.8     06-22    TPro  10.0[H]  /  Alb  3.3  /  TBili  1.0  /  DBili  x   /  AST  36  /  ALT  18  /  AlkPhos  265[H]  06-23    LIVER FUNCTIONS - ( 23 Jun 2025 07:17 )  Alb: 3.3 g/dL / Pro: 10.0 g/dL / ALK PHOS: 265 U/L / ALT: 18 U/L / AST: 36 U/L / GGT: x           PT/INR - ( 23 Jun 2025 07:16 )   PT: 13.8 sec;   INR: 1.20 ratio         PTT - ( 23 Jun 2025 07:16 )  PTT:35.1 sec  Urinalysis Basic - ( 23 Jun 2025 07:17 )    Color: x / Appearance: x / SG: x / pH: x  Gluc: 82 mg/dL / Ketone: x  / Bili: x / Urobili: x   Blood: x / Protein: x / Nitrite: x   Leuk Esterase: x / RBC: x / WBC x   Sq Epi: x / Non Sq Epi: x / Bacteria: x        < from: CT Head No Cont (06.03.25 @ 09:04) >    ACC: [id number] EXAM:  CT BRAIN   ORDERED BY:  DANGELO GAYTAN     PROCEDURE DATE:  06/03/2025          INTERPRETATION:  CLINICAL INDICATIONS:  confusion    COMPARISON: None    TECHNIQUE: Noncontrast CT of the head. Multiplanar reformations are   submitted.    FINDINGS:  There is periventricular and subcortical white matter hypodensity without   mass effect, nonspecific, likely representing mild chronic microvascular   ischemic changes. There is no compelling evidence for an acute   transcortical infarction. There is no evidence of mass, mass effect,   midline shift or extra-axial fluid collection. The lateral ventricles and   cortical sulci are age-appropriate in size and configuration. The orbits,   mastoid air cells and visualized paranasal sinuses are unremarkable. The   calvarium is intact. Consider MRI as clinically warranted.    IMPRESSION:  Mild chronic microvascular changes without evidence of an   acute transcortical infarction or hemorrhage.    --- End of Report ---            CARLOS ZIMMERMAN MD; Attending Radiologist  This document has been electronically signed. Sheldon  3 2025  9:07AM    < end of copied text >      
  Chief Complaint:  Patient is a 65y old  Male who presents with a chief complaint of sob/cordova, bl le swelling, abdominal distention (16 Jun 2025 12:38)      Interval Events:   -TTE performed  -no overnight events     Allergies:  No Known Allergies      Hospital Medications:  acetaminophen     Tablet .. 650 milliGRAM(s) Oral every 6 hours PRN  aluminum hydroxide/magnesium hydroxide/simethicone Suspension 30 milliLiter(s) Oral every 4 hours PRN  carvedilol 6.25 milliGRAM(s) Oral every 12 hours  folic acid 1 milliGRAM(s) Oral daily  furosemide    Tablet 40 milliGRAM(s) Oral daily  heparin   Injectable 5000 Unit(s) SubCutaneous every 8 hours  lactulose Syrup 30 Gram(s) Oral three times a day  levothyroxine 75 MICROGram(s) Oral daily  melatonin 3 milliGRAM(s) Oral at bedtime PRN  midodrine 5 milliGRAM(s) Oral every 8 hours  multivitamin 1 Tablet(s) Oral daily  ondansetron Injectable 4 milliGRAM(s) IV Push every 8 hours PRN  polyethylene glycol 3350 17 Gram(s) Oral daily  rifAXIMin 550 milliGRAM(s) Oral two times a day  senna 2 Tablet(s) Oral at bedtime  spironolactone 50 milliGRAM(s) Oral daily  thiamine IVPB 250 milliGRAM(s) IV Intermittent daily        PHYSICAL EXAM:   Vital Signs:  Vital Signs Last 24 Hrs  T(C): 37 (17 Jun 2025 05:00), Max: 37 (17 Jun 2025 05:00)  T(F): 98.6 (17 Jun 2025 05:00), Max: 98.6 (17 Jun 2025 05:00)  HR: 91 (17 Jun 2025 05:00) (84 - 95)  BP: 100/65 (17 Jun 2025 05:00) (91/60 - 110/71)  BP(mean): --  RR: 17 (17 Jun 2025 05:00) (17 - 18)  SpO2: 92% (17 Jun 2025 05:00) (92% - 94%)    Parameters below as of 17 Jun 2025 05:00  Patient On (Oxygen Delivery Method): room air     Daily     GENERAL:  No acute distress  HEENT:  NCAT, no scleral icterus  CHEST: no resp distress  HEART:  RRR  ABDOMEN:  Soft, non-tender, non-distended, normoactive bowel sounds  EXTREMITIES:  No cyanosis, clubbing, or edema  SKIN:  No rash/erythema/ecchymoses/petechiae/wounds/abscess/warm/dry  NEURO:  Alert and oriented x 2    LABS:                        10.8   8.78  )-----------( 392      ( 17 Jun 2025 07:42 )             32.3     Mean Cell Volume: 91.5 fl (06-17-25 @ 07:42)    06-17    134[L]  |  97  |  16  ----------------------------<  97  4.1   |  21[L]  |  0.67    Ca    10.2      17 Jun 2025 07:39  Phos  3.9     06-17  Mg     2.0     06-17    TPro  9.4[H]  /  Alb  x   /  TBili  x   /  DBili  x   /  AST  x   /  ALT  x   /  AlkPhos  x   06-17    LIVER FUNCTIONS - ( 17 Jun 2025 07:42 )  Alb: x     / Pro: 9.4 g/dL / ALK PHOS: x     / ALT: x     / AST: x     / GGT: x           PT/INR - ( 17 Jun 2025 07:43 )   PT: 13.4 sec;   INR: 1.18 ratio         PTT - ( 17 Jun 2025 07:43 )  PTT:38.4 sec  Urinalysis Basic - ( 17 Jun 2025 07:39 )    Color: x / Appearance: x / SG: x / pH: x  Gluc: 97 mg/dL / Ketone: x  / Bili: x / Urobili: x   Blood: x / Protein: x / Nitrite: x   Leuk Esterase: x / RBC: x / WBC x   Sq Epi: x / Non Sq Epi: x / Bacteria: x      Amylase Serum--      Lipase serum--       [id number]        Imaging:          
Neurology      S: patient seen. no neuro changes        Medications: MEDICATIONS  (STANDING):  carvedilol 3.125 milliGRAM(s) Oral every 12 hours  folic acid 1 milliGRAM(s) Oral daily  furosemide    Tablet 40 milliGRAM(s) Oral daily  heparin   Injectable 5000 Unit(s) SubCutaneous every 8 hours  lactulose Syrup 30 Gram(s) Oral three times a day  levothyroxine 75 MICROGram(s) Oral daily  midodrine 5 milliGRAM(s) Oral every 8 hours  multivitamin 1 Tablet(s) Oral daily  polyethylene glycol 3350 17 Gram(s) Oral daily  rifAXIMin 550 milliGRAM(s) Oral two times a day  senna 2 Tablet(s) Oral at bedtime  spironolactone 50 milliGRAM(s) Oral daily  thiamine 100 milliGRAM(s) Oral daily    MEDICATIONS  (PRN):  acetaminophen     Tablet .. 650 milliGRAM(s) Oral every 6 hours PRN Temp greater or equal to 38C (100.4F), Mild Pain (1 - 3)  aluminum hydroxide/magnesium hydroxide/simethicone Suspension 30 milliLiter(s) Oral every 4 hours PRN Dyspepsia  melatonin 3 milliGRAM(s) Oral at bedtime PRN Insomnia  ondansetron Injectable 4 milliGRAM(s) IV Push every 8 hours PRN Nausea and/or Vomiting       Vitals:  Vital Signs Last 24 Hrs  T(C): 36.7 (22 Jun 2025 04:57), Max: 37.4 (21 Jun 2025 12:46)  T(F): 98.1 (22 Jun 2025 04:57), Max: 99.3 (21 Jun 2025 12:46)  HR: 95 (22 Jun 2025 04:57) (94 - 99)  BP: 113/74 (22 Jun 2025 04:57) (98/61 - 113/74)  BP(mean): --  RR: 18 (22 Jun 2025 04:57) (18 - 18)  SpO2: 92% (22 Jun 2025 04:57) (92% - 93%)    Parameters below as of 22 Jun 2025 04:57  Patient On (Oxygen Delivery Method): room air                 General Exam:   General Appearance: Appropriately dressed and in no acute distress       Head: Normocephalic, atraumatic and no dysmorphic features  Ear, Nose, and Throat: Moist mucous membranes  CVS: S1S2+  Resp: No SOB, no wheeze or rhonchi  GI: soft NT/ND  Extremities: No edema or cyanosis  Skin: No bruises or rashes     Neurological Exam:  Mental Status: Awake, alert and oriented x 1-2 Able to follow simple and complex verbal commands. Able to name and repeat. fluent speech. No obvious aphasia or dysarthria noted.   Cranial Nerves: PERRL, dysconjugate gaze?  VFFC, sensation V1-V3 intact,? mild f acial asymmetry, equal elevation of palate, scm/trap 5/5, tongue is midline on protrusion.  hearing is grossly intact.   Motor: Normal bulk, tone and strength throughout. Fine finger movements were intact and symmetric. no tremors or drift noted.    Sensation: Intact to light touch and pinprick throughout. no right/left confusion. no extinction to tactile on DSS.    Reflexes: 1+ throughout at biceps, brachioradialis, triceps, patellars and ankles bilaterally and equal. No clonus. R toe and L toe were both downgoing.  Coordination: No dysmetria on FNF    Gait: deferred     Data/Labs/Imaging which I personally reviewed.     Labs:     LABS:                          10.5   8.12  )-----------( 413      ( 22 Jun 2025 07:15 )             31.7     06-22    131[L]  |  97  |  15  ----------------------------<  94  4.1   |  19[L]  |  0.71    Ca    11.0[H]      22 Jun 2025 07:24  Phos  3.0     06-22  Mg     1.8     06-22    TPro  10.0[H]  /  Alb  3.3  /  TBili  1.0  /  DBili  x   /  AST  42[H]  /  ALT  16  /  AlkPhos  277[H]  06-22    LIVER FUNCTIONS - ( 22 Jun 2025 07:24 )  Alb: 3.3 g/dL / Pro: 10.0 g/dL / ALK PHOS: 277 U/L / ALT: 16 U/L / AST: 42 U/L / GGT: x           PT/INR - ( 22 Jun 2025 07:24 )   PT: 13.3 sec;   INR: 1.16 ratio         PTT - ( 22 Jun 2025 07:24 )  PTT:35.6 sec  Urinalysis Basic - ( 22 Jun 2025 07:24 )    Color: x / Appearance: x / SG: x / pH: x  Gluc: 94 mg/dL / Ketone: x  / Bili: x / Urobili: x   Blood: x / Protein: x / Nitrite: x   Leuk Esterase: x / RBC: x / WBC x   Sq Epi: x / Non Sq Epi: x / Bacteria: x          < from: CT Head No Cont (06.03.25 @ 09:04) >    ACC: [id number] EXAM:  CT BRAIN   ORDERED BY:  DANGELO GAYTAN     PROCEDURE DATE:  06/03/2025          INTERPRETATION:  CLINICAL INDICATIONS:  confusion    COMPARISON: None    TECHNIQUE: Noncontrast CT of the head. Multiplanar reformations are   submitted.    FINDINGS:  There is periventricular and subcortical white matter hypodensity without   mass effect, nonspecific, likely representing mild chronic microvascular   ischemic changes. There is no compelling evidence for an acute   transcortical infarction. There is no evidence of mass, mass effect,   midline shift or extra-axial fluid collection. The lateral ventricles and   cortical sulci are age-appropriate in size and configuration. The orbits,   mastoid air cells and visualized paranasal sinuses are unremarkable. The   calvarium is intact. Consider MRI as clinically warranted.    IMPRESSION:  Mild chronic microvascular changes without evidence of an   acute transcortical infarction or hemorrhage.    --- End of Report ---            CARLOS ZIMMERMAN MD; Attending Radiologist  This document has been electronically signed. Sheldon  3 2025  9:07AM    < end of copied text >      
Neurology      S: patient seen. no neuro changes       Medications: MEDICATIONS  (STANDING):  carvedilol 3.125 milliGRAM(s) Oral every 12 hours  folic acid 1 milliGRAM(s) Oral daily  furosemide    Tablet 40 milliGRAM(s) Oral daily  heparin   Injectable 5000 Unit(s) SubCutaneous every 8 hours  lactulose Syrup 30 Gram(s) Oral three times a day  levothyroxine 75 MICROGram(s) Oral daily  midodrine 5 milliGRAM(s) Oral every 8 hours  multivitamin 1 Tablet(s) Oral daily  polyethylene glycol 3350 17 Gram(s) Oral daily  rifAXIMin 550 milliGRAM(s) Oral two times a day  senna 2 Tablet(s) Oral at bedtime  spironolactone 50 milliGRAM(s) Oral daily  thiamine 100 milliGRAM(s) Oral daily    MEDICATIONS  (PRN):  acetaminophen     Tablet .. 650 milliGRAM(s) Oral every 6 hours PRN Temp greater or equal to 38C (100.4F), Mild Pain (1 - 3)  aluminum hydroxide/magnesium hydroxide/simethicone Suspension 30 milliLiter(s) Oral every 4 hours PRN Dyspepsia  melatonin 3 milliGRAM(s) Oral at bedtime PRN Insomnia  ondansetron Injectable 4 milliGRAM(s) IV Push every 8 hours PRN Nausea and/or Vomiting       Vitals:  Vital Signs Last 24 Hrs  T(C): 36.4 (20 Jun 2025 12:53), Max: 36.7 (20 Jun 2025 04:43)  T(F): 97.6 (20 Jun 2025 12:53), Max: 98.1 (20 Jun 2025 04:43)  HR: 76 (20 Jun 2025 17:20) (67 - 93)  BP: 114/86 (20 Jun 2025 17:20) (100/65 - 114/86)  BP(mean): --  RR: 19 (20 Jun 2025 12:53) (15 - 19)  SpO2: 95% (20 Jun 2025 12:53) (92% - 95%)    Parameters below as of 20 Jun 2025 12:53  Patient On (Oxygen Delivery Method): room air            General Exam:   General Appearance: Appropriately dressed and in no acute distress       Head: Normocephalic, atraumatic and no dysmorphic features  Ear, Nose, and Throat: Moist mucous membranes  CVS: S1S2+  Resp: No SOB, no wheeze or rhonchi  GI: soft NT/ND  Extremities: No edema or cyanosis  Skin: No bruises or rashes     Neurological Exam:  Mental Status: Awake, alert and oriented x 1-2 Able to follow simple and complex verbal commands. Able to name and repeat. fluent speech. No obvious aphasia or dysarthria noted.   Cranial Nerves: PERRL, dysconjugate gaze?  VFFC, sensation V1-V3 intact,? mild f acial asymmetry, equal elevation of palate, scm/trap 5/5, tongue is midline on protrusion.  hearing is grossly intact.   Motor: Normal bulk, tone and strength throughout. Fine finger movements were intact and symmetric. no tremors or drift noted.    Sensation: Intact to light touch and pinprick throughout. no right/left confusion. no extinction to tactile on DSS.    Reflexes: 1+ throughout at biceps, brachioradialis, triceps, patellars and ankles bilaterally and equal. No clonus. R toe and L toe were both downgoing.  Coordination: No dysmetria on FNF    Gait: deferred     Data/Labs/Imaging which I personally reviewed.     Labs:      LABS:                          9.8    8.26  )-----------( 415      ( 20 Jun 2025 07:17 )             29.2     06-20    131[L]  |  97  |  15  ----------------------------<  76  4.2   |  20[L]  |  0.65    Ca    10.3      20 Jun 2025 07:17  Phos  3.3     06-20  Mg     1.7     06-20    TPro  9.7[H]  /  Alb  3.4  /  TBili  1.0  /  DBili  x   /  AST  32  /  ALT  15  /  AlkPhos  246[H]  06-20    LIVER FUNCTIONS - ( 20 Jun 2025 07:17 )  Alb: 3.4 g/dL / Pro: 9.7 g/dL / ALK PHOS: 246 U/L / ALT: 15 U/L / AST: 32 U/L / GGT: x           PT/INR - ( 20 Jun 2025 07:17 )   PT: 14.0 sec;   INR: 1.23 ratio         PTT - ( 20 Jun 2025 07:17 )  PTT:34.7 sec  Urinalysis Basic - ( 20 Jun 2025 07:17 )    Color: x / Appearance: x / SG: x / pH: x  Gluc: 76 mg/dL / Ketone: x  / Bili: x / Urobili: x   Blood: x / Protein: x / Nitrite: x   Leuk Esterase: x / RBC: x / WBC x   Sq Epi: x / Non Sq Epi: x / Bacteria: x          < from: CT Head No Cont (06.03.25 @ 09:04) >    ACC: [id number] EXAM:  CT BRAIN   ORDERED BY:  DANGELO GAYTAN     PROCEDURE DATE:  06/03/2025          INTERPRETATION:  CLINICAL INDICATIONS:  confusion    COMPARISON: None    TECHNIQUE: Noncontrast CT of the head. Multiplanar reformations are   submitted.    FINDINGS:  There is periventricular and subcortical white matter hypodensity without   mass effect, nonspecific, likely representing mild chronic microvascular   ischemic changes. There is no compelling evidence for an acute   transcortical infarction. There is no evidence of mass, mass effect,   midline shift or extra-axial fluid collection. The lateral ventricles and   cortical sulci are age-appropriate in size and configuration. The orbits,   mastoid air cells and visualized paranasal sinuses are unremarkable. The   calvarium is intact. Consider MRI as clinically warranted.    IMPRESSION:  Mild chronic microvascular changes without evidence of an   acute transcortical infarction or hemorrhage.    --- End of Report ---            CARLOS ZIMMERMAN MD; Attending Radiologist  This document has been electronically signed. Sheldon  3 2025  9:07AM    < end of copied text >      
Neurology      S: patient seen. no neuro changes      Medications: MEDICATIONS  (STANDING):  carvedilol 3.125 milliGRAM(s) Oral every 12 hours  cefTRIAXone   IVPB 2000 milliGRAM(s) IV Intermittent every 24 hours  folic acid 1 milliGRAM(s) Oral daily  furosemide    Tablet 40 milliGRAM(s) Oral daily  heparin   Injectable 5000 Unit(s) SubCutaneous every 8 hours  lactulose Syrup 30 Gram(s) Oral three times a day  levothyroxine 75 MICROGram(s) Oral daily  midodrine 5 milliGRAM(s) Oral every 8 hours  multivitamin 1 Tablet(s) Oral daily  polyethylene glycol 3350 17 Gram(s) Oral daily  rifAXIMin 550 milliGRAM(s) Oral two times a day  senna 2 Tablet(s) Oral at bedtime  spironolactone 50 milliGRAM(s) Oral daily  thiamine 100 milliGRAM(s) Oral daily  ursodiol Tablet 500 milliGRAM(s) Oral two times a day    MEDICATIONS  (PRN):  acetaminophen     Tablet .. 650 milliGRAM(s) Oral every 6 hours PRN Temp greater or equal to 38C (100.4F), Mild Pain (1 - 3)  aluminum hydroxide/magnesium hydroxide/simethicone Suspension 30 milliLiter(s) Oral every 4 hours PRN Dyspepsia  melatonin 3 milliGRAM(s) Oral at bedtime PRN Insomnia  ondansetron Injectable 4 milliGRAM(s) IV Push every 8 hours PRN Nausea and/or Vomiting       Vitals:  Vital Signs Last 24 Hrs  T(C): 36.6 (24 Jun 2025 11:58), Max: 36.6 (24 Jun 2025 04:15)  T(F): 97.9 (24 Jun 2025 11:58), Max: 97.9 (24 Jun 2025 04:15)  HR: 91 (24 Jun 2025 11:58) (91 - 100)  BP: 104/66 (24 Jun 2025 11:58) (103/67 - 110/72)  BP(mean): --  RR: 18 (24 Jun 2025 11:58) (18 - 18)  SpO2: 93% (24 Jun 2025 11:58) (93% - 96%)    Parameters below as of 24 Jun 2025 11:58  Patient On (Oxygen Delivery Method): room air                  General Exam:   General Appearance: Appropriately dressed and in no acute distress       Head: Normocephalic, atraumatic and no dysmorphic features  Ear, Nose, and Throat: Moist mucous membranes  CVS: S1S2+  Resp: No SOB, no wheeze or rhonchi  GI: soft NT/ND  Extremities: No edema or cyanosis  Skin: No bruises or rashes     Neurological Exam:  Mental Status: Awake, alert and oriented x 1-2 Able to follow simple and complex verbal commands. Able to name and repeat. fluent speech. No obvious aphasia or dysarthria noted.   Cranial Nerves: PERRL, dysconjugate gaze?  VFFC, sensation V1-V3 intact,? mild f acial asymmetry, equal elevation of palate, scm/trap 5/5, tongue is midline on protrusion.  hearing is grossly intact.   Motor: Normal bulk, tone and strength throughout. Fine finger movements were intact and symmetric. no tremors or drift noted.    Sensation: Intact to light touch and pinprick throughout. no right/left confusion. no extinction to tactile on DSS.    Reflexes: 1+ throughout at biceps, brachioradialis, triceps, patellars and ankles bilaterally and equal. No clonus. R toe and L toe were both downgoing.  Coordination: No dysmetria on FNF    Gait: deferred     Data/Labs/Imaging which I personally reviewed.     Labs:      LABS:   LABS:                          10.1   7.91  )-----------( 356      ( 24 Jun 2025 07:24 )             30.4     06-24    133[L]  |  99  |  16  ----------------------------<  70  3.8   |  18[L]  |  0.69    Ca    10.7[H]      24 Jun 2025 07:24    TPro  9.6[H]  /  Alb  3.2[L]  /  TBili  0.9  /  DBili  x   /  AST  38  /  ALT  18  /  AlkPhos  259[H]  06-24    LIVER FUNCTIONS - ( 24 Jun 2025 07:24 )  Alb: 3.2 g/dL / Pro: 9.6 g/dL / ALK PHOS: 259 U/L / ALT: 18 U/L / AST: 38 U/L / GGT: x           PT/INR - ( 23 Jun 2025 07:16 )   PT: 13.8 sec;   INR: 1.20 ratio         PTT - ( 23 Jun 2025 07:16 )  PTT:35.1 sec  Urinalysis Basic - ( 24 Jun 2025 07:24 )    Color: x / Appearance: x / SG: x / pH: x  Gluc: 70 mg/dL / Ketone: x  / Bili: x / Urobili: x   Blood: x / Protein: x / Nitrite: x   Leuk Esterase: x / RBC: x / WBC x   Sq Epi: x / Non Sq Epi: x / Bacteria: x          < from: CT Head No Cont (06.03.25 @ 09:04) >    ACC: [id number] EXAM:  CT BRAIN   ORDERED BY:  DANGELO GAYTAN     PROCEDURE DATE:  06/03/2025          INTERPRETATION:  CLINICAL INDICATIONS:  confusion    COMPARISON: None    TECHNIQUE: Noncontrast CT of the head. Multiplanar reformations are   submitted.    FINDINGS:  There is periventricular and subcortical white matter hypodensity without   mass effect, nonspecific, likely representing mild chronic microvascular   ischemic changes. There is no compelling evidence for an acute   transcortical infarction. There is no evidence of mass, mass effect,   midline shift or extra-axial fluid collection. The lateral ventricles and   cortical sulci are age-appropriate in size and configuration. The orbits,   mastoid air cells and visualized paranasal sinuses are unremarkable. The   calvarium is intact. Consider MRI as clinically warranted.    IMPRESSION:  Mild chronic microvascular changes without evidence of an   acute transcortical infarction or hemorrhage.    --- End of Report ---       < from: MR Head No Cont (06.23.25 @ 21:02) >    ACC: [id number] EXAM:  MR BRAIN   ORDERED BY:  ERMELINDA HARRIS     PROCEDURE DATE:  06/23/2025          INTERPRETATION:  .    CLINICAL INFORMATION: Confusion. Cirrhosis.    TECHNIQUE: Multiplanar multisequential MRI of the brain was acquired   without the administration of IV gadolinium.    COMPARISON: Prior CT examination of the head performed on 6/3/2025. No   prior brain MRI studies are available for comparison.    FINDINGS: Examination is slightly motion limited.    Multiple patchy confluent nonspecific foci of T2/FLAIR hyperintensity are   noted throughout the deep and periventricular white matter of the   cerebral hemispheres. There is no associated mass effect. There is no   evidence of acute ischemia on the diffusion-weighted images.    There is mild diffuse cerebral volume loss with prominence of the sulci,   fissures, and cisternal spaces which is normal for the patient's age.   Ventricular size and configuration is unremarkable. Flow-voids are noted   throughout the major intracranial vessels, on the T2 weighted images,   consistent with their patency. The sellar region and posterior fossa   appear unremarkable.    The paranasal sinuses and tympanomastoid cavities are clear. The   calvarium is intact. The orbits appear unremarkable.    IMPRESSION: No acute intracranial hemorrhage or evidence of acute   ischemia.    Multiple patchy confluent nonspecific abnormal white matter foci of   T2/FLAIR prolongation statistically favoring microvascular type changes.    --- End of Report ---            YONIS CAMERON MD; Attending Radiologist  This document has been electronically signed. Jun 23 2025  9:46PM    < end of copied text >  
PROGRESS NOTE:   Maryam Dunaway, DO  Hospitalist  Teams  After 5pm/weekends or if no answer ext: [phone]      Patient is a 65y old  Male who presents with a chief complaint of sob/cordova, bl le swelling, abdominal distention (23 Jun 2025 14:32)      SUBJECTIVE / OVERNIGHT EVENTS:  Still confused, no asterxis or deficits. Does have some numbness in his feet.     ADDITIONAL REVIEW OF SYSTEMS:  no fever or chills no n/v/d    MEDICATIONS  (STANDING):  carvedilol 3.125 milliGRAM(s) Oral every 12 hours  cefTRIAXone   IVPB 2000 milliGRAM(s) IV Intermittent every 24 hours  folic acid 1 milliGRAM(s) Oral daily  furosemide    Tablet 40 milliGRAM(s) Oral daily  heparin   Injectable 5000 Unit(s) SubCutaneous every 8 hours  lactulose Syrup 30 Gram(s) Oral three times a day  levothyroxine 75 MICROGram(s) Oral daily  midodrine 5 milliGRAM(s) Oral every 8 hours  multivitamin 1 Tablet(s) Oral daily  polyethylene glycol 3350 17 Gram(s) Oral daily  rifAXIMin 550 milliGRAM(s) Oral two times a day  senna 2 Tablet(s) Oral at bedtime  spironolactone 50 milliGRAM(s) Oral daily  thiamine 100 milliGRAM(s) Oral daily  ursodiol Tablet 500 milliGRAM(s) Oral two times a day    MEDICATIONS  (PRN):  acetaminophen     Tablet .. 650 milliGRAM(s) Oral every 6 hours PRN Temp greater or equal to 38C (100.4F), Mild Pain (1 - 3)  aluminum hydroxide/magnesium hydroxide/simethicone Suspension 30 milliLiter(s) Oral every 4 hours PRN Dyspepsia  melatonin 3 milliGRAM(s) Oral at bedtime PRN Insomnia  ondansetron Injectable 4 milliGRAM(s) IV Push every 8 hours PRN Nausea and/or Vomiting      CAPILLARY BLOOD GLUCOSE        I&O's Summary    23 Jun 2025 07:01  -  24 Jun 2025 07:00  --------------------------------------------------------  IN: 0 mL / OUT: 250 mL / NET: -250 mL        PHYSICAL EXAM:  Vital Signs Last 24 Hrs  T(C): 36.6 (24 Jun 2025 04:15), Max: 36.8 (23 Jun 2025 12:19)  T(F): 97.9 (24 Jun 2025 04:15), Max: 98.2 (23 Jun 2025 12:19)  HR: 100 (24 Jun 2025 04:15) (87 - 100)  BP: 110/72 (24 Jun 2025 04:15) (100/62 - 110/72)  BP(mean): --  RR: 18 (24 Jun 2025 04:15) (18 - 18)  SpO2: 94% (24 Jun 2025 04:15) (94% - 96%)    Parameters below as of 24 Jun 2025 04:15  Patient On (Oxygen Delivery Method): room air        CONSTITUTIONAL: NAD, well-developed  MUSCLOSKELETAL: no clubbing or cyanosis of digits; no joint swelling or tenderness to palpation  PSYCH: A+O to person, place    LABS:                        10.1   7.91  )-----------( 356      ( 24 Jun 2025 07:24 )             30.4     06-24    133[L]  |  99  |  16  ----------------------------<  70  3.8   |  18[L]  |  0.69    Ca    10.7[H]      24 Jun 2025 07:24    TPro  9.6[H]  /  Alb  3.2[L]  /  TBili  0.9  /  DBili  x   /  AST  38  /  ALT  18  /  AlkPhos  259[H]  06-24    PT/INR - ( 23 Jun 2025 07:16 )   PT: 13.8 sec;   INR: 1.20 ratio         PTT - ( 23 Jun 2025 07:16 )  PTT:35.1 sec      Urinalysis Basic - ( 24 Jun 2025 07:24 )    Color: x / Appearance: x / SG: x / pH: x  Gluc: 70 mg/dL / Ketone: x  / Bili: x / Urobili: x   Blood: x / Protein: x / Nitrite: x   Leuk Esterase: x / RBC: x / WBC x   Sq Epi: x / Non Sq Epi: x / Bacteria: x          RADIOLOGY & ADDITIONAL TESTS:  Results Reviewed:   Imaging Personally Reviewed:  Electrocardiogram Personally Reviewed:    COORDINATION OF CARE:  Care Discussed with Consultants/Other Providers [Y/N]:  Prior or Outpatient Records Reviewed [Y/N]:  
Kandy Carmona MD  [facility] Division of Hospital Medicine    SUBJECTIVE / OVERNIGHT EVENTS:  Wallisian language line  used ID [id number]    MEDICATIONS  (STANDING):  carvedilol 3.125 milliGRAM(s) Oral every 12 hours  folic acid 1 milliGRAM(s) Oral daily  furosemide    Tablet 40 milliGRAM(s) Oral daily  heparin   Injectable 5000 Unit(s) SubCutaneous every 8 hours  lactulose Syrup 30 Gram(s) Oral three times a day  levothyroxine 75 MICROGram(s) Oral daily  midodrine 5 milliGRAM(s) Oral every 8 hours  multivitamin 1 Tablet(s) Oral daily  polyethylene glycol 3350 17 Gram(s) Oral daily  rifAXIMin 550 milliGRAM(s) Oral two times a day  senna 2 Tablet(s) Oral at bedtime  spironolactone 50 milliGRAM(s) Oral daily  thiamine 100 milliGRAM(s) Oral daily    MEDICATIONS  (PRN):  acetaminophen     Tablet .. 650 milliGRAM(s) Oral every 6 hours PRN Temp greater or equal to 38C (100.4F), Mild Pain (1 - 3)  aluminum hydroxide/magnesium hydroxide/simethicone Suspension 30 milliLiter(s) Oral every 4 hours PRN Dyspepsia  melatonin 3 milliGRAM(s) Oral at bedtime PRN Insomnia  ondansetron Injectable 4 milliGRAM(s) IV Push every 8 hours PRN Nausea and/or Vomiting      I&O's Summary    20 Jun 2025 07:01  -  21 Jun 2025 07:00  --------------------------------------------------------  IN: 450 mL / OUT: 1350 mL / NET: -900 mL        PHYSICAL EXAM:  Vital Signs Last 24 Hrs  T(C): 36.7 (21 Jun 2025 05:19), Max: 36.8 (20 Jun 2025 21:21)  T(F): 98.1 (21 Jun 2025 05:19), Max: 98.3 (20 Jun 2025 21:21)  HR: 97 (21 Jun 2025 09:45) (67 - 97)  BP: 105/67 (21 Jun 2025 09:45) (100/65 - 114/86)  BP(mean): --  RR: 18 (21 Jun 2025 05:19) (18 - 19)  SpO2: 93% (21 Jun 2025 09:45) (91% - 95%)    Parameters below as of 21 Jun 2025 09:45  Patient On (Oxygen Delivery Method): room air        CONSTITUTIONAL: NAD, well-developed   EYES: PERRLA; conjunctiva and sclera clear  RESPIRATORY: Normal respiratory effort; lungs are clear to auscultation bilaterally  CARDIOVASCULAR: Regular rate and rhythm, normal S1 and S2   ABDOMEN: Nontender to palpation, normoactive bowel sounds   MUSCULOSKELETAL: no clubbing or cyanosis of digits; no joint swelling or tenderness to palpation  PSYCH: A+O x2; affect appropriate  NEUROLOGY: no gross sensory deficits     LABS:                        10.5   7.55  )-----------( 399      ( 21 Jun 2025 07:27 )             32.0     06-21    133[L]  |  99  |  15  ----------------------------<  77  4.2   |  19[L]  |  0.73    Ca    10.7[H]      21 Jun 2025 07:28  Phos  3.7     06-21  Mg     1.8     06-21    TPro  10.4[H]  /  Alb  3.5  /  TBili  1.1  /  DBili  x   /  AST  34  /  ALT  16  /  AlkPhos  266[H]  06-21    PT/INR - ( 21 Jun 2025 07:28 )   PT: 13.6 sec;   INR: 1.20 ratio         PTT - ( 21 Jun 2025 07:28 )  PTT:36.5 sec      Urinalysis Basic - ( 21 Jun 2025 07:28 )    Color: x / Appearance: x / SG: x / pH: x  Gluc: 77 mg/dL / Ketone: x  / Bili: x / Urobili: x   Blood: x / Protein: x / Nitrite: x   Leuk Esterase: x / RBC: x / WBC x   Sq Epi: x / Non Sq Epi: x / Bacteria: x        Culture - Fungal, Body Fluid (collected 20 Jun 2025 13:59)  Source: Peritoneal Peritoneal Fluid  Preliminary Report (21 Jun 2025 08:18):    Testing in progress    Culture - Body Fluid with Gram Stain (collected 20 Jun 2025 13:59)  Source: Ascites Fl Ascites Fluid  Gram Stain (20 Jun 2025 22:27):    polymorphonuclear leukocytes seen by cytocentrifuge    No organisms seen by cytocentrifuge  Preliminary Report (21 Jun 2025 11:30):    No growth        
[facility] Division of Hospital Medicine  Sushil Villatoro MD  Available via MS Teams      SUBJECTIVE / OVERNIGHT EVENTS:  No acute events overnight.  ID Fred [id number]. Still appears confused. Able to state his name and in a hospital in [place]. Denies pain.     MEDICATIONS  (STANDING):  carvedilol 6.25 milliGRAM(s) Oral every 12 hours  folic acid 1 milliGRAM(s) Oral daily  furosemide    Tablet 40 milliGRAM(s) Oral daily  heparin   Injectable 5000 Unit(s) SubCutaneous every 8 hours  lactulose Syrup 30 Gram(s) Oral three times a day  levothyroxine 75 MICROGram(s) Oral daily  midodrine 5 milliGRAM(s) Oral every 8 hours  multivitamin 1 Tablet(s) Oral daily  polyethylene glycol 3350 17 Gram(s) Oral daily  rifAXIMin 550 milliGRAM(s) Oral two times a day  senna 2 Tablet(s) Oral at bedtime  spironolactone 50 milliGRAM(s) Oral daily  thiamine 100 milliGRAM(s) Oral daily  thiamine IVPB 500 milliGRAM(s) IV Intermittent every 8 hours    MEDICATIONS  (PRN):  acetaminophen     Tablet .. 650 milliGRAM(s) Oral every 6 hours PRN Temp greater or equal to 38C (100.4F), Mild Pain (1 - 3)  aluminum hydroxide/magnesium hydroxide/simethicone Suspension 30 milliLiter(s) Oral every 4 hours PRN Dyspepsia  melatonin 3 milliGRAM(s) Oral at bedtime PRN Insomnia  ondansetron Injectable 4 milliGRAM(s) IV Push every 8 hours PRN Nausea and/or Vomiting      I&O's Summary    14 Jun 2025 07:01  -  15 Sheldon 2025 07:00  --------------------------------------------------------  IN: 240 mL / OUT: 300 mL / NET: -60 mL    15 Sheldon 2025 07:01  -  15 Sheldon 2025 14:40  --------------------------------------------------------  IN: 240 mL / OUT: 0 mL / NET: 240 mL        PHYSICAL EXAM:  Vital Signs Last 24 Hrs  T(C): 36.8 (15 Sheldon 2025 14:02), Max: 36.8 (14 Jun 2025 20:51)  T(F): 98.2 (15 Sheldon 2025 14:02), Max: 98.3 (14 Jun 2025 20:51)  HR: 93 (15 Sheldon 2025 14:02) (86 - 100)  BP: 99/64 (15 Sheldon 2025 14:02) (93/58 - 109/57)  BP(mean): --  RR: 18 (15 Sheldon 2025 14:02) (18 - 18)  SpO2: 99% (15 Sheldon 2025 14:02) (93% - 99%)    Parameters below as of 15 Sheldon 2025 14:02  Patient On (Oxygen Delivery Method): room air      CONSTITUTIONAL: NAD, well-developed  RESPIRATORY: Normal respiratory effort; lungs are clear to auscultation bilaterally  CARDIOVASCULAR: Regular rate and rhythm, normal S1 and S2, no murmur/rub/gallop; +LE edema  ABDOMEN: Nontender to palpation, normoactive bowel sounds, no rebound/guarding  MUSCLOSKELETAL: no clubbing or cyanosis of digits; no joint swelling or tenderness to palpation  PSYCH: A+O to person, place only     LABS:                        9.6    9.99  )-----------( 341      ( 15 Sheldon 2025 07:04 )             27.5     06-15    132[L]  |  97  |  14  ----------------------------<  78  4.0   |  20[L]  |  0.71    Ca    9.7      15 Sheldon 2025 07:05    TPro  9.0[H]  /  Alb  3.5  /  TBili  1.1  /  DBili  x   /  AST  28  /  ALT  9[L]  /  AlkPhos  187[H]  06-14    PT/INR - ( 15 Sheldon 2025 07:05 )   PT: 14.5 sec;   INR: 1.27 ratio         PTT - ( 15 Sheldon 2025 07:05 )  PTT:37.5 sec      Urinalysis Basic - ( 15 Sheldon 2025 07:05 )    Color: x / Appearance: x / SG: x / pH: x  Gluc: 78 mg/dL / Ketone: x  / Bili: x / Urobili: x   Blood: x / Protein: x / Nitrite: x   Leuk Esterase: x / RBC: x / WBC x   Sq Epi: x / Non Sq Epi: x / Bacteria: x        Culture - Blood (collected 13 Jun 2025 17:44)  Source: Blood Blood-Peripheral  Preliminary Report (14 Jun 2025 23:08):    No growth at 24 hours    Culture - Blood (collected 13 Jun 2025 17:44)  Source: Blood Blood-Venous  Preliminary Report (14 Jun 2025 23:08):    No growth at 24 hours    Culture - Body Fluid with Gram Stain (collected 13 Jun 2025 16:50)  Source: Peritoneal Peritoneal Fluid  Gram Stain (14 Jun 2025 03:20):    polymorphonuclear leukocytes seen    No organisms seen    by cytocentrifuge  Preliminary Report (15 Sheldon 2025 09:45):    No growth to date    Culture - Fungal, Body Fluid (collected 13 Jun 2025 16:50)  Source: Peritoneal Peritoneal Fluid  Preliminary Report (15 Sheldon 2025 08:38):    No growth    Urinalysis with Rflx Culture (collected 13 Jun 2025 13:04)    Culture - Urine (collected 13 Jun 2025 13:04)  Source: Catheterized  Final Report (14 Jun 2025 16:19):    <10,000 CFU/mL Normal Urogenital Alyssa          RADIOLOGY & ADDITIONAL TESTS:  New Imaging Personally Reviewed Today:  New Electrocardiogram Personally Reviewed Today:  Other Results Reviewed Today:   Prior or Outpatient Records Reviewed Today with Summary:    COORDINATION OF CARE:  Consultant Communication and Details of Discussion (where applicable):    
[facility] Division of Hospital Medicine  Robert Franks MD  Available via MS Teams    SUBJECTIVE / OVERNIGHT EVENTS: No acute events overnight. Patient feeling overall well. No chest pain or SOB. No abdominal pain. No new concerns or complaints at this time. Interviewed with  ID# [id number].    Review of Systems:   CONSTITUTIONAL: No fever   EYES: No eye pain, visual disturbances, or discharge  ENMT: No difficulty hearing   RESPIRATORY: No SOB. No cough   CARDIOVASCULAR: No chest pain   GASTROINTESTINAL: No abdominal or epigastric pain. No nausea, vomiting, or hematemesis; No constipation    GENITOURINARY: No dysuria   NEUROLOGICAL: No headaches   SKIN: No itching    MUSCULOSKELETAL: No joint pain or swelling; No muscle or back pain  PSYCHIATRIC: No depression or anxiety  HEME/LYMPH: No easy bruising or bleeding gums      MEDICATIONS  (STANDING):  carvedilol 3.125 milliGRAM(s) Oral every 12 hours  folic acid 1 milliGRAM(s) Oral daily  furosemide    Tablet 40 milliGRAM(s) Oral daily  heparin   Injectable 5000 Unit(s) SubCutaneous every 8 hours  lactulose Syrup 30 Gram(s) Oral three times a day  levothyroxine 75 MICROGram(s) Oral daily  midodrine 5 milliGRAM(s) Oral every 8 hours  multivitamin 1 Tablet(s) Oral daily  polyethylene glycol 3350 17 Gram(s) Oral daily  rifAXIMin 550 milliGRAM(s) Oral two times a day  senna 2 Tablet(s) Oral at bedtime  spironolactone 50 milliGRAM(s) Oral daily  thiamine IVPB 250 milliGRAM(s) IV Intermittent daily    MEDICATIONS  (PRN):  acetaminophen     Tablet .. 650 milliGRAM(s) Oral every 6 hours PRN Temp greater or equal to 38C (100.4F), Mild Pain (1 - 3)  aluminum hydroxide/magnesium hydroxide/simethicone Suspension 30 milliLiter(s) Oral every 4 hours PRN Dyspepsia  melatonin 3 milliGRAM(s) Oral at bedtime PRN Insomnia  ondansetron Injectable 4 milliGRAM(s) IV Push every 8 hours PRN Nausea and/or Vomiting      I&O's Summary    18 Jun 2025 07:01  -  19 Jun 2025 07:00  --------------------------------------------------------  IN: 660 mL / OUT: 1000 mL / NET: -340 mL      PHYSICAL EXAM:  Vital Signs Last 24 Hrs  T(C): 36.4 (19 Jun 2025 11:46), Max: 37 (18 Jun 2025 20:56)  T(F): 97.5 (19 Jun 2025 11:46), Max: 98.6 (18 Jun 2025 20:56)  HR: 91 (19 Jun 2025 11:46) (91 - 100)  BP: 115/75 (19 Jun 2025 11:46) (101/71 - 115/75)  RR: 18 (19 Jun 2025 11:46) (18 - 18)  SpO2: 94% (19 Jun 2025 11:46) (93% - 94%)    Parameters below as of 19 Jun 2025 11:46  Patient On (Oxygen Delivery Method): room air      CONSTITUTIONAL: NAD, well-developed   EYES: PERRLA; conjunctiva and sclera clear  ENMT: Moist oral mucosa, no pharyngeal injection or exudates   NECK: Supple  RESPIRATORY: Normal respiratory effort; lungs are clear to auscultation bilaterally  CARDIOVASCULAR: Regular rate and rhythm, normal S1 and S2   ABDOMEN: Nontender to palpation, normoactive bowel sounds   MUSCULOSKELETAL: no clubbing or cyanosis of digits; no joint swelling or tenderness to palpation  PSYCH: A+O x2; affect appropriate  NEUROLOGY: no gross sensory deficits   SKIN: No rashes     LABS:                        9.7    8.98  )-----------( 407      ( 19 Jun 2025 07:18 )             28.8     06-19    132[L]  |  97  |  17  ----------------------------<  73  4.1   |  19[L]  |  0.67    Ca    10.0      19 Jun 2025 07:19  Phos  3.3     06-19  Mg     1.7     06-19    TPro  9.3[H]  /  Alb  3.3  /  TBili  0.9  /  DBili  x   /  AST  31  /  ALT  13  /  AlkPhos  225[H]  06-19    PT/INR - ( 19 Jun 2025 07:26 )   PT: 14.0 sec;   INR: 1.23 ratio         PTT - ( 19 Jun 2025 07:26 )  PTT:35.5 sec      Urinalysis Basic - ( 19 Jun 2025 07:19 )    Color: x / Appearance: x / SG: x / pH: x  Gluc: 73 mg/dL / Ketone: x  / Bili: x / Urobili: x   Blood: x / Protein: x / Nitrite: x   Leuk Esterase: x / RBC: x / WBC x   Sq Epi: x / Non Sq Epi: x / Bacteria: x      RADIOLOGY & ADDITIONAL TESTS:  New Imaging Personally Reviewed Today:  New Electrocardiogram Personally Reviewed Today:  Other Results Reviewed Today:   Prior or Outpatient Records Reviewed Today with Summary:    COORDINATION OF CARE:  Consultant Communication and Details of Discussion (where applicable):    
[facility] Division of Hospital Medicine  Robert Franks MD  Available via MS Teams    SUBJECTIVE / OVERNIGHT EVENTS: No acute events overnight. Patient feeling well. No chest pain or SOB. No abdominal pain. No new complaints or concerns at this time. Interviewed with  ID# [id number].     Review of Systems:   CONSTITUTIONAL: No fever   EYES: No eye pain, visual disturbances, or discharge  ENMT: No difficulty hearing  RESPIRATORY: No SOB. No cough   CARDIOVASCULAR: No chest pain   GASTROINTESTINAL: No abdominal or epigastric pain. No nausea, vomiting, or hematemesis; No diarrhea    GENITOURINARY: No dysuria   NEUROLOGICAL: No headache   SKIN: No itching    MUSCULOSKELETAL: No joint pain or swelling; No muscle or back pain  PSYCHIATRIC: No depression or anxiety  HEME/LYMPH: No easy bruising or bleeding gums      MEDICATIONS  (STANDING):  carvedilol 3.125 milliGRAM(s) Oral every 12 hours  folic acid 1 milliGRAM(s) Oral daily  furosemide    Tablet 40 milliGRAM(s) Oral daily  heparin   Injectable 5000 Unit(s) SubCutaneous every 8 hours  lactulose Syrup 30 Gram(s) Oral three times a day  levothyroxine 75 MICROGram(s) Oral daily  midodrine 5 milliGRAM(s) Oral every 8 hours  multivitamin 1 Tablet(s) Oral daily  polyethylene glycol 3350 17 Gram(s) Oral daily  rifAXIMin 550 milliGRAM(s) Oral two times a day  senna 2 Tablet(s) Oral at bedtime  spironolactone 50 milliGRAM(s) Oral daily  thiamine 100 milliGRAM(s) Oral daily    MEDICATIONS  (PRN):  acetaminophen     Tablet .. 650 milliGRAM(s) Oral every 6 hours PRN Temp greater or equal to 38C (100.4F), Mild Pain (1 - 3)  aluminum hydroxide/magnesium hydroxide/simethicone Suspension 30 milliLiter(s) Oral every 4 hours PRN Dyspepsia  melatonin 3 milliGRAM(s) Oral at bedtime PRN Insomnia  ondansetron Injectable 4 milliGRAM(s) IV Push every 8 hours PRN Nausea and/or Vomiting      I&O's Summary    19 Jun 2025 07:01  -  20 Jun 2025 07:00  --------------------------------------------------------  IN: 1040 mL / OUT: 950 mL / NET: 90 mL    20 Jun 2025 07:01  -  20 Jun 2025 13:08  --------------------------------------------------------  IN: 0 mL / OUT: 500 mL / NET: -500 mL      PHYSICAL EXAM:  Vital Signs Last 24 Hrs  T(C): 36.4 (20 Jun 2025 12:53), Max: 36.9 (19 Jun 2025 20:39)  T(F): 97.6 (20 Jun 2025 12:53), Max: 98.4 (19 Jun 2025 20:39)  HR: 92 (20 Jun 2025 12:53) (67 - 97)  BP: 100/65 (20 Jun 2025 12:53) (100/65 - 115/70)  RR: 19 (20 Jun 2025 12:53) (15 - 19)  SpO2: 95% (20 Jun 2025 12:53) (92% - 97%)    Parameters below as of 20 Jun 2025 12:53  Patient On (Oxygen Delivery Method): room air      CONSTITUTIONAL: NAD, well-developed   EYES: PERRLA; conjunctiva and sclera clear  ENMT: Moist oral mucosa, no pharyngeal injection or exudates   NECK: Supple   RESPIRATORY: Normal respiratory effort; lungs are clear to auscultation bilaterally  CARDIOVASCULAR: Regular rate and rhythm, normal S1 and S2   ABDOMEN: Nontender to palpation, normoactive bowel sounds   MUSCULOSKELETAL: no clubbing or cyanosis of digits; no joint swelling or tenderness to palpation  PSYCH: A+O x2; affect appropriate  NEUROLOGY: no gross sensory deficits   SKIN: No rashes     LABS:                        9.8    8.26  )-----------( 415      ( 20 Jun 2025 07:17 )             29.2     06-20    131[L]  |  97  |  15  ----------------------------<  76  4.2   |  20[L]  |  0.65    Ca    10.3      20 Jun 2025 07:17  Phos  3.3     06-20  Mg     1.7     06-20    TPro  9.7[H]  /  Alb  3.4  /  TBili  1.0  /  DBili  x   /  AST  32  /  ALT  15  /  AlkPhos  246[H]  06-20    PT/INR - ( 20 Jun 2025 07:17 )   PT: 14.0 sec;   INR: 1.23 ratio         PTT - ( 20 Jun 2025 07:17 )  PTT:34.7 sec      Urinalysis Basic - ( 20 Jun 2025 07:17 )    Color: x / Appearance: x / SG: x / pH: x  Gluc: 76 mg/dL / Ketone: x  / Bili: x / Urobili: x   Blood: x / Protein: x / Nitrite: x   Leuk Esterase: x / RBC: x / WBC x   Sq Epi: x / Non Sq Epi: x / Bacteria: x      RADIOLOGY & ADDITIONAL TESTS:  New Imaging Personally Reviewed Today:  New Electrocardiogram Personally Reviewed Today:  Other Results Reviewed Today:   Prior or Outpatient Records Reviewed Today with Summary:    COORDINATION OF CARE:  Consultant Communication and Details of Discussion (where applicable):    
PROGRESS NOTE:   Maryam Dunaway, DO  Hospitalist  Teams  After 5pm/weekends or if no answer ext: [phone]      Patient is a 65y old  Male who presents with a chief complaint of sob/cordova, bl le swelling, abdominal distention (23 Jun 2025 10:45)      SUBJECTIVE / OVERNIGHT EVENTS: Still seems confused but able to say that he was having right sided crampy pain earlier that is improved. No asterixes on exam. 3 BM in last 24 hrs.     ADDITIONAL REVIEW OF SYSTEMS:  no fever or chills no n/v/d    MEDICATIONS  (STANDING):  carvedilol 3.125 milliGRAM(s) Oral every 12 hours  folic acid 1 milliGRAM(s) Oral daily  furosemide    Tablet 40 milliGRAM(s) Oral daily  heparin   Injectable 5000 Unit(s) SubCutaneous every 8 hours  lactulose Syrup 30 Gram(s) Oral three times a day  levothyroxine 75 MICROGram(s) Oral daily  midodrine 5 milliGRAM(s) Oral every 8 hours  multivitamin 1 Tablet(s) Oral daily  polyethylene glycol 3350 17 Gram(s) Oral daily  rifAXIMin 550 milliGRAM(s) Oral two times a day  senna 2 Tablet(s) Oral at bedtime  spironolactone 50 milliGRAM(s) Oral daily  thiamine 100 milliGRAM(s) Oral daily    MEDICATIONS  (PRN):  acetaminophen     Tablet .. 650 milliGRAM(s) Oral every 6 hours PRN Temp greater or equal to 38C (100.4F), Mild Pain (1 - 3)  aluminum hydroxide/magnesium hydroxide/simethicone Suspension 30 milliLiter(s) Oral every 4 hours PRN Dyspepsia  melatonin 3 milliGRAM(s) Oral at bedtime PRN Insomnia  ondansetron Injectable 4 milliGRAM(s) IV Push every 8 hours PRN Nausea and/or Vomiting      CAPILLARY BLOOD GLUCOSE        I&O's Summary    22 Jun 2025 07:01  -  23 Jun 2025 07:00  --------------------------------------------------------  IN: 240 mL / OUT: 1000 mL / NET: -760 mL        PHYSICAL EXAM:  Vital Signs Last 24 Hrs  T(C): 36.8 (23 Jun 2025 12:19), Max: 37 (23 Jun 2025 04:18)  T(F): 98.2 (23 Jun 2025 12:19), Max: 98.6 (23 Jun 2025 04:18)  HR: 87 (23 Jun 2025 12:19) (87 - 97)  BP: 100/62 (23 Jun 2025 12:19) (98/62 - 109/73)  BP(mean): --  RR: 18 (23 Jun 2025 12:19) (18 - 18)  SpO2: 94% (23 Jun 2025 12:19) (92% - 94%)    Parameters below as of 23 Jun 2025 12:19  Patient On (Oxygen Delivery Method): room air        CONSTITUTIONAL: NAD, jaundice, thin  ABDOMEN: Nontender to palpation, normoactive bowel sounds, no rebound/guarding; distended  MUSCLOSKELETAL: no clubbing or cyanosis of digits; no joint swelling or tenderness to palpation  PSYCH: A+O to person, place, and time; confused on longer questions    LABS:                        10.1   7.73  )-----------( 404      ( 23 Jun 2025 07:08 )             30.8     06-23    133[L]  |  98  |  19  ----------------------------<  82  4.2   |  21[L]  |  0.80    Ca    10.8[H]      23 Jun 2025 07:17  Phos  3.0     06-22  Mg     1.8     06-22    TPro  10.0[H]  /  Alb  3.3  /  TBili  1.0  /  DBili  x   /  AST  36  /  ALT  18  /  AlkPhos  265[H]  06-23    PT/INR - ( 23 Jun 2025 07:16 )   PT: 13.8 sec;   INR: 1.20 ratio         PTT - ( 23 Jun 2025 07:16 )  PTT:35.1 sec      Urinalysis Basic - ( 23 Jun 2025 07:17 )    Color: x / Appearance: x / SG: x / pH: x  Gluc: 82 mg/dL / Ketone: x  / Bili: x / Urobili: x   Blood: x / Protein: x / Nitrite: x   Leuk Esterase: x / RBC: x / WBC x   Sq Epi: x / Non Sq Epi: x / Bacteria: x          RADIOLOGY & ADDITIONAL TESTS:  Results Reviewed:   Imaging Personally Reviewed:  Electrocardiogram Personally Reviewed:    COORDINATION OF CARE:  Care Discussed with Consultants/Other Providers [Y/N]:  Prior or Outpatient Records Reviewed [Y/N]:  
[facility] Division of Hospital Medicine  Robert Franks MD  Available via MS Teams    SUBJECTIVE / OVERNIGHT EVENTS: No acute events overnight. Patient does not appear to be in acute distress. States having some intermittent left side abdominal pain. No chest pain or SOB. No other concerns or complaints at this time. Interviewed with  ID# [id number].     Review of Systems:   CONSTITUTIONAL: No fever   EYES: No eye pain, visual disturbances, or discharge  ENMT: No difficulty hearing   RESPIRATORY: No SOB. No cough   CARDIOVASCULAR: No chest pain   GASTROINTESTINAL: No abdominal or epigastric pain. No nausea, vomiting, or hematemesis; No diarrhea    GENITOURINARY: No dysuria   NEUROLOGICAL: No headaches   SKIN: No itching    MUSCULOSKELETAL: No joint pain or swelling; No muscle or back pain  PSYCHIATRIC: No depression or anxiety   HEME/LYMPH: No easy bruising or bleeding gums      MEDICATIONS  (STANDING):  carvedilol 6.25 milliGRAM(s) Oral every 12 hours  folic acid 1 milliGRAM(s) Oral daily  furosemide    Tablet 40 milliGRAM(s) Oral daily  heparin   Injectable 5000 Unit(s) SubCutaneous every 8 hours  lactulose Syrup 30 Gram(s) Oral three times a day  levothyroxine 75 MICROGram(s) Oral daily  midodrine 5 milliGRAM(s) Oral every 8 hours  multivitamin 1 Tablet(s) Oral daily  polyethylene glycol 3350 17 Gram(s) Oral daily  rifAXIMin 550 milliGRAM(s) Oral two times a day  senna 2 Tablet(s) Oral at bedtime  spironolactone 50 milliGRAM(s) Oral daily  thiamine 100 milliGRAM(s) Oral daily    MEDICATIONS  (PRN):  acetaminophen     Tablet .. 650 milliGRAM(s) Oral every 6 hours PRN Temp greater or equal to 38C (100.4F), Mild Pain (1 - 3)  aluminum hydroxide/magnesium hydroxide/simethicone Suspension 30 milliLiter(s) Oral every 4 hours PRN Dyspepsia  melatonin 3 milliGRAM(s) Oral at bedtime PRN Insomnia  ondansetron Injectable 4 milliGRAM(s) IV Push every 8 hours PRN Nausea and/or Vomiting      I&O's Summary    15 Sheldon 2025 07:01  -  16 Jun 2025 07:00  --------------------------------------------------------  IN: 540 mL / OUT: 0 mL / NET: 540 mL    16 Jun 2025 07:01  -  16 Jun 2025 13:37  --------------------------------------------------------  IN: 0 mL / OUT: 950 mL / NET: -950 mL      PHYSICAL EXAM:  Vital Signs Last 24 Hrs  T(C): 36.3 (16 Jun 2025 12:37), Max: 37 (16 Jun 2025 04:30)  T(F): 97.4 (16 Jun 2025 12:37), Max: 98.6 (16 Jun 2025 04:30)  HR: 95 (16 Jun 2025 12:37) (84 - 97)  BP: 110/71 (16 Jun 2025 12:37) (91/60 - 110/71)  RR: 18 (16 Jun 2025 12:37) (18 - 18)  SpO2: 94% (16 Jun 2025 12:37) (91% - 99%)    Parameters below as of 16 Jun 2025 12:37  Patient On (Oxygen Delivery Method): room air      CONSTITUTIONAL: NAD, well-developed   EYES: PERRLA; conjunctiva and sclera clear  ENMT: Moist oral mucosa, no pharyngeal injection or exudates  NECK: Supple   RESPIRATORY: Normal respiratory effort; lungs are clear to auscultation bilaterally  CARDIOVASCULAR: Regular rate and rhythm, normal S1 and S2   ABDOMEN: Nontender to palpation, normoactive bowel sounds  MUSCULOSKELETAL: no clubbing or cyanosis of digits; no joint swelling or tenderness to palpation  PSYCH: A+O x2; affect appropriate  NEUROLOGY: no gross sensory deficits   SKIN: No rashes     LABS:                        9.4    9.09  )-----------( 373      ( 16 Jun 2025 06:45 )             27.5     06-16    133[L]  |  101  |  16  ----------------------------<  82  4.4   |  21[L]  |  0.67    Ca    9.8      16 Jun 2025 06:41    TPro  8.9[H]  /  Alb  3.5  /  TBili  0.9  /  DBili  x   /  AST  30  /  ALT  11  /  AlkPhos  205[H]  06-16    PT/INR - ( 16 Jun 2025 06:41 )   PT: 14.4 sec;   INR: 1.25 ratio         PTT - ( 16 Jun 2025 06:41 )  PTT:35.8 sec      Urinalysis Basic - ( 16 Jun 2025 06:41 )    Color: x / Appearance: x / SG: x / pH: x  Gluc: 82 mg/dL / Ketone: x  / Bili: x / Urobili: x   Blood: x / Protein: x / Nitrite: x   Leuk Esterase: x / RBC: x / WBC x   Sq Epi: x / Non Sq Epi: x / Bacteria: x        Culture - Blood (collected 13 Jun 2025 17:44)  Source: Blood Blood-Venous  Preliminary Report (15 Sheldon 2025 23:01):    No growth at 48 Hours    Culture - Blood (collected 13 Jun 2025 17:44)  Source: Blood Blood-Peripheral  Preliminary Report (15 Sheldon 2025 23:01):    No growth at 48 Hours    Culture - Body Fluid with Gram Stain (collected 13 Jun 2025 16:50)  Source: Peritoneal Peritoneal Fluid  Gram Stain (14 Jun 2025 03:20):    polymorphonuclear leukocytes seen    No organisms seen    by cytocentrifuge  Preliminary Report (15 Sheldon 2025 09:45):    No growth to date    Culture - Fungal, Body Fluid (collected 13 Jun 2025 16:50)  Source: Peritoneal Peritoneal Fluid  Preliminary Report (15 Sheldon 2025 08:38):    No growth      RADIOLOGY & ADDITIONAL TESTS:  New Imaging Personally Reviewed Today:  New Electrocardiogram Personally Reviewed Today:  Other Results Reviewed Today:   Prior or Outpatient Records Reviewed Today with Summary:    COORDINATION OF CARE:  Consultant Communication and Details of Discussion (where applicable):    
[facility] Division of Hospital Medicine  Sushil Villatoro MD  Available via MS Teams      SUBJECTIVE / OVERNIGHT EVENTS:   Devora [id number]. Patient appears confused. Alert to self and location. Not having pain.     MEDICATIONS  (STANDING):  carvedilol 6.25 milliGRAM(s) Oral every 12 hours  folic acid 1 milliGRAM(s) Oral daily  furosemide    Tablet 40 milliGRAM(s) Oral daily  heparin   Injectable 5000 Unit(s) SubCutaneous every 8 hours  lactulose Syrup 10 Gram(s) Oral every 6 hours  levothyroxine 75 MICROGram(s) Oral daily  midodrine 5 milliGRAM(s) Oral every 8 hours  multivitamin 1 Tablet(s) Oral daily  polyethylene glycol 3350 17 Gram(s) Oral daily  rifAXIMin 550 milliGRAM(s) Oral two times a day  senna 2 Tablet(s) Oral at bedtime  spironolactone 50 milliGRAM(s) Oral daily  thiamine 100 milliGRAM(s) Oral daily  thiamine IVPB 500 milliGRAM(s) IV Intermittent every 8 hours    MEDICATIONS  (PRN):  acetaminophen     Tablet .. 650 milliGRAM(s) Oral every 6 hours PRN Temp greater or equal to 38C (100.4F), Mild Pain (1 - 3)  aluminum hydroxide/magnesium hydroxide/simethicone Suspension 30 milliLiter(s) Oral every 4 hours PRN Dyspepsia  melatonin 3 milliGRAM(s) Oral at bedtime PRN Insomnia  ondansetron Injectable 4 milliGRAM(s) IV Push every 8 hours PRN Nausea and/or Vomiting      I&O's Summary    13 Jun 2025 07:01  -  14 Jun 2025 07:00  --------------------------------------------------------  IN: 400 mL / OUT: 0 mL / NET: 400 mL        PHYSICAL EXAM:  Vital Signs Last 24 Hrs  T(C): 36.9 (14 Jun 2025 13:13), Max: 37 (13 Jun 2025 20:54)  T(F): 98.4 (14 Jun 2025 13:13), Max: 98.6 (13 Jun 2025 20:54)  HR: 90 (14 Jun 2025 13:13) (89 - 110)  BP: 101/66 (14 Jun 2025 13:13) (101/66 - 108/66)  BP(mean): --  RR: 18 (14 Jun 2025 13:13) (17 - 18)  SpO2: 94% (14 Jun 2025 13:13) (92% - 94%)    Parameters below as of 14 Jun 2025 13:13  Patient On (Oxygen Delivery Method): room air      CONSTITUTIONAL: NAD, well-developed  RESPIRATORY: Normal respiratory effort; lungs are clear to auscultation bilaterally  CARDIOVASCULAR: Regular rate and rhythm, normal S1 and S2, no murmur/rub/gallop; +LE edema  ABDOMEN: Nontender to palpation, normoactive bowel sounds, no rebound/guarding  MUSCLOSKELETAL: no clubbing or cyanosis of digits; no joint swelling or tenderness to palpation  PSYCH: A+O to person, place only     LABS:                        9.9    11.38 )-----------( 362      ( 14 Jun 2025 06:36 )             29.5     06-14    132[L]  |  98  |  15  ----------------------------<  126[H]  3.9   |  21[L]  |  0.67    Ca    9.6      14 Jun 2025 06:36  Phos  3.7     06-13  Mg     1.8     06-13    TPro  9.0[H]  /  Alb  3.5  /  TBili  1.1  /  DBili  x   /  AST  28  /  ALT  9[L]  /  AlkPhos  187[H]  06-14    PT/INR - ( 14 Jun 2025 06:36 )   PT: 15.1 sec;   INR: 1.32 ratio         PTT - ( 14 Jun 2025 06:36 )  PTT:39.4 sec      Urinalysis Basic - ( 14 Jun 2025 06:36 )    Color: x / Appearance: x / SG: x / pH: x  Gluc: 126 mg/dL / Ketone: x  / Bili: x / Urobili: x   Blood: x / Protein: x / Nitrite: x   Leuk Esterase: x / RBC: x / WBC x   Sq Epi: x / Non Sq Epi: x / Bacteria: x        Culture - Fungal, Body Fluid (collected 13 Jun 2025 16:50)  Source: Peritoneal Peritoneal Fluid  Preliminary Report (14 Jun 2025 10:59):    Testing in progress    Culture - Body Fluid with Gram Stain (collected 13 Jun 2025 16:50)  Source: Peritoneal Peritoneal Fluid  Gram Stain (14 Jun 2025 03:20):    polymorphonuclear leukocytes seen    No organisms seen    by cytocentrifuge    Culture - Urine (collected 13 Jun 2025 13:04)  Source: Catheterized  Final Report (14 Jun 2025 16:19):    <10,000 CFU/mL Normal Urogenital Alyssa    Urinalysis with Rflx Culture (collected 13 Jun 2025 13:04)          RADIOLOGY & ADDITIONAL TESTS:  New Imaging Personally Reviewed Today:  New Electrocardiogram Personally Reviewed Today:  Other Results Reviewed Today:   Prior or Outpatient Records Reviewed Today with Summary:    COORDINATION OF CARE:  Consultant Communication and Details of Discussion (where applicable):    
[facility] Division of Hospital Medicine  Robert Franks MD  Available via MS Teams    SUBJECTIVE / OVERNIGHT EVENTS: No acute events overnight. Patient feeling well overall. No chest pain or SOB. No other concerns or complaints at this time. Interviewed with  ID# [id number].     Review of Systems:   CONSTITUTIONAL: No fever   EYES: No eye pain, visual disturbances, or discharge  ENMT: No difficulty hearing  RESPIRATORY: No SOB. No cough   CARDIOVASCULAR: No chest pain  GASTROINTESTINAL: No abdominal or epigastric pain. No nausea, vomiting, or hematemesis; No diarrhea    GENITOURINARY: No dysuria   NEUROLOGICAL: No headache   SKIN: No itching   MUSCULOSKELETAL: No joint pain or swelling; No muscle or back pain  PSYCHIATRIC: No depression or anxiety  HEME/LYMPH: No easy bruising or bleeding gums      MEDICATIONS  (STANDING):  carvedilol 3.125 milliGRAM(s) Oral every 12 hours  folic acid 1 milliGRAM(s) Oral daily  furosemide    Tablet 40 milliGRAM(s) Oral daily  heparin   Injectable 5000 Unit(s) SubCutaneous every 8 hours  lactulose Syrup 30 Gram(s) Oral three times a day  levothyroxine 75 MICROGram(s) Oral daily  midodrine 5 milliGRAM(s) Oral every 8 hours  multivitamin 1 Tablet(s) Oral daily  polyethylene glycol 3350 17 Gram(s) Oral daily  rifAXIMin 550 milliGRAM(s) Oral two times a day  senna 2 Tablet(s) Oral at bedtime  spironolactone 50 milliGRAM(s) Oral daily  thiamine IVPB 250 milliGRAM(s) IV Intermittent daily    MEDICATIONS  (PRN):  acetaminophen     Tablet .. 650 milliGRAM(s) Oral every 6 hours PRN Temp greater or equal to 38C (100.4F), Mild Pain (1 - 3)  aluminum hydroxide/magnesium hydroxide/simethicone Suspension 30 milliLiter(s) Oral every 4 hours PRN Dyspepsia  melatonin 3 milliGRAM(s) Oral at bedtime PRN Insomnia  ondansetron Injectable 4 milliGRAM(s) IV Push every 8 hours PRN Nausea and/or Vomiting      I&O's Summary    17 Jun 2025 07:01  -  18 Jun 2025 07:00  --------------------------------------------------------  IN: 850 mL / OUT: 0 mL / NET: 850 mL      PHYSICAL EXAM:  Vital Signs Last 24 Hrs  T(C): 36.9 (18 Jun 2025 04:14), Max: 37.2 (17 Jun 2025 20:53)  T(F): 98.5 (18 Jun 2025 04:14), Max: 99 (17 Jun 2025 20:53)  HR: 93 (18 Jun 2025 04:14) (93 - 98)  BP: 122/76 (18 Jun 2025 04:14) (101/63 - 122/76)  RR: 18 (18 Jun 2025 04:14) (18 - 18)  SpO2: 93% (18 Jun 2025 04:14) (93% - 94%)    Parameters below as of 18 Jun 2025 04:14  Patient On (Oxygen Delivery Method): room air      CONSTITUTIONAL: NAD, well-developed   EYES: PERRLA; conjunctiva and sclera clear  ENMT: Moist oral mucosa, no pharyngeal injection or exudates  NECK: Supple   RESPIRATORY: Normal respiratory effort; lungs are clear to auscultation bilaterally  CARDIOVASCULAR: Regular rate and rhythm, normal S1 and S2  ABDOMEN: some tenderness to palpation, normoactive bowel sounds  MUSCULOSKELETAL: no clubbing or cyanosis of digits; no joint swelling or tenderness to palpation  PSYCH: A+O x2; affect appropriate  NEUROLOGY: no gross sensory deficits   SKIN: No rashes   LABS:                        10.0   9.20  )-----------( 424      ( 18 Jun 2025 07:14 )             30.5     06-18    131[L]  |  100  |  16  ----------------------------<  71  4.2   |  20[L]  |  0.75    Ca    9.9      18 Jun 2025 07:11  Phos  3.4     06-18  Mg     1.8     06-18    TPro  9.2[H]  /  Alb  3.3  /  TBili  0.9  /  DBili  x   /  AST  32  /  ALT  12  /  AlkPhos  232[H]  06-18    PT/INR - ( 18 Jun 2025 07:11 )   PT: 14.1 sec;   INR: 1.24 ratio         PTT - ( 18 Jun 2025 07:11 )  PTT:37.7 sec      Urinalysis Basic - ( 18 Jun 2025 07:11 )    Color: x / Appearance: x / SG: x / pH: x  Gluc: 71 mg/dL / Ketone: x  / Bili: x / Urobili: x   Blood: x / Protein: x / Nitrite: x   Leuk Esterase: x / RBC: x / WBC x   Sq Epi: x / Non Sq Epi: x / Bacteria: x      RADIOLOGY & ADDITIONAL TESTS:  New Imaging Personally Reviewed Today:  New Electrocardiogram Personally Reviewed Today:  Other Results Reviewed Today:   Prior or Outpatient Records Reviewed Today with Summary:    COORDINATION OF CARE:  Consultant Communication and Details of Discussion (where applicable):    
Blanca Hussein MD  Division of Hospital Medicine  Reachable on MS Teams    PROGRESS NOTE:     Patient is a 65y old  Male who presents with a chief complaint of sob/cordova, bl le swelling, abdominal distention (2025 14:13)      SUBJECTIVE / OVERNIGHT EVENTS: No acute events overnight, seen this AM.  Sree ID [id number]. Alert to self, location only. No complaints. Unable to reach family for collateral     ADDITIONAL REVIEW OF SYSTEMS:    MEDICATIONS  (STANDING):  albumin human 25% IVPB 100 milliLiter(s) IV Intermittent every 8 hours  carvedilol 6.25 milliGRAM(s) Oral every 12 hours  folic acid 1 milliGRAM(s) Oral daily  furosemide    Tablet 40 milliGRAM(s) Oral daily  heparin   Injectable 5000 Unit(s) SubCutaneous every 8 hours  lactulose Syrup 15 Gram(s) Oral three times a day  levothyroxine 75 MICROGram(s) Oral daily  midodrine 5 milliGRAM(s) Oral every 8 hours  multivitamin 1 Tablet(s) Oral daily  polyethylene glycol 3350 17 Gram(s) Oral daily  rifAXIMin 550 milliGRAM(s) Oral two times a day  senna 2 Tablet(s) Oral at bedtime  thiamine 100 milliGRAM(s) Oral daily    MEDICATIONS  (PRN):  acetaminophen     Tablet .. 650 milliGRAM(s) Oral every 6 hours PRN Temp greater or equal to 38C (100.4F), Mild Pain (1 - 3)  aluminum hydroxide/magnesium hydroxide/simethicone Suspension 30 milliLiter(s) Oral every 4 hours PRN Dyspepsia  melatonin 3 milliGRAM(s) Oral at bedtime PRN Insomnia  ondansetron Injectable 4 milliGRAM(s) IV Push every 8 hours PRN Nausea and/or Vomiting      CAPILLARY BLOOD GLUCOSE        I&O's Summary    2025 07:01  -  2025 07:00  --------------------------------------------------------  IN: 680 mL / OUT: 600 mL / NET: 80 mL        PHYSICAL EXAM:  Vital Signs Last 24 Hrs  T(C): 36.7 (2025 12:23), Max: 36.9 (2025 05:03)  T(F): 98 (2025 12:23), Max: 98.4 (2025 05:03)  HR: 89 (:23) (89 - 102)  BP: 91/51 (2025 12:23) (91/51 - 125/73)  BP(mean): --  RR: 18 (:) (18 - 18)  SpO2: 92% (:) (92% - 95%)    Parameters below as of :  Patient On (Oxygen Delivery Method): room air        CONSTITUTIONAL: NAD, well-developed  RESPIRATORY: Normal respiratory effort; lungs are clear to auscultation bilaterally  CARDIOVASCULAR: Regular rate and rhythm, normal S1 and S2, no murmur/rub/gallop; +LE edema  ABDOMEN: Nontender to palpation, normoactive bowel sounds, no rebound/guarding  MUSCLOSKELETAL: no clubbing or cyanosis of digits; no joint swelling or tenderness to palpation  PSYCH: A+O to person, place only     LABS:                        10.3   10.64 )-----------( 362      ( 2025 06:59 )             30.3     06-13    131[L]  |  95[L]  |  15  ----------------------------<  82  3.8   |  21[L]  |  0.54    Ca    9.3      2025 07:02  Phos  3.7     06-13  Mg     1.8     06-13    TPro  8.9[H]  /  Alb  3.5  /  TBili  1.3[H]  /  DBili  x   /  AST  22  /  ALT  8[L]  /  AlkPhos  168[H]  06-13    PT/INR - ( 2025 07:02 )   PT: 15.9 sec;   INR: 1.40 ratio         PTT - ( 2025 07:02 )  PTT:52.8 sec      Urinalysis Basic - ( 2025 13:04 )    Color: Dark Yellow / Appearance: Clear / S.017 / pH: x  Gluc: x / Ketone: x  / Bili: Small / Urobili: 4.0 mg/dL   Blood: x / Protein: Negative mg/dL / Nitrite: Negative   Leuk Esterase: Moderate / RBC: 0 /HPF / WBC 2 /HPF   Sq Epi: x / Non Sq Epi: 1 /HPF / Bacteria: Negative /HPF        Urinalysis with Rflx Culture (collected 2025 13:04)        RADIOLOGY & ADDITIONAL TESTS:  Results Reviewed:   Imaging Personally Reviewed:  Electrocardiogram Personally Reviewed:    COORDINATION OF CARE:  Care Discussed with Consultants/Other Providers [Y/N]: Hepatology  Prior or Outpatient Records Reviewed [Y/N]:  
[facility] Division of Hospital Medicine  Robert Franks MD  Available via MS Teams    SUBJECTIVE / OVERNIGHT EVENTS: No acute events overnight. Patient feeling well overall. No new complaints or concerns at this time.     Review of Systems:   CONSTITUTIONAL: No fever   EYES: No eye pain, visual disturbances, or discharge  ENMT: No difficulty hearing   RESPIRATORY: No SOB. No cough   GASTROINTESTINAL: No abdominal or epigastric pain. No nausea, vomiting, or hematemesis; No diarrhea    GENITOURINARY: No dysuria   NEUROLOGICAL: No headache   SKIN: No itching    MUSCULOSKELETAL: No joint pain or swelling; No muscle or back pain  PSYCHIATRIC: No depression or anxiety  HEME/LYMPH: No easy bruising or bleeding gums      MEDICATIONS  (STANDING):  carvedilol 6.25 milliGRAM(s) Oral every 12 hours  folic acid 1 milliGRAM(s) Oral daily  furosemide    Tablet 40 milliGRAM(s) Oral daily  heparin   Injectable 5000 Unit(s) SubCutaneous every 8 hours  lactulose Syrup 30 Gram(s) Oral three times a day  levothyroxine 75 MICROGram(s) Oral daily  midodrine 5 milliGRAM(s) Oral every 8 hours  multivitamin 1 Tablet(s) Oral daily  polyethylene glycol 3350 17 Gram(s) Oral daily  rifAXIMin 550 milliGRAM(s) Oral two times a day  senna 2 Tablet(s) Oral at bedtime  spironolactone 50 milliGRAM(s) Oral daily  thiamine IVPB 250 milliGRAM(s) IV Intermittent daily    MEDICATIONS  (PRN):  acetaminophen     Tablet .. 650 milliGRAM(s) Oral every 6 hours PRN Temp greater or equal to 38C (100.4F), Mild Pain (1 - 3)  aluminum hydroxide/magnesium hydroxide/simethicone Suspension 30 milliLiter(s) Oral every 4 hours PRN Dyspepsia  melatonin 3 milliGRAM(s) Oral at bedtime PRN Insomnia  ondansetron Injectable 4 milliGRAM(s) IV Push every 8 hours PRN Nausea and/or Vomiting      I&O's Summary    16 Jun 2025 07:01  -  17 Jun 2025 07:00  --------------------------------------------------------  IN: 0 mL / OUT: 950 mL / NET: -950 mL      PHYSICAL EXAM:  Vital Signs Last 24 Hrs  T(C): 36.3 (17 Jun 2025 12:33), Max: 37 (17 Jun 2025 05:00)  T(F): 97.4 (17 Jun 2025 12:33), Max: 98.6 (17 Jun 2025 05:00)  HR: 84 (17 Jun 2025 12:33) (84 - 93)  BP: 102/69 (17 Jun 2025 12:33) (94/57 - 103/65)  RR: 18 (17 Jun 2025 12:33) (17 - 18)  SpO2: 94% (17 Jun 2025 12:33) (92% - 94%)    Parameters below as of 17 Jun 2025 12:33  Patient On (Oxygen Delivery Method): room air      CONSTITUTIONAL: NAD, well-developed   EYES: PERRLA; conjunctiva and sclera clear  ENMT: Moist oral mucosa, no pharyngeal injection or exudates   NECK: Supple   RESPIRATORY: Normal respiratory effort; lungs are clear to auscultation bilaterally  CARDIOVASCULAR: Regular rate and rhythm, normal S1 and S2  ABDOMEN:  Nontender to palpation, normoactive bowel sounds, mildly distended   MUSCULOSKELETAL: no clubbing or cyanosis of digits; no joint swelling or tenderness to palpation  PSYCH: A+O x2; affect appropriate  NEUROLOGY: no gross sensory deficits   SKIN: No rashes     LABS:                        10.8   8.78  )-----------( 392      ( 17 Jun 2025 07:42 )             32.3     06-17    134[L]  |  97  |  16  ----------------------------<  97  4.1   |  21[L]  |  0.67    Ca    10.2      17 Jun 2025 07:39  Phos  3.9     06-17  Mg     2.0     06-17    TPro  9.4[H]  /  Alb  x   /  TBili  x   /  DBili  x   /  AST  x   /  ALT  x   /  AlkPhos  x   06-17    PT/INR - ( 17 Jun 2025 07:43 )   PT: 13.4 sec;   INR: 1.18 ratio         PTT - ( 17 Jun 2025 07:43 )  PTT:38.4 sec      Urinalysis Basic - ( 17 Jun 2025 07:39 )    Color: x / Appearance: x / SG: x / pH: x  Gluc: 97 mg/dL / Ketone: x  / Bili: x / Urobili: x   Blood: x / Protein: x / Nitrite: x   Leuk Esterase: x / RBC: x / WBC x   Sq Epi: x / Non Sq Epi: x / Bacteria: x      RADIOLOGY & ADDITIONAL TESTS:  New Imaging Personally Reviewed Today:  New Electrocardiogram Personally Reviewed Today:  Other Results Reviewed Today:   Prior or Outpatient Records Reviewed Today with Summary:    COORDINATION OF CARE:  Consultant Communication and Details of Discussion (where applicable):

## 2025-07-02 LAB
CULTURE RESULTS: SIGNIFICANT CHANGE UP
SPECIMEN SOURCE: SIGNIFICANT CHANGE UP

## 2025-07-05 LAB
CULTURE RESULTS: SIGNIFICANT CHANGE UP
SPECIMEN SOURCE: SIGNIFICANT CHANGE UP

## 2025-07-12 LAB
CULTURE RESULTS: SIGNIFICANT CHANGE UP
SPECIMEN SOURCE: SIGNIFICANT CHANGE UP

## 2025-07-14 ENCOUNTER — APPOINTMENT (OUTPATIENT)
Dept: RHEUMATOLOGY | Facility: CLINIC | Age: 65
End: 2025-07-14

## 2025-07-14 ENCOUNTER — APPOINTMENT (OUTPATIENT)
Dept: RHEUMATOLOGY | Facility: CLINIC | Age: 65
End: 2025-07-14
Payer: MEDICARE

## 2025-07-14 VITALS
BODY MASS INDEX: 24.29 KG/M2 | DIASTOLIC BLOOD PRESSURE: 68 MMHG | HEART RATE: 84 BPM | WEIGHT: 132 LBS | HEIGHT: 62 IN | OXYGEN SATURATION: 98 % | TEMPERATURE: 98 F | SYSTOLIC BLOOD PRESSURE: 110 MMHG | RESPIRATION RATE: 16 BRPM

## 2025-07-14 PROBLEM — R76.8 POSITIVE ANA (ANTINUCLEAR ANTIBODY): Status: ACTIVE | Noted: 2025-07-14

## 2025-07-14 PROCEDURE — G2211 COMPLEX E/M VISIT ADD ON: CPT

## 2025-07-14 PROCEDURE — 99214 OFFICE O/P EST MOD 30 MIN: CPT

## 2025-07-17 ENCOUNTER — MED ADMIN CHARGE (OUTPATIENT)
Age: 65
End: 2025-07-17

## 2025-07-17 ENCOUNTER — OUTPATIENT (OUTPATIENT)
Dept: OUTPATIENT SERVICES | Facility: HOSPITAL | Age: 65
LOS: 1 days | End: 2025-07-17
Payer: MEDICARE

## 2025-07-17 ENCOUNTER — APPOINTMENT (OUTPATIENT)
Age: 65
End: 2025-07-17

## 2025-07-17 VITALS
WEIGHT: 130 LBS | OXYGEN SATURATION: 96 % | HEART RATE: 86 BPM | DIASTOLIC BLOOD PRESSURE: 70 MMHG | SYSTOLIC BLOOD PRESSURE: 108 MMHG | RESPIRATION RATE: 17 BRPM | BODY MASS INDEX: 23.78 KG/M2 | TEMPERATURE: 98.3 F

## 2025-07-17 DIAGNOSIS — Z23 ENCOUNTER FOR IMMUNIZATION: ICD-10-CM

## 2025-07-17 DIAGNOSIS — Z00.00 ENCOUNTER FOR GENERAL ADULT MEDICAL EXAMINATION WITHOUT ABNORMAL FINDINGS: ICD-10-CM

## 2025-07-17 DIAGNOSIS — J90 PLEURAL EFFUSION, NOT ELSEWHERE CLASSIFIED: ICD-10-CM

## 2025-07-17 DIAGNOSIS — K74.60 UNSPECIFIED CIRRHOSIS OF LIVER: ICD-10-CM

## 2025-07-17 DIAGNOSIS — E03.9 HYPOTHYROIDISM, UNSPECIFIED: ICD-10-CM

## 2025-07-17 DIAGNOSIS — K76.82 HEPATIC ENCEPHALOPATHY: ICD-10-CM

## 2025-07-17 PROBLEM — Z09 HOSPITAL DISCHARGE FOLLOW-UP: Status: ACTIVE | Noted: 2025-07-17

## 2025-07-17 PROCEDURE — 90471 IMMUNIZATION ADMIN: CPT

## 2025-07-17 PROCEDURE — G0009: CPT

## 2025-07-17 PROCEDURE — 90677 PCV20 VACCINE IM: CPT

## 2025-07-17 PROCEDURE — G0463: CPT

## 2025-07-17 RX ORDER — PROPRANOLOL HYDROCHLORIDE 10 MG/1
10 TABLET ORAL
Refills: 0 | Status: ACTIVE | COMMUNITY

## 2025-07-17 RX ORDER — FUROSEMIDE 40 MG/1
40 TABLET ORAL
Refills: 0 | Status: ACTIVE | COMMUNITY

## 2025-07-17 RX ORDER — CARVEDILOL 3.12 MG/1
3.12 TABLET, FILM COATED ORAL TWICE DAILY
Qty: 180 | Refills: 2 | Status: ACTIVE | COMMUNITY

## 2025-07-17 RX ORDER — MIDODRINE HYDROCHLORIDE 5 MG/1
5 TABLET ORAL 3 TIMES DAILY
Refills: 0 | Status: ACTIVE | COMMUNITY

## 2025-07-17 RX ORDER — URSODIOL 500 MG/1
500 TABLET ORAL DAILY
Refills: 0 | Status: ACTIVE | COMMUNITY

## 2025-07-17 RX ORDER — MULTIVITAMIN
TABLET ORAL
Refills: 0 | Status: ACTIVE | COMMUNITY

## 2025-07-17 RX ORDER — LACTULOSE 10 G/15ML
10 SOLUTION ORAL 3 TIMES DAILY
Refills: 0 | Status: ACTIVE | COMMUNITY

## 2025-07-19 LAB
CULTURE RESULTS: SIGNIFICANT CHANGE UP
SPECIMEN SOURCE: SIGNIFICANT CHANGE UP

## 2025-07-24 RX ORDER — LEVOTHYROXINE SODIUM 75 UG/1
75 CAPSULE ORAL DAILY
Qty: 90 | Refills: 0 | Status: ACTIVE | COMMUNITY
Start: 1900-01-01 | End: 1900-01-01

## 2025-07-24 RX ORDER — FOLIC ACID 1 MG/1
1 TABLET ORAL DAILY
Qty: 30 | Refills: 1 | Status: ACTIVE | COMMUNITY
Start: 1900-01-01 | End: 1900-01-01

## 2025-07-24 RX ORDER — SPIRONOLACTONE 25 MG/1
25 TABLET ORAL DAILY
Qty: 90 | Refills: 0 | Status: ACTIVE | COMMUNITY
Start: 1900-01-01 | End: 1900-01-01

## 2025-07-28 NOTE — H&P ADULT - NSCORESITESY/N_GEN_A_CORE_RD
You will receive a survey in the next couple days regarding your experience in the ED. We are constantly striving to improve our care and welcome your feedback. Thank you very much for your time.     The emergency department evaluation is not a complete evaluation, you're always required to followup with another doctor within the next few days to assess how your symptoms are progressing and to ensure that there is no indication for further testing or returning to the hospital.  Even with treatment sometimes your condition worsens and you will need to return to the hospital.  If you are having pain and it is getting worse you should return to the hospital.  If you have any new symptoms that were not addressed at your original visit you should return to the hospital. If you're having difficulty breathing but it is getting worse you should return to the hospital.  If you're vomiting and cannot take the medicines that were prescribed you should return to the hospital.  If you're having persistent fevers you should return to the hospital.  If you have any question of whether or not your symptoms are serious enough or for any other urgent concerns- always return to the hospital for repeat evaluation.     Yes

## 2025-07-30 ENCOUNTER — APPOINTMENT (OUTPATIENT)
Dept: GASTROENTEROLOGY | Facility: CLINIC | Age: 65
End: 2025-07-30
Payer: MEDICARE

## 2025-07-30 VITALS
BODY MASS INDEX: 25.03 KG/M2 | HEIGHT: 62 IN | OXYGEN SATURATION: 96 % | DIASTOLIC BLOOD PRESSURE: 68 MMHG | RESPIRATION RATE: 16 BRPM | SYSTOLIC BLOOD PRESSURE: 104 MMHG | TEMPERATURE: 98.5 F | WEIGHT: 136 LBS

## 2025-07-30 PROCEDURE — G2211 COMPLEX E/M VISIT ADD ON: CPT

## 2025-07-30 PROCEDURE — 99214 OFFICE O/P EST MOD 30 MIN: CPT

## 2025-07-30 RX ORDER — LEVOTHYROXINE SODIUM 0.07 MG/1
75 TABLET ORAL DAILY
Qty: 30 | Refills: 0 | Status: ACTIVE | COMMUNITY
Start: 2025-07-30 | End: 1900-01-01

## 2025-07-30 RX ORDER — FOLIC ACID 1 MG/1
1 TABLET ORAL DAILY
Qty: 30 | Refills: 0 | Status: ACTIVE | COMMUNITY
Start: 2025-07-30 | End: 1900-01-01

## 2025-07-30 RX ORDER — SPIRONOLACTONE 25 MG/1
25 TABLET ORAL DAILY
Qty: 30 | Refills: 3 | Status: ACTIVE | COMMUNITY
Start: 2025-07-30 | End: 1900-01-01

## 2025-07-30 RX ORDER — LACTULOSE 10 G/15ML
10 SOLUTION ORAL DAILY
Qty: 1 | Refills: 0 | Status: ACTIVE | COMMUNITY
Start: 2025-07-30 | End: 1900-01-01

## 2025-08-23 ENCOUNTER — APPOINTMENT (OUTPATIENT)
Age: 65
End: 2025-08-23